# Patient Record
Sex: FEMALE | Race: WHITE | Employment: UNEMPLOYED | URBAN - METROPOLITAN AREA
[De-identification: names, ages, dates, MRNs, and addresses within clinical notes are randomized per-mention and may not be internally consistent; named-entity substitution may affect disease eponyms.]

---

## 2020-07-17 ENCOUNTER — HOSPITAL ENCOUNTER (INPATIENT)
Facility: CLINIC | Age: 60
LOS: 17 days | Discharge: HOME HEALTH | End: 2020-08-03
Attending: PHYSICAL MEDICINE & REHABILITATION | Admitting: PHYSICAL MEDICINE & REHABILITATION
Payer: MEDICAID

## 2020-07-17 DIAGNOSIS — I63.9 ACUTE ISCHEMIC STROKE (H): Primary | ICD-10-CM

## 2020-07-17 DIAGNOSIS — E11.9 TYPE 2 DIABETES MELLITUS WITHOUT COMPLICATION, WITHOUT LONG-TERM CURRENT USE OF INSULIN (H): ICD-10-CM

## 2020-07-17 LAB
GLUCOSE BLDC GLUCOMTR-MCNC: 102 MG/DL (ref 70–99)
GLUCOSE BLDC GLUCOMTR-MCNC: 138 MG/DL (ref 70–99)

## 2020-07-17 PROCEDURE — 12800006 ZZH R&B REHAB

## 2020-07-17 PROCEDURE — 25000132 ZZH RX MED GY IP 250 OP 250 PS 637: Performed by: PHYSICIAN ASSISTANT

## 2020-07-17 PROCEDURE — 00000146 ZZHCL STATISTIC GLUCOSE BY METER IP

## 2020-07-17 RX ORDER — BISACODYL 10 MG
10 SUPPOSITORY, RECTAL RECTAL DAILY PRN
Status: DISCONTINUED | OUTPATIENT
Start: 2020-07-17 | End: 2020-08-03 | Stop reason: HOSPADM

## 2020-07-17 RX ORDER — POLYETHYLENE GLYCOL 3350 17 G/17G
17 POWDER, FOR SOLUTION ORAL DAILY PRN
Status: DISCONTINUED | OUTPATIENT
Start: 2020-07-17 | End: 2020-07-25

## 2020-07-17 RX ORDER — ACETAMINOPHEN 325 MG/1
325-975 TABLET ORAL EVERY 6 HOURS PRN
Status: DISCONTINUED | OUTPATIENT
Start: 2020-07-17 | End: 2020-08-03 | Stop reason: HOSPADM

## 2020-07-17 RX ORDER — SERTRALINE HYDROCHLORIDE 25 MG/1
25 TABLET, FILM COATED ORAL DAILY
Status: DISCONTINUED | OUTPATIENT
Start: 2020-07-18 | End: 2020-07-23

## 2020-07-17 RX ORDER — NICOTINE POLACRILEX 4 MG
15-30 LOZENGE BUCCAL
Status: DISCONTINUED | OUTPATIENT
Start: 2020-07-17 | End: 2020-08-03 | Stop reason: HOSPADM

## 2020-07-17 RX ORDER — CLOPIDOGREL BISULFATE 75 MG/1
75 TABLET ORAL DAILY
Status: DISCONTINUED | OUTPATIENT
Start: 2020-07-18 | End: 2020-08-03 | Stop reason: HOSPADM

## 2020-07-17 RX ORDER — ASPIRIN 81 MG/1
81 TABLET ORAL DAILY
Status: DISCONTINUED | OUTPATIENT
Start: 2020-07-18 | End: 2020-08-03 | Stop reason: HOSPADM

## 2020-07-17 RX ORDER — ATORVASTATIN CALCIUM 40 MG/1
40 TABLET, FILM COATED ORAL EVERY EVENING
Status: DISCONTINUED | OUTPATIENT
Start: 2020-07-17 | End: 2020-08-03 | Stop reason: HOSPADM

## 2020-07-17 RX ORDER — LISINOPRIL 20 MG/1
20 TABLET ORAL DAILY
Status: DISCONTINUED | OUTPATIENT
Start: 2020-07-18 | End: 2020-07-20

## 2020-07-17 RX ORDER — DEXTROSE MONOHYDRATE 25 G/50ML
25-50 INJECTION, SOLUTION INTRAVENOUS
Status: DISCONTINUED | OUTPATIENT
Start: 2020-07-17 | End: 2020-08-03 | Stop reason: HOSPADM

## 2020-07-17 RX ORDER — HYDRALAZINE HYDROCHLORIDE 25 MG/1
25 TABLET, FILM COATED ORAL 3 TIMES DAILY PRN
Status: DISCONTINUED | OUTPATIENT
Start: 2020-07-17 | End: 2020-08-03 | Stop reason: HOSPADM

## 2020-07-17 RX ORDER — AMLODIPINE BESYLATE 10 MG/1
10 TABLET ORAL DAILY
Status: DISCONTINUED | OUTPATIENT
Start: 2020-07-18 | End: 2020-08-03 | Stop reason: HOSPADM

## 2020-07-17 RX ADMIN — METFORMIN HYDROCHLORIDE 500 MG: 500 TABLET ORAL at 18:29

## 2020-07-17 RX ADMIN — HYDRALAZINE HYDROCHLORIDE 25 MG: 25 TABLET ORAL at 19:16

## 2020-07-17 RX ADMIN — DICLOFENAC SODIUM 2 G: 10 GEL TOPICAL at 18:31

## 2020-07-17 RX ADMIN — ATORVASTATIN CALCIUM 40 MG: 40 TABLET, FILM COATED ORAL at 21:16

## 2020-07-17 RX ADMIN — DICLOFENAC SODIUM 2 G: 10 GEL TOPICAL at 21:16

## 2020-07-17 RX ADMIN — Medication 37.5 MG: at 21:16

## 2020-07-17 RX ADMIN — ACETAMINOPHEN 650 MG: 325 TABLET, FILM COATED ORAL at 19:16

## 2020-07-17 ASSESSMENT — MIFFLIN-ST. JEOR: SCORE: 1388.26

## 2020-07-17 NOTE — H&P
"    [facility]   Acute Rehabilitation Unit  Admission History and Physical    CHIEF COMPLAINT   \"I think I had a stroke\"    HISTORY OF PRESENT ILLNESS  Tara Odell is a 59-year-old female with no significant past medical history who presented with  tingling sensation on the right side, nausea and emesis, and memory impairments.  On arrival to the ER patient was noted to have right visual field cut.  Stroke code was called. She was not given TPAdue to hypertension.   CTA head showed left P2 occlusion which was not amenable for endovascular intervention.  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  Was seen and evaluated by neurology who recommend DAPT indefinitely given severe intracranial atherosclerotic disease, bp, HLD and dm control.      Status post cerebral angiogram which showed diffuse intracranial atherosclerotic disease in proximal basilar artery and left posterior cerebral artery distally. Mild fusiform enlargement of proximal cavernous left internal carotid artery as well as small left-sided aneurysm arising from control supraclinoid left internal carotid artery.  In addition to intracranial atherosclerosis workup revealed Hgb A1C 7.0% with new diabetes diagnosis, hyperlidemia with total cholesterol 226, . Echo with normal EF, moderate hypertrophy. Was started on medications for management of HTN, HLD, and diabetes during hospitalization will need ongoing monitoring and titration.    During her acute hospitalization, patient was seen and evaluated by PT, SLP and OT, who collectively " "recommended that patient would benefit from ongoing therapies in the acute inpatient rehabilitation setting.       In review of the therapy notes patient noted to have impaired balance, weakness, right field cut/neglect, impaired cognition and aphasia. She is currently min assist for seated grooming, transfers with mod-max assist walking up with 55 feet with mod assist and walker.      On arrival to rehab able to state her name and in response to why she is at rehab stated \"I think I had a stroke\". She was unable to name her deficits, she denied n/v/d, sob, headaches, dizziness, fever, and pain. She reported urge to void with suprapubic fullness reportedly doss catheter was removed several hours prior to admission, she has not yet voided.  Tara reports her appetite is poor and expresses frustration/sadness about her situation.  She denies vision/hearing changes, is motivated to get home to see grand babies. She reports right sided sensation changes.       PAST MEDICAL HISTORY   Reviewed and updated in Epic.  No past medical history on file.    SURGICAL HISTORY  Reviewed and updated in Epic.  No past surgical history on file.    SOCIAL HISTORY  Reviewed and updated in Epic.  Marital Status: single  Living situation: living with mother and brother in house, has 2 adult children   Family support: supportive  Vocational History: working at   Tobacco use: none  Alcohol use: none  Illicit drug use: none  Social History     Socioeconomic History     Marital status: Not on file     Spouse name: Not on file     Number of children: Not on file     Years of education: Not on file     Highest education level: Not on file   Occupational History     Not on file   Social Needs     Financial resource strain: Not on file     Food insecurity     Worry: Not on file     Inability: Not on file     Transportation needs     Medical: Not on file     Non-medical: Not on file   Tobacco Use     Smoking status: Not on file   Substance " "and Sexual Activity     Alcohol use: Not on file     Drug use: Not on file     Sexual activity: Not on file   Lifestyle     Physical activity     Days per week: Not on file     Minutes per session: Not on file     Stress: Not on file   Relationships     Social connections     Talks on phone: Not on file     Gets together: Not on file     Attends Zoroastrianism service: Not on file     Active member of club or organization: Not on file     Attends meetings of clubs or organizations: Not on file     Relationship status: Not on file     Intimate partner violence     Fear of current or ex partner: Not on file     Emotionally abused: Not on file     Physically abused: Not on file     Forced sexual activity: Not on file   Other Topics Concern     Not on file   Social History Narrative     Not on file       FAMILY HISTORY  Reviewed and updated in Epic.  Family History   Problem Relation Age of Onset     Heart Disease Mother      Heart Disease Father          PRIOR FUNCTIONAL HISTORY   Pt was independent with all ADLs/IADLs, transfers, mobility and gait.      MEDICATIONS  Scheduled meds  No medications prior to admission.       ALLERGIES   Allergies not on file      REVIEW OF SYSTEMS  A 10 point ROS was performed and negative unless otherwise noted in HPI.       PHYSICAL EXAM  VITAL SIGNS:  BP (!) 152/77 (BP Location: Right arm)   Pulse 65   Temp 98.1  F (36.7  C) (Oral)   Resp 20   Ht 1.626 m (5' 4\")   Wt 83.3 kg (183 lb 11.2 oz)   SpO2 96%   BMI 31.53 kg/m    BMI:  There is no height or weight on file to calculate BMI.     General: awake alert  HEENT: no scleral irritation/, mmm  Pulmonary: non labored clear  Cardiovascular: rrr  Abdominal: soft non tender, reported discomfort in suprapubic area.   Extremities: warm, well perfused, no edema in bilateral lower extremities, no tenderness in calves   MSK/neuro:   Mental Status:  alert and oriented to self and somewhat to situation.    Cranial Nerves:   1. 2nd CN: Pupils " equal, round, reactive  2. 3rd,4th,6th CN:  EOMI, appropriate pupillary responses.  Significant right field cut (hemianopsia)  3. 5th CN: facial sensation intact   4. 7th CN: face symmetrical   5. 8th CN: functional hearing bilaterally  6. 9th, 10th CN: palate elevates symmetrically   7. 11th CN: sternocleidomastoids and trapezii strong   8. 12th CN: tongue midline and without fasciculations     Sensory: reports impaired sensation on right.    Strength: 4/5 in all muscle groups of the left upper and lower   SF  EF  EE  WE  G  HF  KE  DF  EHL  PF   R  3           4-         3            4         4          4-         4-         4           4          4     Reflexes: Present and symmetrical    Navarro's test: negative bilaterally    Tone per modified Mustapha Scale: none   Abnormal movements: None    Coordination: appears slowed/ off on right ? Related to vision   Speech: memory impaired word finding problems.- anomia    Cognition: memory impaired follows simple commands   Gait: not tested.  Skin: no bruising or bleeding      LABS/imaging  Hgb A1C 7.0  Total Cholesterol 226     Echo   Left ventricle ejection fraction is normal. The calculated left   ventricular ejection fraction is 60%.    Normal left ventricular size. Moderate hypertrophy noted    Normal right ventricular size and systolic function.    No significant valvualr abnormalities noted    No previous study for comparison.    MRI   1.  Left posterior cerebral artery is supplied via the left posterior communicating artery. There is occlusion of the left posterior communicating artery at the expected P1/P2 junction without flow seen more distally.    2.  Severe multifocal areas of stenosis are seen within the proximal intradural vertebral arteries and basilar artery as seen on prior CTA.    3.  There is aneurysmal dilatation/fusiform ectasia of the cavernous segment of the left internal carotid artery measuring up to 9 mm in diameter.    4.  Large area  of restricted diffusion is seen involving the medial aspect of the left temporal and occipital lobes with a smaller area in the left putamen and compatible with areas of ischemic change.    CT BRAIN WITHOUT CONTRAST:   1. No finding for intracranial hemorrhage or mass or convincing finding for acute infarct. Small areas of patchy white matter low-attenuation change are noted, nonspecific and favored to reflect sequela of chronic microvascular ischemic change given the   patient's age. In the absence of priors for comparison, small areas of acute on chronic ischemic change cannot be excluded.  2. Small chronic infarcts are seen within both parietal lobes. Mild volume loss.    CTA [place] OF LUCAS:   1. There is abrupt occlusion of the left posterior cerebral artery at the level of the P2 junction. Left posterior cerebral artery is derived via the left posterior communicating artery. Left P1 segment is not identified.  2. There is smooth tapering of the proximal P1 segment of the right posterior cerebral artery. Although favored to reflect congenital variation, acquired occlusion cannot be excluded. P2 and more distal segments of the right posterior cerebral artery is   supplied via the right posterior communicating artery.  3. Extensive coarse atherosclerotic plaque is seen within both carotid siphons, intradural vertebral arteries and within the basilar artery. There is multifocal severe stenosis within the vertebral and basilar arteries. More mild narrowing is seen in   both carotid siphons without clear flow limiting stenosis. Areas of mild narrowing are seen within the distal EULALIA and MCA branches.    CTA OF THE NECK:  1. Mild atherosclerotic plaque left carotid bulb/bifurcation without significant stenosis either cervical carotid or vertebral system by modified NASCET criteria. No findings for dissection.    IMPRESSION/PLAN:  Tara BEAN Jose R is a 59 year old woman who presented with right sided  weakness/sensation changes, impaired cognition, nausea and vomiting noted to have visual field cut on admission, diagnosed with Left PCA stroke with severe multifocal intracranial atherosclerotic disease, HTN, HLD, and type 2 diabetes. Admitted to acute rehab 7/17 for ongoing rehabilitation and medical management.     Admission to acute inpatient rehab Left PCA stroke.    Impairment group code: 01.2      1. PT, OT and SLP 60 minutes of each on a daily basis, in addition to rehab nursing and close management of physiatrist.      2. Impairment of ADL's: Noted to have impaired strength, balance, vision, cognition, and activity tolerance. Currently max assist for clothing management with toileting, mod-max assist with transfers, mod assist for supine to sit, min assist for seated grooming/hygine. Goals for mod I with basic ADLs.     3. Impairment of mobility:  Noted to have strength, balance, vision, cognition, and activity tolerance, currently sit to stand with min assist, ambulating up to 55 feet with mod assist, goals for mod I with basic mobility and transfers, and cga for stairs.       4. Impairment of cognition/language/swallow:  Noted to have impaired cognition, and expressive aphasia. Goals to improve cognitive/linguistic skills.     5. Medical Conditions  Left PCA infarction   severe multifocal intracranial atherosclerotic disease    Patient admitted with right visual field cut, CT and CTA showed left PCA infarction along with extensive atherosclerotic disease involving the carotid and vertebrobasilar system. cerebral angiogram showed mild fusiform dilatation of the right and left ICA with diffuse intracranial atherosclerotic disease most notably 70% narrowing of the proximal basilar artery  -continue Dual antiplatelet therapy with aspirin and Plavix,  indefinitely   - continue  Lipitor 40 mg daily with goal of LDL less than 70.  - HTN management as below-  goal should be 120-130/80  -continue Physical,  occupational and speech therapy   -DM management as below Hemoglobin A1c goal <7.  - Follow-up with neurology (Dr. Duarte) in 6 to 8 weeks     Essential hypertension.  goal <130/80. Hypertensive on admission with initiation of BP meds starting 7/14, with ongoing titration.   -continue Amlodipine 10 mg daily, lisinopril 20 mg daily and metoprolol 37.5 daily. If bp remains elevated >130 would increase lisinopril   -hydralazine 25 mg tid prn.     Hypercholesteremia    . Goal <70. Started on statin during acute hospitalization.     -Continue atorvastatin 40 mg daily.    New diagnosis of DM 2.   HbA1C 7%  -Start  Metformin 500 mg daily  - monitor blood glucose levels- anticipate transition to bid 7/20  -ssi     Left hip pain: no fractures on xray no effusion, ? Trochanteric bursitis  - apply diclofenac ointment scheduled/ prn tylenol  -monitor.      6. Adjustment to disability:  Clinical psychology to eval and treat  7. FEN: reg  8. Bowel: continent  9. Bladder: doss reportedly removed 7/17- tov/ - monitor pvrs st cath as indicated  10. DVT Prophylaxis: ambulation  11. GI Prophylaxis: reg  12. Code: full confirmed on admission.   13. Disposition: goal for home  14. ELOS:  ~2 weeks.  15. Rehab prognosis:  Fair.   16. Follow up Appointments on Discharge: neurology, pcp       discussed with Dr. Holland , PM&R staff physician     Elisabet Chavez PA-C  Rehab Service  Pager [phone]

## 2020-07-17 NOTE — PLAN OF CARE
PT: Attempted to see pt for same day eval. Brief conversation with pt in which pt expressing she is tired and becoming tearful over hospital stay, writer providing therapeutic listening. Receiving a phone call, declined further. Will see tomorrow at scheduled appointment time.

## 2020-07-17 NOTE — PROGRESS NOTES
07/17/20 2013   Visit Information   Visit Made By Staff    Type of Visit Initial;Staff consultation/triage   Visited Patient  (Televisit)   Interventions   Plan of Care Review   With patient/family/proxy   Basic Spiritual Interventions    introduction/orientation to Spiritual Health Services;Assessment of spiritual needs/resources;Prayer;Reflective conversation   Advanced Assessments/Interventions   Presenting Concerns/Issues Spiritual/Sikh/emotional support;Grief and adjustment issues;Self-concept disturbance;Challenged coping;Stress/self-care   SPIRITUAL HEALTH SERVICES: Tele-Encounter  Patient Location (Hospital, [facility], Unit): [facility], [facility], 5R  Spoke with (patient, family relationship): Pt.      Referral Source: Epic consult.    DATA:  Spoke with Tara who had just arrived on unit. She said that she wasn't sure what happened or why it happened. 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  Tara lives with her mom, Gbariela and her brother, Graham.  She has a dtr, Anabel, and a son, Tong.  She said that her , Paula Nguyen, had spoken with her by phone.  Tara first said that she was Mormonism, then she said that she is Pentecostal. We shared a prayer for her healing and protection and that of her family.       PLAN:  Will speak with Tara next week on ARU.    Chaplain LARS MENCHACA    ______________________________    Type of service:  Telephone Visit     has received verbal consent for a TelephoneVisit from the patient? Yes    Distance Provider Location: designated [place] office or home office (secure setting)    Mode of Communication: telephone (via [employer] phone or [employer] website tele-call-number ([phone]))

## 2020-07-17 NOTE — PLAN OF CARE
Arrived at 1430 to unit. Patient is Alert to self, disoriented to time, situation. A1 with walker and gait belt. Per report from [facility]'s doss was pulled at 1200. Patient attempted to void but able, random scan was 57. Continue with POC.

## 2020-07-18 ENCOUNTER — APPOINTMENT (OUTPATIENT)
Dept: OCCUPATIONAL THERAPY | Facility: CLINIC | Age: 60
End: 2020-07-18
Payer: MEDICAID

## 2020-07-18 ENCOUNTER — APPOINTMENT (OUTPATIENT)
Dept: PHYSICAL THERAPY | Facility: CLINIC | Age: 60
End: 2020-07-18
Payer: MEDICAID

## 2020-07-18 LAB
GLUCOSE BLDC GLUCOMTR-MCNC: 107 MG/DL (ref 70–99)
GLUCOSE BLDC GLUCOMTR-MCNC: 130 MG/DL (ref 70–99)
GLUCOSE BLDC GLUCOMTR-MCNC: 140 MG/DL (ref 70–99)
GLUCOSE BLDC GLUCOMTR-MCNC: 143 MG/DL (ref 70–99)

## 2020-07-18 PROCEDURE — 25000131 ZZH RX MED GY IP 250 OP 636 PS 637: Performed by: PHYSICIAN ASSISTANT

## 2020-07-18 PROCEDURE — 97530 THERAPEUTIC ACTIVITIES: CPT | Mod: GP | Performed by: PHYSICAL THERAPIST

## 2020-07-18 PROCEDURE — 25000132 ZZH RX MED GY IP 250 OP 250 PS 637: Performed by: PHYSICAL MEDICINE & REHABILITATION

## 2020-07-18 PROCEDURE — 97166 OT EVAL MOD COMPLEX 45 MIN: CPT | Mod: GO

## 2020-07-18 PROCEDURE — 12800006 ZZH R&B REHAB

## 2020-07-18 PROCEDURE — 97535 SELF CARE MNGMENT TRAINING: CPT | Mod: GO

## 2020-07-18 PROCEDURE — 00000146 ZZHCL STATISTIC GLUCOSE BY METER IP

## 2020-07-18 PROCEDURE — 97162 PT EVAL MOD COMPLEX 30 MIN: CPT | Mod: GP | Performed by: PHYSICAL THERAPIST

## 2020-07-18 PROCEDURE — 97116 GAIT TRAINING THERAPY: CPT | Mod: GP | Performed by: PHYSICAL THERAPIST

## 2020-07-18 PROCEDURE — 25000132 ZZH RX MED GY IP 250 OP 250 PS 637: Performed by: PHYSICIAN ASSISTANT

## 2020-07-18 PROCEDURE — 97110 THERAPEUTIC EXERCISES: CPT | Mod: GO

## 2020-07-18 RX ORDER — LANOLIN ALCOHOL/MO/W.PET/CERES
3 CREAM (GRAM) TOPICAL
Status: DISCONTINUED | OUTPATIENT
Start: 2020-07-18 | End: 2020-08-03 | Stop reason: HOSPADM

## 2020-07-18 RX ADMIN — ASPIRIN 81 MG: 81 TABLET ORAL at 09:05

## 2020-07-18 RX ADMIN — MELATONIN TAB 3 MG 3 MG: 3 TAB at 22:58

## 2020-07-18 RX ADMIN — CLOPIDOGREL BISULFATE 75 MG: 75 TABLET, FILM COATED ORAL at 09:05

## 2020-07-18 RX ADMIN — DICLOFENAC SODIUM 2 G: 10 GEL TOPICAL at 09:12

## 2020-07-18 RX ADMIN — ACETAMINOPHEN 650 MG: 325 TABLET, FILM COATED ORAL at 20:12

## 2020-07-18 RX ADMIN — LISINOPRIL 20 MG: 20 TABLET ORAL at 09:05

## 2020-07-18 RX ADMIN — DICLOFENAC SODIUM 2 G: 10 GEL TOPICAL at 13:52

## 2020-07-18 RX ADMIN — HYDRALAZINE HYDROCHLORIDE 25 MG: 25 TABLET ORAL at 23:30

## 2020-07-18 RX ADMIN — DICLOFENAC SODIUM 2 G: 10 GEL TOPICAL at 20:11

## 2020-07-18 RX ADMIN — Medication 37.5 MG: at 09:05

## 2020-07-18 RX ADMIN — SERTRALINE HYDROCHLORIDE 25 MG: 25 TABLET ORAL at 09:05

## 2020-07-18 RX ADMIN — Medication 37.5 MG: at 20:11

## 2020-07-18 RX ADMIN — HYDRALAZINE HYDROCHLORIDE 25 MG: 25 TABLET ORAL at 16:18

## 2020-07-18 RX ADMIN — INSULIN ASPART 1 UNITS: 100 INJECTION, SOLUTION INTRAVENOUS; SUBCUTANEOUS at 17:50

## 2020-07-18 RX ADMIN — ATORVASTATIN CALCIUM 40 MG: 40 TABLET, FILM COATED ORAL at 20:10

## 2020-07-18 RX ADMIN — AMLODIPINE BESYLATE 10 MG: 10 TABLET ORAL at 09:05

## 2020-07-18 RX ADMIN — METFORMIN HYDROCHLORIDE 500 MG: 500 TABLET ORAL at 17:50

## 2020-07-18 RX ADMIN — DICLOFENAC SODIUM 2 G: 10 GEL TOPICAL at 16:19

## 2020-07-18 ASSESSMENT — ACTIVITIES OF DAILY LIVING (ADL)
PREVIOUS_RESPONSIBILITIES: MEAL PREP;HOUSEKEEPING;LAUNDRY;SHOPPING;YARDWORK;MEDICATION MANAGEMENT;FINANCES;DRIVING;WORK;CHILD CARE

## 2020-07-18 NOTE — PHARMACY-MEDICATION REGIMEN REVIEW
Pharmacy Medication Regimen Review  Tara Odell is a 59 year old female who is currently in the Acute Rehab Unit.    Assessment: All medications have an appropriate indications, durations and no unnecessary use was found    Plan:   Continue medications as ordered.  Attending provider will be sent this note for review.  If there are any emergent issues noted above, pharmacist will contact provider directly by phone.      Pharmacy will periodically review the resident's medication regimen for any PRN medications not administered in > 72 hours and discontinue them. The pharmacist will discuss gradual dose reductions of psychopharmacologic medications with interdisciplinary team on a regular basis.    Please contact pharmacy if the above does not answer specific medication questions/concerns.    Background:  A pharmacist has reviewed all medications and pertinent medical history today.  Medications were reviewed for appropriate use and any irregularities found are listed with recommendations.      Current Facility-Administered Medications:      acetaminophen (TYLENOL) tablet 325-975 mg, 325-975 mg, Oral, Q6H PRN, Elisabet Chavez PA, 650 mg at 07/17/20 1916     amLODIPine (NORVASC) tablet 10 mg, 10 mg, Oral, Daily, Elisabet Chavez PA, 10 mg at 07/18/20 0905     aspirin EC tablet 81 mg, 81 mg, Oral, Daily, Elisabet Chavez PA, 81 mg at 07/18/20 0905     atorvastatin (LIPITOR) tablet 40 mg, 40 mg, Oral, QPM, Elisabet Chavez PA, 40 mg at 07/17/20 2116     bisacodyl (DULCOLAX) Suppository 10 mg, 10 mg, Rectal, Daily PRN, Elisabet Chavez PA     clopidogrel (PLAVIX) tablet 75 mg, 75 mg, Oral, Daily, Elisabet Chavez PA, 75 mg at 07/18/20 0905     glucose gel 15-30 g, 15-30 g, Oral, Q15 Min PRN **OR** dextrose 50 % injection 25-50 mL, 25-50 mL, Intravenous, Q15 Min PRN **OR** glucagon injection 1 mg, 1 mg, Subcutaneous, Q15 Min PRN, Elisabet Chavez PA     diclofenac (VOLTAREN) 1 % topical  gel 2 g, 2 g, Transdermal, 4x Daily, Elisabet Chavez PA, 2 g at 07/18/20 0912     hydrALAZINE (APRESOLINE) tablet 25 mg, 25 mg, Oral, TID PRN, Elisabet Chavez PA, 25 mg at 07/17/20 1916     insulin aspart (NovoLOG) injection (RAPID ACTING), 1-3 Units, Subcutaneous, TID AC, Elisabet Chavez PA     insulin aspart (NovoLOG) injection (RAPID ACTING), 1-3 Units, Subcutaneous, At Bedtime, Elisabet Chavez PA     lisinopril (ZESTRIL) tablet 20 mg, 20 mg, Oral, Daily, Elisabet Chavez PA, 20 mg at 07/18/20 0905     metFORMIN (GLUCOPHAGE) tablet 500 mg, 500 mg, Oral, Daily with supper, Elisabet Chavez PA, 500 mg at 07/17/20 1829     metoprolol tartrate (LOPRESSOR) half-tab 37.5 mg, 37.5 mg, Oral, BID, Elisabet Chavez PA, 37.5 mg at 07/18/20 0905     polyethylene glycol (MIRALAX) Packet 17 g, 17 g, Oral, Daily PRN, Elisabet Chavez PA     sertraline (ZOLOFT) tablet 25 mg, 25 mg, Oral, Daily, Elisabet Chavez PA, 25 mg at 07/18/20 0905  No current outpatient prescriptions on file.

## 2020-07-18 NOTE — PROGRESS NOTES
07/18/20 0945   Quick Adds   Type of Visit Initial PT Evaluation       Present no   Living Environment   Lives With parent(s);sibling(s)  (Mother who she cares for, and brother)   Living Arrangements house   Home Accessibility stairs within home   Number of Stairs, Within Home, Primary other (see comments)  (flight of stairs to basement)   Transportation Anticipated family or friend will provide   Living Environment Comment Pt unsure number of LATRICE or with handrails inside or outside   Self-Care   Usual Activity Tolerance good   Current Activity Tolerance fair   Equipment Currently Used at Home none   Functional Level Prior   Ambulation 0-->independent   Transferring 0-->independent   Toileting 0-->independent   Bathing 0-->independent   Communication 0-->understands/communicates without difficulty   Swallowing 0-->swallows foods/liquids without difficulty   Cognition 0 - no cognition issues reported   Fall history within last six months no   Which of the above functional risks had a recent onset or change? 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   Pertinent History of Current Problem (include personal factors and/or comorbidities that impact the POC) Moderate   Precautions/Limitations fall precautions   Weight-Bearing Status - LUE full weight-bearing   Weight-Bearing Status - RUE full weight-bearing   Weight-Bearing Status - LLE full weight-bearing   Weight-Bearing Status - RLE full weight-bearing   Heart Disease Risk Factors Diabetes;High blood pressure;Dislipidemia;Overweight   General Observations quite forgetful and fatigued   Cognitive Status Examination   Orientation person;other (see comments)  (but not place, time, or situation)   Level of  Consciousness alert   Follows Commands and Answers Questions 75% of the time   Personal Safety and Judgment other (Must comment)  (Appears intact)   Memory impaired   Cognitive Comment quite forgetful   Pain Assessment   Patient Currently in Pain No   Integumentary/Edema   Integumentary/Edema no deficits were identifed   Posture    Posture Forward head position;Protracted shoulders;Kyphosis   Range of Motion (ROM)   ROM Comment WFL bilat LE   Strength   Manual Muscle Testing Quick Adds MMT: Hip;MMT: Knee;MMT: Ankle   MMT: Hip, Rehab Eval   Hip Flexion - Left Side (3+/5) fair plus, left   Hip Extension - Left Side (3+/5) fair plus, left   Hip ABduction - Left Side (3+/5) fair plus, left   Hip Flexion - Right Side (3/5) fair, right   Hip Extension - Right Side (3/5) fair, right   Hip ABduction - Right Side (3/5) fair, right   MMT: Knee, Rehab Eval   Knee Flexion - Left Side (4-/5) good minus, left   Knee Extension - Left Side (3+/5) fair plus, left   Knee Flexion - Right Side (4-/5) good minus, right   Knee Extension - Right Side (3/5) fair, right   MMT: Ankle, Rehab Eval   Ankle Dorsiflexion - Left Side   (Attmpted ankle MMT but pt w/ difficulty following directions)   ARC Assessment Only   Acute Rehab Functional Assessment See IP Rehab Daily Documentation Flowsheet for Functional Mobility/ADL Assessment   Balance   Balance Comments Fair seated balance. Able to maintain EOB with SBA. Poor standing balance without UE A through FWW.   Sensory Examination   Sensory Perception other (describe)   Sensory Perception Comments Difficult to assess, but appears intact to light touch. Proprioception 2/5 at bilat great toe, 4/5 L ankle, 1/5 R ankle- Pt appears to be primarily guessing.   Coordination   Coordination other (see comments)   Coordination Comments Moderate impairment noted R UE, although this may be due to visual field cut. Unable to assess LE coordination due to difficulty with following commands.   Muscle Tone    Muscle Tone Comments N/A   Modality Interventions   Planned Modality Interventions Thermotherapy: Hydrocollator Packs;Cryotherapy   General Therapy Interventions   Planned Therapy Interventions balance training;bed mobility training;gait training;groups;manual therapy;motor coordination training;neuromuscular re-education;ROM;strengthening;stretching;transfer training;home program guidelines;progressive activity/exercise;risk factor education   Clinical Impression   Criteria for Skilled Therapeutic Intervention yes, treatment indicated   PT Diagnosis Decreased strength, decreased balance   Influenced by the following impairments decreased strength, decreased balance, impaired vision/likely R neglect, impaired cognition   Functional limitations due to impairments Increased difficulty with bed mobility, transfers, ambulation, stairs   Clinical Presentation Evolving/Changing   Clinical Presentation Rationale Impaired cognition may be a limiting factor for this patient.   Clinical Decision Making (Complexity) Moderate complexity   Therapy Frequency Daily   Predicted Duration of Therapy Intervention (days/wks) 14   Anticipated Equipment Needs at Discharge gait belt;front wheeled walker   Anticipated Discharge Disposition Home with Assist;Home with Home Therapy;Home with Outpatient Therapy  (with supervision)   Risk & Benefits of therapy have been explained Yes   Patient, Family & other staff in agreement with plan of care Yes   Total Evaluation Time   Total Evaluation Time (Minutes) 30

## 2020-07-18 NOTE — H&P
Post Admission Physician Evaluation:    I have compared Tara Odell's condition on admission to acute rehabilitation to that outlined in the preadmission screen. History and physical exam performed by SALINAS Abad, and myself.  No significant differences are identified and the patient remains appropriate for an inpatient rehabilitation facility level of care to manage medical issues and address functional impairments due to L PCA CVA.    Comorbid medical conditions being managed: Multivessel intracranial stenosis, HTN, DM II, HLD    Prior functional level:   Independent with ambulation, mobility, and ADLs    Present function:   impaired balance, weakness, right field cut/neglect, impaired cognition and aphasia. She is currently min assist for seated grooming, transfers with mod-max assist walking up with 55 feet with mod assist and walker.      Anticipated rehabilitation course:   Anticipate discharge home at a supervision level for ambulation, mobility, and ADLs    Will benefit from intensive rehabilitation includin minutes each of PT, OT and SLP  Rehabilitation nursing  Close management by physiatry    Prognosis: Good    Estimated length of stay: 14 days    Alfred Holland MD  Department of Rehabilitation Medicine  Pager: [phone]

## 2020-07-18 NOTE — PLAN OF CARE
Discharge Planner OT   Patient plan for discharge: home with family support  Current status: min a transfers cga sit to stand, l/b dressing don socks sitting in low chair sba  Barriers to return to prior living situation: decrease strength r u/e decreae adls and Iadls  Recommendations for discharge: home with op ot         Entered by: Sheila Ospina 07/18/2020 4:13 PM

## 2020-07-18 NOTE — PLAN OF CARE
FOCUS/GOAL  Bowel management, Bladder management, and Pain management    ASSESSMENT, INTERVENTIONS AND CONTINUING PLAN FOR GOAL:  Pt woke up,  c/o discomfort but could not describe in detail, confused, and emotional around 0400.   After patient voided and had A sponge mat, patient fell asleep without complains.  PVR 30 ml.  Incontinent of bladder before went to the toilet.   Ax 1 with walker and GB to transfer.   LBM on 7/17.   BP elevated this shift, almost match the PRN Hydralazine perimeter,  Rechecked BP in minutes and dropped a little to 155/80. Pt denied pain, SOB, chest pain.   Called for assistance. Bed alarm on.

## 2020-07-18 NOTE — PLAN OF CARE
Orders received for SLP evaluation and chart reviewed. Approached patient but patient too somnolent to participate. Pt responds to verbal cues but then quickly falls back asleep. Will re-approach later in day as schedule allows. RN notified. -60 minutes SLP.

## 2020-07-18 NOTE — PROGRESS NOTES
"CLINICAL NUTRITION SERVICES - ASSESSMENT NOTE     Nutrition Prescription    RECOMMENDATIONS FOR MDs/PROVIDERS TO ORDER:  Assistance with meals as needed     Malnutrition Status:    Unable to determine due to no NFPA completed     Recommendations already ordered by Registered Dietitian (RD):  Diet education  Boost GC BID    Future/Additional Recommendations:  Additional DM diet eduction as pt becomes more appropriate  Adjust supplements per pt preference   Consider need for CHO restriction once intakes improve    Unable to obtain in-person nutrition history or nutrition focused physical assessment (NFPA) from patient to limit exposure and to minimize use of PPE during COVID-19 pandemic. Spoke to pt over the phone.     REASON FOR ASSESSMENT  Tara Odell is a 59 year old female assessed by the dietitian for Admission Nutrition Risk Screen for new/uncontrolled diabetes  No significant PMH admitted for CVA workup revealed Hgb A1C 7.0% with new diabetes diagnosis, hyperlidemia with total cholesterol 226, .  NUTRITION HISTORY  Pt unsure of UBW but believes wt is stable. Noted 5# wt loss in 1 week per documented wt history. Pt was unsure of what she normally eats at home but stated she will usually eat a breakfast, lunch and dinner. Pt was diagnosed with DM during admit and started on metformin. Stated she was not informed of this diagnosis but seemed confused during assessment. Pt stated her mom has DM (lives with pt) but is unsure if she follows a DM diet.     CURRENT NUTRITION ORDERS  Diet: Regular  Intake/Tolerance: Pt confused during assessment. When asked about appetite stated \"I dont know, mostly tired\".  Was too somnolent to participate in speech therapy this morning. Did not eat any breakfast this morning per flowsheet. Lunch tray in room during assessment, pt asked what she ordered, RD told her but pt asked again a few minutes later. Briefly discussed what foods contain carbohydrate and provided " "pt with a general DM diet handout. Was not appropriate for further education at this time.     LABS  Labs reviewed    MEDICATIONS  Medications reviewed;  novolog  Metformin  PRN: dulcolax, miralax    ANTHROPOMETRICS  Height: 162.6 cm (5' 4\")  Most Recent Weight: 83.3 kg (183 lb 11.2 oz)    IBW: 54.5 kg  BMI: 31.53 kg/m^2, Obesity Grade I BMI 30-34.9  Weight History: 5# (2.6%) wt loss in 1 week  Wt Readings from Last 10 Encounters:   07/17/20 83.3 kg (183 lb 11.2 oz)   07/10/20 85.3 kg (188 lb)     Dosing Weight: 61.7 kg (adjsuted using current wt of 83.3 kg and ideal wt of 54.5 kg)    ASSESSED NUTRITION NEEDS  Estimated Energy Needs: 6595-1485 kcals/day (25 - 30 kcals/kg)  Justification: Maintenance  Estimated Protein Needs: 62-74 grams protein/day (1 - 1.2 grams of pro/kg)  Justification: Obesity guidelines  Estimated Fluid Needs: 2632-4490 mL/day (1 mL/kcal)   Justification: Maintenance or Per provider pending fluid status    PHYSICAL FINDINGS  See malnutrition section below.    MALNUTRITION  % Intake: Decreased intake does not meet criteria  % Weight Loss: > 2% in 1 week (severe)  Subcutaneous Fat Loss: Unable to assess  Muscle Loss: Unable to assess  Fluid Accumulation/Edema: None noted  Malnutrition Diagnosis: Unable to determine due to no NFPA completed     NUTRITION DIAGNOSIS  Predicted inadequate nutrient intake related to confusion as evidenced by documented itnakes      INTERVENTIONS  Implementation  Nutrition Education: Discussed CHO containing foods and provided DM diet handout. Will require additional education d/t pts current condition.    Boost GC BID     Goals  Patient to consume % of nutritionally adequate meal trays TID, or the equivalent with supplements/snacks.     Monitoring/Evaluation  Progress toward goals will be monitored and evaluated per protocol.    Cathleen Mendoza MS, RD, LDN  Unit Pager [phone]    "

## 2020-07-18 NOTE — PROGRESS NOTES
"PM&R PROGRESS NOTE     Patient Active Problem List   Diagnosis     Acute ischemic stroke (H)       HPI   Tara Odell is a 59 year old female admitted to the ARU on 7/17/2020    She has a history of  Left PCA stroke with severe multifocal intracranial atherosclerotic disease, HTN, HLD, and type 2 diabetes. Admitted to acute rehab 7/17 for ongoing rehabilitation and medical management.     Deficits are right sided weakness/sensation changes, impaired cognition, nausea and vomiting noted to have visual field cut on admission, diagnosed with       Impairment group code: 01.2    From the functional perspective, Tara will be undergoing formal evaluations by PT/OT/SLP       Medically,   Hypertension: continues to have higher blood pressures, goal is to keep below 130/80. On increased dose of lisinopril started today at 20 mg daily. Continue Norcvasc at 10 mg daily     DM: Continue insulin ISS, and metformin. Reviewed blood sugars 100-140. Continue unchanged for now. Continue QID BS checks     Secondary prevention of stroke with dual antiplatelet therapy.     Orders Placed This Encounter      Combination Diet Regular Diet Adult      Review Of Systems  Total of ten systems reviewed, pertinent positives and negatives as follows  Denies chest pain fever chills rigors  Denies any shortness of breath   Denies any nausea or vomiting   Appetite good  Denies any lightheadedness or dizziness   Reports right sided weakness   Orders Placed This Encounter      Combination Diet Regular Diet Adult    Denies any pain    Denies any sob or cough   Denies any new rashes   LBM incontinent per nursing   Slept poorly   Fatigued   Remainder of the review of the systems was negative        /69 (BP Location: Right arm)   Pulse 66   Temp 96.8  F (36  C) (Oral)   Resp 16   Ht 1.626 m (5' 4\")   Wt 83.3 kg (183 lb 11.2 oz)   SpO2 96%   BMI 31.53 kg/m    Physical exam    Patient is lying in bed comfortable in no acute distress "   Fatigued   Slept poorly last night   Falls asleep easily during the conversation   HEENT NC AT PRTL EOM good   Neck supple  Heart S1S2  Lungs CTA  Abdomen  benign BS positive NT NR   LE no edema         Current Facility-Administered Medications   Medication     acetaminophen (TYLENOL) tablet 325-975 mg     amLODIPine (NORVASC) tablet 10 mg     aspirin EC tablet 81 mg     atorvastatin (LIPITOR) tablet 40 mg     bisacodyl (DULCOLAX) Suppository 10 mg     clopidogrel (PLAVIX) tablet 75 mg     glucose gel 15-30 g    Or     dextrose 50 % injection 25-50 mL    Or     glucagon injection 1 mg     diclofenac (VOLTAREN) 1 % topical gel 2 g     hydrALAZINE (APRESOLINE) tablet 25 mg     insulin aspart (NovoLOG) injection (RAPID ACTING)     insulin aspart (NovoLOG) injection (RAPID ACTING)     lisinopril (ZESTRIL) tablet 20 mg     metFORMIN (GLUCOPHAGE) tablet 500 mg     metoprolol tartrate (LOPRESSOR) half-tab 37.5 mg     polyethylene glycol (MIRALAX) Packet 17 g     sertraline (ZOLOFT) tablet 25 mg        Labs:  No results found for: WBC, HGB, HCT, PLT, NA, POTASSIUM, CHLORIDE, CO2, BUN, CR, GLC, SED, DD, DIMER, NTBNPI, NTBNP, TROPONIN, TROPI, TROPR, TROPN, AST, ALT, GGT, ALKPHOS, BILITOTAL, BILIDIRECT, PROSPER, INR    Attestation:  This patient has been seen and evaluated by me, Siomara Gordillo MD.    Total time: 25 Minutes more than 50% in Care coordination on the floor/ counseling the patient face to face   Siomara Gordillo MD, [facility]   Department of Rehabilitation

## 2020-07-18 NOTE — PROGRESS NOTES
20 1432   Living Arrangements   Lives With parent(s);sibling(s)   Final Resources   Equipment Currently Used at Home none     Social Work: Initial Assessment with Discharge Plan    Patient Name: Tara Odell  : 1960  Age: 59 year old  MRN: [id number]  Completed assessment with: patient, chart review, daughter  Admitted to ARU: 20    Presenting Information   Date of SW assessment: 2020  Health Care Directive: none  Primary Health Care Agent: next-of-kin (adult children) would be surrogate decision-maker if necessary  Secondary Health Care Agent: n/a  Living Situation: lives with her mother and brother in house in [place]  Previous Functional Status: independent, 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, was working  DME available: none  Patient and family understanding of hospitalization: stroke  Cultural/Language/Spiritual Considerations: pastor [place] involved (ok with  visits)    Physical Health  Reason for admission: acute left PCA territory CVA secondary to vascular occlusion    Provider Information   Primary Care Physician: [facility]eds to [facility] [place]     Mental Health/Chemical Dependency:   Diagnosis: zoloft started for mood concerns  Alcohol/Tobacco/Narcotics: no problems  Support/Services in Place: none  Services Needed/Recommended: health psychologist available if interested during stay  Sexuality/Intimacy: not discussed    Support System  Marital Status:   Family support: daughter-Anabel  Brother-Graham (unemployed, there , lives w/her and her mother)  Son-Guy (lives in [place])  Other support available: none mentioned    Community Resources  Current in home services: none  Previous services: none    Financial/Employment/Education  Employment Status: working  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  Pt/dtr explained that she was on MA yrs ago when she lived in [place]-unsure if [facility] is planning to transfer case to [facility] or what next steps are so sw can involved the financial worker to sort this out.    Discharge Plan   Patient and family discharge goal: home with family and recommended home care/therapy services.  Provided education on discharge plan: home care/therapy services  Patient agreeable to discharge plan: to be determined  Provided education and attained signature for Medicare IM and IRF Patient Rights and Privacy Information provided to patient : n/a  Provided patient with [facility] Resources: declined  Barriers to discharge: potential need for 24/7 oversight/assistance -unsure if available    Discharge Recommendations   Disposition: home with family and recommended home care/therapy services, potential need for 24/7 oversight/assistance  Transportation Needs: family can provide  Name of Transportation Company and Phone: n/a, but may have benefit through MA plan if eligible    Additional comments   D:  See H&P for full medical background.  I:  Met with patient to complete assessment and social work consult.  A:  Patient is doing okay, has expressive aphasia and memory limits.  Supportive family who is involved.    P:  SW will follow and assist as needed.    Please invite to Care Conference:  Anabel (dtr)       TYESHA Hernandez  Weekend   Phone: [phone]  Pager: [phone]      "

## 2020-07-18 NOTE — PROGRESS NOTES
07/18/20 1300   Living Environment   Lives With   (mom and brother)   Living Arrangements house   Home Accessibility stairs to enter home;stairs within home  (13 steps to downstairs to her bedroom)   Living Environment Comment rambler 1 step to enter, pt and brother  caregivers for mom, main level standard toilet moms bathroom walkin shower with rails and rails around toilet, Pts bathroom down stairs standard toilet and tub shower comb   Functional Level   Ambulation 0-->independent   Transferring 0-->independent   Toileting 0-->independent   Bathing 0-->independent   Dressing 0-->independent   Eating 0-->independent   Communication 0-->understands/communicates without difficulty   Swallowing 0-->swallows foods/liquids without difficulty   Cognition 0 - no cognition issues reported   Fall history within last six months no   Which of the above functional risks had a recent onset or change? ambulation;transferring;toileting;bathing;dressing;eating;cognition;communication/speech   Prior Functional Level Comment Prior independent without AD   General Information   Additional Occupational Profile Info/Pertinent History of Current Problem per md tilley Pt is a 59-year-old female with no significant past medical history who presented with  tingling sensation on the right side, nausea and emesis, and memory impairments.  On arrival to the ER patient was noted to have right visual field cut.  Stroke code was called. She was not given TPAdue to hypertension.   CTA head showed left P2 occlusion which was not amenable for endovascular intervention.  MRI brain shows large area of acute -subacute ischemia in the left temporal and occipital lobes; as well as small areas within the dorsolateral left thalamus, occlusion of the left posterior communicating artery at the P1/P2 junction, severe multifocal areas of stenosis within the proximal intradural vertebral arteries and basilar artery, aneurysmal dilatation of the cavernous  segment of the left ICA 9 mm.  Was seen and evaluated by neurology who recommend DAPT indefinitely given severe intracranial atherosclerotic disease, bp, HLD and dm control.     Weight-Bearing Status - LUE full weight-bearing   Weight-Bearing Status - RUE full weight-bearing   Weight-Bearing Status - LLE full weight-bearing   Weight-Bearing Status - RLE full weight-bearing   Cognitive Status Examination   Orientation person;place   Level of Consciousness alert   Follows Commands (Cognition) follows two step commands   Visual Perception   Visual Perception Comments pt needs further visual assessment difficult locating numbers and overshooting with pushing numbers on tv control, and closing r eye with tasks   Sensory Examination   Sensory Comments mild decrease sensation r u/e   Pain Assessment   Patient Currently in Pain No   Integumentary/Edema   Integumentary/Edema no deficits were identifed   Posture   Posture forward head position   Range of Motion (ROM)   ROM Comment prom arom wfl b u/e   Strength   Strength Comments l 4+ /5 r 3/5   Hand Strength   Hand Strength Comments functional for adls needs formal testing   Coordination   Coordination Comments decrease coordnationof r hand with adls but able to use with l to doff and don sock s to be formally tested   ARC Assessment Only   Acute Rehab Functional Assessment See IP Rehab Daily Documentation Flowsheet for Functional Mobility/ADL Assessment   Instrumental Activities of Daily Living (IADL)   Previous Responsibilities meal prep;housekeeping;laundry;shopping;yardwork;medication management;finances;driving;work;   Activities of Daily Living Analysis   Impairments Contributing to Impaired Activities of Daily Living balance impaired;cognition impaired;coordination impaired;motor control impaired;muscle tone abnormal;sensation decreased;strength decreased   General Therapy Interventions   Planned Therapy Interventions ADL retraining;IADL retraining;balance  training;bed mobility training;cognition;fine motor coordination training;motor coordination training;neuromuscular re-education   Clinical Impression   Criteria for Skilled Therapeutic Interventions Met yes, treatment indicated   OT Diagnosis decrease adls and Iadls   Influenced by the following impairments decrease strength r U/E and R L/E   Assessment of Occupational Performance 3-5 Performance Deficits   Identified Performance Deficits bathing dressing, toileting bating homemanagement   Clinical Decision Making (Complexity) Moderate complexity   Therapy Frequency Daily   Predicted Duration of Therapy Intervention (days/wks) 14 days   Anticipated Equipment Needs at Discharge bathing equipment;dressing equipment   Anticipated Discharge Disposition Home with Outpatient Therapy   Risks and Benefits of Treatment have been explained. Yes   Patient, Family & other staff in agreement with plan of care Yes   Clinical Impression Comments pt will benifit from ot per ot goals   Total Evaluation Time   Total Evaluation Time (Minutes) 15        07/18/20 1300   Living Environment   Lives With   (mom and brother)   Living Arrangements house   Home Accessibility stairs to enter home;stairs within home  (13 steps to downstairs to her bedroom)   Living Environment Comment rambler 1 step to enter, pt and brother  caregivers for mom, main level standard toilet moms bathroom walkin shower with rails and rails around toilet, Pts bathroom down stairs standard toilet and tub shower comb   Functional Level   Ambulation 0-->independent   Transferring 0-->independent   Toileting 0-->independent   Bathing 0-->independent   Dressing 0-->independent   Eating 0-->independent   Communication 0-->understands/communicates without difficulty   Swallowing 0-->swallows foods/liquids without difficulty   Cognition 0 - no cognition issues reported   Fall history within last six months no   Which of the above functional risks had a recent onset or  change? ambulation;transferring;toileting;bathing;dressing;eating;cognition;communication/speech   Prior Functional Level Comment Prior independent without AD   General Information   Additional Occupational Profile Info/Pertinent History of Current Problem per md reposrt Pt is a 59-year-old female with no significant past medical history who presented with  tingling sensation on the right side, nausea and emesis, and memory impairments.  On arrival to the ER patient was noted to have right visual field cut.  Stroke code was called. She was not given TPAdue to hypertension.   CTA head showed left P2 occlusion which was not amenable for endovascular intervention.  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  Was seen and evaluated by neurology who recommend DAPT indefinitely given severe intracranial atherosclerotic disease, bp, HLD and dm control.     Weight-Bearing Status - LUE full weight-bearing   Weight-Bearing Status - RUE full weight-bearing   Weight-Bearing Status - LLE full weight-bearing   Weight-Bearing Status - RLE full weight-bearing   Cognitive Status Examination   Orientation person;place   Level of Consciousness alert   Follows Commands (Cognition) follows two step commands   Visual Perception   Visual Perception Comments pt needs further visual assessment difficult locating numbers and overshooting with pushing numbers on tv control, and closing r eye with tasks   Sensory Examination   Sensory Comments mild decrease sensation r u/e   Pain Assessment   Patient Currently in Pain No   Integumentary/Edema   Integumentary/Edema no deficits were  identifed   Posture   Posture forward head position   Range of Motion (ROM)   ROM Comment prom arom wfl b u/e   Strength   Strength Comments l 4+ /5 r 3/5   Hand Strength   Hand Strength Comments functional for adls needs formal testing   Coordination   Coordination Comments decrease coordnationof r hand with adls but able to use with l to doff and don sock s to be formally tested   ARC Assessment Only   Acute Rehab Functional Assessment See IP Rehab Daily Documentation Flowsheet for Functional Mobility/ADL Assessment   Instrumental Activities of Daily Living (IADL)   Previous Responsibilities meal prep;housekeeping;laundry;shopping;yardwork;medication management;finances;driving;work;   Activities of Daily Living Analysis   Impairments Contributing to Impaired Activities of Daily Living balance impaired;cognition impaired;coordination impaired;motor control impaired;muscle tone abnormal;sensation decreased;strength decreased   General Therapy Interventions   Planned Therapy Interventions ADL retraining;IADL retraining;balance training;bed mobility training;cognition;fine motor coordination training;motor coordination training;neuromuscular re-education   Clinical Impression   Criteria for Skilled Therapeutic Interventions Met yes, treatment indicated   OT Diagnosis decrease adls and Iadls   Influenced by the following impairments decrease strength r U/E and R L/E   Assessment of Occupational Performance 3-5 Performance Deficits   Identified Performance Deficits bathing dressing, toileting bating homemanagement   Clinical Decision Making (Complexity) Moderate complexity   Therapy Frequency Daily   Predicted Duration of Therapy Intervention (days/wks) 14 days   Anticipated Equipment Needs at Discharge bathing equipment;dressing equipment   Anticipated Discharge Disposition Home with Outpatient Therapy   Risks and Benefits of Treatment have been explained. Yes   Patient, Family & other staff in agreement  with plan of care Yes   Clinical Impression Comments pt will benifit from ot per ot goals   Total Evaluation Time   Total Evaluation Time (Minutes) 15

## 2020-07-18 NOTE — PROGRESS NOTES
"SPIRITUAL HEALTH SERVICES  [facility] ([facility]) Rehab  ON-CALL VISIT     REFERRAL SOURCE: I did visit this morning patient Tara per [facility] hospital  referral. I introduced myself as the on-call  and shared all the info about the [facility].     Pt was looking so tired and sleepy and she said \"Thank you for coming to visit me but for today if I get a prayer is more than enough.\" As pt wanted after a brief words of comfort, I offered her a prayer based on her prayer request.     PLAN: The unit  will follow up the pt to provide spiritual care as needed.     Manny Lange M.Div. ([place]), M.Th., D.Min., [facility]  Staff   Pager [phone]    "

## 2020-07-18 NOTE — PLAN OF CARE
"Stroke with R sided weakness and vision field cut. VSS. T disoriented to time, and place and situation at times, better during day. Pt was very drowsy today after not getting much sleep last night. Tearful about being confused and feeling \"stupid\". Pt also needed to be educated and encouraged about how to use her call light if she needed something. Pt seamed to wake up more later in the shift and is currently visiting with daughter moses. Assist x1 with gait belt and walker. Pt incontinent of B&B at times, but also using toilet this shift. LBM 7/17. Orders for PVR after voids. DM II, BG's 130 and 140, no insulin given. Pt had poor appetite, only ate a muffin this shift. Encouraged importance of nutrition. Regular diet, think liquids. Therapy evals all done today. Continue with POC.   "

## 2020-07-19 ENCOUNTER — APPOINTMENT (OUTPATIENT)
Dept: PHYSICAL THERAPY | Facility: CLINIC | Age: 60
End: 2020-07-19
Payer: MEDICAID

## 2020-07-19 ENCOUNTER — APPOINTMENT (OUTPATIENT)
Dept: SPEECH THERAPY | Facility: CLINIC | Age: 60
End: 2020-07-19
Payer: MEDICAID

## 2020-07-19 ENCOUNTER — APPOINTMENT (OUTPATIENT)
Dept: OCCUPATIONAL THERAPY | Facility: CLINIC | Age: 60
End: 2020-07-19
Payer: MEDICAID

## 2020-07-19 LAB
GLUCOSE BLDC GLUCOMTR-MCNC: 116 MG/DL (ref 70–99)
GLUCOSE BLDC GLUCOMTR-MCNC: 122 MG/DL (ref 70–99)
GLUCOSE BLDC GLUCOMTR-MCNC: 130 MG/DL (ref 70–99)
GLUCOSE BLDC GLUCOMTR-MCNC: 204 MG/DL (ref 70–99)

## 2020-07-19 PROCEDURE — 97535 SELF CARE MNGMENT TRAINING: CPT | Mod: GO

## 2020-07-19 PROCEDURE — 25000132 ZZH RX MED GY IP 250 OP 250 PS 637: Performed by: PHYSICAL MEDICINE & REHABILITATION

## 2020-07-19 PROCEDURE — 92523 SPEECH SOUND LANG COMPREHEN: CPT | Mod: GN | Performed by: REHABILITATION PRACTITIONER

## 2020-07-19 PROCEDURE — 12800006 ZZH R&B REHAB

## 2020-07-19 PROCEDURE — 00000146 ZZHCL STATISTIC GLUCOSE BY METER IP

## 2020-07-19 PROCEDURE — 25000132 ZZH RX MED GY IP 250 OP 250 PS 637: Performed by: PHYSICIAN ASSISTANT

## 2020-07-19 PROCEDURE — 97530 THERAPEUTIC ACTIVITIES: CPT | Mod: GP

## 2020-07-19 RX ADMIN — Medication 37.5 MG: at 08:50

## 2020-07-19 RX ADMIN — SERTRALINE HYDROCHLORIDE 25 MG: 25 TABLET ORAL at 08:58

## 2020-07-19 RX ADMIN — DICLOFENAC SODIUM 2 G: 10 GEL TOPICAL at 20:30

## 2020-07-19 RX ADMIN — ACETAMINOPHEN 650 MG: 325 TABLET, FILM COATED ORAL at 02:10

## 2020-07-19 RX ADMIN — DICLOFENAC SODIUM 2 G: 10 GEL TOPICAL at 08:55

## 2020-07-19 RX ADMIN — LISINOPRIL 20 MG: 20 TABLET ORAL at 08:50

## 2020-07-19 RX ADMIN — Medication 37.5 MG: at 20:30

## 2020-07-19 RX ADMIN — CLOPIDOGREL BISULFATE 75 MG: 75 TABLET, FILM COATED ORAL at 08:50

## 2020-07-19 RX ADMIN — DICLOFENAC SODIUM 2 G: 10 GEL TOPICAL at 16:19

## 2020-07-19 RX ADMIN — ASPIRIN 81 MG: 81 TABLET ORAL at 08:50

## 2020-07-19 RX ADMIN — AMLODIPINE BESYLATE 10 MG: 10 TABLET ORAL at 08:50

## 2020-07-19 RX ADMIN — METFORMIN HYDROCHLORIDE 500 MG: 500 TABLET ORAL at 17:27

## 2020-07-19 RX ADMIN — DICLOFENAC SODIUM 2 G: 10 GEL TOPICAL at 12:31

## 2020-07-19 RX ADMIN — ATORVASTATIN CALCIUM 40 MG: 40 TABLET, FILM COATED ORAL at 20:30

## 2020-07-19 RX ADMIN — MELATONIN TAB 3 MG 3 MG: 3 TAB at 22:31

## 2020-07-19 NOTE — PLAN OF CARE
"PT: Pt engaged in functional mobility with FWW, required consistent cuing for hand placement, min A when turning with FWW for transfers; noted pulse to be low during session (51-58 bpm), pt reportedly feeling \"cold\" and \"tired\". -20 minutes due to bladder scan and low HR, RN aware.   "

## 2020-07-19 NOTE — PROGRESS NOTES
Called at 22:25. Patient requesting sleep aid.   -Melatonin 3mg prn  at bedtime ordered    Lory Stark MD

## 2020-07-19 NOTE — PROGRESS NOTES
Called at 5:02 regarding elevated SBP > 160 overnight, PRN hydralazine given without effective result. BP recheck 155/65. Plan to give scheduled PO lisinopril 20mg and PO amlodipine 10mg at 8:00. PO Hydralazine 25mg TID PRN for SBP > 160. Continue to monitor.     Lory Stark MD

## 2020-07-19 NOTE — PROGRESS NOTES
"   07/19/20 0900   General Information, SLP   Type of Evaluation Speech and Language   Type of Visit Initial   Start of Care Date 07/19/20   Onset of Illness/Injury or Date of Surgery - Date 07/10/20   Referring Physician Elisabet Chavez PA   Patient/Family Goals Statement \"I used to know how to read and think\"   Pertinent History of Current Problem Per chart review: \"Tara Odell is a 59-year-old female with no significant past medical history who presented with  tingling sensation on the right side, nausea and emesis, and memory impairments.  On arrival to the ER patient was noted to have right visual field cut.  Stroke code was called. She was not given TPAdue to hypertension.   CTA head showed left P2 occlusion which was not amenable for endovascular intervention.  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  Was seen and evaluated by neurology who recommend DAPT indefinitely given severe intracranial atherosclerotic disease, bp, HLD and dm control. \"   Language: Auditory Comprehension (understanding of spoken language)   Yes/No Sentence and Simple Paragraph; [place] Diagnostic Aphasia Exam 3 short (out of 6 total) 4   Functional Assessment Scale (Auditory Comprehension) Mild to Moderate Impairment   Language: Verbal Expression (use of spoken language to express information)   Generative Naming Score; Cognitive Linguistic Quick Test 1   Generative Naming; Cognitive Linguistic Quick Test Result Below mean   Conversation; [place] Diagnostic Aphasia Exam rating (out of 5 total) 3   Functional Assessment Scale (Verbal Expression) Moderate " Impairment   Reading Comprehension (understanding of written language)   Match Letters/Match Words (out of 8 total) 6   Functional Assessment Scale (Reading Comprehension) Moderate to Severe Impairment   Comments (Reading Comprehension) Likely impacted by R field cut/neglect.   Cognitive Status Examination   Attention impaired   Behavioral Observations confused;lethargic   Visual Impairments Include visual tracking;visual scanning   Reasoning impaired   Clinical Impression, SLP Eval   Criteria for Skilled Therapeutic Interventions Met Yes;Treatment indicated   Rehab Potential Good, to achieve stated therapy goals   Therapy Frequency Daily   Predicted Duration of Therapy Intervention (days/wks) 2 weeks   Anticipated Discharge Disposition Home with Assist   Risks and Benefits of Treatment have been explained. Yes   Patient, Family & other staff in agreement with plan of care Yes   Clinical Impression Comments Patient seen for speech/language evaluation. Patient presents with mild-moderate to moderate receptive/expressive aphasia. Reading comprehension is moderate-severely impaired and likely impacted by vision impairments. Recommend further evaluation of cognitive function as language improves. Communication function is significantly below baseline and pt will benefit from skilled SLP tx to maximize safety/independence for return to home.   Total Evaluation Time      Total Evaluation Time (Minutes) 40

## 2020-07-19 NOTE — PLAN OF CARE
Approached patient x2 in afternoon but patient sleeping soundly. Pt does not wake despite verbal and tactile cues. -30 SLP.

## 2020-07-19 NOTE — PROGRESS NOTES
"   07/19/20 0900   General Information, SLP   Type of Evaluation Speech and Language   Type of Visit Initial   Start of Care Date 07/19/20   Onset of Illness/Injury or Date of Surgery - Date 07/10/20   Referring Physician Elisabet Chavez PA   Patient/Family Goals Statement \"I used to know how to read and think\"   Pertinent History of Current Problem Per chart review: \"Tara Odell is a 59-year-old female with no significant past medical history who presented with  tingling sensation on the right side, nausea and emesis, and memory impairments.  On arrival to the ER patient was noted to have right visual field cut.  Stroke code was called. She was not given TPAdue to hypertension.   CTA head showed left P2 occlusion which was not amenable for endovascular intervention.  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  Was seen and evaluated by neurology who recommend DAPT indefinitely given severe intracranial atherosclerotic disease, bp, HLD and dm control. \"   Language: Auditory Comprehension (understanding of spoken language)   Yes/No Sentence and Simple Paragraph; [place] Diagnostic Aphasia Exam 3 short (out of 6 total) 4   Functional Assessment Scale (Auditory Comprehension) Mild to Moderate Impairment   Language: Verbal Expression (use of spoken language to express information)   Generative Naming Score; Cognitive Linguistic Quick Test 1   Generative Naming; Cognitive Linguistic Quick Test Result Below mean   Conversation; [place] Diagnostic Aphasia Exam rating (out of 5 total) 3   Functional Assessment Scale (Verbal Expression) Moderate " Impairment   Reading Comprehension (understanding of written language)   Match Letters/Match Words (out of 8 total) 6   Functional Assessment Scale (Reading Comprehension) Moderate to Severe Impairment   Comments (Reading Comprehension) Likely impacted by R field cut/neglect.   Cognitive Status Examination   Attention impaired   Behavioral Observations confused;lethargic   Visual Impairments Include visual tracking;visual scanning   Reasoning impaired   Clinical Impression, SLP Eval   Criteria for Skilled Therapeutic Interventions Met Yes;Treatment indicated   Rehab Potential Good, to achieve stated therapy goals   Therapy Frequency Daily   Predicted Duration of Therapy Intervention (days/wks) 2 weeks   Anticipated Discharge Disposition Home with Assist   Risks and Benefits of Treatment have been explained. Yes   Patient, Family & other staff in agreement with plan of care Yes   Clinical Impression Comments Patient seen for speech/language evaluation. Patient presents with mild-moderate to moderate receptive/expressive aphasia. Reading comprehension is moderate-severely impaired and likely impacted by vision impairments. Recommend further evaluation of cognitive function as language improves. Communication function is significantly below baseline and pt will benefit from skilled SLP tx to maximize safety/independence for return to home.   Total Evaluation Time      Total Evaluation Time (Minutes) 30

## 2020-07-19 NOTE — PLAN OF CARE
FOCUS/GOAL  Bladder management, Medical management, and Skin integrity    ASSESSMENT, INTERVENTIONS AND CONTINUING PLAN FOR GOAL:  Pt is alert to self and oriented to situation only, intermittently tearful and c/o not being able to get comfortable and sleep at HS. MD on call paged and melatonin PRN ordered and given. VSS ex for elevated BP hydralazine PRN and scheduled metoprolol given this shift recheck 144/70. Denies dizziness, SOB, chest pain.  and 107. Appetite fair, she ate 50 %. Had a shower this evening. Incontinent of bladder and continent of bowel x1. Assist of 1/walker for transfers. Staff anticipates needs, call light with reach. Alarms on for safety.

## 2020-07-19 NOTE — PLAN OF CARE
"FOCUS/GOAL  Bowel management, Bladder management, and Pain management    ASSESSMENT, INTERVENTIONS AND CONTINUING PLAN FOR GOAL:  Pt c/o \"tired, hot, could not fell asleep\" at the beginning of the shift, a fan offered and patient calm down.  Elevated BP this shift, PRN Hydralazine offered without effective result. On call provider was paged at 0500, who told writer to continuously monitor BP and hydralazine is not needed now. /65 at 0500.   Ax 1, GB and walker to transfer to toilet. Incontinent of bladder this shift. PVR 15 ml. No BM this shift, LBM on 7/17.  PCD offered, but patient took them off around 0200.  Pt was emotional, alert, disoriented to herself, time, and place.   Pt c/o LLE pain at 0230, tylenol offered with relief.   Pt slept well after 0230.   Call light in reach, but patient did not call for assistance this shift. Writer did frequently check on her overnight for the safety. Bed alarm on.         "

## 2020-07-19 NOTE — PLAN OF CARE
FOCUS/GOAL  Bladder management, Pain management, Medical management, and Safety management    ASSESSMENT, INTERVENTIONS AND CONTINUING PLAN FOR GOAL:  Pt complained of feeling tired and cold while in therapy, vitals signs stable ex for slightly elevated /73 pulse 57. Pt assisted to bed to rest. Denies dizziness, difficulty breathing, pain, N/V. Continent of bladder this morning, PVR 55. LBM 7/17 continent.  this morning after pt had breakfast and 130 before lunch. Requires 1 assist/walker for transfers and cares. Used call light appropriately to seek assistance. Alarms on for safety.    Jennifer shift: Pt  and 122. No sliding scale given. Denies pain. VSS ex elevated BP. Pt C/o not being comfortable and unable to sleep at bedtime. Pt repositioned and melatonin given. Incontinent of bladder. Used call light appropriately for assistance.

## 2020-07-20 ENCOUNTER — APPOINTMENT (OUTPATIENT)
Dept: OCCUPATIONAL THERAPY | Facility: CLINIC | Age: 60
End: 2020-07-20
Payer: MEDICAID

## 2020-07-20 ENCOUNTER — APPOINTMENT (OUTPATIENT)
Dept: PHYSICAL THERAPY | Facility: CLINIC | Age: 60
End: 2020-07-20
Payer: MEDICAID

## 2020-07-20 ENCOUNTER — APPOINTMENT (OUTPATIENT)
Dept: SPEECH THERAPY | Facility: CLINIC | Age: 60
End: 2020-07-20
Payer: MEDICAID

## 2020-07-20 LAB
ANION GAP SERPL CALCULATED.3IONS-SCNC: 5 MMOL/L (ref 3–14)
BASOPHILS # BLD AUTO: 0 10E9/L (ref 0–0.2)
BASOPHILS NFR BLD AUTO: 0.3 %
BUN SERPL-MCNC: 16 MG/DL (ref 7–30)
CALCIUM SERPL-MCNC: 8.9 MG/DL (ref 8.5–10.1)
CHLORIDE SERPL-SCNC: 110 MMOL/L (ref 94–109)
CO2 SERPL-SCNC: 26 MMOL/L (ref 20–32)
CREAT SERPL-MCNC: 0.63 MG/DL (ref 0.52–1.04)
DIFFERENTIAL METHOD BLD: NORMAL
EOSINOPHIL # BLD AUTO: 0.3 10E9/L (ref 0–0.7)
EOSINOPHIL NFR BLD AUTO: 4 %
ERYTHROCYTE [DISTWIDTH] IN BLOOD BY AUTOMATED COUNT: 13.4 % (ref 10–15)
GFR SERPL CREATININE-BSD FRML MDRD: >90 ML/MIN/{1.73_M2}
GLUCOSE BLDC GLUCOMTR-MCNC: 109 MG/DL (ref 70–99)
GLUCOSE BLDC GLUCOMTR-MCNC: 116 MG/DL (ref 70–99)
GLUCOSE BLDC GLUCOMTR-MCNC: 129 MG/DL (ref 70–99)
GLUCOSE SERPL-MCNC: 112 MG/DL (ref 70–99)
HCT VFR BLD AUTO: 38.2 % (ref 35–47)
HGB BLD-MCNC: 12.8 G/DL (ref 11.7–15.7)
IMM GRANULOCYTES # BLD: 0 10E9/L (ref 0–0.4)
IMM GRANULOCYTES NFR BLD: 0.1 %
LYMPHOCYTES # BLD AUTO: 1.6 10E9/L (ref 0.8–5.3)
LYMPHOCYTES NFR BLD AUTO: 21.8 %
MCH RBC QN AUTO: 29.6 PG (ref 26.5–33)
MCHC RBC AUTO-ENTMCNC: 33.5 G/DL (ref 31.5–36.5)
MCV RBC AUTO: 88 FL (ref 78–100)
MONOCYTES # BLD AUTO: 0.8 10E9/L (ref 0–1.3)
MONOCYTES NFR BLD AUTO: 11.7 %
NEUTROPHILS # BLD AUTO: 4.5 10E9/L (ref 1.6–8.3)
NEUTROPHILS NFR BLD AUTO: 62.1 %
NRBC # BLD AUTO: 0 10*3/UL
NRBC BLD AUTO-RTO: 0 /100
PLATELET # BLD AUTO: 318 10E9/L (ref 150–450)
POTASSIUM SERPL-SCNC: 3.6 MMOL/L (ref 3.4–5.3)
RBC # BLD AUTO: 4.33 10E12/L (ref 3.8–5.2)
SODIUM SERPL-SCNC: 141 MMOL/L (ref 133–144)
WBC # BLD AUTO: 7.2 10E9/L (ref 4–11)

## 2020-07-20 PROCEDURE — 00000146 ZZHCL STATISTIC GLUCOSE BY METER IP

## 2020-07-20 PROCEDURE — 97110 THERAPEUTIC EXERCISES: CPT | Mod: GP

## 2020-07-20 PROCEDURE — 80048 BASIC METABOLIC PNL TOTAL CA: CPT | Performed by: PHYSICIAN ASSISTANT

## 2020-07-20 PROCEDURE — 36415 COLL VENOUS BLD VENIPUNCTURE: CPT | Performed by: PHYSICIAN ASSISTANT

## 2020-07-20 PROCEDURE — 97116 GAIT TRAINING THERAPY: CPT | Mod: GP

## 2020-07-20 PROCEDURE — 92507 TX SP LANG VOICE COMM INDIV: CPT | Mod: GN

## 2020-07-20 PROCEDURE — 25000132 ZZH RX MED GY IP 250 OP 250 PS 637: Performed by: PHYSICIAN ASSISTANT

## 2020-07-20 PROCEDURE — 85025 COMPLETE CBC W/AUTO DIFF WBC: CPT | Performed by: PHYSICIAN ASSISTANT

## 2020-07-20 PROCEDURE — 97535 SELF CARE MNGMENT TRAINING: CPT | Mod: GO | Performed by: OCCUPATIONAL THERAPIST

## 2020-07-20 PROCEDURE — 97530 THERAPEUTIC ACTIVITIES: CPT | Mod: GP

## 2020-07-20 PROCEDURE — 12800006 ZZH R&B REHAB

## 2020-07-20 PROCEDURE — 97112 NEUROMUSCULAR REEDUCATION: CPT | Mod: GP

## 2020-07-20 RX ORDER — LISINOPRIL 40 MG/1
40 TABLET ORAL DAILY
Status: DISCONTINUED | OUTPATIENT
Start: 2020-07-21 | End: 2020-08-03 | Stop reason: HOSPADM

## 2020-07-20 RX ADMIN — ASPIRIN 81 MG: 81 TABLET ORAL at 08:56

## 2020-07-20 RX ADMIN — Medication 37.5 MG: at 08:56

## 2020-07-20 RX ADMIN — LISINOPRIL 20 MG: 20 TABLET ORAL at 08:56

## 2020-07-20 RX ADMIN — METFORMIN HYDROCHLORIDE 500 MG: 500 TABLET ORAL at 17:36

## 2020-07-20 RX ADMIN — DICLOFENAC SODIUM 2 G: 10 GEL TOPICAL at 17:36

## 2020-07-20 RX ADMIN — SERTRALINE HYDROCHLORIDE 25 MG: 25 TABLET ORAL at 08:56

## 2020-07-20 RX ADMIN — ACETAMINOPHEN 975 MG: 325 TABLET, FILM COATED ORAL at 01:42

## 2020-07-20 RX ADMIN — AMLODIPINE BESYLATE 10 MG: 10 TABLET ORAL at 08:56

## 2020-07-20 RX ADMIN — DICLOFENAC SODIUM 2 G: 10 GEL TOPICAL at 09:01

## 2020-07-20 RX ADMIN — HYDRALAZINE HYDROCHLORIDE 25 MG: 25 TABLET ORAL at 01:42

## 2020-07-20 RX ADMIN — ATORVASTATIN CALCIUM 40 MG: 40 TABLET, FILM COATED ORAL at 20:51

## 2020-07-20 RX ADMIN — DICLOFENAC SODIUM 2 G: 10 GEL TOPICAL at 12:36

## 2020-07-20 RX ADMIN — CLOPIDOGREL BISULFATE 75 MG: 75 TABLET, FILM COATED ORAL at 08:56

## 2020-07-20 RX ADMIN — DICLOFENAC SODIUM 2 G: 10 GEL TOPICAL at 20:53

## 2020-07-20 RX ADMIN — Medication 37.5 MG: at 20:52

## 2020-07-20 NOTE — PLAN OF CARE
"OT- Patient completed health and safety questions with 1/6 correct. Answered \"call police\" for all other questions asked. Apepars with decreased visual acuity and possible R side neglect with start of visual screening - will further assess.  Did ask \"which is my L hand?\" when asked to trial writing with L UE. Able to state month by counting on her fingers and singing a song for the months of the year.  "

## 2020-07-20 NOTE — PROGRESS NOTES
PM&R PROGRESS NOTE     Patient Active Problem List   Diagnosis     Acute ischemic stroke (H)       HPI   Tara Odell is a 59 year old female admitted to the ARU on 7/17/2020    She has a history of  Left PCA stroke with severe multifocal intracranial atherosclerotic disease, HTN, HLD, and type 2 diabetes. Admitted to acute rehab 7/17 for ongoing rehabilitation and medical management.     Deficits are right sided weakness/sensation changes, impaired cognition, nausea and vomiting noted to have visual field cut on admission, diagnosed with       Impairment group code: 01.2    From the functional perspective, Lindsay was seen by PT/OT/SLP. Discussed with JOSE Bender today, patient quite somnolent during her therapy session.   Per my discussion with the patient, she did not sleep well last night. And fatigued during the day.   In PT, she required Min assist for transfers and mobility with walker. Continue rehab.   While it is too early in the rehab course, recommend the primary evaluate her support system, and the patient's trajectory of recovery to make decisions if discharge home is a reasonable option       Medically,   Hypertension: continues to have higher blood pressures, goal is to keep below 130/80. On increased dose of lisinopril started today at 20 mg daily. Continue Norcvasc at 10 mg daily. Reviewed the call note and discussed with the resident. PO Hydralazine added as prn.      DM: Continue insulin ISS, and metformin. Reviewed blood sugars 100-140. Continue unchanged for now. Continue QID BS checks     Secondary prevention of stroke with dual antiplatelet therapy.     Orders Placed This Encounter      Combination Diet Regular Diet Adult      Review Of Systems  Total of ten systems reviewed, pertinent positives and negatives as follows  Fatigue   Slept poorly last night   Denies chest pain fever chills rigors  Denies any shortness of breath   Denies any nausea or vomiting   Appetite poor  Denies any  "lightheadedness or dizziness   Reports right sided weakness   Orders Placed This Encounter      Combination Diet Regular Diet Adult    Remainder of the review of the systems was negative        BP (!) 157/74 (BP Location: Right arm)   Pulse 62   Temp 98.4  F (36.9  C) (Oral)   Resp 16   Ht 1.626 m (5' 4\")   Wt 83.3 kg (183 lb 11.2 oz)   SpO2 96%   BMI 31.53 kg/m    Physical exam    Patient is lying in bed comfortable in no acute distress   Fatigued   Aphasia and paucity of speech   HEENT NC AT PRTL EOM good   Neck supple  Heart S1S2  Lungs CTA  Abdomen  benign BS positive NT NR   LE no edema    Right sided wealness       Current Facility-Administered Medications   Medication     acetaminophen (TYLENOL) tablet 325-975 mg     amLODIPine (NORVASC) tablet 10 mg     aspirin EC tablet 81 mg     atorvastatin (LIPITOR) tablet 40 mg     bisacodyl (DULCOLAX) Suppository 10 mg     clopidogrel (PLAVIX) tablet 75 mg     glucose gel 15-30 g    Or     dextrose 50 % injection 25-50 mL    Or     glucagon injection 1 mg     diclofenac (VOLTAREN) 1 % topical gel 2 g     hydrALAZINE (APRESOLINE) tablet 25 mg     insulin aspart (NovoLOG) injection (RAPID ACTING)     insulin aspart (NovoLOG) injection (RAPID ACTING)     lisinopril (ZESTRIL) tablet 20 mg     melatonin tablet 3 mg     metFORMIN (GLUCOPHAGE) tablet 500 mg     metoprolol tartrate (LOPRESSOR) half-tab 37.5 mg     polyethylene glycol (MIRALAX) Packet 17 g     sertraline (ZOLOFT) tablet 25 mg        Labs:  No results found for: WBC, HGB, HCT, PLT, NA, POTASSIUM, CHLORIDE, CO2, BUN, CR, GLC, SED, DD, DIMER, NTBNPI, NTBNP, TROPONIN, TROPI, TROPR, TROPN, AST, ALT, GGT, ALKPHOS, BILITOTAL, BILIDIRECT, PROSPER, INR    Attestation:  This patient has been seen and evaluated by me, Siomara Gordillo MD.    Total time: 25 Minutes more than 50% in Care coordination on the floor/ counseling the patient face to face   Siomara Gordillo MD, [facility]   Department of Rehabilitation    "

## 2020-07-20 NOTE — PLAN OF CARE
PT: Good participation in therapies this date. Pt should continue to be challenged with R visual field challenges and attending to R side, ashish when ambulating.

## 2020-07-20 NOTE — PLAN OF CARE
"SLP: Speech/lang tx session completed. Picture identification in field of 3 words with 100% accuracy. Significant word finding and pt reporting \"things don't make sense.\" Pt able to complete a phrase in 17/20 opportunities independently, moderate cues to increase to 20/20     "

## 2020-07-20 NOTE — PROGRESS NOTES
"  [facility]   Acute Rehabilitation Unit  Daily progress note    INTERVAL HISTORY  Tara Odell was seen and examined at bedside. She was doing ok; reported fatigue and lack of energy. Denied any pain or discomfort, fever, chills, SOB, CP, dysuria or hematuria. She also reported difficulty seeing the board and knowing time/day.     Early in her rehab course. Will discuss at team rounds. Visual deficits, fatigue and cognitive deficits seem to be her main barriers at this point.       MEDICATIONS  Scheduled meds    amLODIPine  10 mg Oral Daily     aspirin  81 mg Oral Daily     atorvastatin  40 mg Oral QPM     clopidogrel  75 mg Oral Daily     diclofenac  2 g Transdermal 4x Daily     insulin aspart  1-3 Units Subcutaneous BID w/meals     [START ON 7/21/2020] lisinopril  40 mg Oral Daily     metFORMIN  500 mg Oral Daily with supper     metoprolol tartrate  37.5 mg Oral BID     sertraline  25 mg Oral Daily       PRN meds:  acetaminophen, bisacodyl, glucose **OR** dextrose **OR** glucagon, hydrALAZINE, melatonin, polyethylene glycol      PHYSICAL EXAM  /76 (BP Location: Right arm)   Pulse 61   Temp 98.2  F (36.8  C) (Oral)   Resp 16   Ht 1.626 m (5' 4\")   Wt 83.3 kg (183 lb 11.2 oz)   SpO2 93%   BMI 31.53 kg/m    Gen: NAD, resting in bed  Cardio: RRR  Pulm: clear breath sounds b/l   Abd: soft and non-tender   Ext: no edema in bilateral lower extremities, no tenderness at calves   Neuro/MSK: alert, partially oriented, didn't know the date and couldn't read the board. RUE with 4/5 strength but mostly apraxic and incoordinated. RLE the same; she had 4/5 strength at DF/PF but was not consistently using the strength     LABS  BMP wnl  CBC wnl  BGs in good range     ASSESSMENT AND PLAN  Tara Odell is a 59 year old woman who presented 7/10 with right sided weakness/sensation changes, impaired cognition, nausea and vomiting noted to have visual field cut on " admission, diagnosed with Left PCA stroke with severe multifocal intracranial atherosclerotic disease, HTN, HLD, and type 2 diabetes. Admitted to acute rehab 7/17 for ongoing rehabilitation and medical management.       Left PCA infarction  Severe multifocal intracranial atherosclerotic disease    Patient admitted with right visual field cut, CT and CTA showed left PCA infarction along with extensive atherosclerotic disease involving the carotid and vertebrobasilar system. cerebral angiogram showed mild fusiform dilatation of the right and left ICA with diffuse intracranial atherosclerotic disease most notably 70% narrowing of the proximal basilar artery  -continue Dual antiplatelet therapy with aspirin and Plavix,  indefinitely   - continue  Lipitor 40 mg daily with goal of LDL less than 70.  - HTN management as below-  goal should be 120-130/80  -continue Physical, occupational and speech therapy   -DM management as below Hemoglobin A1c goal <7.  - Follow-up with neurology (Dr. Duarte) in 6 to 8 weeks     Essential hypertension.  goal <130/80. Hypertensive on admission with initiation of BP meds starting 7/14, with ongoing titration.   -continue Amlodipine 10 mg daily, lisinopril 20 mg daily and metoprolol 37.5 daily. If bp remains elevated >130 would increase lisinopril. 07/20/20 increased to 40 daily   -hydralazine 25 mg tid prn.     Hypercholesteremia    . Goal <70. Started on statin during acute hospitalization.     -Continue atorvastatin 40 mg daily.    New diagnosis of DM 2.   HbA1C 7%  -Start  Metformin 500 mg daily  - monitor blood glucose levels- anticipate transition to bid 7/20. 07/20/20 changed to bid checking   -ssi; 07/20/20 not requiring; will discontinue in 2 days.      Left hip pain: no fractures on xray no effusion, ? Trochanteric bursitis  - apply diclofenac ointment scheduled/ prn tylenol  -monitor.        Adjustment to disability:  Clinical psychology to eval and treat as needed   FEN:  reg  Bowel: continent  Bladder: doss reportedly removed 7/17- tov - monitor pvrs st cath as indicated. PVRs were checked and were acceptable.   DVT Prophylaxis: ambulation  GI Prophylaxis: reg  Code: full confirmed on admission.   Disposition: goal for home  ELOS:  ~2 weeks.  Rehab prognosis:  Fair.   Follow up Appointments on Discharge: neurology, pcp          Kelsi Mcneill MD  Physical Medicine & Rehabilitation    I spent a total of 35 minutes face to face and coordinating care of Tara Odell.  Over 50% of my time on the unit was spent counseling the patient and /or coordinating care.

## 2020-07-20 NOTE — PLAN OF CARE
FOCUS/GOAL  Caregiver training, Psychosocial needs and Medical management    ASSESSMENT, INTERVENTIONS AND CONTINUING PLAN FOR GOAL:  Patient denied pain. Scheduled voltaren gel given to L hip to manage pain. VSS ex /72. MD increased lisinopril to start tomorrow 7/21/20.  and 123 prior to meals. MD modified BG orders to BID. Patient denied SOB, denied difficulty breathing. Denied numbness or tingling. No neuro changes. Patient disoriented to time and place. Patient forgetful and tearful regarding hospital stay. Staff provided support and listening and educated on recovery. Psychology also involved. PLC stroke class to be given when family present. Alarms on for safety purposes. Call light within reach. Continue with POC.

## 2020-07-20 NOTE — PLAN OF CARE
FOCUS/GOAL  Bowel management, Bladder management, and Pain management    ASSESSMENT, INTERVENTIONS AND CONTINUING PLAN FOR GOAL:    Pt had elevated /75 prn Hydralazine 25 mg , recheck /74. C/o left thigh pain prn Tylenol was given with good effect.  Slept well. Incontinent of bladder, no BM. Continue with plan care.

## 2020-07-21 ENCOUNTER — APPOINTMENT (OUTPATIENT)
Dept: OCCUPATIONAL THERAPY | Facility: CLINIC | Age: 60
End: 2020-07-21
Payer: MEDICAID

## 2020-07-21 ENCOUNTER — APPOINTMENT (OUTPATIENT)
Dept: SPEECH THERAPY | Facility: CLINIC | Age: 60
End: 2020-07-21
Payer: MEDICAID

## 2020-07-21 ENCOUNTER — APPOINTMENT (OUTPATIENT)
Dept: PHYSICAL THERAPY | Facility: CLINIC | Age: 60
End: 2020-07-21
Payer: MEDICAID

## 2020-07-21 LAB
GLUCOSE BLDC GLUCOMTR-MCNC: 105 MG/DL (ref 70–99)
GLUCOSE BLDC GLUCOMTR-MCNC: 124 MG/DL (ref 70–99)
GLUCOSE BLDC GLUCOMTR-MCNC: 131 MG/DL (ref 70–99)
GLUCOSE BLDC GLUCOMTR-MCNC: 133 MG/DL (ref 70–99)

## 2020-07-21 PROCEDURE — 25000132 ZZH RX MED GY IP 250 OP 250 PS 637: Performed by: PHYSICAL MEDICINE & REHABILITATION

## 2020-07-21 PROCEDURE — 97533 SENSORY INTEGRATION: CPT | Mod: GO

## 2020-07-21 PROCEDURE — 97535 SELF CARE MNGMENT TRAINING: CPT | Mod: GO

## 2020-07-21 PROCEDURE — 25000132 ZZH RX MED GY IP 250 OP 250 PS 637: Performed by: PHYSICIAN ASSISTANT

## 2020-07-21 PROCEDURE — 92507 TX SP LANG VOICE COMM INDIV: CPT | Mod: GN

## 2020-07-21 PROCEDURE — 12800006 ZZH R&B REHAB

## 2020-07-21 PROCEDURE — 00000146 ZZHCL STATISTIC GLUCOSE BY METER IP

## 2020-07-21 PROCEDURE — 97530 THERAPEUTIC ACTIVITIES: CPT | Mod: GP | Performed by: REHABILITATION PRACTITIONER

## 2020-07-21 PROCEDURE — 97116 GAIT TRAINING THERAPY: CPT | Mod: GP | Performed by: REHABILITATION PRACTITIONER

## 2020-07-21 PROCEDURE — 97530 THERAPEUTIC ACTIVITIES: CPT | Mod: GO

## 2020-07-21 RX ADMIN — DICLOFENAC SODIUM 2 G: 10 GEL TOPICAL at 20:52

## 2020-07-21 RX ADMIN — MELATONIN TAB 3 MG 3 MG: 3 TAB at 22:38

## 2020-07-21 RX ADMIN — ATORVASTATIN CALCIUM 40 MG: 40 TABLET, FILM COATED ORAL at 20:52

## 2020-07-21 RX ADMIN — Medication 37.5 MG: at 21:53

## 2020-07-21 RX ADMIN — DICLOFENAC SODIUM 2 G: 10 GEL TOPICAL at 09:49

## 2020-07-21 RX ADMIN — DICLOFENAC SODIUM 2 G: 10 GEL TOPICAL at 12:32

## 2020-07-21 RX ADMIN — ASPIRIN 81 MG: 81 TABLET ORAL at 09:40

## 2020-07-21 RX ADMIN — AMLODIPINE BESYLATE 10 MG: 10 TABLET ORAL at 09:40

## 2020-07-21 RX ADMIN — LISINOPRIL 40 MG: 40 TABLET ORAL at 09:40

## 2020-07-21 RX ADMIN — Medication 37.5 MG: at 09:41

## 2020-07-21 RX ADMIN — SERTRALINE HYDROCHLORIDE 25 MG: 25 TABLET ORAL at 09:41

## 2020-07-21 RX ADMIN — METFORMIN HYDROCHLORIDE 500 MG: 500 TABLET ORAL at 17:01

## 2020-07-21 RX ADMIN — POLYETHYLENE GLYCOL 3350 17 G: 17 POWDER, FOR SOLUTION ORAL at 09:57

## 2020-07-21 RX ADMIN — CLOPIDOGREL BISULFATE 75 MG: 75 TABLET, FILM COATED ORAL at 09:40

## 2020-07-21 NOTE — PLAN OF CARE
Patient was observed casually during her breakfast meal and just noting lack of initiation with her meals.  No dysphagia or swallowing concerns.  Patient in conversation is able to generate general information but lacking a lot of specific details and requiring verbal cues and assistance in order to generate appropriate information.  Information lacking is typically rote information such as where she lives, ages of her children and where she works.

## 2020-07-21 NOTE — PLAN OF CARE
FOCUS/GOAL  Bowel management, Bladder management and Pain management    ASSESSMENT, INTERVENTIONS AND CONTINUING PLAN FOR GOAL:    Pt slept well during the overnight, denies pain. Pt Is alert and oriented x4, assist of one with walker. Incontinent of bladder, no BM. Continue with plan care.

## 2020-07-21 NOTE — PLAN OF CARE
OT: treatment focused on full body adls and sit to stand transfers, visual tasks. pt motivated and engaged in entire session    Sheron Burgess OTR/L, [facility]IN

## 2020-07-21 NOTE — PROGRESS NOTES
"  [facility]   Acute Rehabilitation Unit  Daily progress note    INTERVAL HISTORY  Tara Odell was seen and examined at bedside. She was asleep sitting in the chair. Woke up and answered questions. Reported ongoing fatigue and drowsiness; no other associated symptoms. She was not able to provide good history regarding her bowel or bladder function.  Reply to most questions with I do not know.    Blood pressure is still elevated but today was the first dose of 40 mg lisinopril.  Will monitor and adjust accordingly  Continues to be incontinent with and bladder, unclear history.  Will start timed toileting  Her fatigue is most likely due to stroke, no clinical sign or symptom of infection.  We will check TFT on Monday with other labs      She is mostly standby with bed mobility, and requires contact-guard assist for transfers.  She has been able to ambulate short distances, fatigue, incoordination apraxia and right hemiparesis contributing.  Will discuss at team rounds.      MEDICATIONS  Scheduled meds    amLODIPine  10 mg Oral Daily     aspirin  81 mg Oral Daily     atorvastatin  40 mg Oral QPM     clopidogrel  75 mg Oral Daily     diclofenac  2 g Transdermal 4x Daily     insulin aspart  1-3 Units Subcutaneous BID w/meals     lisinopril  40 mg Oral Daily     metFORMIN  500 mg Oral Daily with supper     metoprolol tartrate  37.5 mg Oral BID     sertraline  25 mg Oral Daily       PRN meds:  acetaminophen, bisacodyl, glucose **OR** dextrose **OR** glucagon, hydrALAZINE, melatonin, polyethylene glycol      PHYSICAL EXAM  BP (!) 140/65 (BP Location: Right arm)   Pulse 55   Temp 97.9  F (36.6  C) (Oral)   Resp 20   Ht 1.626 m (5' 4\")   Wt 83.3 kg (183 lb 11.2 oz)   SpO2 93%   BMI 31.53 kg/m      Gen: NAD, sitting in chair   Cardio: RRR  Pulm: clear breath sounds b/l   Abd: soft and non-tender   Ext: no edema in bilateral lower extremities, no tenderness at calves "   Neuro/MSK: Unchanged - alert, partially oriented, didn't know the date and couldn't read the board. RUE with 4/5 strength but mostly apraxic and incoordinated. RLE the same; she had 4/5 strength at DF/PF but was not consistently using the strength     LABS  No new today    ASSESSMENT AND PLAN  Tara Odell is a 59 year old woman who presented 7/10 with right sided weakness/sensation changes, impaired cognition, nausea and vomiting noted to have visual field cut on admission, diagnosed with Left PCA stroke with severe multifocal intracranial atherosclerotic disease, HTN, HLD, and type 2 diabetes. Admitted to acute rehab 7/17 for ongoing rehabilitation and medical management.       Left PCA infarction  Severe multifocal intracranial atherosclerotic disease    Patient admitted with right visual field cut, CT and CTA showed left PCA infarction along with extensive atherosclerotic disease involving the carotid and vertebrobasilar system. cerebral angiogram showed mild fusiform dilatation of the right and left ICA with diffuse intracranial atherosclerotic disease most notably 70% narrowing of the proximal basilar artery  -continue Dual antiplatelet therapy with aspirin and Plavix,  indefinitely   - continue  Lipitor 40 mg daily with goal of LDL less than 70.  - HTN management as below-  goal should be 120-130/80  -continue Physical, occupational and speech therapy   -DM management as below Hemoglobin A1c goal <7.  - Follow-up with neurology (Dr. Duarte) in 6 to 8 weeks     Essential hypertension.  goal <130/80. Hypertensive on admission with initiation of BP meds starting 7/14, with ongoing titration.   -continue Amlodipine 10 mg daily, lisinopril 20 mg daily and metoprolol 37.5 daily. If bp remains elevated >130 would increase lisinopril. 07/20/20 increased to 40 daily   -hydralazine 25 mg tid prn.     Hypercholesteremia    . Goal <70. Started on statin during acute hospitalization.     -Continue  atorvastatin 40 mg daily.    New diagnosis of DM 2.   HbA1C 7%  -Start  Metformin 500 mg daily  - monitor blood glucose levels- anticipate transition to bid 7/20. 07/20/20 changed to bid checking   -ssi; 07/20/20 not requiring; will discontinue in 2 days.      Left hip pain: no fractures on xray no effusion, ? Trochanteric bursitis  - apply diclofenac ointment scheduled/ prn tylenol  -monitor.        Adjustment to disability:  Clinical psychology to eval and treat as needed   FEN: reg  Bowel: continent  Bladder: doss reportedly removed 7/17- tov - monitor pvrs st cath as indicated. PVRs were checked and were acceptable. 07/21/20 she continues to be intermittently incontinent, most likely neurogenic.  We will start timed toileting and monitor  DVT Prophylaxis: ambulation  GI Prophylaxis: reg  Code: full confirmed on admission.   Disposition: goal for home  ELOS:  ~2 weeks.  Rehab prognosis:  Fair.   Follow up Appointments on Discharge: neurology, pcp          Kelsi Mcneill MD  Physical Medicine & Rehabilitation    I spent a total of 25 minutes face to face and coordinating care of Tara Odell.  Over 50% of my time on the unit was spent counseling the patient and /or coordinating care.

## 2020-07-21 NOTE — PLAN OF CARE
FOCUS/GOAL  Bladder management, Mobility, Cognition/Memory/Judgment/Problem solving, Psychosocial needs, and Safety management    ASSESSMENT, INTERVENTIONS AND CONTINUING PLAN FOR GOAL:  Alert, oriented x 2, needed prompting on place, date, and time. Assist of 1 w/ walker. Only ate a few bites of dinner after encouragement. Verbalized sadness, reassurance provided by staff.  at 1800. LBM 7/18 per chart; pt denies abdominal discomfort, bowel sounds active. Both continent and incontinent of urine during shift. Pt verbalized discomfort on right hip/leg/flank area; Voltaren cream applied x2 as scheduled. Used call light appropriately.

## 2020-07-21 NOTE — PLAN OF CARE
Discharge Planner PT   Patient plan for discharge: home.   Current status: pt willing to work with PT today, pt stating concern that will never be able to get home to see family. Pt reassured pt that with cont hard work good things will cont to happen and pt will cont to progress with all functional mobility. Pt SBA to all bed mob, CGA for sit to stand with WW. Pt amb up to 70'x 1 and other short amb drill in small spaces all with WW. Pt needing CGA and V.c for tech for all mobility for safety. Pt on bed and chair alarms   Barriers to return to prior living situation: weakness, fatigue, vision, cognition  Recommendations for discharge: PT - per plan established by the Physical Therapist, according to functional mobility the  discharge recommendation date 8/1   Rationale for recommendations: pt cont to work well with PT to progress functional mobility.        Entered by: Pito Lima 07/21/2020 10:18 AM

## 2020-07-21 NOTE — PLAN OF CARE
C/o right wrist pain, no inflammation or redness noted,voltren gel applied with good effect. No BM per epic for 3 days, prn miralax administered. Patient appear very sad and will lament about missing her grand kids and her cat, TLC provided with good effect,however was happy that she had  a good conversation with her mom over the phone, will continue POC

## 2020-07-21 NOTE — PLAN OF CARE
"  VS: BP (!) 140/65 (BP Location: Right arm)   Pulse 55   Temp 97.9  F (36.6  C) (Oral)   Resp 20   Ht 1.626 m (5' 4\")   Wt 83.3 kg (183 lb 11.2 oz)   SpO2 93%   BMI 31.53 kg/m     O2: Room air, no complaints of SOB   Output: Voids spontaneously in the toilet without difficulty   Last BM: 7/18/2020 per chart   Activity: Up w/ x1 assist using walker and gait belt to wheelchair    Skin: Generalized ecchymotic area   Pain: Denies   CMS: Right side weakness. Disoriented to time and place.   Dressing: N/a   Diet: Regular, thin liquids, pills whole   LDA: Na   Equipment: Bed/chair alarm, walker, personal belongings, call light within reach   Plan: Discharge set for 8/1/2020   Additional Info:        "

## 2020-07-21 NOTE — PROGRESS NOTES
"CLINICAL NUTRITION SERVICES - BRIEF NOTE     Nutrition Prescription    RECOMMENDATIONS FOR MDs/PROVIDERS TO ORDER:  None today    Malnutrition Status:    Unable to determine d/t no NFPA completed    Recommendations already ordered by Registered Dietitian (RD):  Chocolate boost GC BID    Future/Additional Recommendations:  Additional DM diet eduction as pt becomes more appropriate  Adjust supplements per pt preference   Consider need for CHO restriction once intakes improve   Unable to obtain in-person nutrition history or nutrition focused physical assessment (NFPA) from patient to limit exposure and to minimize use of PPE during COVID-19 pandemic. Spoke to pt over the phone.     Findings  Pt stated appetite is \"Okay I guess\". Continues to have fair intakes, eating ~50% of meals. Stated she is usually drinking boost, would prefer chocolate. Unable to follow up with previously provided DM diet education as pt was going to therapy.     INTERVENTIONS  Implementation  Chocolate boost GC with breakfast and dinner      Follow up/Monitoring  Progress toward goals will be monitored and evaluated per protocol.    Cathleen Mendoza MS, RD, LDN  Unit Pager [phone]    "

## 2020-07-22 ENCOUNTER — APPOINTMENT (OUTPATIENT)
Dept: OCCUPATIONAL THERAPY | Facility: CLINIC | Age: 60
End: 2020-07-22
Payer: MEDICAID

## 2020-07-22 ENCOUNTER — APPOINTMENT (OUTPATIENT)
Dept: PHYSICAL THERAPY | Facility: CLINIC | Age: 60
End: 2020-07-22
Payer: MEDICAID

## 2020-07-22 ENCOUNTER — APPOINTMENT (OUTPATIENT)
Dept: SPEECH THERAPY | Facility: CLINIC | Age: 60
End: 2020-07-22
Payer: MEDICAID

## 2020-07-22 LAB
GLUCOSE BLDC GLUCOMTR-MCNC: 125 MG/DL (ref 70–99)
GLUCOSE BLDC GLUCOMTR-MCNC: 156 MG/DL (ref 70–99)
GLUCOSE BLDC GLUCOMTR-MCNC: 159 MG/DL (ref 70–99)

## 2020-07-22 PROCEDURE — 40000187 ZZH STATISTIC PATIENT MED CONFERENCE < 30 MIN

## 2020-07-22 PROCEDURE — 97530 THERAPEUTIC ACTIVITIES: CPT | Mod: GO

## 2020-07-22 PROCEDURE — 97116 GAIT TRAINING THERAPY: CPT | Mod: GP

## 2020-07-22 PROCEDURE — 92507 TX SP LANG VOICE COMM INDIV: CPT | Mod: GN

## 2020-07-22 PROCEDURE — 97535 SELF CARE MNGMENT TRAINING: CPT | Mod: GO

## 2020-07-22 PROCEDURE — 97110 THERAPEUTIC EXERCISES: CPT | Mod: GP

## 2020-07-22 PROCEDURE — 40000183 ZZH STATISTIC PT MED CONFERENCE < 30 MIN: Performed by: PHYSICAL THERAPIST

## 2020-07-22 PROCEDURE — 12800006 ZZH R&B REHAB

## 2020-07-22 PROCEDURE — 00000146 ZZHCL STATISTIC GLUCOSE BY METER IP

## 2020-07-22 PROCEDURE — 25000132 ZZH RX MED GY IP 250 OP 250 PS 637: Performed by: PHYSICAL MEDICINE & REHABILITATION

## 2020-07-22 PROCEDURE — 97530 THERAPEUTIC ACTIVITIES: CPT | Mod: GP

## 2020-07-22 PROCEDURE — 25000132 ZZH RX MED GY IP 250 OP 250 PS 637: Performed by: PHYSICIAN ASSISTANT

## 2020-07-22 RX ORDER — CARVEDILOL 25 MG/1
25 TABLET ORAL 2 TIMES DAILY WITH MEALS
Status: DISCONTINUED | OUTPATIENT
Start: 2020-07-22 | End: 2020-08-01

## 2020-07-22 RX ADMIN — ATORVASTATIN CALCIUM 40 MG: 40 TABLET, FILM COATED ORAL at 20:06

## 2020-07-22 RX ADMIN — CLOPIDOGREL BISULFATE 75 MG: 75 TABLET, FILM COATED ORAL at 09:24

## 2020-07-22 RX ADMIN — HYDRALAZINE HYDROCHLORIDE 25 MG: 25 TABLET ORAL at 06:22

## 2020-07-22 RX ADMIN — SERTRALINE HYDROCHLORIDE 25 MG: 25 TABLET ORAL at 09:23

## 2020-07-22 RX ADMIN — BISACODYL 10 MG: 10 SUPPOSITORY RECTAL at 20:06

## 2020-07-22 RX ADMIN — ASPIRIN 81 MG: 81 TABLET ORAL at 09:24

## 2020-07-22 RX ADMIN — CARVEDILOL 25 MG: 25 TABLET, FILM COATED ORAL at 17:23

## 2020-07-22 RX ADMIN — AMLODIPINE BESYLATE 10 MG: 10 TABLET ORAL at 09:23

## 2020-07-22 RX ADMIN — POLYETHYLENE GLYCOL 3350 17 G: 17 POWDER, FOR SOLUTION ORAL at 12:59

## 2020-07-22 RX ADMIN — DICLOFENAC SODIUM 2 G: 10 GEL TOPICAL at 13:00

## 2020-07-22 RX ADMIN — CARVEDILOL 25 MG: 25 TABLET, FILM COATED ORAL at 09:51

## 2020-07-22 RX ADMIN — METFORMIN HYDROCHLORIDE 500 MG: 500 TABLET ORAL at 17:23

## 2020-07-22 RX ADMIN — LISINOPRIL 40 MG: 40 TABLET ORAL at 09:23

## 2020-07-22 RX ADMIN — DICLOFENAC SODIUM 2 G: 10 GEL TOPICAL at 17:23

## 2020-07-22 RX ADMIN — DICLOFENAC SODIUM 2 G: 10 GEL TOPICAL at 20:06

## 2020-07-22 NOTE — PLAN OF CARE
Acute Rehab Care Conference/Team Rounds    Type: Team Rounds    Present:  Kelsi Mcneill MD, Elisabet Chavez PA-C, Carrie Segura PT, Sheila Ospina OTR-L, Anil Ball SLP, Mone SANCHEZ, Rox Ruiz RD, GucciO Eduarda Larkin, Katharine Arroyo RN     Discharge Barriers/Treatment/Education    Rehab Diagnosis:  L PCA CVA    Active Medical Co-morbidities/Prognosis:  Multivessel intracranial stenosis, HTN, DM II, HLD       Safety: Pt alert and oriented x2, disoriented to place and time. Alarms on for safety.     Pain:Generalized pain that's managed with prn Tylenol.      Medications, Skin, Tubes/Lines: Skin intact, no tubes or lines.     Swallowing/Nutrition: No dysphagia related goals. At times limited initiation with her meals exacerbated by right side neglect. May benefit from additional nursing assistance/cues to eat adequate portions.     Bowel/Bladder:Incont/cont of bladder, continent of BM.     Psychosocial: Lives w/elderly mother (adm her meds) & brother who doesn't work/taking care of their mother. Dtr local & son in [place].  Started new job at [employer].  NO insurance, had MA in the past. MA application was sent 7/16 to [facility]. Family can assist at discharge. Independent PTA.     ADLs/IADLs:OT pt sba g/h, sba U/B dressing, min a pants and sba socks cont ot per IP poc    Mobility: Up in room w/ walker and assist, anticipate ind in ho    Cognition/Language: Able to express her general thoughts and answer questions broadly but when requiring more definitive responses, has significant difficulties being able to complete. Difficulties with memory and cognition as well. Fatigue and distractibility limiting at times. Would recommend additional oversight with all IADL tasks at discharge. Ongoing therapy at discharge.     Community Re-Entry: Ambulatory w/ supervision    Transportation: Not a , car transfer not a barrier    Decision maker: self    Plan of Care and goals reviewed and  updated.    Discharge Plan/Recommendations    Fall Precautions: continue    Overall plan for the patient: she has made some progress since last week but limited by fatigue, cognitive deficits, aphasia, right sided visual deficits and inattention. BP not well controlled yet; will continue to adjust her meds as needed. Anticipate 10 more days at ARU and then discharge to home with likely home care. Consider neuropsych testing next week and also consider antidepressant medication. Psych consult is pending.       Utilization Review and Continued Stay Justification    Medical Necessity Criteria:    For any criteria that is not met, please document reason and plan for discharge, transfer, or modification of plan of care to address.    Requires intensive rehabilitation program to treat functional deficits?: Yes    Requires 3x per week or greater involvement of rehabilitation physician to oversee rehabilitation program?: Yes    Requires rehabilitation nursing interventions?: Yes    Patient is making functional progress?: Yes    There is a potential for additional functional progress? Yes    Patient is participating in therapy 3 hours per day a minimum of 5 days per week or 15 hours per week in 7 day period?:Yes    Has discharge needs that require coordinated discharge planning approach?:Yes      Final Physician Sign off    Statement of Approval: I agree with all the recommendations detailed in this document.      Patient Goals  Social Work Goals:Confirm discharge recommendations with therapy, coordinate safe discharge plan and remain available to support and assist as needed.         OT Frequency: daily 60-90 min for 14 days  OT goal: hygiene/grooming: modified independent  OT goal: upper body dressing: Modified independent  OT goal: lower body dressing: Modified independent  OT goal: upper body bathing: Supervision/stand-by assist  OT goal: lower body bathing: Supervision/stand-by assist  OT goal: bed mobility: Modified  independent  OT Goal: transfer: Modified independent  OT goal: toilet transfer/toileting: Modified independent  OT goal: meal preparation: Supervision/stand-by assist  OT goal: home management: Supervision/stand-by assist   OT goal 1: pt will be sba with tub shower kamran transfer  OT goal 2: OT pt will be mod I hep      PT Frequency: daily x 60 minutes  PT goal: bed mobility: Independent, Supine to/from sit, Rolling  PT goal: transfers: Modified independent, Sit to/from stand, Bed to/from chair(with FWW)  PT goal: gait: Modified independent, 100 feet, Assistive device  PT goal: stairs: Supervision/stand-by assist, Greater than 10 stairs(with single rail)  PT goal: perform aerobic activity with stable cardiovascular response: NuStep, 10 minutes   PT goal 1: Pt will be able to perform car transfer with SBA/supervision with LRAD.  PT Goal 2: Pt will be independent with HEP for LE strengthening and balance prior to discharge.  PT Goal 3: floor transfer w/ furniture assist      SLP Frequency: daily x 2 weeks  SLP Swallow Goal:  Safely tolerate diet without signs/symptoms of aspiration: regular diet, thin liquids  SLP goal 1: Pt to complete mod-complex level auditory comprehension and reading comprehension tasks with 90% acc given min A to maximize ability to understand spoken and written information from medical team and family.  SLP goal 2: Pt to complete mod-complex verbal expression tasks with 90% acc given min A to maximize functional communication with medial team and family.      RN goal:     Patient and family will be able to verbalize 3 risk factors of stroke, and 3 signs/symptoms of stroke by participating in PLC stroke teaching.   RN goal:

## 2020-07-22 NOTE — PLAN OF CARE
"Patient plan for discharge: home.   Current status:  Pt SBA for all bed mob, CGA for sit to stand with WW and cues for hand placement. Pt amb up to 155 'x 2 with WW. Pt needing CGA and moderate vc's for safety and directional cues due to poor attention to right side.  She frequently reported fatigue, memory issues, and overall cognitive deficits.  She asked if she would \"always be stupid\".     Barriers to return to prior living situation: weakness, fatigue, vision, cognition  Recommendations for discharge: PT - per plan established by the Physical Therapist, according to functional mobility the discharge recommendation date 8/1   Rationale for recommendations: pt cont to work well with PT to progress functional mobility.   "

## 2020-07-22 NOTE — PLAN OF CARE
Post Rounds Family Phone Call     Length of call: 6 min  Family involved: Anabel Daughter  Projected discharge date: 8-1  Projected discharge location: home with family assisit  Equipment needs:  Bathroom equipment  Other questions and misc: daughter updated on pt progress following rounds, daughter had no questions at this time

## 2020-07-22 NOTE — PROGRESS NOTES
"Per [employer] Counselor- 'MA cleared on 7-, MA active 5-1-2020 for [facility]. Address in [employer] system. Pt will likely need to call [facility] and report her change of address for Case number [id number]. Address for pt per [facility] and [employer] is:  [address] [id number].'    Printed off this information and met with pt in the room during team rounds. Pt appeared sleepy and unclear if she understood the information provided. Suggested that pt call [facility] and update her home address. Pt stated \"I don't know what idiot did that, I have lived at the same address for a while\". SWer offered and plans to come back to assist pt with making that call.     Followed up with pt later this afternoon. Call to [facility] not completed as pt was lethargic. Asked pt if she had a HCD or POA. Pt stated that she has never completed that information. Asked pt who she would want to be contacted in case of an emergency, pt stated her son and dtr. Asked SLP and team during huddle to assess further pt's ability to complete a POA/HCD. Would be beneficial to complete if able. Will plan to f/u with pt to discuss further.     Plan to also discuss SLL referral, stroke alliance referral, SMRT assessment for possible CADI waiver, metro mobility/transportation resources and etc. Will follow when pt is more alert and able to engage.     Mone Hewitt, JOSELO, CAD-[facility] Acute Rehab Unit   Phone: [phone]  I   Pager: [phone]          "

## 2020-07-22 NOTE — PLAN OF CARE
DESTINEY: Pt seated in w/c upon arrival just having finished rounds meeting. Pt reports being very tired but willing to participate. Pt donned socks with setup and cues, no physical assist. Pt ambulated to/from therapy gym with FWW with focus on navigating around obstacles and attending to R side/scanning with Min A. Faciliated pt in [place] puzz with focus on R side attention and visual scanning. Pt required assist with manipulation of pieces, initiation, and cues to use R hand and stabilizing board with L hand.    -15 d/t 2/2 fatigue. Pt requested to return to bed; unable to stay aroused.

## 2020-07-22 NOTE — PROGRESS NOTES
"  [facility]   Acute Rehabilitation Unit  Daily progress note    INTERVAL HISTORY  Tara Odell was seen during rounds today. She has made some progress since last week but limited by fatigue, cognitive deficits, aphasia, right sided visual deficits and inattention. BP not well controlled yet; will continue to adjust her meds as needed. Anticipate 10 more days at ARU and then discharge to home with likely home care. Consider neuropsych testing next week and also consider antidepressant medication. Psych consult is pending.       MEDICATIONS  Scheduled meds    - Medication Assessment Program - Rehab Services   Does not apply See Admin Instructions     amLODIPine  10 mg Oral Daily     aspirin  81 mg Oral Daily     atorvastatin  40 mg Oral QPM     carvedilol  25 mg Oral BID w/meals     clopidogrel  75 mg Oral Daily     diclofenac  2 g Transdermal 4x Daily     insulin aspart  1-3 Units Subcutaneous BID w/meals     lisinopril  40 mg Oral Daily     metFORMIN  500 mg Oral Daily with supper     sertraline  25 mg Oral Daily       PRN meds:  acetaminophen, bisacodyl, glucose **OR** dextrose **OR** glucagon, hydrALAZINE, melatonin, polyethylene glycol      PHYSICAL EXAM  /64 (BP Location: Right arm)   Pulse 64   Temp 98.5  F (36.9  C) (Oral)   Resp 17   Ht 1.626 m (5' 4\")   Wt 83.3 kg (183 lb 11.2 oz)   SpO2 96%   BMI 31.53 kg/m      Gen: NAD, sitting in chair   Pulm: non-labored in room air   Abd: soft and non-tender   Ext: no edema in bilateral lower extremities  Neuro/MSK: was deferred for conversation today       LABS  No new today    ASSESSMENT AND PLAN  Tara Odell is a 59 year old woman who presented 7/10 with right sided weakness/sensation changes, impaired cognition, nausea and vomiting noted to have visual field cut on admission, diagnosed with Left PCA stroke with severe multifocal intracranial atherosclerotic disease, HTN, HLD, and type 2 " diabetes. Admitted to acute rehab 7/17 for ongoing rehabilitation and medical management.       Left PCA infarction  Severe multifocal intracranial atherosclerotic disease    Patient admitted with right visual field cut, CT and CTA showed left PCA infarction along with extensive atherosclerotic disease involving the carotid and vertebrobasilar system. cerebral angiogram showed mild fusiform dilatation of the right and left ICA with diffuse intracranial atherosclerotic disease most notably 70% narrowing of the proximal basilar artery  -continue Dual antiplatelet therapy with aspirin and Plavix,  indefinitely   - continue  Lipitor 40 mg daily with goal of LDL less than 70.  - HTN management as below-  goal should be 120-130/80  -continue Physical, occupational and speech therapy   -DM management as below Hemoglobin A1c goal <7.  - Follow-up with neurology (Dr. Duarte) in 6 to 8 weeks     Essential hypertension.  goal <130/80. Hypertensive on admission with initiation of BP meds starting 7/14, with ongoing titration.   -continue Amlodipine 10 mg daily, lisinopril 20 mg daily and metoprolol 37.5 daily. If bp remains elevated >130 would increase lisinopril. 07/20/20 increased to 40 daily . 07/22/20 BP continues to be elevated; change metoprolol to coreg with slightly higher dose.  -hydralazine 25 mg tid prn.     Hypercholesteremia    . Goal <70. Started on statin during acute hospitalization.     -Continue atorvastatin 40 mg daily.    New diagnosis of DM 2.   HbA1C 7%  -Start  Metformin 500 mg daily  - monitor blood glucose levels- anticipate transition to bid 7/20. 07/20/20 changed to bid checking   -ssi; 07/20/20 not requiring; will discontinue in 2 days.      Left hip pain: no fractures on xray no effusion, ? Trochanteric bursitis  - apply diclofenac ointment scheduled/ prn tylenol  -monitor.        Adjustment to disability:  Clinical psychology to eval and treat as needed   FEN: reg  Bowel: continent  Bladder:  doss reportedly removed 7/17- tov - monitor pvrs st cath as indicated. PVRs were checked and were acceptable. 07/21/20 she continues to be intermittently incontinent, most likely neurogenic.  We will start timed toileting and monitor  DVT Prophylaxis: ambulation  GI Prophylaxis: reg  Code: full confirmed on admission.   Disposition: goal for home  ELOS:  ~2 weeks.  Rehab prognosis:  Fair.   Follow up Appointments on Discharge: neurology, pcp          Kelsi Mcneill MD  Physical Medicine & Rehabilitation    I spent a total of 25 minutes face to face and coordinating care of Tara Odell.  Over 50% of my time on the unit was spent counseling the patient and /or coordinating care.

## 2020-07-22 NOTE — PLAN OF CARE
Answering yes/no questions at moderate level of difficulty with 90% accuracy.  Ongoing difficulties noted with verbal expression/word finding in casual conversation though able to express her general thoughts.

## 2020-07-22 NOTE — PLAN OF CARE
Patient continues to complain of left hip pain, no inflammation or redness noted in the area, voltren gel applied which patient states that it is not effective, the pain continues.  Patient is unable to describe or rate the pain level.  Patient was refusing to eat lunch stating that she was really tired and want to sleep.  Toileting schedule got her up and then began to eat lunch.  Distraction with the T.V. was effective to get her focused on eatting.  Meal trays are to be set up and ordered through the room service by the staff on the unit also auto sent by  if the meal is skipped.  Tray to be set up at the nurses station and the utensils placed to the left of the patient. Last BM 7/18/20 per chart. Prn miralax given today. Toileting schedule started with good results.  Patient remained continent for the day, but continued to need encouragement.  Up with assist of 1, using walker to ambulate to the restroom. Patient continues to show saddness and get tearful at times reagarding her current state. Redirection  and distraction provided with good effect, laughter can be achieved if talking about memories and family. However the happiness is short in duration. States that she wishes to see her daughter soon.  MAP to be started in preparation for discharge.  Discharge set for 8/1/20.

## 2020-07-23 ENCOUNTER — APPOINTMENT (OUTPATIENT)
Dept: PHYSICAL THERAPY | Facility: CLINIC | Age: 60
End: 2020-07-23
Payer: MEDICAID

## 2020-07-23 ENCOUNTER — APPOINTMENT (OUTPATIENT)
Dept: SPEECH THERAPY | Facility: CLINIC | Age: 60
End: 2020-07-23
Payer: MEDICAID

## 2020-07-23 ENCOUNTER — APPOINTMENT (OUTPATIENT)
Dept: OCCUPATIONAL THERAPY | Facility: CLINIC | Age: 60
End: 2020-07-23
Payer: MEDICAID

## 2020-07-23 LAB
GLUCOSE BLDC GLUCOMTR-MCNC: 115 MG/DL (ref 70–99)
GLUCOSE BLDC GLUCOMTR-MCNC: 127 MG/DL (ref 70–99)
GLUCOSE BLDC GLUCOMTR-MCNC: 143 MG/DL (ref 70–99)

## 2020-07-23 PROCEDURE — 25000132 ZZH RX MED GY IP 250 OP 250 PS 637: Performed by: PHYSICAL MEDICINE & REHABILITATION

## 2020-07-23 PROCEDURE — 97116 GAIT TRAINING THERAPY: CPT | Mod: GP

## 2020-07-23 PROCEDURE — 00000146 ZZHCL STATISTIC GLUCOSE BY METER IP

## 2020-07-23 PROCEDURE — 97112 NEUROMUSCULAR REEDUCATION: CPT | Mod: GP

## 2020-07-23 PROCEDURE — 12800006 ZZH R&B REHAB

## 2020-07-23 PROCEDURE — 25000132 ZZH RX MED GY IP 250 OP 250 PS 637: Performed by: PHYSICIAN ASSISTANT

## 2020-07-23 PROCEDURE — 97129 THER IVNTJ 1ST 15 MIN: CPT | Mod: GO

## 2020-07-23 PROCEDURE — 92507 TX SP LANG VOICE COMM INDIV: CPT | Mod: GN | Performed by: REHABILITATION PRACTITIONER

## 2020-07-23 PROCEDURE — 97535 SELF CARE MNGMENT TRAINING: CPT | Mod: GO

## 2020-07-23 PROCEDURE — 97750 PHYSICAL PERFORMANCE TEST: CPT | Mod: GP

## 2020-07-23 RX ORDER — HYDRALAZINE HYDROCHLORIDE 10 MG/1
10 TABLET, FILM COATED ORAL 3 TIMES DAILY
Status: DISCONTINUED | OUTPATIENT
Start: 2020-07-23 | End: 2020-08-03 | Stop reason: HOSPADM

## 2020-07-23 RX ADMIN — HYDRALAZINE HYDROCHLORIDE 10 MG: 10 TABLET ORAL at 09:28

## 2020-07-23 RX ADMIN — HYDRALAZINE HYDROCHLORIDE 10 MG: 10 TABLET ORAL at 14:04

## 2020-07-23 RX ADMIN — HYDRALAZINE HYDROCHLORIDE 10 MG: 10 TABLET ORAL at 20:10

## 2020-07-23 RX ADMIN — ATORVASTATIN CALCIUM 40 MG: 40 TABLET, FILM COATED ORAL at 20:11

## 2020-07-23 RX ADMIN — MELATONIN TAB 3 MG 3 MG: 3 TAB at 20:55

## 2020-07-23 RX ADMIN — ASPIRIN 81 MG: 81 TABLET ORAL at 09:34

## 2020-07-23 RX ADMIN — LISINOPRIL 40 MG: 40 TABLET ORAL at 09:33

## 2020-07-23 RX ADMIN — CARVEDILOL 25 MG: 25 TABLET, FILM COATED ORAL at 09:33

## 2020-07-23 RX ADMIN — SERTRALINE HYDROCHLORIDE 50 MG: 50 TABLET, FILM COATED ORAL at 09:28

## 2020-07-23 RX ADMIN — CLOPIDOGREL BISULFATE 75 MG: 75 TABLET, FILM COATED ORAL at 09:33

## 2020-07-23 RX ADMIN — AMLODIPINE BESYLATE 10 MG: 10 TABLET ORAL at 09:33

## 2020-07-23 RX ADMIN — METFORMIN HYDROCHLORIDE 500 MG: 500 TABLET ORAL at 18:45

## 2020-07-23 NOTE — PLAN OF CARE
FOCUS/GOAL  Medical management and Cognition/Memory/Judgment/Problem solving    ASSESSMENT, INTERVENTIONS AND CONTINUING PLAN FOR GOAL:  Patient denied pain during shift. Denied SOB, denied difficulty breathing. No neuro changes. VSS ex /77, recheck /77. Scheduled hydralazine ordered per medical team.  during shift - scheduled novolog insulin discontinued per medical team. Alert and oriented to self and situation. Zoloft increased. Patient did not call for medications via MAP. With repeated education on MAP, patient unable to understand MAP and unable to perform. Check-ins provided. Patient not initiating needs via call light. Call light within reach. Alarms on for safety purposes. Continue with POC.

## 2020-07-23 NOTE — PLAN OF CARE
Patient is Alert to self, and situation. CGA with walker and gait belt, needs cueing to attend to R. Side. LBM today after suppository. Voiding spontaneously in toliet by participating in timed toileting. Did not eat dinner tonight. Stated she has no appetite and is too sad to do anything. Very tearful throughout the entire shift. Left MD note about depression/ sadness patient is experiencing. MAP is set up to start tomorrow. Continue with POC.

## 2020-07-23 NOTE — PROGRESS NOTES
"   07/23/20 0820   Signing Clinician's Name / Credentials   Signing clinician's name / credentials Roxi Licona, PT/DPT   Garcia Balance Scale (SCOTT PULLIAM, LANE PLUMMER, CHONG CHURCH, TIGRE EARLY: MEASURING BALANCE IN THE ELDERLY: VALIDATION OF AN INSTRUMENT. CAN. J. PUB. HEALTH, JULY/AUGUST SUPPLEMENT 2:S7-11, 1992.)   Sit To Stand 3   Standing Unsupported 1   Sitting Unsupported 4   Stand to Sit 3   Transfers 2   Standing with Eyes Closed 3   Standing Unsupported, Feet Together 2   Reach Forward With Outstretched Arm 2   Retrieve Object From Floor 3   Turning to Look Behind 3   Turn 360 Degrees 1   Placing Alternate Foot on Stool (4-6 inches) 1   Unsupported Tandem Stand (Demonstrate to Subject) 0   One Leg Stand 1   Total Score (A score of 45 or less has been correlated with an increased risk of falls)   Total Score (out of 56) 29     Garcia Balance Scale (BBS) Cutoff Scores: A score of < 45/56 indicates an increased risk for falls.   Patient Score: 29/56    The BBS is a measure of static and dynamic standing balance that has been validated in community dwelling elderly individuals and individuals who have Parkinson's Disease, MS, and those who are s/p CVA and TBI. The test is administered without an assistive device. Scores from the Garcia are used to determine the probability of falling based on the patient's previous history of falls and their test performance.     Minimal Detectable Change = 6.5   & Minimal Detectable Change (Parkinson's Disease) = 5 according to Miguelangel & Keviney 2008    Assessment (rationale for performing, application to patient s function & care plan): Pt required assistance/supervision with most tasks. Pt had difficulty with turning 360', tandem stance, SLS, alternating toe taps, and standing unsupported. Pt verbalized that she felt \"scared\" during most tasks but did well with reassurance. Pt also c/o pain in R LE.   Minutes billed as physical performance test: 40        "

## 2020-07-23 NOTE — PLAN OF CARE
PT: Pt continues to c/o fatigue throughout session. Pt limited by R sided weakness and decrease attention to R side. Pt able to ambulate 200' x 2 with CGA and min A for turns.

## 2020-07-23 NOTE — PROGRESS NOTES
"  [facility]   Acute Rehabilitation Unit  Daily progress note    INTERVAL HISTORY  Tara Odell was seen sitting up in chair reports she is tired, when asked if she is sleeping at night responds \"I think so\" which she responds to several questions. She denies n/v/d, sob, headaches, and dizziness.  Also feels she does not need diclofenac on hip scheduled asks for as needed.  We discussed changes in medications, specifically increase in zoloft which was started on rehab admission, ongoing blood pressure medication titration, increase in metformin and decrease in glucose monitoring frequency.  She notes ongoing right hemiparesis uses term \"it hurts\" then when more specifically asks denies pain but says doesn't work.      Garcia Balance Scale (BBS) Cutoff Scores: A score of < 45/56 indicates an increased risk for falls.   Patient Score: 29/56    Pt demonstrates ongoing difficulty with expressive language. Teary at times.    MEDICATIONS  Scheduled meds    - Medication Assessment Program - Rehab Services   Does not apply See Admin Instructions     amLODIPine  10 mg Oral Daily     aspirin  81 mg Oral Daily     atorvastatin  40 mg Oral QPM     carvedilol  25 mg Oral BID w/meals     clopidogrel  75 mg Oral Daily     hydrALAZINE  10 mg Oral TID     lisinopril  40 mg Oral Daily     metFORMIN  500 mg Oral BID w/meals     sertraline  50 mg Oral Daily       PRN meds:  acetaminophen, bisacodyl, glucose **OR** dextrose **OR** glucagon, diclofenac, hydrALAZINE, melatonin, polyethylene glycol      PHYSICAL EXAM  /71 (BP Location: Right arm)   Pulse 55   Temp 98.8  F (37.1  C) (Oral)   Resp 18   Ht 1.626 m (5' 4\")   Wt 83.3 kg (183 lb 11.2 oz)   SpO2 94%   BMI 31.53 kg/m      Gen: NAD, sitting in chair   Pulm: non-labored clear in room air   CV: rrr  Abd: soft and non-tender   Ext: no edema in bilateral lower extremities  Neuro/MSK: alert to self, vision impaired, right side " impaired coordination/4/5 at ue, hf 4-, ke 4-, df/pf 4/5.       LABS  No new today    ASSESSMENT AND PLAN  Tara Odell is a 59 year old woman who presented 7/10 with right sided weakness/sensation changes, impaired cognition, nausea and vomiting noted to have visual field cut on admission, diagnosed with Left PCA stroke with severe multifocal intracranial atherosclerotic disease, HTN, HLD, and type 2 diabetes. Admitted to acute rehab 7/17 for ongoing rehabilitation and medical management.       Left PCA infarction  Severe multifocal intracranial atherosclerotic disease    Patient admitted with right visual field cut, CT and CTA showed left PCA infarction along with extensive atherosclerotic disease involving the carotid and vertebrobasilar system. cerebral angiogram showed mild fusiform dilatation of the right and left ICA with diffuse intracranial atherosclerotic disease most notably 70% narrowing of the proximal basilar artery  -continue Dual antiplatelet therapy with aspirin and Plavix,  indefinitely   - continue  Lipitor 40 mg daily with goal of LDL less than 70.  - HTN management as below-  goal should be 120-130/80  -continue Physical, occupational and speech therapy   -DM management as below Hemoglobin A1c goal <7.  - Follow-up with neurology (Dr. Duarte) in 6 to 8 weeks       Essential hypertension.  goal <130/80. Hypertensive on admission with initiation of BP meds starting 7/14, with ongoing titration.   -continue Amlodipine 10 mg daily, lisinopril 40mg daily  Coreg 25 mg bid and -hydralazine 10 mg tid started 7/23.   -continue to monitor and titrate as indicated.   -continue prn hydralazine mg tid prn.     Hypercholesteremia    . Goal <70. Started on statin during acute hospitalization.     -Continue atorvastatin 40 mg daily.    New diagnosis of DM 2.   HbA1C 7%  -increase metformin 500 mg bid 7/23-   -  bid glucose checks   -discontinue ssi    Suspected neurogenic bladder- doss removed  "7/21 tov PVRs wnl, intermittent incontinence,  Has difficulty explaining incontinence, when asked if she feel urge to void states \"sometimes\", denies ab pain, nausea, dysuria. Does not believe she is drinking well, this was encouraged    Situational Depression  Poor appetite, tearful, fatigue, suspect situational depression, started on sertaline 25 mg daily on admission will increase to 50 mg daily 7/23.  -monitor       Left hip pain: denies left hip pain 7/23 no fractures on xray no effusion, ? Trochanteric bursitis  - apply diclofenac ointment prn/ prn tylenol  -monitor.        Adjustment to disability:  Clinical psychology to eval and treat as needed   FEN: reg  Bowel: continent  Bladder: doss reportedly removed 7/17- tov - monitor pvrs st cath as indicated. PVRs were checked and were acceptable. 07/21/20 she continues to be intermittently incontinent, most likely neurogenic.   timed toileting and monitor  DVT Prophylaxis: ambulation  GI Prophylaxis: reg  Code: full confirmed on admission.   Disposition: goal for home  ELOS:  ~2 weeks.  Rehab prognosis:  Fair.   Follow up Appointments on Discharge: neurology, pcp          Elisabet Chavez PA-C  PM&R    Case discussed with Dr. Mcneill.             "

## 2020-07-23 NOTE — PROGRESS NOTES
07/23/20 1100   General Information   Patient Profile Review See Profile for full history and prior level of function   Daily Contact with Relatives or Friends Phone call   Music   Music Preferences Country;Spiritual   Listens to Recorded Music Yes   Brought Own Equipment No   Media   TV / Movies TV   Writing Writing   Reading Books   Sports / Physical Activities   Sports/Exercise Walks   Impression   Open to Socializing with Others Initiates with cues   Barriers to Leisure Endurance;Cognition   Treatment Plan   Type of Intervention 1:1 intervention   Equipment and Supplies While on Unit Radio   Therapeutic Recreation: Brief assessment completed with pt, pt had mild expressive aphasia, able to identify leisure interests.  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 Flat affect, needed assist with her phone due to vision (stated blurry) and unable to comprehend words in messages.  Will need assist with phone use.  Appropriate for 1:1 TR tx and will be scheduled.  Juliane Mahoney, CTRS

## 2020-07-23 NOTE — PLAN OF CARE
FOCUS/GOAL  Bowel management, Bladder management, and Pain management    ASSESSMENT, INTERVENTIONS AND CONTINUING PLAN FOR GOAL:  Pt appears to be sleeping, no sign of respiratory distress noted. Pt was continent of bladder, no BM. Alert and oriented x2, denies pain. MAP is setup. Continue with plan of care.

## 2020-07-24 ENCOUNTER — APPOINTMENT (OUTPATIENT)
Dept: OCCUPATIONAL THERAPY | Facility: CLINIC | Age: 60
End: 2020-07-24
Payer: MEDICAID

## 2020-07-24 ENCOUNTER — APPOINTMENT (OUTPATIENT)
Dept: PHYSICAL THERAPY | Facility: CLINIC | Age: 60
End: 2020-07-24
Payer: MEDICAID

## 2020-07-24 LAB
GLUCOSE BLDC GLUCOMTR-MCNC: 127 MG/DL (ref 70–99)
GLUCOSE BLDC GLUCOMTR-MCNC: 135 MG/DL (ref 70–99)

## 2020-07-24 PROCEDURE — 97530 THERAPEUTIC ACTIVITIES: CPT | Mod: GP

## 2020-07-24 PROCEDURE — 97112 NEUROMUSCULAR REEDUCATION: CPT | Mod: GO

## 2020-07-24 PROCEDURE — 25000132 ZZH RX MED GY IP 250 OP 250 PS 637: Performed by: PHYSICIAN ASSISTANT

## 2020-07-24 PROCEDURE — 97110 THERAPEUTIC EXERCISES: CPT | Mod: GO

## 2020-07-24 PROCEDURE — 97530 THERAPEUTIC ACTIVITIES: CPT | Mod: GO | Performed by: OCCUPATIONAL THERAPIST

## 2020-07-24 PROCEDURE — 12800006 ZZH R&B REHAB

## 2020-07-24 PROCEDURE — 97110 THERAPEUTIC EXERCISES: CPT | Mod: GP

## 2020-07-24 PROCEDURE — 97116 GAIT TRAINING THERAPY: CPT | Mod: GP

## 2020-07-24 PROCEDURE — 25000132 ZZH RX MED GY IP 250 OP 250 PS 637: Performed by: PHYSICAL MEDICINE & REHABILITATION

## 2020-07-24 PROCEDURE — 97112 NEUROMUSCULAR REEDUCATION: CPT | Mod: GP

## 2020-07-24 PROCEDURE — 00000146 ZZHCL STATISTIC GLUCOSE BY METER IP

## 2020-07-24 PROCEDURE — 97535 SELF CARE MNGMENT TRAINING: CPT | Mod: GO | Performed by: OCCUPATIONAL THERAPIST

## 2020-07-24 PROCEDURE — 97535 SELF CARE MNGMENT TRAINING: CPT | Mod: GO

## 2020-07-24 RX ADMIN — AMLODIPINE BESYLATE 10 MG: 10 TABLET ORAL at 08:38

## 2020-07-24 RX ADMIN — METFORMIN HYDROCHLORIDE 500 MG: 500 TABLET ORAL at 18:33

## 2020-07-24 RX ADMIN — CLOPIDOGREL BISULFATE 75 MG: 75 TABLET, FILM COATED ORAL at 08:38

## 2020-07-24 RX ADMIN — SERTRALINE HYDROCHLORIDE 50 MG: 50 TABLET, FILM COATED ORAL at 08:37

## 2020-07-24 RX ADMIN — CARVEDILOL 25 MG: 25 TABLET, FILM COATED ORAL at 08:38

## 2020-07-24 RX ADMIN — ATORVASTATIN CALCIUM 40 MG: 40 TABLET, FILM COATED ORAL at 20:27

## 2020-07-24 RX ADMIN — HYDRALAZINE HYDROCHLORIDE 10 MG: 10 TABLET ORAL at 20:27

## 2020-07-24 RX ADMIN — ACETAMINOPHEN 975 MG: 325 TABLET, FILM COATED ORAL at 14:47

## 2020-07-24 RX ADMIN — HYDRALAZINE HYDROCHLORIDE 10 MG: 10 TABLET ORAL at 08:38

## 2020-07-24 RX ADMIN — HYDRALAZINE HYDROCHLORIDE 10 MG: 10 TABLET ORAL at 13:35

## 2020-07-24 RX ADMIN — ASPIRIN 81 MG: 81 TABLET ORAL at 08:38

## 2020-07-24 RX ADMIN — LISINOPRIL 40 MG: 40 TABLET ORAL at 08:38

## 2020-07-24 RX ADMIN — METFORMIN HYDROCHLORIDE 500 MG: 500 TABLET ORAL at 08:38

## 2020-07-24 NOTE — PROGRESS NOTES
"  [facility]   Acute Rehabilitation Unit  Daily progress note    INTERVAL HISTORY  Tara Odell was seen during her PT. Doing better today and less fatigued. Still answered most questions with \"I don't know\". Denied any pain or discomfort.    BP and BG in better range.     Fatigue, aphasia and visual deficits are main barriers for her independence. Able to ambulate now with FWW and min A.       MEDICATIONS  Scheduled meds    amLODIPine  10 mg Oral Daily     aspirin  81 mg Oral Daily     atorvastatin  40 mg Oral QPM     carvedilol  25 mg Oral BID w/meals     clopidogrel  75 mg Oral Daily     hydrALAZINE  10 mg Oral TID     lisinopril  40 mg Oral Daily     metFORMIN  500 mg Oral BID w/meals     sertraline  50 mg Oral Daily       PRN meds:  acetaminophen, bisacodyl, glucose **OR** dextrose **OR** glucagon, diclofenac, hydrALAZINE, melatonin, polyethylene glycol      PHYSICAL EXAM  /69 (BP Location: Right arm)   Pulse 60   Temp 98.1  F (36.7  C) (Oral)   Resp 16   Ht 1.626 m (5' 4\")   Wt 83.3 kg (183 lb 11.2 oz)   SpO2 92%   BMI 31.53 kg/m      Gen: NAD, sitting in chair   Pulm: non-labored clear in room air   Abd: soft and non-tender   Ext: no edema in bilateral lower extremities  Neuro/MSK: unchanged - alert to self, vision impaired, right side impaired coordination/4/5 at ue, hf 4-, ke 4-, df/pf 4/5.       LABS  No new today  Reviewed BGs      ASSESSMENT AND PLAN  Tara Odell is a 59 year old woman who presented 7/10 with right sided weakness/sensation changes, impaired cognition, nausea and vomiting noted to have visual field cut on admission, diagnosed with Left PCA stroke with severe multifocal intracranial atherosclerotic disease, HTN, HLD, and type 2 diabetes. Admitted to acute rehab 7/17 for ongoing rehabilitation and medical management.       Left PCA infarction  Severe multifocal intracranial atherosclerotic disease    Patient admitted " "with right visual field cut, CT and CTA showed left PCA infarction along with extensive atherosclerotic disease involving the carotid and vertebrobasilar system. cerebral angiogram showed mild fusiform dilatation of the right and left ICA with diffuse intracranial atherosclerotic disease most notably 70% narrowing of the proximal basilar artery  -continue Dual antiplatelet therapy with aspirin and Plavix,  indefinitely   - continue  Lipitor 40 mg daily with goal of LDL less than 70.  - HTN management as below-  goal should be 120-130/80  -continue Physical, occupational and speech therapy   -DM management as below Hemoglobin A1c goal <7.  - Follow-up with neurology (Dr. Duarte) in 6 to 8 weeks       Essential hypertension.  goal <130/80. Hypertensive on admission with initiation of BP meds starting 7/14, with ongoing titration.   -continue Amlodipine 10 mg daily, lisinopril 40mg daily  Coreg 25 mg bid and -hydralazine 10 mg tid started 7/23.   -continue to monitor and titrate as indicated.   -continue prn hydralazine mg tid prn.     Hypercholesteremia    . Goal <70. Started on statin during acute hospitalization.     -Continue atorvastatin 40 mg daily.    New diagnosis of DM 2.   HbA1C 7%  -increase metformin 500 mg bid 7/23-   -  bid glucose checks   -discontinue ssi    Suspected neurogenic bladder- doss removed 7/21 tov PVRs wnl, intermittent incontinence,  Has difficulty explaining incontinence, when asked if she feel urge to void states \"sometimes\", denies ab pain, nausea, dysuria. Does not believe she is drinking well, this was encouraged    Situational Depression  Poor appetite, tearful, fatigue, suspect situational depression, started on sertaline 25 mg daily on admission will increase to 50 mg daily 7/23.  -monitor       Left hip pain: denies left hip pain 7/23 no fractures on xray no effusion, ? Trochanteric bursitis  - apply diclofenac ointment prn/ prn tylenol  -monitor.        Adjustment to " disability:  Clinical psychology to eval and treat as needed   FEN: reg  Bowel: continent  Bladder: doss reportedly removed 7/17- tov - monitor pvrs st cath as indicated. PVRs were checked and were acceptable. 07/21/20 she continues to be intermittently incontinent, most likely neurogenic.   timed toileting and monitor  DVT Prophylaxis: ambulation  GI Prophylaxis: reg  Code: full confirmed on admission.   Disposition: goal for home  ELOS:  ~2 weeks.  Rehab prognosis:  Fair.   Follow up Appointments on Discharge: neurology, pcp      Kelsi Mcneill MD  Physical Medicine & Rehabilitation    Time Spent on this Encounter   I spent a total of 25 minutes face to face and coordinating care of Tara Odell.  Over 50% of my time on the unit was spent counseling the patient and /or coordinating care; see note for details.

## 2020-07-24 NOTE — PLAN OF CARE
RN: TALITA. Pt alert and oriented to self  and situation. Appetite fair. Pt rested in bed for most of the shift, turned/repositioned. Incontinent of urine, perineal skin care per routine. Pt denies pain or SOB and has no complaints. Bed alarm applied. Pt refused PCD. Continue with POC.

## 2020-07-24 NOTE — PLAN OF CARE
OT pt seen for viusal tasks with patch on r eye pt able to dial numer written and given verbal 100% accuracy, pt able to sequence and problem solve card a-k with leaving needed spaces betwwen cards pt says wrong number but sees correct number and able to push on phone and pick correct card

## 2020-07-24 NOTE — PLAN OF CARE
PT: Pt continues to c/o fatigue throughout session. Pt completed session with R eye patch but continued to have difficulty with scanning environment and would turn L with visual and verbal cues to turn R. Pt able to ambulate up to 150' using FWW with min A for turns and safety. Appropriate to continue with current POC.

## 2020-07-24 NOTE — PLAN OF CARE
FOCUS/GOAL  Bowel management, Bladder management, and Pain management    ASSESSMENT, INTERVENTIONS AND CONTINUING PLAN FOR GOAL:  Pt slept well.   No c/o pain.  Call light in reach, bed alarm on.   No BM this shift.   Incont and cont of bladder, malodorous.   Disoriented to time ans place, alert.   Ax 1 with [id number] to transfer.

## 2020-07-24 NOTE — PLAN OF CARE
"FOCUS/GOAL  Medical management    ASSESSMENT, INTERVENTIONS AND CONTINUING PLAN FOR GOAL:  Patient denied pain during shift. Denied SOB, denied difficulty breathing. No neuro changes.  and 135 during shift. VSS ex /76, HR 57. Afternoon /68. Check-ins provided. Toileting offered and up with therapy. Patient not using call light to initiate needs. Alarms on for safety purposes. Patient did not eat breakfast d/t patient not liking it. Staff assisted with ordering lunch. Patient stated likes lunch but \"too tired\". Patient reported sleeping well last night. Set-up of tray provided. Call light within reach. Continue with POC.Patient reported headache toward end of shift, PRN tylenol given x1 to assist with pain. /69, HR 60. See OT note for later afternoon session update.  "

## 2020-07-25 ENCOUNTER — APPOINTMENT (OUTPATIENT)
Dept: EDUCATION SERVICES | Facility: CLINIC | Age: 60
End: 2020-07-25
Attending: PHYSICAL MEDICINE & REHABILITATION
Payer: MEDICAID

## 2020-07-25 ENCOUNTER — APPOINTMENT (OUTPATIENT)
Dept: OCCUPATIONAL THERAPY | Facility: CLINIC | Age: 60
End: 2020-07-25
Payer: MEDICAID

## 2020-07-25 ENCOUNTER — APPOINTMENT (OUTPATIENT)
Dept: PHYSICAL THERAPY | Facility: CLINIC | Age: 60
End: 2020-07-25
Payer: MEDICAID

## 2020-07-25 ENCOUNTER — APPOINTMENT (OUTPATIENT)
Dept: SPEECH THERAPY | Facility: CLINIC | Age: 60
End: 2020-07-25
Payer: MEDICAID

## 2020-07-25 LAB
GLUCOSE BLDC GLUCOMTR-MCNC: 125 MG/DL (ref 70–99)
GLUCOSE BLDC GLUCOMTR-MCNC: 129 MG/DL (ref 70–99)
GLUCOSE BLDC GLUCOMTR-MCNC: 139 MG/DL (ref 70–99)
GLUCOSE BLDC GLUCOMTR-MCNC: 97 MG/DL (ref 70–99)

## 2020-07-25 PROCEDURE — 97112 NEUROMUSCULAR REEDUCATION: CPT | Mod: GP | Performed by: PHYSICAL THERAPIST

## 2020-07-25 PROCEDURE — 97535 SELF CARE MNGMENT TRAINING: CPT | Mod: GO | Performed by: OCCUPATIONAL THERAPIST

## 2020-07-25 PROCEDURE — 25000132 ZZH RX MED GY IP 250 OP 250 PS 637: Performed by: PHYSICAL MEDICINE & REHABILITATION

## 2020-07-25 PROCEDURE — 00000146 ZZHCL STATISTIC GLUCOSE BY METER IP

## 2020-07-25 PROCEDURE — 97530 THERAPEUTIC ACTIVITIES: CPT | Mod: GP | Performed by: PHYSICAL THERAPIST

## 2020-07-25 PROCEDURE — 25000132 ZZH RX MED GY IP 250 OP 250 PS 637: Performed by: PHYSICIAN ASSISTANT

## 2020-07-25 PROCEDURE — 92507 TX SP LANG VOICE COMM INDIV: CPT | Mod: GN | Performed by: REHABILITATION PRACTITIONER

## 2020-07-25 PROCEDURE — 97530 THERAPEUTIC ACTIVITIES: CPT | Mod: GO | Performed by: OCCUPATIONAL THERAPIST

## 2020-07-25 PROCEDURE — 97110 THERAPEUTIC EXERCISES: CPT | Mod: GP | Performed by: PHYSICAL THERAPIST

## 2020-07-25 PROCEDURE — 25000132 ZZH RX MED GY IP 250 OP 250 PS 637: Performed by: STUDENT IN AN ORGANIZED HEALTH CARE EDUCATION/TRAINING PROGRAM

## 2020-07-25 PROCEDURE — 12800006 ZZH R&B REHAB

## 2020-07-25 RX ORDER — POLYETHYLENE GLYCOL 3350 17 G/17G
17 POWDER, FOR SOLUTION ORAL DAILY
Status: DISCONTINUED | OUTPATIENT
Start: 2020-07-25 | End: 2020-08-03 | Stop reason: HOSPADM

## 2020-07-25 RX ADMIN — POLYETHYLENE GLYCOL 3350 17 G: 17 POWDER, FOR SOLUTION ORAL at 13:24

## 2020-07-25 RX ADMIN — ATORVASTATIN CALCIUM 40 MG: 40 TABLET, FILM COATED ORAL at 19:40

## 2020-07-25 RX ADMIN — HYDRALAZINE HYDROCHLORIDE 10 MG: 10 TABLET ORAL at 13:25

## 2020-07-25 RX ADMIN — CARVEDILOL 25 MG: 25 TABLET, FILM COATED ORAL at 17:05

## 2020-07-25 RX ADMIN — HYDRALAZINE HYDROCHLORIDE 10 MG: 10 TABLET ORAL at 19:40

## 2020-07-25 RX ADMIN — SERTRALINE HYDROCHLORIDE 50 MG: 50 TABLET, FILM COATED ORAL at 08:47

## 2020-07-25 RX ADMIN — LISINOPRIL 40 MG: 40 TABLET ORAL at 08:47

## 2020-07-25 RX ADMIN — HYDRALAZINE HYDROCHLORIDE 10 MG: 10 TABLET ORAL at 08:46

## 2020-07-25 RX ADMIN — ASPIRIN 81 MG: 81 TABLET ORAL at 08:47

## 2020-07-25 RX ADMIN — METFORMIN HYDROCHLORIDE 500 MG: 500 TABLET ORAL at 17:05

## 2020-07-25 RX ADMIN — CLOPIDOGREL BISULFATE 75 MG: 75 TABLET, FILM COATED ORAL at 08:46

## 2020-07-25 RX ADMIN — CARVEDILOL 25 MG: 25 TABLET, FILM COATED ORAL at 08:47

## 2020-07-25 RX ADMIN — AMLODIPINE BESYLATE 10 MG: 10 TABLET ORAL at 08:47

## 2020-07-25 RX ADMIN — METFORMIN HYDROCHLORIDE 500 MG: 500 TABLET ORAL at 08:47

## 2020-07-25 ASSESSMENT — MIFFLIN-ST. JEOR: SCORE: 1356.96

## 2020-07-25 NOTE — PLAN OF CARE
Pt is alert, disoriented to time. Pt very soft spoken, and states she is just so tired and wants to sleep. Is easy to arouse on encounters. Denies shortness of breath/chest pain. Has been continent and incontinent of bladder. Have been offering bathroom on encounters. Pt has poor appetite, pt only ate angelfood cake and orange fruit ice for dinner. LBM 7/23. Denies pain on encounters. Took pills well with applesauce. Continue POC.

## 2020-07-25 NOTE — CONSULTS
Stroke Education Note    The following information has been reviewed with the patient and family:    1. Warning signs of stroke    2. Calling 911 if having warning signs of stroke    3. All modifiable risk factors: hypertension, CAD, atrial fib, diabetes, hypercholesterolemia, smoking, substance abuse, diet, physical inactivity, obesity, sleep apnea.    4. Patient's risk factors for stroke which include: HTN, HLD, obesity, poor diet, physical inactivity, DM2    5. Follow-up plan for after discharge    6. Discharge medications which include: Lisinopril, Hydralazine, Metformin, Coreg, Lipitor, ASA,Plavix, Norvasc    In addition, the [facility] Stroke Class Handout has been given to the patient and family.    Learner's response to risk factors / lifestyle modification education: NA - Precontemplative. Tara had very flat affect throughout education and gave minimal response when asked questions about lifestyle choices and health care. Talked about needing some diabetic education for her new diagnosis and the importance of follow up care.     Anne Pal, RN

## 2020-07-25 NOTE — PLAN OF CARE
FOCUS/GOAL  Bowel management, Bladder management, Pain management, Discharge planning, Mobility, Skin integrity, and Safety management    ASSESSMENT, INTERVENTIONS AND CONTINUING PLAN FOR GOAL:  Disoriented to place and time.  Right side weakness continues.  Up w/ assist one and walker to the bathroom.  Cont of bowel and bladder overnight. Last BM 7/23.  No c/o pain.  Regular diet, thin liquids. Takes pills well whole w/ applesauce.  Needs assistance w/ ordering meals.  Continue w/ POC.

## 2020-07-25 NOTE — PLAN OF CARE
Denies headache or dizziness, amb to the BR and continent bladder and medium BM,ind with pericare. Need assist with clothing management, also inc x 1, need assist with changing her brief. . Had some episode of weepiness and statements of feeling sad,patients daughter and patients mom visited today, which was helpful patients mood, also provided TLC, Patient ate good at breakfast and poor at lunch, , will continue POC

## 2020-07-25 NOTE — PLAN OF CARE
Patient demonstrated difficulty attending to targets in R visual field during paper/pencil cancellation task.

## 2020-07-25 NOTE — PROGRESS NOTES
[facility], [place]   Physical Medicine and Rehabilitation Daily Note           Assessment and Plan of Care:   Tara Odell is a 59 year old woman who presented 7/10 with right sided weakness/sensation changes, impaired cognition, nausea and vomiting noted to have visual field cut on admission, diagnosed with Left PCA stroke with severe multifocal intracranial atherosclerotic disease, HTN, HLD, and type 2 diabetes. Admitted to acute rehab 7/17 for ongoing rehabilitation and medical management.     --Vitals stable. No lab today.  -- Added scheduled Miralax for constipation  --Continue ongoing medical management.  --Continue therapies and plan of care.             Interval history:   Nursing notes reviewed, no acute overnight events. LBM 7/23. Denies abdominal pain, nausea. Discussed scheduling Miralax; pt amenable. Denies any other new symptoms or concerns.             Physical Exam:     Vitals:    07/24/20 1828 07/24/20 2024 07/25/20 0640 07/25/20 0742   BP: 135/57 133/65  (!) 148/66   BP Location: Left arm   Right arm   Pulse: 55   68   Resp: 16   20   Temp: 97  F (36.1  C)   98.3  F (36.8  C)   TempSrc: Oral   Oral   SpO2: 95%   94%   Weight:   80.2 kg (176 lb 12.8 oz)    Height:         Gen: NAD, resting in bed  Resp: Non-labored breathing on RA.   Ext: wwp, no edema in BLE  MSK/neuro: Alert. Seems to answers questions appropriately but with delayed processing. R hemiparesis.          Data:   Scheduled meds    amLODIPine  10 mg Oral Daily     aspirin  81 mg Oral Daily     atorvastatin  40 mg Oral QPM     carvedilol  25 mg Oral BID w/meals     clopidogrel  75 mg Oral Daily     hydrALAZINE  10 mg Oral TID     lisinopril  40 mg Oral Daily     menthol   Transdermal Q8H     metFORMIN  500 mg Oral BID w/meals     polyethylene glycol  17 g Oral Daily     sertraline  50 mg Oral Daily       PRN meds:  acetaminophen, bisacodyl, glucose **OR** dextrose **OR** glucagon, diclofenac,  hydrALAZINE, melatonin, menthol **AND** menthol    Staff is Dr. Angelika Kelly MD  Physical Medicine and Rehabilitation Resident

## 2020-07-26 ENCOUNTER — APPOINTMENT (OUTPATIENT)
Dept: PHYSICAL THERAPY | Facility: CLINIC | Age: 60
End: 2020-07-26
Payer: MEDICAID

## 2020-07-26 ENCOUNTER — APPOINTMENT (OUTPATIENT)
Dept: SPEECH THERAPY | Facility: CLINIC | Age: 60
End: 2020-07-26
Payer: MEDICAID

## 2020-07-26 ENCOUNTER — APPOINTMENT (OUTPATIENT)
Dept: OCCUPATIONAL THERAPY | Facility: CLINIC | Age: 60
End: 2020-07-26
Payer: MEDICAID

## 2020-07-26 LAB
GLUCOSE BLDC GLUCOMTR-MCNC: 121 MG/DL (ref 70–99)
GLUCOSE BLDC GLUCOMTR-MCNC: 129 MG/DL (ref 70–99)
GLUCOSE BLDC GLUCOMTR-MCNC: 98 MG/DL (ref 70–99)

## 2020-07-26 PROCEDURE — 00000146 ZZHCL STATISTIC GLUCOSE BY METER IP

## 2020-07-26 PROCEDURE — 25000132 ZZH RX MED GY IP 250 OP 250 PS 637: Performed by: STUDENT IN AN ORGANIZED HEALTH CARE EDUCATION/TRAINING PROGRAM

## 2020-07-26 PROCEDURE — 25000132 ZZH RX MED GY IP 250 OP 250 PS 637: Performed by: PHYSICIAN ASSISTANT

## 2020-07-26 PROCEDURE — 12800006 ZZH R&B REHAB

## 2020-07-26 PROCEDURE — 97112 NEUROMUSCULAR REEDUCATION: CPT | Mod: GP | Performed by: PHYSICAL THERAPIST

## 2020-07-26 PROCEDURE — 97530 THERAPEUTIC ACTIVITIES: CPT | Mod: GO | Performed by: OCCUPATIONAL THERAPIST

## 2020-07-26 PROCEDURE — 97530 THERAPEUTIC ACTIVITIES: CPT | Mod: GP | Performed by: PHYSICAL THERAPIST

## 2020-07-26 PROCEDURE — 92507 TX SP LANG VOICE COMM INDIV: CPT | Mod: GN | Performed by: SPEECH-LANGUAGE PATHOLOGIST

## 2020-07-26 PROCEDURE — 25000132 ZZH RX MED GY IP 250 OP 250 PS 637: Performed by: PHYSICAL MEDICINE & REHABILITATION

## 2020-07-26 PROCEDURE — 97535 SELF CARE MNGMENT TRAINING: CPT | Mod: GO | Performed by: OCCUPATIONAL THERAPIST

## 2020-07-26 RX ADMIN — ATORVASTATIN CALCIUM 40 MG: 40 TABLET, FILM COATED ORAL at 20:04

## 2020-07-26 RX ADMIN — AMLODIPINE BESYLATE 10 MG: 10 TABLET ORAL at 08:25

## 2020-07-26 RX ADMIN — CLOPIDOGREL BISULFATE 75 MG: 75 TABLET, FILM COATED ORAL at 08:25

## 2020-07-26 RX ADMIN — SERTRALINE HYDROCHLORIDE 50 MG: 50 TABLET, FILM COATED ORAL at 08:24

## 2020-07-26 RX ADMIN — CARVEDILOL 25 MG: 25 TABLET, FILM COATED ORAL at 17:52

## 2020-07-26 RX ADMIN — LISINOPRIL 40 MG: 40 TABLET ORAL at 08:24

## 2020-07-26 RX ADMIN — HYDRALAZINE HYDROCHLORIDE 10 MG: 10 TABLET ORAL at 08:24

## 2020-07-26 RX ADMIN — METFORMIN HYDROCHLORIDE 500 MG: 500 TABLET ORAL at 08:25

## 2020-07-26 RX ADMIN — HYDRALAZINE HYDROCHLORIDE 10 MG: 10 TABLET ORAL at 13:52

## 2020-07-26 RX ADMIN — ASPIRIN 81 MG: 81 TABLET ORAL at 08:30

## 2020-07-26 RX ADMIN — POLYETHYLENE GLYCOL 3350 17 G: 17 POWDER, FOR SOLUTION ORAL at 08:24

## 2020-07-26 RX ADMIN — HYDRALAZINE HYDROCHLORIDE 10 MG: 10 TABLET ORAL at 20:04

## 2020-07-26 RX ADMIN — METFORMIN HYDROCHLORIDE 500 MG: 500 TABLET ORAL at 17:52

## 2020-07-26 NOTE — PLAN OF CARE
Denies headache or dizziness, appear to be sleepy most of the time if not participating in therapy, Also poor appetite need significant encouragement to eat, will rather sleep than eat. Make statements than she does not like the food, offered other food choices but she declined and just want to sleep. Admission weight compared to yesterday revealed about 8 pounds weight loss,currently on zoloft., nutrition consult is in place will continue POC

## 2020-07-26 NOTE — PLAN OF CARE
Completed auditory comprehension task- listening to yes/no questions- comparative and then answering yes/no- pt sow to process and respond- needing repetition - at 12/15 accuracy. Pt also very sleepy. Completed verbal expression task- divergent naming- target is to name 5 category members- pt is averaging 3/5 on 5 different trials- benefits from phonemic cueing.

## 2020-07-26 NOTE — PLAN OF CARE
Patient disoriented to situation with occasional forgetfulness. Uses call light to make needs known. VSS and lung sounds clear and equal bilaterally. Bowel sounds active and passing gas; LBM yesterday. Denies chest pain, lightheadedness, dizziness, and SOB. Drinking well and tolerating regular diet. Needs encouragement to eat and drink. Voiding spontaneously with occasional incontinence. Able to wiggle toes and CMS intact; denies numbness or tingling. Denies pain and discomfort. Turned and repositioned with heels floated off of bed. Able to transfer with assist of 1 and walker. Will continue to follow plan of care and provide interventions as needed.

## 2020-07-26 NOTE — PLAN OF CARE
Participated well today.  Was upset about her lunch tray but unable to articulate why.  Called her dtr who indicated that she does not like pieces of meat, only smaller meat that is in things (like chicken salad).  Made a short preference list and passed it on to the charge nurse.

## 2020-07-26 NOTE — PLAN OF CARE
FOCUS/GOAL  Bowel management, Bladder management, and Medical management    ASSESSMENT, INTERVENTIONS AND CONTINUING PLAN FOR GOAL:  Alert, uses call light to make needs known. Assist of one with walker. Continent/incontinent of bladder, had BM this shift. Denies pain or discomfort. Fall precautions in place, uses call light appropriately to make needs known.

## 2020-07-27 ENCOUNTER — APPOINTMENT (OUTPATIENT)
Dept: OCCUPATIONAL THERAPY | Facility: CLINIC | Age: 60
End: 2020-07-27
Payer: MEDICAID

## 2020-07-27 ENCOUNTER — APPOINTMENT (OUTPATIENT)
Dept: SPEECH THERAPY | Facility: CLINIC | Age: 60
End: 2020-07-27
Payer: MEDICAID

## 2020-07-27 ENCOUNTER — APPOINTMENT (OUTPATIENT)
Dept: PHYSICAL THERAPY | Facility: CLINIC | Age: 60
End: 2020-07-27
Payer: MEDICAID

## 2020-07-27 LAB
ANION GAP SERPL CALCULATED.3IONS-SCNC: 7 MMOL/L (ref 3–14)
BASOPHILS # BLD AUTO: 0 10E9/L (ref 0–0.2)
BASOPHILS NFR BLD AUTO: 0.4 %
BUN SERPL-MCNC: 21 MG/DL (ref 7–30)
CALCIUM SERPL-MCNC: 9.2 MG/DL (ref 8.5–10.1)
CHLORIDE SERPL-SCNC: 108 MMOL/L (ref 94–109)
CO2 SERPL-SCNC: 25 MMOL/L (ref 20–32)
CREAT SERPL-MCNC: 0.87 MG/DL (ref 0.52–1.04)
DIFFERENTIAL METHOD BLD: NORMAL
EOSINOPHIL # BLD AUTO: 0.3 10E9/L (ref 0–0.7)
EOSINOPHIL NFR BLD AUTO: 3.4 %
ERYTHROCYTE [DISTWIDTH] IN BLOOD BY AUTOMATED COUNT: 13.3 % (ref 10–15)
GFR SERPL CREATININE-BSD FRML MDRD: 72 ML/MIN/{1.73_M2}
GLUCOSE BLDC GLUCOMTR-MCNC: 107 MG/DL (ref 70–99)
GLUCOSE SERPL-MCNC: 109 MG/DL (ref 70–99)
HCT VFR BLD AUTO: 42.4 % (ref 35–47)
HGB BLD-MCNC: 14.4 G/DL (ref 11.7–15.7)
IMM GRANULOCYTES # BLD: 0 10E9/L (ref 0–0.4)
IMM GRANULOCYTES NFR BLD: 0.1 %
LYMPHOCYTES # BLD AUTO: 2 10E9/L (ref 0.8–5.3)
LYMPHOCYTES NFR BLD AUTO: 27.5 %
MCH RBC QN AUTO: 30.1 PG (ref 26.5–33)
MCHC RBC AUTO-ENTMCNC: 34 G/DL (ref 31.5–36.5)
MCV RBC AUTO: 89 FL (ref 78–100)
MONOCYTES # BLD AUTO: 0.7 10E9/L (ref 0–1.3)
MONOCYTES NFR BLD AUTO: 8.8 %
NEUTROPHILS # BLD AUTO: 4.4 10E9/L (ref 1.6–8.3)
NEUTROPHILS NFR BLD AUTO: 59.8 %
NRBC # BLD AUTO: 0 10*3/UL
NRBC BLD AUTO-RTO: 0 /100
PLATELET # BLD AUTO: 319 10E9/L (ref 150–450)
POTASSIUM SERPL-SCNC: 4.2 MMOL/L (ref 3.4–5.3)
RBC # BLD AUTO: 4.78 10E12/L (ref 3.8–5.2)
SODIUM SERPL-SCNC: 140 MMOL/L (ref 133–144)
TSH SERPL DL<=0.005 MIU/L-ACNC: 0.84 MU/L (ref 0.4–4)
WBC # BLD AUTO: 7.4 10E9/L (ref 4–11)

## 2020-07-27 PROCEDURE — 25000132 ZZH RX MED GY IP 250 OP 250 PS 637: Performed by: PHYSICIAN ASSISTANT

## 2020-07-27 PROCEDURE — 97535 SELF CARE MNGMENT TRAINING: CPT | Mod: GO

## 2020-07-27 PROCEDURE — 97110 THERAPEUTIC EXERCISES: CPT | Mod: GO

## 2020-07-27 PROCEDURE — 00000146 ZZHCL STATISTIC GLUCOSE BY METER IP

## 2020-07-27 PROCEDURE — 84443 ASSAY THYROID STIM HORMONE: CPT | Performed by: PHYSICIAN ASSISTANT

## 2020-07-27 PROCEDURE — 25000132 ZZH RX MED GY IP 250 OP 250 PS 637: Performed by: STUDENT IN AN ORGANIZED HEALTH CARE EDUCATION/TRAINING PROGRAM

## 2020-07-27 PROCEDURE — 97116 GAIT TRAINING THERAPY: CPT | Mod: GP

## 2020-07-27 PROCEDURE — 92507 TX SP LANG VOICE COMM INDIV: CPT | Mod: GN

## 2020-07-27 PROCEDURE — 36415 COLL VENOUS BLD VENIPUNCTURE: CPT | Performed by: PHYSICIAN ASSISTANT

## 2020-07-27 PROCEDURE — 85025 COMPLETE CBC W/AUTO DIFF WBC: CPT | Performed by: PHYSICIAN ASSISTANT

## 2020-07-27 PROCEDURE — 25000132 ZZH RX MED GY IP 250 OP 250 PS 637: Performed by: PHYSICAL MEDICINE & REHABILITATION

## 2020-07-27 PROCEDURE — 80048 BASIC METABOLIC PNL TOTAL CA: CPT | Performed by: PHYSICIAN ASSISTANT

## 2020-07-27 PROCEDURE — 25000128 H RX IP 250 OP 636: Performed by: PHYSICAL MEDICINE & REHABILITATION

## 2020-07-27 PROCEDURE — 97112 NEUROMUSCULAR REEDUCATION: CPT | Mod: GP

## 2020-07-27 PROCEDURE — 12800006 ZZH R&B REHAB

## 2020-07-27 RX ORDER — ONDANSETRON 4 MG/1
4 TABLET, FILM COATED ORAL EVERY 6 HOURS PRN
Status: DISCONTINUED | OUTPATIENT
Start: 2020-07-27 | End: 2020-08-03 | Stop reason: HOSPADM

## 2020-07-27 RX ADMIN — SERTRALINE HYDROCHLORIDE 50 MG: 50 TABLET, FILM COATED ORAL at 09:45

## 2020-07-27 RX ADMIN — CARVEDILOL 25 MG: 25 TABLET, FILM COATED ORAL at 21:19

## 2020-07-27 RX ADMIN — LISINOPRIL 40 MG: 40 TABLET ORAL at 09:45

## 2020-07-27 RX ADMIN — HYDRALAZINE HYDROCHLORIDE 10 MG: 10 TABLET ORAL at 09:45

## 2020-07-27 RX ADMIN — POLYETHYLENE GLYCOL 3350 17 G: 17 POWDER, FOR SOLUTION ORAL at 09:46

## 2020-07-27 RX ADMIN — METFORMIN HYDROCHLORIDE 500 MG: 500 TABLET ORAL at 09:46

## 2020-07-27 RX ADMIN — ONDANSETRON HYDROCHLORIDE 4 MG: 4 TABLET, FILM COATED ORAL at 11:23

## 2020-07-27 RX ADMIN — HYDRALAZINE HYDROCHLORIDE 10 MG: 10 TABLET ORAL at 21:19

## 2020-07-27 RX ADMIN — AMLODIPINE BESYLATE 10 MG: 10 TABLET ORAL at 09:45

## 2020-07-27 RX ADMIN — ATORVASTATIN CALCIUM 40 MG: 40 TABLET, FILM COATED ORAL at 21:19

## 2020-07-27 RX ADMIN — ASPIRIN 81 MG: 81 TABLET ORAL at 09:45

## 2020-07-27 RX ADMIN — METFORMIN HYDROCHLORIDE 500 MG: 500 TABLET ORAL at 18:17

## 2020-07-27 RX ADMIN — HYDRALAZINE HYDROCHLORIDE 10 MG: 10 TABLET ORAL at 13:50

## 2020-07-27 RX ADMIN — CLOPIDOGREL BISULFATE 75 MG: 75 TABLET, FILM COATED ORAL at 09:45

## 2020-07-27 NOTE — PLAN OF CARE
OT pt able to don shirt with good clothes orientation, min a for clothes orientation and fastening bra in front pt able to turn it around and get arms in , button shirt min a pt needed assist with l arm second arm into sleeve. Pt seen for vision with eye patch on l and then on r eye increase vision from last week with r eye pt able to see 1/2 letter tiles and place small beads on pickup stick, pt c/o eyes not working together when no patch was on eye and stated her eyes got tired with taks

## 2020-07-27 NOTE — PLAN OF CARE
Patient scheduled for TR.  Patient sitting up in bed eating and watching tv.  Patient talked about her book and how she would like to work on it while here in the hospital.  TR  Provided her with a tablet and pen.  Will continue to schedule 1:1 with TR and bring outside as well.  No other leisure needs.  Helped with warm blankets and repositioning.

## 2020-07-27 NOTE — PLAN OF CARE
PT:  Pt was very fatigued today and need encouragement to continue to participate.  She had multiple complaints of pain in her R UE and R LE.  -30 minutes for later AM session due to complaints of stomach pain and nausea.  RN was aware.  Continue to focus on activity tolerance and dynamic standing balance for improved independence with all functional mobility.

## 2020-07-27 NOTE — PROGRESS NOTES
Clinical Nutrition Services Brief Note      Tara Odell is a/an 59 year old female assessed by the dietitian for modified RN Consult - She doesn't like: Boost, carrots, cantaloupe, tomato soup, meat in big pieces. Here are the food choices that she would like: Rice, potatoes, peaches, chicken salad, chicken noodle soup, ham and cheese sandwich, blue berry muffin, mac & cheese, pizza. Pt had been getting default trays and pt had been in tears several times because it is food she doesn't want to eat. Nursing cannot always call and order food for her.     Contacted nutrition associate who assists pt in ordering and updated on preferences.    Will continue to monitor per nutrition protocol.      Kayla Mcfarland RD, LD  Unit Pager: [phone]

## 2020-07-27 NOTE — PLAN OF CARE
FOCUS/GOAL  Medical management    ASSESSMENT, INTERVENTIONS AND CONTINUING PLAN FOR GOAL:  Patient was seen at 2310, she had no complaints, she asked for assistance with blankets. At 2355, she was heard crying, she said she called for assistance and no one came, her call light was not on. She requested to remove her PCD sleeves, explained to her the reason she needs them on, she still declined, they were removed. She has been sleeping well, independent with bed mobility, turning herself from side to side. At 0100, offered her assistance to the bathroom, she declined, no incontinence.   At 0500, she was incontinent of a small BM and urine. She did not call for assistance all night.  Continue to anticipate her needs.

## 2020-07-27 NOTE — PLAN OF CARE
FOCUS/GOAL  Bowel management, Bladder management, and Nutrition/Feeding/Swallowing precautions    ASSESSMENT, INTERVENTIONS AND CONTINUING PLAN FOR GOAL:  Alert and disoriented to place and situation, cont of B/B this shift. Pt had hard time swallowing pills this AM.  Pt took pills 1 at a time with sips of water, but was gagging while taking pills. Pt continued to burp and gag for approximately 3 hours after AM pills. Pt was given Zofran at 1130. Pt was taken to the bathroom as well to attempt tp have a BM, pt had small loose BM that did not relieve symptoms right away. Upon revisiting pt, she states she does not feel as nauseous as before. Pt ate 50% of breakfast and late lunch was ordered. VSS ex BP, see flowsheet. BP medications given per order. Pt denies pain,  SOB, and N/T. Continue POC

## 2020-07-27 NOTE — PLAN OF CARE
SLP: Speech/lang session completed. Yes/no questions of moderate complexity competed with 80% accuracy independently, 90% with min cues. Object naming when provided a description with 40% accuracy independently and moderate cues needed to increase accuracy to 80%.

## 2020-07-28 ENCOUNTER — APPOINTMENT (OUTPATIENT)
Dept: PHYSICAL THERAPY | Facility: CLINIC | Age: 60
End: 2020-07-28
Payer: MEDICAID

## 2020-07-28 ENCOUNTER — APPOINTMENT (OUTPATIENT)
Dept: OCCUPATIONAL THERAPY | Facility: CLINIC | Age: 60
End: 2020-07-28
Payer: MEDICAID

## 2020-07-28 ENCOUNTER — APPOINTMENT (OUTPATIENT)
Dept: SPEECH THERAPY | Facility: CLINIC | Age: 60
End: 2020-07-28
Payer: MEDICAID

## 2020-07-28 LAB
GLUCOSE BLDC GLUCOMTR-MCNC: 104 MG/DL (ref 70–99)
GLUCOSE BLDC GLUCOMTR-MCNC: 116 MG/DL (ref 70–99)
GLUCOSE BLDC GLUCOMTR-MCNC: 132 MG/DL (ref 70–99)

## 2020-07-28 PROCEDURE — 97530 THERAPEUTIC ACTIVITIES: CPT | Mod: GP

## 2020-07-28 PROCEDURE — 25000132 ZZH RX MED GY IP 250 OP 250 PS 637: Performed by: STUDENT IN AN ORGANIZED HEALTH CARE EDUCATION/TRAINING PROGRAM

## 2020-07-28 PROCEDURE — 25000132 ZZH RX MED GY IP 250 OP 250 PS 637: Performed by: PHYSICAL MEDICINE & REHABILITATION

## 2020-07-28 PROCEDURE — 97110 THERAPEUTIC EXERCISES: CPT | Mod: GO | Performed by: OCCUPATIONAL THERAPIST

## 2020-07-28 PROCEDURE — 00000146 ZZHCL STATISTIC GLUCOSE BY METER IP

## 2020-07-28 PROCEDURE — 12800006 ZZH R&B REHAB

## 2020-07-28 PROCEDURE — 97110 THERAPEUTIC EXERCISES: CPT | Mod: GP

## 2020-07-28 PROCEDURE — 25000132 ZZH RX MED GY IP 250 OP 250 PS 637: Performed by: PHYSICIAN ASSISTANT

## 2020-07-28 PROCEDURE — 97535 SELF CARE MNGMENT TRAINING: CPT | Mod: GO | Performed by: OCCUPATIONAL THERAPIST

## 2020-07-28 PROCEDURE — 92507 TX SP LANG VOICE COMM INDIV: CPT | Mod: GN | Performed by: SPEECH-LANGUAGE PATHOLOGIST

## 2020-07-28 PROCEDURE — 97116 GAIT TRAINING THERAPY: CPT | Mod: GP

## 2020-07-28 PROCEDURE — 97530 THERAPEUTIC ACTIVITIES: CPT | Mod: GO | Performed by: OCCUPATIONAL THERAPIST

## 2020-07-28 RX ORDER — SERTRALINE HYDROCHLORIDE 100 MG/1
100 TABLET, FILM COATED ORAL DAILY
Status: DISCONTINUED | OUTPATIENT
Start: 2020-07-29 | End: 2020-08-03 | Stop reason: HOSPADM

## 2020-07-28 RX ADMIN — LISINOPRIL 40 MG: 40 TABLET ORAL at 07:59

## 2020-07-28 RX ADMIN — HYDRALAZINE HYDROCHLORIDE 10 MG: 10 TABLET ORAL at 08:02

## 2020-07-28 RX ADMIN — SERTRALINE HYDROCHLORIDE 50 MG: 50 TABLET, FILM COATED ORAL at 07:58

## 2020-07-28 RX ADMIN — METFORMIN HYDROCHLORIDE 500 MG: 500 TABLET ORAL at 18:31

## 2020-07-28 RX ADMIN — METFORMIN HYDROCHLORIDE 500 MG: 500 TABLET ORAL at 07:59

## 2020-07-28 RX ADMIN — MELATONIN TAB 3 MG 3 MG: 3 TAB at 19:29

## 2020-07-28 RX ADMIN — HYDRALAZINE HYDROCHLORIDE 10 MG: 10 TABLET ORAL at 14:40

## 2020-07-28 RX ADMIN — CLOPIDOGREL BISULFATE 75 MG: 75 TABLET, FILM COATED ORAL at 07:59

## 2020-07-28 RX ADMIN — AMLODIPINE BESYLATE 10 MG: 10 TABLET ORAL at 07:59

## 2020-07-28 RX ADMIN — ASPIRIN 81 MG: 81 TABLET ORAL at 07:59

## 2020-07-28 RX ADMIN — ATORVASTATIN CALCIUM 40 MG: 40 TABLET, FILM COATED ORAL at 19:29

## 2020-07-28 RX ADMIN — HYDRALAZINE HYDROCHLORIDE 10 MG: 10 TABLET ORAL at 19:29

## 2020-07-28 NOTE — PLAN OF CARE
Pt alert and oriented x2. VSS. Disoriented to situation and time. Up with 1 assist, gait belt and walker. Regular diet, thin liquids. Takes pills whole. Was incontinent of bladder this shift. Up to the bathroom. LBM: 7/27. Pt continues to dry heaven, denies nausea. Was very tired this evening. Able to make needs known. Will continue with plan of care.

## 2020-07-28 NOTE — PLAN OF CARE
FOCUS/GOAL  Bowel management, Bladder management, and Pain management    ASSESSMENT, INTERVENTIONS AND CONTINUING PLAN FOR GOAL:    Pt slept well during the overnight , denies pain or SOB. CGA with FWW for transfers. Incontinent of urine x1, no BM on this shift. Continue with plan of care.

## 2020-07-28 NOTE — PLAN OF CARE
Patient has walked to the bathroom several times so far this shift, with gait belt and walker, assist of 1.She needs cues, and assist with slacks and pull-ups.  Small BM earlier.  Declined to take Miralax today.       When lunch tray arrived, ignored it and turned self to side.  After a walk to the bathroom, she was at least receptive to looking at it, and considering a few bites.

## 2020-07-28 NOTE — PLAN OF CARE
SLP: Patient seen for speech/language treatment. Patient named grandchildren in book in room (mixed up names on occasion but mostly accurate). Patient required cues to expand descriptions of pictures. Patient named described objects with 60% accuracy independently and required min cues to improve accuracy to 100%. Patient responded to short abstract yes/no questions with 100% accuracy. Note significant difficulty with simple reading tasks at sentence level.

## 2020-07-28 NOTE — PROGRESS NOTES
Diagnosis: Acute Ischemic Stroke  OT: Pt very melancholy this day. Pt completed ambulating with 2 ww with CGA. Right attention decreased  Precautions: Requires alarms due to some impulsiveness.

## 2020-07-28 NOTE — PROGRESS NOTES
"  [facility]   Acute Rehabilitation Unit  Daily progress note    INTERVAL HISTORY  Tara Odell was seen and examined in her room. She was very sad and tearful; said \"I just want my life back\". Discussed post-stroke depression, its frequency, and treatment options.     She is working with our RT team.   Psych consult is pending; placed another one today.  Zoloft dose was increased to the therapeutic dose 7/23 and increased to 100 starting tomorrow. Consider psychiatry consult if needed. Also consider stimulant to help with fatigue.    BP and BGs in good range.    Misses therapy minutes due to various symptoms, fatigue and lack of motivation. Visual and cognitive deficits are interfering as well. Currently requires min A for ADLs and mobility. Will discuss at team rounds.       MEDICATIONS  Scheduled meds    amLODIPine  10 mg Oral Daily     aspirin  81 mg Oral Daily     atorvastatin  40 mg Oral QPM     carvedilol  25 mg Oral BID w/meals     clopidogrel  75 mg Oral Daily     hydrALAZINE  10 mg Oral TID     lisinopril  40 mg Oral Daily     menthol   Transdermal Q8H     metFORMIN  500 mg Oral BID w/meals     polyethylene glycol  17 g Oral Daily     sertraline  50 mg Oral Daily       PRN meds:  acetaminophen, bisacodyl, glucose **OR** dextrose **OR** glucagon, hydrALAZINE, melatonin, menthol **AND** menthol, ondansetron      PHYSICAL EXAM  /67   Pulse 50   Temp 98.3  F (36.8  C) (Oral)   Resp 20   Ht 1.626 m (5' 4\")   Wt 80.2 kg (176 lb 12.8 oz)   SpO2 93%   BMI 30.35 kg/m      Gen: NAD, sitting in chair   Heart: RRR  Pulm: clear breath sounds b/l   Abd: soft and non-tender   Ext: no edema in bilateral lower extremities  Neuro/MSK: alert and partially oriented. R homonymous hemianopsia and neglect.    LABS  No new today   BGs in good range      ASSESSMENT AND PLAN  Tara Odell is a 59 year old woman who presented 7/10 with right sided " weakness/sensation changes, impaired cognition, nausea and vomiting noted to have visual field cut on admission, diagnosed with Left PCA stroke with severe multifocal intracranial atherosclerotic disease, HTN, HLD, and type 2 diabetes. Admitted to acute rehab 7/17 for ongoing rehabilitation and medical management.       Left PCA infarction  Severe multifocal intracranial atherosclerotic disease    Patient admitted with right visual field cut, CT and CTA showed left PCA infarction along with extensive atherosclerotic disease involving the carotid and vertebrobasilar system. cerebral angiogram showed mild fusiform dilatation of the right and left ICA with diffuse intracranial atherosclerotic disease most notably 70% narrowing of the proximal basilar artery  -continue Dual antiplatelet therapy with aspirin and Plavix,  indefinitely   - continue  Lipitor 40 mg daily with goal of LDL less than 70.  - HTN management as below-  goal should be 120-130/80  -continue Physical, occupational and speech therapy   -DM management as below Hemoglobin A1c goal <7.  - Follow-up with neurology (Dr. Duarte) in 6 to 8 weeks       Essential hypertension.  goal <130/80. Hypertensive on admission with initiation of BP meds starting 7/14, with ongoing titration.   -continue Amlodipine 10 mg daily, lisinopril 40mg daily  Coreg 25 mg bid and -hydralazine 10 mg tid started 7/23.   -continue to monitor and titrate as indicated.   -continue prn hydralazine mg tid prn.     Hypercholesteremia    . Goal <70. Started on statin during acute hospitalization.     -Continue atorvastatin 40 mg daily.    New diagnosis of DM 2.   HbA1C 7%  -increase metformin 500 mg bid 7/23-   -  bid glucose checks   -discontinue ssi  Diabetic educator consult completed; will order supply for home    Suspected neurogenic bladder- doss removed 7/21 tov PVRs wnl, intermittent incontinence,  Has difficulty explaining incontinence, when asked if she feel urge to void  "states \"sometimes\", denies ab pain, nausea, dysuria. Does not believe she is drinking well, this was encouraged    Depression  Poor appetite, tearful, fatigue, suspect situational depression, started on sertaline 25 mg daily on admission.  07/28/20   She is working with our RT team.   Psych consult is pending; placed another one today.  Zoloft dose was increased to the therapeutic dose 7/23 and increased to 100 starting tomorrow. Consider psychiatry consult if needed. Also consider stimulant to help with fatigue.     Left hip pain: denies left hip pain 7/23 no fractures on xray no effusion, ? Trochanteric bursitis  - apply diclofenac ointment prn/ prn tylenol  -monitor.        Adjustment to disability:  Clinical psychology to eval and treat as needed   FEN: reg  Bowel: continent  Bladder: doss reportedly removed 7/17- tov - monitor pvrs st cath as indicated. PVRs were checked and were acceptable. 07/21/20 she continues to be intermittently incontinent, most likely neurogenic.   timed toileting and monitor  DVT Prophylaxis: ambulation  GI Prophylaxis: reg  Code: full confirmed on admission.   Disposition: goal for home  ELOS:  ~2 weeks.  Rehab prognosis:  Fair.   Follow up Appointments on Discharge: neurology, pcp      Kelsi Mcneill MD  Physical Medicine & Rehabilitation    Time Spent on this Encounter   I spent a total of 30 minutes face to face and coordinating care of Tara Odell.  Over 50% of my time on the unit was spent counseling the patient and /or coordinating care; see note for details.                "

## 2020-07-28 NOTE — PROGRESS NOTES
"  [facility]   Acute Rehabilitation Unit  Daily progress note    INTERVAL HISTORY  Tara Odell was seen and examined in her room. She was doing \"better\". Still fatigued with low mood. Denied any pain or discomfort. Appetite is fair.     Was nauseated earlier today which resolved with zofran. Talked to nursing about questionable constipation; will monitor.     BP and BGs in better range.     She is participating  But fatigue and lack of motivation continue to be limiting. Aphasia and cognitive deficits contributing as well. Will discuss at team rounds; may need longer stay before being mod I and ready for discharge.       MEDICATIONS  Scheduled meds    amLODIPine  10 mg Oral Daily     aspirin  81 mg Oral Daily     atorvastatin  40 mg Oral QPM     carvedilol  25 mg Oral BID w/meals     clopidogrel  75 mg Oral Daily     hydrALAZINE  10 mg Oral TID     lisinopril  40 mg Oral Daily     menthol   Transdermal Q8H     metFORMIN  500 mg Oral BID w/meals     polyethylene glycol  17 g Oral Daily     sertraline  50 mg Oral Daily       PRN meds:  acetaminophen, bisacodyl, glucose **OR** dextrose **OR** glucagon, hydrALAZINE, melatonin, menthol **AND** menthol, ondansetron      PHYSICAL EXAM  BP (!) 146/79   Pulse 56   Temp 98.8  F (37.1  C) (Oral)   Resp 20   Ht 1.626 m (5' 4\")   Wt 80.2 kg (176 lb 12.8 oz)   SpO2 93%   BMI 30.35 kg/m      Gen: NAD, resting in bed  Pulm: non-labored clear in room air   Abd: soft and non-tender   Ext: no edema in bilateral lower extremities  Neuro/MSK: alert and partially oriented. R homonymous hemianopsia and neglect.    LABS  CBC and BMP wnl  BGs       ASSESSMENT AND PLAN  Tara Odell is a 59 year old woman who presented 7/10 with right sided weakness/sensation changes, impaired cognition, nausea and vomiting noted to have visual field cut on admission, diagnosed with Left PCA stroke with severe multifocal intracranial " "atherosclerotic disease, HTN, HLD, and type 2 diabetes. Admitted to acute rehab 7/17 for ongoing rehabilitation and medical management.       Left PCA infarction  Severe multifocal intracranial atherosclerotic disease    Patient admitted with right visual field cut, CT and CTA showed left PCA infarction along with extensive atherosclerotic disease involving the carotid and vertebrobasilar system. cerebral angiogram showed mild fusiform dilatation of the right and left ICA with diffuse intracranial atherosclerotic disease most notably 70% narrowing of the proximal basilar artery  -continue Dual antiplatelet therapy with aspirin and Plavix,  indefinitely   - continue  Lipitor 40 mg daily with goal of LDL less than 70.  - HTN management as below-  goal should be 120-130/80  -continue Physical, occupational and speech therapy   -DM management as below Hemoglobin A1c goal <7.  - Follow-up with neurology (Dr. Duarte) in 6 to 8 weeks       Essential hypertension.  goal <130/80. Hypertensive on admission with initiation of BP meds starting 7/14, with ongoing titration.   -continue Amlodipine 10 mg daily, lisinopril 40mg daily  Coreg 25 mg bid and -hydralazine 10 mg tid started 7/23.   -continue to monitor and titrate as indicated.   -continue prn hydralazine mg tid prn.     Hypercholesteremia    . Goal <70. Started on statin during acute hospitalization.     -Continue atorvastatin 40 mg daily.    New diagnosis of DM 2.   HbA1C 7%  -increase metformin 500 mg bid 7/23-   -  bid glucose checks   -discontinue ssi  Diabetic educator consult completed; will order supply for home    Suspected neurogenic bladder- doss removed 7/21 tov PVRs wnl, intermittent incontinence,  Has difficulty explaining incontinence, when asked if she feel urge to void states \"sometimes\", denies ab pain, nausea, dysuria. Does not believe she is drinking well, this was encouraged    Situational Depression  Poor appetite, tearful, fatigue, suspect " situational depression, started on sertaline 25 mg daily on admission will increase to 50 mg daily 7/23. May consider stimulant.   -monitor       Left hip pain: denies left hip pain 7/23 no fractures on xray no effusion, ? Trochanteric bursitis  - apply diclofenac ointment prn/ prn tylenol  -monitor.        Adjustment to disability:  Clinical psychology to eval and treat as needed   FEN: reg  Bowel: continent  Bladder: doss reportedly removed 7/17- tov - monitor pvrs st cath as indicated. PVRs were checked and were acceptable. 07/21/20 she continues to be intermittently incontinent, most likely neurogenic.   timed toileting and monitor  DVT Prophylaxis: ambulation  GI Prophylaxis: reg  Code: full confirmed on admission.   Disposition: goal for home  ELOS:  ~2 weeks.  Rehab prognosis:  Fair.   Follow up Appointments on Discharge: neurology, pcp      Kelsi Mcneill MD  Physical Medicine & Rehabilitation    Time Spent on this Encounter   I spent a total of 20 minutes face to face and coordinating care of Tara Odell.  Over 50% of my time on the unit was spent counseling the patient and /or coordinating care; see note for details.

## 2020-07-29 ENCOUNTER — APPOINTMENT (OUTPATIENT)
Dept: SPEECH THERAPY | Facility: CLINIC | Age: 60
End: 2020-07-29
Payer: MEDICAID

## 2020-07-29 ENCOUNTER — APPOINTMENT (OUTPATIENT)
Dept: PHYSICAL THERAPY | Facility: CLINIC | Age: 60
End: 2020-07-29
Payer: MEDICAID

## 2020-07-29 ENCOUNTER — APPOINTMENT (OUTPATIENT)
Dept: OCCUPATIONAL THERAPY | Facility: CLINIC | Age: 60
End: 2020-07-29
Payer: MEDICAID

## 2020-07-29 LAB
GLUCOSE BLDC GLUCOMTR-MCNC: 101 MG/DL (ref 70–99)
GLUCOSE BLDC GLUCOMTR-MCNC: 114 MG/DL (ref 70–99)

## 2020-07-29 PROCEDURE — 25000128 H RX IP 250 OP 636: Performed by: PHYSICAL MEDICINE & REHABILITATION

## 2020-07-29 PROCEDURE — 12800006 ZZH R&B REHAB

## 2020-07-29 PROCEDURE — 97535 SELF CARE MNGMENT TRAINING: CPT | Mod: GO

## 2020-07-29 PROCEDURE — 25000132 ZZH RX MED GY IP 250 OP 250 PS 637: Performed by: PHYSICIAN ASSISTANT

## 2020-07-29 PROCEDURE — 40000187 ZZH STATISTIC PATIENT MED CONFERENCE < 30 MIN

## 2020-07-29 PROCEDURE — 25000132 ZZH RX MED GY IP 250 OP 250 PS 637: Performed by: PHYSICAL MEDICINE & REHABILITATION

## 2020-07-29 PROCEDURE — 00000146 ZZHCL STATISTIC GLUCOSE BY METER IP

## 2020-07-29 PROCEDURE — 97530 THERAPEUTIC ACTIVITIES: CPT | Mod: GP | Performed by: PHYSICAL THERAPIST

## 2020-07-29 PROCEDURE — 97112 NEUROMUSCULAR REEDUCATION: CPT | Mod: GP | Performed by: PHYSICAL THERAPIST

## 2020-07-29 PROCEDURE — 92507 TX SP LANG VOICE COMM INDIV: CPT | Mod: GN

## 2020-07-29 PROCEDURE — 97530 THERAPEUTIC ACTIVITIES: CPT | Mod: GO

## 2020-07-29 RX ORDER — BACLOFEN 10 MG/1
5 TABLET ORAL 2 TIMES DAILY PRN
Status: DISCONTINUED | OUTPATIENT
Start: 2020-07-29 | End: 2020-08-03 | Stop reason: HOSPADM

## 2020-07-29 RX ADMIN — HYDRALAZINE HYDROCHLORIDE 10 MG: 10 TABLET ORAL at 13:58

## 2020-07-29 RX ADMIN — ASPIRIN 81 MG: 81 TABLET ORAL at 07:52

## 2020-07-29 RX ADMIN — HYDRALAZINE HYDROCHLORIDE 10 MG: 10 TABLET ORAL at 19:41

## 2020-07-29 RX ADMIN — METFORMIN HYDROCHLORIDE 500 MG: 500 TABLET ORAL at 17:41

## 2020-07-29 RX ADMIN — METFORMIN HYDROCHLORIDE 500 MG: 500 TABLET ORAL at 07:52

## 2020-07-29 RX ADMIN — HYDRALAZINE HYDROCHLORIDE 10 MG: 10 TABLET ORAL at 07:53

## 2020-07-29 RX ADMIN — ATORVASTATIN CALCIUM 40 MG: 40 TABLET, FILM COATED ORAL at 19:41

## 2020-07-29 RX ADMIN — CLOPIDOGREL BISULFATE 75 MG: 75 TABLET, FILM COATED ORAL at 07:52

## 2020-07-29 RX ADMIN — ONDANSETRON HYDROCHLORIDE 4 MG: 4 TABLET, FILM COATED ORAL at 13:58

## 2020-07-29 RX ADMIN — AMLODIPINE BESYLATE 10 MG: 10 TABLET ORAL at 07:53

## 2020-07-29 RX ADMIN — LISINOPRIL 40 MG: 40 TABLET ORAL at 07:53

## 2020-07-29 RX ADMIN — SERTRALINE HYDROCHLORIDE 100 MG: 100 TABLET ORAL at 07:54

## 2020-07-29 RX ADMIN — Medication 5 MG: at 18:08

## 2020-07-29 NOTE — CONSULTS
Health Psychology                  Clinic    Department of Medicine  Tara Lomas, Ph.D., L.P. [phone]                          Clinics and Surgery Center  [facility] Yoly Trujillo, Ph.D.,  L.P. [phone]                 3rd Floor  [place] [address]   Deanna Otero, Ph.D., L.P. [phone]    [address] Cathleen Hernandez, Ph.D., L.P. [phone]            [id number]  [place] [address]          Jordon Lombardo, Ph.D., A.B.P.P., L.P. [phone]      Rossi Boone, Ph.D., L.P. [phone]      This telehealth service is appropriate and effective for delivering services in light of the necessity for social distancing to mitigate the COVID-19 epidemic and for conservation of PPE.     Patient has agreed to receiving telehealth services after being informed about it: Yes    Time service started:1112  Time service ended:  1204    Mode of transmission: telephone    Location of originating:  Hospital room of the patient    Distance site:  Home office of provider for [facility]    The patient has been notified that:  Video/telephone visits will be conducted via a call with their psychologist to provide the care they need with a video/telephone conversation. Video/telephone visits may be billed at different rates depending on insurance coverage.  Patients are advised to please contact their insurance provider with any questions about their health insurance coverage. If during the course of a call the psychologist feels a video/telephone visit is not appropriate, patients will not be charged for this service.      Inpatient Health Psychology Consultation*    DOS:  07/29/2020      REFERRAL SOURCE:  Kelsi Mcneill MD, Acute Rehabilitation Center, [facility].      REASON FOR REFERRAL:  Tara Odell is a 59-year-old woman currently on the Acute [facility] Center following a stroke which has left her with significant levels  "of physical and cognitive impairment.  Ms. Odell has been exhibiting a high level of signs of grief and sadness during this ARC admission.  She has made multiple comments that seem very negative toward herself, focused on the changes in her physical and cognitive abilities.  This evaluation was requested to assess her current emotional status and to make treatment recommendations.      HISTORY OF PRESENT ILLNESS:  Ms. Odell was contacted by telephone for this evaluation, and needed assistance to answer the phone and facilitate this contact.  When she did answer the phone, she indicated that she was open to conversation and appeared to be fully engaged.  She reported that her baseline mood premorbidly was good, and that she does not recall any need for past contact with a mental health professional.  She describes her current mood as very down and sad.  She notes that she is not typically an anxious person, but that she has been feeling very worried about her mother, whom she describes as having multiple medical conditions, being in poor health, and notes that she and her brother have been primary caregivers for her mother; all three share a home. She is unable to describe a sense of making progress in her rehabilitation therapies.  She notes that her rehab therapists tell her that she is doing well, but that she has no insight into the concrete steps of progress that she may be taking.  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  However, she was unable to describe to me anything about the books, even whether she writes fiction or nonfiction.  She agrees that it might be helpful to get very concrete feedback from her rehab therapists about the specific small steps of progress that they see her making.      Ms. Odell acknowledges a very " "depressed mood, and exhibits a profoundly depressed affect, even over the telephone.  She struggles to remain hopeful.  She denies suicidal ideation.  Her appetite is significantly down, and she is unable to speculate on whether it is hospital food or simply lack of appetite.  She notes that sleep is not an issue. We did speak about the frequency of post-stroke depression and how the medication that she is can potentially help with both mood as well as with post-stroke motor recovery.      Ms. Odell states that her primary sources of support are her daughter and her larry.  She does not have close friends, telling me that she is in touch with friends primarily on Facebook.  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  She tells me that she does gain comfort from her larry and her personal prayer, but does not have a regular personal prayer practice.  She has appreciated contact from [facility].  I taught her a compassion prayer to use for herself and all people that she cares about; she stated she was open to practicing with this, and agreed that I might ask staff to write it down for her and bring it into her room.      She is open to additional contact with Health Psychology prior to discharge and I will plan to contact her later this week.       SOCIAL HISTORY:  Ms. Odell grew up in [place] with 1 brother and both parents for a time, then just her mom after her parents .  She attended technical school, but did not recall what she studied, and then moved to [place] because that was where her future  lived.  They were  for a few years and had 2 children; she was unable to tell me how long the marriage lasted, stating only that it was \"too long.\"  She recalls that her oldest child, Guy, was in  when they .  She recalls that the " move to the [place] area was motivated by her daughter's desire to study in the [employer] following high school.  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  As noted above, she has a strong larry.  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  She was confused about whether she would continue teaching, given the current situation of the pandemic.  As noted above, she does not have close friends.      MEDICAL HISTORY:  Please see Ms. Odell's [facility] electronic medical record for more complete information on her medical history, current admission and status, and all medications.      PSYCHIATRIC HISTORY:  As above in HPI.  Ms. Odell denied any significant history of mental health concerns that have affected her functioning negatively.  No other psychiatric history was available at the time of this evaluation.      BEHAVIORAL OBSERVATIONS:  Ms. Odell presented for this evaluation resting in her room on the ARC between scheduled rehabilitation therapies.  She needed assistance to access the hospital telephone and engage in this conversation.  She reported her mood as sad and exhibited a primarily dysphoric affect, crying multiple times during our conversation.  Speech was very soft, and reflected a significant level of cognitive impairment.  She often answered questions that were similar to what I had asked, but not precisely as asked.  At other times, she used more complex sentences and answered more fully.  There were many facts about her personal history for which she had no ability to identify chronologically, often forgot names of peop.e and places.  Most of the speech content was qualified with many responses indicating lack of memory or confusion about the specific question asked.  Insight and judgment  "appeared to be grossly intact with some impairment.  She exhibited no evidence of distortions of thought or perception.      IMPRESSIONS AND PLAN:  Neisha Odell is a 59-year-old woman currently on the [facility] following a severe left posterior cerebral artery stroke that has left her with significant physical and cognitive impairment.  She appears to be experiencing a significant level of grief related to her former abilities that she feels are permanently lost.  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      Ms. Odell has a strong larry, and if able to engage with it, states that it does provide her with a sense of comfort.  She would appreciate additional contact with [facility], and I will communicate that to our unit .  Ms. Odell was open to a compassion prayer that I taught her and encouraged her to experiment with.  A staff member agreed to write this down for her, to assist her in having an ability to remember and practice with it.  It would be helpful if nursing and therapy staff reminded her of this.  She is also open to me contacting her later this week to check in and review that practice, and I will plan to speak with her on Friday.      DIAGNOSES:     1.  Adjustment disorder with mixed depression and anxiety (F43.23).     2.  Rule out single episode of major depressive disorder versus mood disorder secondary to stroke.         NEISHA BURRIS, PHD, LP        *In accordance with the Rules of the [place] Board of [employer], it is noted that psychological descriptions and scientific procedures underlying psychological evaluations have limitations.  Absolute predictions cannot be made based on information in this report.     D: 07/29/2020   T: 07/29/2020   MT: ROXANN "      Name:     NEISHA BARKELY   MRN:      [id number]        Account:       [id number]   :      1960           Consult Date:  2020      Document: [id number]       cc: ARIS LEHMAN MD

## 2020-07-29 NOTE — PLAN OF CARE
Patient having significant difficulties being able to read any words including in large print and sentence as well as at the single word level.  Patient however was able to match written words to the items without difficulties.  When attempting to write the names of items, patient with significant spelling errors without recognition.  Patient having some obvious language component to her impairments related to vision.

## 2020-07-29 NOTE — PROGRESS NOTES
Team rounds this morning. Plan/goal is to discharge home on Monday 08/03 with family supervision (24/7) and assistance with IADLs. Plan for family training and RN education for pt medications.     Pt Carrie and SWer called pt dtr Anabel Ph: [phone]. Discussed progress, discharge recommendations and preference to have family come in for FT over the weekend. Pt dtr appeared overwhelmed and stated that she will need to discuss further with her uncle (pt brother who lives with the pt). Provided pt dtr with Sw direct contact and requested that pt dtr f/u. SWer discussed HHC recommendations as well, did not discuss agencies or send referral at this time, will follow up. Also discussed and recommending a SMRT assessment with the [facility] to become certified disabled and to then have a PCA assessment for additional help in the home. Pt dtr stated that she works on Monday and can't take time off so she will also need to verify that her uncle can provide transport home.     Will plan to contact [facility] to request SMRT assessment, f/u with dtr on scheduling FT and discharge plans, arrange HHC services and offered to complete a stroke alliance referral and senior linkage line referral for additional support. SW will continue to follow and remain available as needs arise.     ADDENDUM: Called [facility]. Was told that pt is eligible for PCA services and that [facility] [place] [facility] would have to call [facility] to coordinate due to pt planned discharge early next week. Pt would be eligible for a SMRT assessment and CADI waiver but that would take weeks-months to complete per the representative. Confirmed that both can be requested at the same time. Called [facility]  PH: [phone] and left a vm on the social service line. Will wait for call back and continue to follow.     Mone Hewitt, LICSW, CADC-IL  [facility]   Phone: [phone]  I   Pager: [phone]

## 2020-07-29 NOTE — PLAN OF CARE
"  VS: /69 (BP Location: Right arm)   Pulse 52   Temp 97.3  F (36.3  C) (Oral)   Resp 16   Ht 1.626 m (5' 4\")   Wt 80.2 kg (176 lb 12.8 oz)   SpO2 94%   BMI 30.35 kg/m     O2: Room air, no complaints of SOB   Output: Voids spontaneously w/o difficultyin toilet   Last BM: 7/28/2020 continent   Activity: A1 w/ walker and gait belt   Skin: Intact ex generalized bruising   Pain: Right leg pain, denies medication and cold packs.Offered emotional support which relaxed the patient to where she appeared comfortable.   CMS: Disoriented to time, place, and situation.Slow speech. Able to make needs known   Dressing: N/a   Diet: Regular diet, thin liquids, pills whole   LDA: N/a   Equipment: Wallker, chair alarm, personal belongings, call light within reach   Plan: Discharge set for 8/3/2020. Family training on 8/1/2020.   Additional Info: Patient complaining of nausea, zofran given with relief       "

## 2020-07-29 NOTE — PLAN OF CARE
OT: Th provided set-up and cues for shower transfer with bar and bench simulating set-up from mom's bathroom which she reports she will use at discharge.  Th provided hand placement cues and CGA for safety.  Pt also led through room activity to put dirty clothing away, straighten her counter top and navigate between chair, bathroom, window ledge and closet.  Pt consistently running into objects on R side with and without R eye patched.  She self corrected after the fact and therapist provided CGA for safety as pt ambulated and transferred with 2WW.  Cue x4 to push up from sitting surface rather than pull up on walker and to find R hand rest on walker before ambulating.

## 2020-07-29 NOTE — PROGRESS NOTES
"  [facility]   Acute Rehabilitation Unit  Daily progress note    INTERVAL HISTORY  Tara Odell was seen during rounds today. She is making slow and steady progress; she is SBA to CGA for ADLs and mobility but will need supervision initially after discharge. Depression and fatigue are two main contributing factors to her function, as well as he visual and cognitive / linguistic deficits. Will schedule family training this weekend and plan for discharge to home on Monday 8/3 if possible. She might need longer stay pending family availability / support and her progress by then.       MEDICATIONS  Scheduled meds    amLODIPine  10 mg Oral Daily     aspirin  81 mg Oral Daily     atorvastatin  40 mg Oral QPM     carvedilol  25 mg Oral BID w/meals     clopidogrel  75 mg Oral Daily     hydrALAZINE  10 mg Oral TID     lisinopril  40 mg Oral Daily     menthol   Transdermal Q8H     metFORMIN  500 mg Oral BID w/meals     polyethylene glycol  17 g Oral Daily     sertraline  100 mg Oral Daily       PRN meds:  acetaminophen, Baclofen, bisacodyl, glucose **OR** dextrose **OR** glucagon, hydrALAZINE, melatonin, menthol **AND** menthol, ondansetron      PHYSICAL EXAM  /62 (BP Location: Right arm)   Pulse 54   Temp 98.9  F (37.2  C) (Oral)   Resp 18   Ht 1.626 m (5' 4\")   Wt 80.2 kg (176 lb 12.8 oz)   SpO2 98%   BMI 30.35 kg/m      Gen: NAD, sitting in chair   Pulm: non-labored in room air   Abd: soft and non-tender   Ext: no edema in bilateral lower extremities  Neuro/MSK: was deferred for conversation     LABS  No new today   BGs in good range      ASSESSMENT AND PLAN  Tara Odell is a 59 year old woman who presented 7/10 with right sided weakness/sensation changes, impaired cognition, nausea and vomiting noted to have visual field cut on admission, diagnosed with Left PCA stroke with severe multifocal intracranial atherosclerotic disease, HTN, HLD, and type 2 " "diabetes. Admitted to acute rehab 7/17 for ongoing rehabilitation and medical management.       Left PCA infarction  Severe multifocal intracranial atherosclerotic disease    Patient admitted with right visual field cut, CT and CTA showed left PCA infarction along with extensive atherosclerotic disease involving the carotid and vertebrobasilar system. cerebral angiogram showed mild fusiform dilatation of the right and left ICA with diffuse intracranial atherosclerotic disease most notably 70% narrowing of the proximal basilar artery  -continue Dual antiplatelet therapy with aspirin and Plavix,  indefinitely   - continue  Lipitor 40 mg daily with goal of LDL less than 70.  - HTN management as below-  goal should be 120-130/80  -continue Physical, occupational and speech therapy   -DM management as below Hemoglobin A1c goal <7.  - Follow-up with neurology (Dr. Duarte) in 6 to 8 weeks       Essential hypertension.  goal <130/80. Hypertensive on admission with initiation of BP meds starting 7/14, with ongoing titration.   -continue Amlodipine 10 mg daily, lisinopril 40mg daily  Coreg 25 mg bid and -hydralazine 10 mg tid started 7/23.   -continue to monitor and titrate as indicated.   -continue prn hydralazine mg tid prn.     Hypercholesteremia    . Goal <70. Started on statin during acute hospitalization.     -Continue atorvastatin 40 mg daily.    New diagnosis of DM 2.   HbA1C 7%  -increase metformin 500 mg bid 7/23-   -  bid glucose checks   -discontinue ssi  Diabetic educator consult completed; will order supply for home    Suspected neurogenic bladder- doss removed 7/21 tov PVRs wnl, intermittent incontinence,  Has difficulty explaining incontinence, when asked if she feel urge to void states \"sometimes\", denies ab pain, nausea, dysuria. Does not believe she is drinking well, this was encouraged    Depression  Poor appetite, tearful, fatigue, suspect situational depression, started on sertaline 25 mg daily on " admission.  07/28/20   She is working with our RT team.   Psych consult is pending; placed another one today.  Zoloft dose was increased to the therapeutic dose 7/23 and increased to 100 starting tomorrow. Consider psychiatry consult if needed. Also consider stimulant to help with fatigue.     Left hip pain: denies left hip pain 7/23 no fractures on xray no effusion, ? Trochanteric bursitis  - apply diclofenac ointment prn/ prn tylenol  -monitor.    Hiccups: persistent and uncomfortable per her report. 07/29/20 started prn baclofen.         Adjustment to disability:  Clinical psychology to eval and treat as needed   FEN: reg  Bowel: continent  Bladder: doss reportedly removed 7/17- tov - monitor pvrs st cath as indicated. PVRs were checked and were acceptable. 07/21/20 she continues to be intermittently incontinent, most likely neurogenic.   timed toileting and monitor  DVT Prophylaxis: ambulation  GI Prophylaxis: reg  Code: full confirmed on admission.   Disposition: goal for home  ELOS:  ~2 weeks.  Rehab prognosis:  Fair.   Follow up Appointments on Discharge: neurology, pcp      Kelsi Mcneill MD  Physical Medicine & Rehabilitation    Time Spent on this Encounter   I spent a total of 25 minutes face to face and coordinating care of Tara Odell.  Over 50% of my time on the unit was spent counseling the patient and /or coordinating care; see note for details.

## 2020-07-29 NOTE — PROGRESS NOTES
CLINICAL NUTRITION SERVICES - REASSESSMENT NOTE     Nutrition Prescription    RECOMMENDATIONS FOR MDs/PROVIDERS TO ORDER:  Consider appetite stimulant if deemed appropriate      Malnutrition Status:    Unable to fully determine due to no NFPA, pt at least likely meets criteria of non-severe malnutrition     Recommendations already ordered by Registered Dietitian (RD):  None today     Future/Additional Recommendations:  Continue to monitor intakes  Continue to monitor weight trends      EVALUATION OF THE PROGRESS TOWARD GOALS   Diet: Regular with list of food preferences provided in room service order, pt was on Boost but nursing staff reported pt disliked this so was discontinued   Intake: 0-75% per flow sheets        NEW FINDINGS   Per care team rounds: Continues to have visual and verbal deficits, MD aware of poor appetite/intakes, likely multifactorial in the setting of depression/fatigue/continued deficits, MD adjusting medications, psychology consulted.     No RD face to face visit today due to reduced in-person exposure during COVID outbreak, no phone call due to continued deficits.    MAR reviewed. Labs reviewed.   07/25/20 0640  80.2 kg (176 lb 12.8 oz)    07/17/20 1424  83.3 kg (183 lb 11.2 oz)      Weight assessment: Significant 3.8% weight loss in 1 week, current BMI still 30.35    MALNUTRITION  % Intake: < 75% for > 7 days (non-severe)  % Weight Loss: > 2% in 1 week (severe)  Subcutaneous Fat Loss: Unable to assess  Muscle Loss: Unable to assess  Fluid Accumulation/Edema: None noted  Malnutrition Diagnosis: Unable to fully determine due to no NFPA, pt at least likely meets criteria of non-severe malnutrition     Previous Goals   Patient to consume % of nutritionally adequate meal trays TID, or the equivalent with supplements/snacks  Evaluation: Not met    Previous Nutrition Diagnosis  Predicted inadequate nutrient intake related to confusion as evidenced by documented intakes  Evaluation:  Declining    CURRENT NUTRITION DIAGNOSIS  Inadequate oral intake related to poor appetite, preferences, mood as evidenced by meal intakes of 0-50% with pt having significant weight loss in 1 week       INTERVENTIONS  Implementation  Modify composition of meals/snacks    Goals  Patient to consume % of nutritionally adequate meal trays TID, or the equivalent with supplements/snacks.    Monitoring/Evaluation  Progress toward goals will be monitored and evaluated per protocol.    Rox Ruiz RD, ANGELIC LEROY RD Pager: [phone]

## 2020-07-29 NOTE — PROGRESS NOTES
"VS: /61   Pulse 52   Temp 97.6  F (36.4  C) (Oral)   Resp 16   Ht 1.626 m (5' 4\")   Wt 80.2 kg (176 lb 12.8 oz)   SpO2 94%   BMI 30.35 kg/m  pt denies chest pain, Pain,n/v and sob    O2: Room air sat. > 90%.    Output: Voiding adequate amount without difficulty in the bathroom.   Last BM: 07/28/20. Continent of bowel and bladder.   Activity: AX 1 to the bathroom with walker and gait belt.   Skin: Intact.   Pain: Denies pain this shift. Takes pills whole   CMS:    Dressing: None   Diet: Regular, thin diet.   LDA: None in place   Equipment:  Personal belongings. Call light in place.   Plan: TBD. Continue with the plan of care   Additional Info:       Alert to self, disoriented to time and situation. Can be forgetful and flat effect.  Able to make needs known.   "

## 2020-07-29 NOTE — PLAN OF CARE
FOCUS/GOAL  Bowel management, Bladder management, Pain management, Mobility, Skin integrity, and Safety management    ASSESSMENT, INTERVENTIONS AND CONTINUING PLAN FOR GOAL:   Oriented to self and place but forgetful. HR 52 so held evening coreg.  Up with CG assist and walker.  Continent of bowel and bladder this shift.  Last BM this shift.  Regular diet, thin liquids. Take pills well whole.  Poor appetite for dinner.  No c/o pain or nausea.  Skin intact.  PRN melatonin given at HS.  Planning on discharge 8/1.  Cont w/ POC.

## 2020-07-29 NOTE — PLAN OF CARE
Acute Rehab Care Conference/Team Rounds    Type: Team Rounds    Present: Kelsi Mcneill MD, Carrie Segura PT, Sheila Ospina OTR-L, Anil aBll SLP, Mone SANCHEZ, Rox Ruiz RD, Elizabeth Larkin, Osmani Collazo RN     Discharge Barriers/Treatment/Education    Rehab Diagnosis: L PCA CVA     Active Medical Co-morbidities/Prognosis:  Multivessel intracranial stenosis, HTN, DM II, HLD    Safety:Alert, disoriented to time and situation. Bed alarm on for safety.    Pain: Denies pain.    Medications, Skin, Tubes/Lines: No tubes or lines present, skin intact.    Swallowing/Nutrition: Dysphagia goal met with patient on regular texture diet and thin liquids.     Bowel/Bladder: Continent of bowel and bladder, last BM 07/28/2020.    Psychosocial: Lives w/elderly mother (adm her meds) & brother who doesn't work/taking care of their mother. Dtr local & son in [place].  Started new job at [employer].  NO insurance, had MA in the past. MA application was sent 7/16 to [facility]. Family can assist at discharge. Independent PTA.     ADLs/IADLs:OT sba standing at sink sba pull over shirt min a with bra, sba l/b dressing and sba with socks and shoes cont ot per poc. Pt would benift from using bathroom upstairs set up for mom    Mobility: Up in room w/ walker and supervision, Garcia score 29/56 but clinically improving. Bedroom initially reported as down 13 steps but she says she may move to another bedroom on the main floor.  Need to confirm w/ family their ability to assist for safe home discharge considering need for IADL support and managing her daily activities in setting of memory loss.     Cognition/Language: Word finding difficulties and deficits in verbal expression having significant difficulties being able to express his thoughts. Patient has improved in auditory comprehension with 80-90% accuracy with moderate level Y/N questions. Ability to read is a strong area of interest for patient but has had  significant difficulties being able to complete. Able to match objects to words but unable to read words. Ongoing therapy upon facility discharge.     Community Re-Entry: limited distance ambulation w/ supervision    Transportation: not a  due to cognitive and vision changes, car transfer not a barrier    Decision maker: self    Plan of Care and goals reviewed and updated.    Discharge Plan/Recommendations    Fall Precautions: continue    Overall plan for the patient: She is making slow and steady progress; she is SBA to CGA for ADLs and mobility but will need supervision initially after discharge. Depression and fatigue are two main contributing factors to her function, as well as he visual and cognitive / linguistic deficits. Will schedule family training this weekend and plan for discharge to home on Monday 8/3 if possible. She might need longer stay pending family availability / support and her progress by then.       Utilization Review and Continued Stay Justification    Medical Necessity Criteria:    For any criteria that is not met, please document reason and plan for discharge, transfer, or modification of plan of care to address.    Requires intensive rehabilitation program to treat functional deficits?: Yes    Requires 3x per week or greater involvement of rehabilitation physician to oversee rehabilitation program?: Yes    Requires rehabilitation nursing interventions?: Yes    Patient is making functional progress?: Yes    There is a potential for additional functional progress? Yes    Patient is participating in therapy 3 hours per day a minimum of 5 days per week or 15 hours per week in 7 day period?:Yes    Has discharge needs that require coordinated discharge planning approach?:Yes        Final Physician Sign off    Statement of Approval: I agree with all the recommendations detailed in this document.      Patient Goals  Social Work Goals:Confirm discharge recommendations with therapy, coordinate  safe discharge plan and remain available to support and assist as needed.       OT Frequency: daily 60-90 min for 14 days  OT goal: hygiene/grooming: modified independent  OT goal: upper body dressing: Modified independent  OT goal: lower body dressing: Modified independent  OT goal: upper body bathing: Supervision/stand-by assist  OT goal: lower body bathing: Supervision/stand-by assist  OT goal: bed mobility: Modified independent  OT Goal: transfer: Modified independent  OT goal: toilet transfer/toileting: Modified independent  OT goal: meal preparation: Supervision/stand-by assist  OT goal: home management: Supervision/stand-by assist           OT goal 1: pt will be sba with tub shower kamran transfer  OT goal 2: OT pt will be mod I hep              PT Frequency: daily x 60 minutes  PT goal: bed mobility: Independent, Supine to/from sit, Rolling  PT goal: transfers: Modified independent, Sit to/from stand, Bed to/from chair(with FWW)  PT goal: gait: 50 feet, Rolling walker  PT goal: stairs: Supervision/stand-by assist, Greater than 10 stairs(with single rail)  PT goal: perform aerobic activity with stable cardiovascular response: NuStep, 10 minutes   PT goal 1: Pt will be able to perform car transfer with SBA/supervision with LRAD.  PT Goal 2: Pt will be independent with HEP for LE strengthening and balance prior to discharge.  PT Goal 3: floor transfer w/ furniture and assist of one      SLP Frequency: daily x 2 weeks  SLP Swallow Goal:  Safely tolerate diet without signs/symptoms of aspiration: regular diet, thin liquids GOAL MET  SLP goal 1: Pt to complete mod-complex level auditory comprehension and reading comprehension tasks with 90% acc given min A to maximize ability to understand spoken and written information from medical team and family.  SLP goal 2: Pt to complete mod-complex verbal expression tasks with 90% acc given min A to maximize functional communication with medial team and family.       Nursing  goals    Goal: Medical Management: Patient and family will be able to verbalize 3 risk factors of stroke, and 3 signs/symptoms of stroke by participating in [facility] stroke teaching.     Patient/Family Goal: Bladder: Patient will be continent of bladder, and empty completely by timed toileting and double voids by 7/31/20.     Goal: Skin Integrity: Patient will remain free from skin breakdown by demonstrating 3 interventions to promote healthy intact skin.

## 2020-07-30 ENCOUNTER — APPOINTMENT (OUTPATIENT)
Dept: SPEECH THERAPY | Facility: CLINIC | Age: 60
End: 2020-07-30
Payer: MEDICAID

## 2020-07-30 ENCOUNTER — APPOINTMENT (OUTPATIENT)
Dept: OCCUPATIONAL THERAPY | Facility: CLINIC | Age: 60
End: 2020-07-30
Payer: MEDICAID

## 2020-07-30 ENCOUNTER — APPOINTMENT (OUTPATIENT)
Dept: PHYSICAL THERAPY | Facility: CLINIC | Age: 60
End: 2020-07-30
Payer: MEDICAID

## 2020-07-30 LAB
GLUCOSE BLDC GLUCOMTR-MCNC: 109 MG/DL (ref 70–99)
GLUCOSE BLDC GLUCOMTR-MCNC: 124 MG/DL (ref 70–99)

## 2020-07-30 PROCEDURE — 12800006 ZZH R&B REHAB

## 2020-07-30 PROCEDURE — 97110 THERAPEUTIC EXERCISES: CPT | Mod: GP | Performed by: STUDENT IN AN ORGANIZED HEALTH CARE EDUCATION/TRAINING PROGRAM

## 2020-07-30 PROCEDURE — 97530 THERAPEUTIC ACTIVITIES: CPT | Mod: GP | Performed by: STUDENT IN AN ORGANIZED HEALTH CARE EDUCATION/TRAINING PROGRAM

## 2020-07-30 PROCEDURE — 25000132 ZZH RX MED GY IP 250 OP 250 PS 637: Performed by: PHYSICAL MEDICINE & REHABILITATION

## 2020-07-30 PROCEDURE — 97112 NEUROMUSCULAR REEDUCATION: CPT | Mod: GO

## 2020-07-30 PROCEDURE — 92507 TX SP LANG VOICE COMM INDIV: CPT | Mod: GN

## 2020-07-30 PROCEDURE — 00000146 ZZHCL STATISTIC GLUCOSE BY METER IP

## 2020-07-30 PROCEDURE — 25000132 ZZH RX MED GY IP 250 OP 250 PS 637: Performed by: PHYSICIAN ASSISTANT

## 2020-07-30 PROCEDURE — 97535 SELF CARE MNGMENT TRAINING: CPT | Mod: GO

## 2020-07-30 PROCEDURE — 97116 GAIT TRAINING THERAPY: CPT | Mod: GP | Performed by: STUDENT IN AN ORGANIZED HEALTH CARE EDUCATION/TRAINING PROGRAM

## 2020-07-30 PROCEDURE — 97112 NEUROMUSCULAR REEDUCATION: CPT | Mod: GP | Performed by: STUDENT IN AN ORGANIZED HEALTH CARE EDUCATION/TRAINING PROGRAM

## 2020-07-30 PROCEDURE — 97530 THERAPEUTIC ACTIVITIES: CPT | Mod: GO

## 2020-07-30 PROCEDURE — 25000128 H RX IP 250 OP 636: Performed by: PHYSICAL MEDICINE & REHABILITATION

## 2020-07-30 PROCEDURE — 97110 THERAPEUTIC EXERCISES: CPT | Mod: GO

## 2020-07-30 RX ADMIN — ASPIRIN 81 MG: 81 TABLET ORAL at 08:15

## 2020-07-30 RX ADMIN — CLOPIDOGREL BISULFATE 75 MG: 75 TABLET, FILM COATED ORAL at 08:17

## 2020-07-30 RX ADMIN — METFORMIN HYDROCHLORIDE 500 MG: 500 TABLET ORAL at 08:15

## 2020-07-30 RX ADMIN — HYDRALAZINE HYDROCHLORIDE 10 MG: 10 TABLET ORAL at 08:16

## 2020-07-30 RX ADMIN — CARVEDILOL 25 MG: 25 TABLET, FILM COATED ORAL at 08:16

## 2020-07-30 RX ADMIN — SERTRALINE HYDROCHLORIDE 100 MG: 100 TABLET ORAL at 08:16

## 2020-07-30 RX ADMIN — HYDRALAZINE HYDROCHLORIDE 10 MG: 10 TABLET ORAL at 19:33

## 2020-07-30 RX ADMIN — LISINOPRIL 40 MG: 40 TABLET ORAL at 08:17

## 2020-07-30 RX ADMIN — ATORVASTATIN CALCIUM 40 MG: 40 TABLET, FILM COATED ORAL at 19:32

## 2020-07-30 RX ADMIN — AMLODIPINE BESYLATE 10 MG: 10 TABLET ORAL at 08:15

## 2020-07-30 RX ADMIN — METFORMIN HYDROCHLORIDE 500 MG: 500 TABLET ORAL at 18:30

## 2020-07-30 NOTE — PROGRESS NOTES
Received a vm from pt dtr this afternoon stating that she and her uncle (pt brother) can come in on Saturday for family training. Called pt dtr Anabel. Anabel was at work and didn't have a lot time to talk. Discussed 9:00am family training on Saturday. Quickly discussed HHC services, with permission referral sent to [facility]. Discussed SLL and how the SLL can support pt/pt family at discharge and also follow up with PCA assessment once pt is home. Anabel agreeable and referral submitted. Anabel stated that once the pt is home, things are out of her control and she is relying on her uncle. Uncle's number not in the chart. Asked Anabel for the number, Anabel stated that it is in her phone and will have to follow up at a later time. Please get pt's brother phone number as he may be the main contact to coordinate support and HHC services.     Discharge scheduled for Monday 08/03. Will plan to f/u upon return Monday.     [facility]-RN, PT, OT, SLP, HHA and SW   Phone: [phone]  Fax: [phone]      Mone Hewitt, JOSELO, CADC-[place]  [place]   Phone: [phone]  I   Pager: [phone]

## 2020-07-30 NOTE — PLAN OF CARE
Patient is alert but oriented to self only and is very forgetful. Complains of R ankle pain, declines PRN medication when offered but did use ice pack to manage pain which was effective per patient report. Is CGA with walker for transfers and ambulation. Has been continent of bladder this shift, no BM. Tolerating diet but has poor appetite. Patient in chair at end of shift. Chair alarm on for safety, call light within reach. Ok to continue with care plan.

## 2020-07-30 NOTE — PLAN OF CARE
OT adls: Pt continues to need vebal cues for sequencing g/h l/b dressing and bra , ffw adapted with foam on r handle to increse proprecption of hand on walker and increase hand placement.  Pt continues to increase vision anle to attend all visual field with 9 hole peg test fine motor  On 9 hole peg R 53.87 L 27.44

## 2020-07-30 NOTE — PLAN OF CARE
FOCUS/GOAL  Bowel management, Bladder management, Pain management, Discharge planning, Mobility, Skin integrity, Cognition/Memory/Judgment/Problem solving, and Safety management    ASSESSMENT, INTERVENTIONS AND CONTINUING PLAN FOR GOAL:  Oriented to self.  HR 54 so held evening coreg per parameters.  No c/o pain.  Pt had hiccups, gave prn baclofen x1 w/ relief.  No c/o nausea this shift.  Up w/ CG and walker.  Continent of bowel and bladder this shift.  Last BM this shift.  Poor appetite still, only had bits of dinner.  Regular diet, thin liquids.  Takes pills well whole.  Planning to discharge home w/ family Monday 8/3.  Will have family training Saturday.  Continue w/ POC.

## 2020-07-30 NOTE — PLAN OF CARE
Patient presented with a variety of minimally different pairs (one letter different between two words). Patient able to recognize the correct word on 75% of opportunities. Improved when reviewing the words and discussing how the two words were different. At end of session, unable to spontaneously read them but able to ID the difference and if given one word, was able to ID the second on 3/5 opportunities.

## 2020-07-30 NOTE — PLAN OF CARE
FOCUS/GOAL  Medical management    ASSESSMENT, INTERVENTIONS AND CONTINUING PLAN FOR GOAL:  Pt is alert, disoriented to time, situation, and place. No complaints of pain. Assist of 1 with walker. Incontinent of bladder in brief, then continent in toilet. Staff offering toileting. Declined toileting x1. Appeared to sleep well overnight between cares.

## 2020-07-30 NOTE — PROGRESS NOTES
"   07/30/20 2626   Visit Information   Visit Made By Staff    Type of Visit Follow-up;Staff consultation/triage   Visited Patient  (Televisit)   Interventions   Plan of Care Review   With patient/family/proxy   Basic Spiritual Interventions   Assessment of spiritual needs/resources;Prayer;Reflective conversation   Advanced Assessments/Interventions   Presenting Concerns/Issues Spiritual/Restorationism/emotional support;Self-concept disturbance;Challenged coping;Stress/self-care   SPIRITUAL HEALTH SERVICES: Tele-Encounter  Patient Location (Hospital, [facility], Unit): [facility],,5R  Spoke with (patient, family relationship): Pt      Referral Source: SW suggested a f/unit(s) visit.      DATA:  Tara shared the story of her stroke at the day [facility] where she worked (shortly). She said that her therapists are telling her that she is getting better each day. Tara said that \"maybe she is getting better.\" Her dtr, Carli, was with her at the time of the call. Tara attends a [employer] whose  is Paula Nguyen.    We shared a prayer together for her healing.       PLAN:  If Tara is still on unit after Aug. 9th, will speak with her then.    Chaplain LARS MENCHACA    ______________________________    Type of service:  Telephone Visit     has received verbal consent for a TelephoneVisit from the patient? Yes    Distance Provider Location: designated [place] office or home office (secure setting)    Mode of Communication: telephone (via [employer] phone or [employer] tele-call-number ([phone]))      "

## 2020-07-31 ENCOUNTER — APPOINTMENT (OUTPATIENT)
Dept: SPEECH THERAPY | Facility: CLINIC | Age: 60
End: 2020-07-31
Payer: MEDICAID

## 2020-07-31 ENCOUNTER — APPOINTMENT (OUTPATIENT)
Dept: PHYSICAL THERAPY | Facility: CLINIC | Age: 60
End: 2020-07-31
Payer: MEDICAID

## 2020-07-31 ENCOUNTER — HOME CARE/HOSPICE - HEALTHEAST (OUTPATIENT)
Dept: HOME HEALTH SERVICES | Facility: HOME HEALTH | Age: 60
End: 2020-07-31

## 2020-07-31 ENCOUNTER — APPOINTMENT (OUTPATIENT)
Dept: OCCUPATIONAL THERAPY | Facility: CLINIC | Age: 60
End: 2020-07-31
Payer: MEDICAID

## 2020-07-31 LAB
GLUCOSE BLDC GLUCOMTR-MCNC: 108 MG/DL (ref 70–99)
GLUCOSE BLDC GLUCOMTR-MCNC: 144 MG/DL (ref 70–99)

## 2020-07-31 PROCEDURE — 97116 GAIT TRAINING THERAPY: CPT | Mod: GP

## 2020-07-31 PROCEDURE — 12800006 ZZH R&B REHAB

## 2020-07-31 PROCEDURE — 00000146 ZZHCL STATISTIC GLUCOSE BY METER IP

## 2020-07-31 PROCEDURE — 97530 THERAPEUTIC ACTIVITIES: CPT | Mod: GP | Performed by: PHYSICAL THERAPIST

## 2020-07-31 PROCEDURE — 97535 SELF CARE MNGMENT TRAINING: CPT | Mod: GO

## 2020-07-31 PROCEDURE — 97112 NEUROMUSCULAR REEDUCATION: CPT | Mod: GP | Performed by: PHYSICAL THERAPIST

## 2020-07-31 PROCEDURE — 97110 THERAPEUTIC EXERCISES: CPT | Mod: GP

## 2020-07-31 PROCEDURE — 92507 TX SP LANG VOICE COMM INDIV: CPT | Mod: GN | Performed by: SPEECH-LANGUAGE PATHOLOGIST

## 2020-07-31 PROCEDURE — 25000132 ZZH RX MED GY IP 250 OP 250 PS 637: Performed by: PHYSICAL MEDICINE & REHABILITATION

## 2020-07-31 PROCEDURE — 25000132 ZZH RX MED GY IP 250 OP 250 PS 637: Performed by: PHYSICIAN ASSISTANT

## 2020-07-31 RX ADMIN — ATORVASTATIN CALCIUM 40 MG: 40 TABLET, FILM COATED ORAL at 20:52

## 2020-07-31 RX ADMIN — METFORMIN HYDROCHLORIDE 500 MG: 500 TABLET ORAL at 08:28

## 2020-07-31 RX ADMIN — METFORMIN HYDROCHLORIDE 500 MG: 500 TABLET ORAL at 17:18

## 2020-07-31 RX ADMIN — HYDRALAZINE HYDROCHLORIDE 10 MG: 10 TABLET ORAL at 08:33

## 2020-07-31 RX ADMIN — LISINOPRIL 40 MG: 40 TABLET ORAL at 08:32

## 2020-07-31 RX ADMIN — SERTRALINE HYDROCHLORIDE 100 MG: 100 TABLET ORAL at 08:28

## 2020-07-31 RX ADMIN — ASPIRIN 81 MG: 81 TABLET ORAL at 08:28

## 2020-07-31 RX ADMIN — HYDRALAZINE HYDROCHLORIDE 10 MG: 10 TABLET ORAL at 13:28

## 2020-07-31 RX ADMIN — HYDRALAZINE HYDROCHLORIDE 10 MG: 10 TABLET ORAL at 20:52

## 2020-07-31 RX ADMIN — CLOPIDOGREL BISULFATE 75 MG: 75 TABLET, FILM COATED ORAL at 08:27

## 2020-07-31 RX ADMIN — AMLODIPINE BESYLATE 10 MG: 10 TABLET ORAL at 08:31

## 2020-07-31 NOTE — PLAN OF CARE
Patient is alert but oriented to self only and is very forgetful. Denies pain this shift. Was upgraded to Mod I with walker during day this shift. Has been continent of bowel and bladder this shift using toilet in bathroom. Tolerating diet but has poor appetite. Writer went through entire menu and compiled a list of food preferences. Writer left this in the room so staff can continue offering meal choices from this list to hopefully encourage intake. Patient in chair at end of shift. Call light within reach. Ok to continue with care plan.

## 2020-07-31 NOTE — PROGRESS NOTES
Spoke with patient and daughter, Anabel, this afternoon. Patient planning to discharge home Monday 8/3. Ride provided by brother at 12:30 PM (cannot  at 11 AM d/t morning conflict). Family training Saturday morning at 9 AM with daughter and brother. [facility] SN/PT/OT/SW/HHA set up. [facility] to contact brother for scheduling visits ([place]; Ph: [phone]). Floor SW will do senior linkage line referral for PCA assessment. Per floor SW handoff, patient will need PCP established prior to discharge for [facility] services to begin. HUC aware. Patient and family in agreement with plan. SW will continue to follow and assist as needed.    SAVANNA Soler,[facility]    Phone: [phone]  Pager: [phone]

## 2020-07-31 NOTE — PLAN OF CARE
Completed mod level auditory comprehension task- hearing comparative questions read out loud and answering yes/no- pt at 1/415 accuracy with this task- improved from 5 days ago. Completed a naming to description task- pt at 11/20- benefits from phonemic cueing when she is not spontaneously able to name.

## 2020-07-31 NOTE — PLAN OF CARE
PT: Orders requested from primary PT/OT to transition to mod I in the room during the day/evening, supervision at night. Still reports to be limited by fatigue, but able to tolerate 150+ ft ambulation with FWW and SBA/supervision. Family training to be completed tomorrow: plan to cover ambulation with FWW, stairs, car transfer, and demonstrate floor recovery.

## 2020-07-31 NOTE — PLAN OF CARE
FOCUS/GOAL  Medical management    ASSESSMENT, INTERVENTIONS AND CONTINUING PLAN FOR GOAL:  Pt is alert, oriented to self only. No complaints of pain.CGA assist with walker. Continent and incontinent of bladder using toilet in the bathroom. Staff offering toileting to avoid incontinence. Appeared to sleep well overnight.

## 2020-07-31 NOTE — CONSULTS
"Diabetes Education  Received consult request to see this 59 year old female for diabetes education.  Patient on ARU following CVA.  Patient diagnosed with diabetes during hospital stay, started on metformin 500 mg bid.    Had called daughter earlier this week regarding diabetes education; daughter reports that patient does not live with her, but lives with mother and brother.  Daughter reports that both the patient's brother and mother have diabetes and check blood glucoses.    Met with patient today; patient reports she was not aware she has diabetes.  Discussed that she is receiving metformin and that her glucoses are well controlled.    Demonstrated use of an Accu-chek Guide meter kit; patient reports it looks similar to what her mother uses.  Discussed that she can check her glucoses before meals twice daily.    Patient states she has not seen a PCP in \" few years\" and will not to get established with a provider.    Debra Luu MS, APRN, CNS, CDE, CDTC  [phone]      A  "

## 2020-07-31 NOTE — PROGRESS NOTES
"   07/31/20 1400   Treatment Plan   Type of Intervention 1:1 intervention   Assessment Therapeutic Recreation: 30 minute tx session, worked on letter and single word comprehension.  Pt unable to verbally identify single letter, even when given choice of two.  Pt would say, \"it doesn't look like either\".  Able to identify names/words that start with a given letter with 75% accuracy and extra time to process.       "

## 2020-07-31 NOTE — PLAN OF CARE
FOCUS/GOAL  Bowel management, Bladder management, Pain management, Discharge planning, Mobility, Skin integrity, Cognition/Memory/Judgment/Problem solving, and Safety management    ASSESSMENT, INTERVENTIONS AND CONTINUING PLAN FOR GOAL:  Oriented to self only, forgetful.  Up w/ CG gait belt and walker to toilet.  Incont of urine x1 this shift contained in brief.  Continent of bowel, last BM this shift.  Regular diet, poor appetite continues.  Takes pills well whole.HS coreg held due to HR 53. No c/o pain. No prn meds given this shift.1700 bg was 109. Planning to discharge home Monday 8/3.  Cont w/ POC.

## 2020-07-31 NOTE — CONSULTS
"  Health Psychology                  Clinic    Department of Medicine  Tara Lomas, Ph.D., L.P. [phone]                          Clinics and Surgery Center  [facility] Yoly Trujillo, Ph.D.,  L.P. [phone]                 3rd Floor  [place] Mail [address]   Deanna Otero, Ph.D., L.P. [phone]    [address] Cathleen Hernandez, Ph.D., L.P. [phone]            [id number]  [place] [address]          Jordon Lombardo, Ph.D., A.B.P.P., L.P. [phone]      Rossi Boone, Ph.D., L.P. [phone]      This telehealth service is appropriate and effective for delivering services in light of the necessity for social distancing to mitigate the COVID-19 epidemic and for conservation of PPE.     Patient has agreed to receiving telehealth services after being informed about it: Yes    Time service started:1000  Time service ended:  101    Mode of transmission: telephone    Location of originating:  Hospital room of the patient    Distance site:  Home office of provider for [facility]    The patient has been notified that:  Video/telephone visits will be conducted via a call with their psychologist to provide the care they need with a video/telephone conversation. Video/telephone visits may be billed at different rates depending on insurance coverage.  Patients are advised to please contact their insurance provider with any questions about their health insurance coverage. If during the course of a call the psychologist feels a video/telephone visit is not appropriate, patients will not be charged for this service.      Inpatient Health Psychology Consultation*    DOS:  07/31/2020    Clinical Information:  Follow up with Ms Jean as planned earlier this week. She reports feeling a bit more confident that she is making progress. However, continues to answer most questions about her emotional status with \"i'm not sure\" or I don't " "know.\" speech tone and affective quality less distressed, less teary than earlier in week, but continues have aspects of sadness. When asked if she has any concerns about upcoming discharge home she referenced her mother immediatly, stating that she \"feels so bad\" for her mother. Unable to provide any specific information about mother's condition aside from stating that she has had \"lots of illness\" over the past few years. Sounded reassured when I reminded her that she will have home care services to provide assistance for herself; stated that the information about that sounded familiar.  She recalled the compassion prayer that I taught her earlier, recalled that she had been given a written copy but could not recall where it is. We practiced together, with focus first only on her mother and then on both herself and her mother. She initially was reluctant to send the prayer out for herself, stating \"Oh, I'll be ok\" she agreed to use it for both of them together but not for herself alone.  Assessment: Emotional status remains dysphoric and with anxious thinking. Cognitive impairment also observable and will be added to diagnostic picture.  Diagnosis:    1.  Adjustment disorder with mixed depression and anxiety (F43.23).     2. 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  Rule out single episode of major depressive disorder versus mood disorder secondary to stroke.   Recommendations/Plan: As Ms Odell will have home care, I would recommend addition of social work on a periodic to the home care requests to allow for ongoing assessment of emotional status. Current presentation does not appear to make establishing care with a mental health provider to be a priority currently.  Time Spent with Patient: 18 minutes of telephone contact  Service Provided: Individual psychotherapy   Provider: Tara Lomas, Ph.D, LP   Provider Pager: [phone]   Provider Phone: [phone]              "

## 2020-08-01 ENCOUNTER — APPOINTMENT (OUTPATIENT)
Dept: PHYSICAL THERAPY | Facility: CLINIC | Age: 60
End: 2020-08-01
Payer: MEDICAID

## 2020-08-01 ENCOUNTER — APPOINTMENT (OUTPATIENT)
Dept: OCCUPATIONAL THERAPY | Facility: CLINIC | Age: 60
End: 2020-08-01
Payer: MEDICAID

## 2020-08-01 ENCOUNTER — APPOINTMENT (OUTPATIENT)
Dept: SPEECH THERAPY | Facility: CLINIC | Age: 60
End: 2020-08-01
Payer: MEDICAID

## 2020-08-01 LAB
GLUCOSE BLDC GLUCOMTR-MCNC: 107 MG/DL (ref 70–99)
GLUCOSE BLDC GLUCOMTR-MCNC: 122 MG/DL (ref 70–99)
GLUCOSE BLDC GLUCOMTR-MCNC: 90 MG/DL (ref 70–99)

## 2020-08-01 PROCEDURE — 97535 SELF CARE MNGMENT TRAINING: CPT | Mod: GO | Performed by: OCCUPATIONAL THERAPIST

## 2020-08-01 PROCEDURE — 97110 THERAPEUTIC EXERCISES: CPT | Mod: GO

## 2020-08-01 PROCEDURE — 97110 THERAPEUTIC EXERCISES: CPT | Mod: GP | Performed by: PHYSICAL THERAPIST

## 2020-08-01 PROCEDURE — 12800006 ZZH R&B REHAB

## 2020-08-01 PROCEDURE — 25000132 ZZH RX MED GY IP 250 OP 250 PS 637: Performed by: STUDENT IN AN ORGANIZED HEALTH CARE EDUCATION/TRAINING PROGRAM

## 2020-08-01 PROCEDURE — 25000128 H RX IP 250 OP 636: Performed by: PHYSICAL MEDICINE & REHABILITATION

## 2020-08-01 PROCEDURE — 25000132 ZZH RX MED GY IP 250 OP 250 PS 637: Performed by: PHYSICAL MEDICINE & REHABILITATION

## 2020-08-01 PROCEDURE — 00000146 ZZHCL STATISTIC GLUCOSE BY METER IP

## 2020-08-01 PROCEDURE — 97530 THERAPEUTIC ACTIVITIES: CPT | Mod: GP | Performed by: PHYSICAL THERAPIST

## 2020-08-01 PROCEDURE — 25000132 ZZH RX MED GY IP 250 OP 250 PS 637: Performed by: PHYSICIAN ASSISTANT

## 2020-08-01 PROCEDURE — 92507 TX SP LANG VOICE COMM INDIV: CPT | Mod: GN

## 2020-08-01 RX ORDER — ATORVASTATIN CALCIUM 40 MG/1
40 TABLET, FILM COATED ORAL EVERY EVENING
Qty: 30 TABLET | Refills: 0 | Status: SHIPPED | OUTPATIENT
Start: 2020-08-01 | End: 2020-08-31

## 2020-08-01 RX ORDER — BLOOD PRESSURE TEST KIT
KIT MISCELLANEOUS
Qty: 100 EACH | Refills: 0 | Status: SHIPPED | OUTPATIENT
Start: 2020-08-01 | End: 2021-08-18

## 2020-08-01 RX ORDER — AMLODIPINE BESYLATE 10 MG/1
10 TABLET ORAL DAILY
Qty: 30 TABLET | Refills: 0 | Status: SHIPPED | OUTPATIENT
Start: 2020-08-02 | End: 2021-08-18

## 2020-08-01 RX ORDER — HYDRALAZINE HYDROCHLORIDE 10 MG/1
10 TABLET, FILM COATED ORAL 3 TIMES DAILY
Qty: 90 TABLET | Refills: 0 | Status: SHIPPED | OUTPATIENT
Start: 2020-08-01 | End: 2021-08-18

## 2020-08-01 RX ORDER — POLYETHYLENE GLYCOL 3350 17 G/17G
17 POWDER, FOR SOLUTION ORAL DAILY PRN
Qty: 10 PACKET | Refills: 0 | Status: SHIPPED | OUTPATIENT
Start: 2020-08-01 | End: 2021-08-18

## 2020-08-01 RX ORDER — SERTRALINE HYDROCHLORIDE 100 MG/1
100 TABLET, FILM COATED ORAL DAILY
Qty: 30 TABLET | Refills: 0 | Status: SHIPPED | OUTPATIENT
Start: 2020-08-02 | End: 2021-08-18

## 2020-08-01 RX ORDER — CONTAINER,EMPTY
EACH MISCELLANEOUS
Qty: 1 EACH | Refills: 0 | Status: SHIPPED | OUTPATIENT
Start: 2020-08-01 | End: 2024-02-14

## 2020-08-01 RX ORDER — CLOPIDOGREL BISULFATE 75 MG/1
75 TABLET ORAL DAILY
Qty: 30 TABLET | Refills: 0 | Status: SHIPPED | OUTPATIENT
Start: 2020-08-02 | End: 2020-08-31

## 2020-08-01 RX ORDER — ACETAMINOPHEN 325 MG/1
650 TABLET ORAL EVERY 4 HOURS PRN
COMMUNITY
Start: 2020-08-01 | End: 2021-08-18

## 2020-08-01 RX ORDER — LISINOPRIL 40 MG/1
40 TABLET ORAL DAILY
Qty: 30 TABLET | Refills: 0 | Status: SHIPPED | OUTPATIENT
Start: 2020-08-02 | End: 2022-04-28

## 2020-08-01 RX ADMIN — HYDRALAZINE HYDROCHLORIDE 10 MG: 10 TABLET ORAL at 13:11

## 2020-08-01 RX ADMIN — LISINOPRIL 40 MG: 40 TABLET ORAL at 08:15

## 2020-08-01 RX ADMIN — METFORMIN HYDROCHLORIDE 500 MG: 500 TABLET ORAL at 08:15

## 2020-08-01 RX ADMIN — ATORVASTATIN CALCIUM 40 MG: 40 TABLET, FILM COATED ORAL at 19:35

## 2020-08-01 RX ADMIN — METFORMIN HYDROCHLORIDE 500 MG: 500 TABLET ORAL at 18:44

## 2020-08-01 RX ADMIN — ASPIRIN 81 MG: 81 TABLET ORAL at 08:16

## 2020-08-01 RX ADMIN — HYDRALAZINE HYDROCHLORIDE 10 MG: 10 TABLET ORAL at 08:15

## 2020-08-01 RX ADMIN — HYDRALAZINE HYDROCHLORIDE 10 MG: 10 TABLET ORAL at 19:35

## 2020-08-01 RX ADMIN — AMLODIPINE BESYLATE 10 MG: 10 TABLET ORAL at 08:16

## 2020-08-01 RX ADMIN — SERTRALINE HYDROCHLORIDE 100 MG: 100 TABLET ORAL at 08:14

## 2020-08-01 RX ADMIN — POLYETHYLENE GLYCOL 3350 17 G: 17 POWDER, FOR SOLUTION ORAL at 08:14

## 2020-08-01 RX ADMIN — ONDANSETRON HYDROCHLORIDE 4 MG: 4 TABLET, FILM COATED ORAL at 20:42

## 2020-08-01 RX ADMIN — CLOPIDOGREL BISULFATE 75 MG: 75 TABLET, FILM COATED ORAL at 08:15

## 2020-08-01 NOTE — PLAN OF CARE
PT: Family training completed today, including demonstration of patient performance with ambulation with FWW, ascending/descending 12 steps, car transfer, and EOM<>floor transfer. Standing LE strength/balance HEP initiated today, with handout provided for home. Plan for discharge home with family A on 8/3/20.

## 2020-08-01 NOTE — PLAN OF CARE
FOCUS/GOAL  Bowel management, Bladder management and Safety management    ASSESSMENT, INTERVENTIONS AND CONTINUING PLAN FOR GOAL:   Patient is alert to self only. Pt is disorientated to time, place and situation. Pt is also forgetful. Pt does call appropriately for needs. VSS except HR. bradycaia at 53 prior to 1800 Coreg. Coreg was held per parameter order. Pt was upgraded to Mod I in room with walker AM shift but  Continue to call even to go to bathroom. Pt was noted to be standing by bedside barefoot x1. Educated pt on the importance of wearing shoes or gripper socks. Pt verbalized understanding.  Poor appetite at dinner even with lots of encouragement and meal choices. Pt required cueing to perform toileting hygiene after toilet use. continent of bowel and bladder on toilet this shift. Pt was also continent of bladder x1 this shift. Safety rounds provided. Alarm on at bedtime for safety. Call light with in reach. Continue with monitoring and POC.

## 2020-08-01 NOTE — PROGRESS NOTES
"  [facility]   Acute Rehabilitation Unit  Daily progress note    INTERVAL HISTORY  Tara Odell was seen and examined in her room. She was doing well and resting in bed. Denied any pain. Mood has improved over the past 2-3 days; \"less tearful and more optimistic\". Discussed the plan for family training tomorrow and possible discharge to home on Saturday.     PT: Orders requested from primary PT/OT to transition to mod I in the room during the day/evening, supervision at night. Still reports to be limited by fatigue, but able to tolerate 150+ ft ambulation with FWW and SBA/supervision. Family training to be completed tomorrow: plan to cover ambulation with FWW, stairs, car transfer, and demonstrate floor recovery.    No medical issues as barrier for discharge.     MEDICATIONS  Scheduled meds    amLODIPine  10 mg Oral Daily     aspirin  81 mg Oral Daily     atorvastatin  40 mg Oral QPM     carvedilol  25 mg Oral BID w/meals     clopidogrel  75 mg Oral Daily     hydrALAZINE  10 mg Oral TID     lisinopril  40 mg Oral Daily     menthol   Transdermal Q8H     metFORMIN  500 mg Oral BID w/meals     polyethylene glycol  17 g Oral Daily     sertraline  100 mg Oral Daily       PRN meds:  acetaminophen, Baclofen, bisacodyl, glucose **OR** dextrose **OR** glucagon, hydrALAZINE, melatonin, menthol **AND** menthol, ondansetron      PHYSICAL EXAM  /69   Pulse 62   Temp 98.4  F (36.9  C) (Oral)   Resp 17   Ht 1.626 m (5' 4\")   Wt 80.2 kg (176 lb 12.8 oz)   SpO2 97%   BMI 30.35 kg/m      Gen: NAD, resting in bed   Pulm: non-labored in room air   Abd: soft and non-tender   Ext: no edema in bilateral lower extremities  Neuro/MSK: mild right hemiparesis, right homonymous hemianopsia and also visual neglect, aphasic        LABS  No new today   BGs in good range      ASSESSMENT AND PLAN  Tara Odell is a 59 year old woman who presented 7/10 with right sided " weakness/sensation changes, impaired cognition, nausea and vomiting noted to have visual field cut on admission, diagnosed with Left PCA stroke with severe multifocal intracranial atherosclerotic disease, HTN, HLD, and type 2 diabetes. Admitted to acute rehab 7/17 for ongoing rehabilitation and medical management.       Left PCA infarction  Severe multifocal intracranial atherosclerotic disease    Patient admitted with right visual field cut, CT and CTA showed left PCA infarction along with extensive atherosclerotic disease involving the carotid and vertebrobasilar system. cerebral angiogram showed mild fusiform dilatation of the right and left ICA with diffuse intracranial atherosclerotic disease most notably 70% narrowing of the proximal basilar artery  -continue Dual antiplatelet therapy with aspirin and Plavix,  indefinitely   - continue  Lipitor 40 mg daily with goal of LDL less than 70.  - HTN management as below-  goal should be 120-130/80  -continue Physical, occupational and speech therapy   -DM management as below Hemoglobin A1c goal <7.  - Follow-up with neurology (Dr. Duarte) in 6 to 8 weeks       Essential hypertension.  goal <130/80. Hypertensive on admission with initiation of BP meds starting 7/14, with ongoing titration.   -continue Amlodipine 10 mg daily, lisinopril 40mg daily  Coreg 25 mg bid and -hydralazine 10 mg tid started 7/23.   -continue to monitor and titrate as indicated.   -continue prn hydralazine mg tid prn.     Hypercholesteremia    . Goal <70. Started on statin during acute hospitalization.     -Continue atorvastatin 40 mg daily.    New diagnosis of DM 2.   HbA1C 7%  -increase metformin 500 mg bid 7/23-   -  bid glucose checks   -discontinue ssi  Diabetic educator consult completed; will order supply for home    Suspected neurogenic bladder- doss removed 7/21 tov PVRs wnl, intermittent incontinence,  Has difficulty explaining incontinence, when asked if she feel urge to void  "states \"sometimes\", denies ab pain, nausea, dysuria. Does not believe she is drinking well, this was encouraged    Depression  Poor appetite, tearful, fatigue, suspect situational depression, started on sertaline 25 mg daily on admission.  07/28/20   She is working with our RT team.   Psych consult is pending; placed another one today.  Zoloft dose was increased to the therapeutic dose 7/23 and increased to 100 starting tomorrow. Consider psychiatry consult if needed. Also consider stimulant to help with fatigue.     Left hip pain: denies left hip pain 7/23 no fractures on xray no effusion, ? Trochanteric bursitis  - apply diclofenac ointment prn/ prn tylenol  -monitor.    Hiccups: persistent and uncomfortable per her report. 07/29/20 started prn baclofen.         Adjustment to disability:  Clinical psychology to eval and treat as needed   FEN: reg  Bowel: continent  Bladder: doss reportedly removed 7/17- tov - monitor pvrs st cath as indicated. PVRs were checked and were acceptable. 07/21/20 she continues to be intermittently incontinent, most likely neurogenic.   timed toileting and monitor  DVT Prophylaxis: ambulation  GI Prophylaxis: reg  Code: full confirmed on admission.   Disposition: goal for home  ELOS:  ~2 weeks.  Rehab prognosis:  Fair.   Follow up Appointments on Discharge: neurology, pcp      Kelsi Mcneill MD  Physical Medicine & Rehabilitation    Time Spent on this Encounter   I spent a total of 25 minutes face to face and coordinating care of Tara Odell.  Over 50% of my time on the unit was spent counseling the patient and /or coordinating care; see note for details.                "

## 2020-08-01 NOTE — PLAN OF CARE
FOCUS/GOAL  Bowel management, Bladder management, and Pain management    ASSESSMENT, INTERVENTIONS AND CONTINUING PLAN FOR GOAL:  Pt slept well overnight,  Denied pain and SOB.  SBA/supervision with FWW to transfer.  Cont of bladder, uses toilet, LBM on 7/31,  Call light in reach and bed alarm on at night for the safety.

## 2020-08-01 NOTE — PLAN OF CARE
Reviewed with patient and family level of supervision and she will benefit from.  Patient will be fine to be left alone for short periods of time but generally having someone around at most times.  Examples given of some of the improvements being noted and highlighting some of the ongoing difficulties she is having with language tasks such as reading/writing.

## 2020-08-01 NOTE — PLAN OF CARE
FOCUS/GOAL  Bowel management, Bladder management, Medication management, Pain management and Mobility    ASSESSMENT, INTERVENTIONS AND CONTINUING PLAN FOR GOAL:   patient is alert and oriented to self only. Pt called for needs. frequent safety checks provided. Mod I in room with walker. Pt required lots of cueing for cares and ADLs.  VSS except HR was 53. Held scheduled coreg at 0800. Pt asymptomatic. HR has been below order parameter and looks like MD discontinued coreg and pt to be monitored.  Adequate appetite and oral hydration with staff encouraging and frequent checks. Skin is intact. continent of bowel and bladder on toilet in the bathroom. BG was 122. Plan to discharge home on Monday. Will practice how to check BG here until discharge. Call light within reach. Will continue to monitor.

## 2020-08-01 NOTE — PLAN OF CARE
-25 min, lethargy. pt sleeping soundly upon approach. pt appears very lethargic. pt reports she feels more tired than usual right now however pt agreeable to exercises at bedside. Instructed patient in UB HEP using yellow tband. Needed [place] A due to R incoordination. Needed max vc for alertness. pt tolerates fair this session.       Calos Deluca, OTR

## 2020-08-01 NOTE — PROGRESS NOTES
"  [facility]   Acute Rehabilitation Unit  Daily progress note    INTERVAL HISTORY  Tara Odell was seen and examined in her room. Her brother and daughter were present as well; they are here for family training. Discussed the plan for discharge to home on Monday and they were agreeable. Graham will pick her up in the afternoon because their mother has an appointment in the morning.     Reviewed her meds and ordered for discharge. She has not been getting coreg over the past few days due to low HR; discontinued and will monitor before discharge.      MEDICATIONS  Scheduled meds    amLODIPine  10 mg Oral Daily     aspirin  81 mg Oral Daily     atorvastatin  40 mg Oral QPM     clopidogrel  75 mg Oral Daily     hydrALAZINE  10 mg Oral TID     lisinopril  40 mg Oral Daily     menthol   Transdermal Q8H     metFORMIN  500 mg Oral BID w/meals     polyethylene glycol  17 g Oral Daily     sertraline  100 mg Oral Daily       PRN meds:  acetaminophen, Baclofen, bisacodyl, glucose **OR** dextrose **OR** glucagon, hydrALAZINE, melatonin, menthol **AND** menthol, ondansetron      PHYSICAL EXAM  /65 (BP Location: Left arm)   Pulse 53   Temp 97.5  F (36.4  C) (Oral)   Resp 18   Ht 1.626 m (5' 4\")   Wt 80.2 kg (176 lb 12.8 oz)   SpO2 98%   BMI 30.35 kg/m      Gen: NAD, sitting in chair   Pulm: non-labored in room air   Abd: soft and non-tender   Ext: no edema in bilateral lower extremities  Neuro/MSK: was deferred for conversation       LABS  No new today   BGs in good range      ASSESSMENT AND PLAN  Tara Odell is a 59 year old woman who presented 7/10 with right sided weakness/sensation changes, impaired cognition, nausea and vomiting noted to have visual field cut on admission, diagnosed with Left PCA stroke with severe multifocal intracranial atherosclerotic disease, HTN, HLD, and type 2 diabetes. Admitted to acute rehab 7/17 for ongoing rehabilitation and " "medical management.       Left PCA infarction  Severe multifocal intracranial atherosclerotic disease    Patient admitted with right visual field cut, CT and CTA showed left PCA infarction along with extensive atherosclerotic disease involving the carotid and vertebrobasilar system. cerebral angiogram showed mild fusiform dilatation of the right and left ICA with diffuse intracranial atherosclerotic disease most notably 70% narrowing of the proximal basilar artery  -continue Dual antiplatelet therapy with aspirin and Plavix,  indefinitely   - continue  Lipitor 40 mg daily with goal of LDL less than 70.  - HTN management as below-  goal should be 120-130/80  -continue Physical, occupational and speech therapy   -DM management as below Hemoglobin A1c goal <7.  - Follow-up with neurology (Dr. Duarte) in 6 to 8 weeks       Essential hypertension.  goal <130/80. Hypertensive on admission with initiation of BP meds starting 7/14, with ongoing titration.   -continue Amlodipine 10 mg daily, lisinopril 40mg daily  Coreg 25 mg bid and -hydralazine 10 mg tid started 7/23.   -continue to monitor and titrate as indicated. 08/01/20 discontinued coreg as she has been taking it over the past few months.   -continue prn hydralazine mg tid prn.     Hypercholesteremia    . Goal <70. Started on statin during acute hospitalization.     -Continue atorvastatin 40 mg daily.    New diagnosis of DM 2.   HbA1C 7%  -increase metformin 500 mg bid 7/23-   -  bid glucose checks   -discontinue ssi  Diabetic educator consult completed; will order supply for home    Suspected neurogenic bladder- dsos removed 7/21 tov PVRs wnl, intermittent incontinence,  Has difficulty explaining incontinence, when asked if she feel urge to void states \"sometimes\", denies ab pain, nausea, dysuria. Does not believe she is drinking well, this was encouraged    Depression  Poor appetite, tearful, fatigue, suspect situational depression, started on sertaline 25 " mg daily on admission.  07/28/20   She is working with our RT team.   Psych consult is pending; placed another one today.  Zoloft dose was increased to the therapeutic dose 7/23 and increased to 100 starting tomorrow. Consider psychiatry consult if needed. Also consider stimulant to help with fatigue.     Left hip pain: denies left hip pain 7/23 no fractures on xray no effusion, ? Trochanteric bursitis  - apply diclofenac ointment prn/ prn tylenol  -monitor.    Hiccups: persistent and uncomfortable per her report. 07/29/20 started prn baclofen.         Adjustment to disability:  Clinical psychology to eval and treat as needed   FEN: reg  Bowel: continent  Bladder: doss reportedly removed 7/17- tov - monitor pvrs st cath as indicated. PVRs were checked and were acceptable. 07/21/20 she continues to be intermittently incontinent, most likely neurogenic.   timed toileting and monitor  DVT Prophylaxis: ambulation  GI Prophylaxis: reg  Code: full confirmed on admission.   Disposition: goal for home  ELOS:  ~2 weeks.  Rehab prognosis:  Fair.   Follow up Appointments on Discharge: neurology, pcp      Kelsi Mcneill MD  Physical Medicine & Rehabilitation    Time Spent on this Encounter   I spent a total of 30 minutes face to face and coordinating care of Tara Odell.  Over 50% of my time on the unit was spent counseling the patient and /or coordinating care; see note for details.

## 2020-08-02 ENCOUNTER — APPOINTMENT (OUTPATIENT)
Dept: PHYSICAL THERAPY | Facility: CLINIC | Age: 60
End: 2020-08-02
Payer: MEDICAID

## 2020-08-02 ENCOUNTER — APPOINTMENT (OUTPATIENT)
Dept: SPEECH THERAPY | Facility: CLINIC | Age: 60
End: 2020-08-02
Payer: MEDICAID

## 2020-08-02 ENCOUNTER — APPOINTMENT (OUTPATIENT)
Dept: OCCUPATIONAL THERAPY | Facility: CLINIC | Age: 60
End: 2020-08-02
Payer: MEDICAID

## 2020-08-02 LAB
GLUCOSE BLDC GLUCOMTR-MCNC: 108 MG/DL (ref 70–99)
GLUCOSE BLDC GLUCOMTR-MCNC: 86 MG/DL (ref 70–99)

## 2020-08-02 PROCEDURE — 97530 THERAPEUTIC ACTIVITIES: CPT | Mod: GP | Performed by: PHYSICAL THERAPIST

## 2020-08-02 PROCEDURE — 97110 THERAPEUTIC EXERCISES: CPT | Mod: GP

## 2020-08-02 PROCEDURE — 25000132 ZZH RX MED GY IP 250 OP 250 PS 637: Performed by: PHYSICIAN ASSISTANT

## 2020-08-02 PROCEDURE — 25000132 ZZH RX MED GY IP 250 OP 250 PS 637: Performed by: PHYSICAL MEDICINE & REHABILITATION

## 2020-08-02 PROCEDURE — 12800006 ZZH R&B REHAB

## 2020-08-02 PROCEDURE — 25000132 ZZH RX MED GY IP 250 OP 250 PS 637: Performed by: STUDENT IN AN ORGANIZED HEALTH CARE EDUCATION/TRAINING PROGRAM

## 2020-08-02 PROCEDURE — 00000146 ZZHCL STATISTIC GLUCOSE BY METER IP

## 2020-08-02 PROCEDURE — 97116 GAIT TRAINING THERAPY: CPT | Mod: GP

## 2020-08-02 PROCEDURE — 97535 SELF CARE MNGMENT TRAINING: CPT | Mod: GO | Performed by: OCCUPATIONAL THERAPIST

## 2020-08-02 PROCEDURE — 92507 TX SP LANG VOICE COMM INDIV: CPT | Mod: GN

## 2020-08-02 RX ADMIN — HYDRALAZINE HYDROCHLORIDE 10 MG: 10 TABLET ORAL at 19:09

## 2020-08-02 RX ADMIN — POLYETHYLENE GLYCOL 3350 17 G: 17 POWDER, FOR SOLUTION ORAL at 09:35

## 2020-08-02 RX ADMIN — ATORVASTATIN CALCIUM 40 MG: 40 TABLET, FILM COATED ORAL at 19:08

## 2020-08-02 RX ADMIN — AMLODIPINE BESYLATE 10 MG: 10 TABLET ORAL at 09:34

## 2020-08-02 RX ADMIN — METFORMIN HYDROCHLORIDE 500 MG: 500 TABLET ORAL at 18:05

## 2020-08-02 RX ADMIN — HYDRALAZINE HYDROCHLORIDE 10 MG: 10 TABLET ORAL at 09:34

## 2020-08-02 RX ADMIN — LISINOPRIL 40 MG: 40 TABLET ORAL at 09:37

## 2020-08-02 RX ADMIN — METFORMIN HYDROCHLORIDE 500 MG: 500 TABLET ORAL at 09:34

## 2020-08-02 RX ADMIN — CLOPIDOGREL BISULFATE 75 MG: 75 TABLET, FILM COATED ORAL at 09:34

## 2020-08-02 RX ADMIN — ASPIRIN 81 MG: 81 TABLET ORAL at 09:34

## 2020-08-02 RX ADMIN — SERTRALINE HYDROCHLORIDE 100 MG: 100 TABLET ORAL at 09:34

## 2020-08-02 NOTE — PLAN OF CARE
Physical Therapy Discharge Summary    Reason for therapy discharge:    Discharged to home with home therapy.    Progress towards therapy goal(s). See goals on Care Plan in [facility] electronic health record for goal details.  Goals met or otherwise adequate for discharge. See care plan.    Therapy recommendation(s):    Continued therapy is recommended.  Rationale/Recommendations:  to address continued LE strength, endurance, and balance deficits to further improve independence and decrease risk of future falls.  Continue home exercise program.

## 2020-08-02 NOTE — PLAN OF CARE
Speech Language Therapy Discharge Summary    Reason for therapy discharge:    Discharged to home with outpatient therapy.    Progress towards therapy goal(s). See goals on Care Plan in Epic electronic health record for goal details.  Goals partially met.  Barriers to achieving goals:   discharge from facility.    Therapy recommendation(s):    Continued therapy is recommended.  Rationale/Recommendations:  Continue addressing language-based goals. Patient showing consistent improvement in verbal expression abilities.  Patient continues with significant difficulties with reading and writing.  Patient at baseline does enjoy reading and is even attempting to publish a book and is very bothered by her increased difficulties with reading and writing.  Patient unable to consistently read at the word level but is able to differentiate between different words as well as identify differences in minimally different paired words.  Would recommend general oversight upon initial discharge to home and supervision with all higher level IADL tasks.

## 2020-08-02 NOTE — PLAN OF CARE
Occupational Therapy Discharge Summary    Reason for therapy discharge:    Discharged to home with home therapy.    Progress towards therapy goal(s). See goals on Care Plan in [facility] electronic health record for goal details.  Goals met    Therapy recommendation(s):    Continued therapy is recommended.  Rationale/Recommendations:  Patient will benefit from assist from family mainly for verbal cues and encouragement, as she is physically able to complete ADLs and light IADLs.  Family training was completed with brother and daughter and they will be able to provide the assist as well as helping with IADLs.  She will benefit from continued OT to address mild cognitive deficits including vision of her right eye as well as the coordination and strength of her RUE to increase IND.

## 2020-08-02 NOTE — PLAN OF CARE
FOCUS/GOAL  Bowel management, Bladder management, Nutrition/Feeding/Swallowing precautions, and Mobility    ASSESSMENT, INTERVENTIONS AND CONTINUING PLAN FOR GOAL:  Alert and oriented x4, cont of B/B this shift, LBM 8/1. Mod I with walker for transfers. No complaints of pain, SOB, or N/T. VSS ex bp. Last /47, 1400 hydralazine held. Fluids encouraged, will continue to monitor. Call light within reach, pt using appropriately. Pt to discharge home tomorrow with home care.

## 2020-08-02 NOTE — PLAN OF CARE
FOCUS/GOAL  Bowel management, Bladder management, and Pain management    ASSESSMENT, INTERVENTIONS AND CONTINUING PLAN FOR GOAL:  Pt slept well, independent repositioning in the bed,  No c/o pain,   Ax 1 with walker to transfer.  Cont of bladder, no BM this shift,  LBM on 8/1,  Call light within reach, bed alarm on.

## 2020-08-02 NOTE — PLAN OF CARE
FOCUS/GOAL  Discharge planning, Mobility, Cognition/Memory/Judgment/Problem solving and Safety management    ASSESSMENT, INTERVENTIONS AND CONTINUING PLAN FOR GOAL:  A/O to self and place only. VSS. Denies pain, SOB, chest pain. Continent of B&B on toilet in bathroom. Assist x1 with GB and walker. Alarms on for safety. Pt educated on BG checks, continue teach back practice with BG checks. Needs several cues. BG 90. Ate 75% of dinner. Pt c/o nausea at 2030, PRN zofran given. Pt reported feeling better after antiemetic. Skin is intact. Pt able to make needs known and call light is within reach. Continue with plan of care.

## 2020-08-03 ENCOUNTER — RECORDS - HEALTHEAST (OUTPATIENT)
Dept: ADMINISTRATIVE | Facility: OTHER | Age: 60
End: 2020-08-03

## 2020-08-03 VITALS
TEMPERATURE: 97.2 F | WEIGHT: 176.8 LBS | HEIGHT: 64 IN | RESPIRATION RATE: 18 BRPM | OXYGEN SATURATION: 96 % | SYSTOLIC BLOOD PRESSURE: 138 MMHG | HEART RATE: 54 BPM | DIASTOLIC BLOOD PRESSURE: 84 MMHG | BODY MASS INDEX: 30.19 KG/M2

## 2020-08-03 LAB
ANION GAP SERPL CALCULATED.3IONS-SCNC: 7 MMOL/L (ref 3–14)
BUN SERPL-MCNC: 23 MG/DL (ref 7–30)
CALCIUM SERPL-MCNC: 9.2 MG/DL (ref 8.5–10.1)
CHLORIDE SERPL-SCNC: 106 MMOL/L (ref 94–109)
CO2 SERPL-SCNC: 28 MMOL/L (ref 20–32)
CREAT SERPL-MCNC: 1.01 MG/DL (ref 0.52–1.04)
GFR SERPL CREATININE-BSD FRML MDRD: 60 ML/MIN/{1.73_M2}
GLUCOSE BLDC GLUCOMTR-MCNC: 101 MG/DL (ref 70–99)
GLUCOSE SERPL-MCNC: 103 MG/DL (ref 70–99)
POTASSIUM SERPL-SCNC: 4 MMOL/L (ref 3.4–5.3)
SODIUM SERPL-SCNC: 141 MMOL/L (ref 133–144)

## 2020-08-03 PROCEDURE — 00000146 ZZHCL STATISTIC GLUCOSE BY METER IP

## 2020-08-03 PROCEDURE — 25000132 ZZH RX MED GY IP 250 OP 250 PS 637: Performed by: PHYSICIAN ASSISTANT

## 2020-08-03 PROCEDURE — 25000132 ZZH RX MED GY IP 250 OP 250 PS 637: Performed by: PHYSICAL MEDICINE & REHABILITATION

## 2020-08-03 PROCEDURE — 36415 COLL VENOUS BLD VENIPUNCTURE: CPT | Performed by: PHYSICIAN ASSISTANT

## 2020-08-03 PROCEDURE — 80048 BASIC METABOLIC PNL TOTAL CA: CPT | Performed by: PHYSICIAN ASSISTANT

## 2020-08-03 PROCEDURE — 25000132 ZZH RX MED GY IP 250 OP 250 PS 637: Performed by: STUDENT IN AN ORGANIZED HEALTH CARE EDUCATION/TRAINING PROGRAM

## 2020-08-03 RX ADMIN — POLYETHYLENE GLYCOL 3350 17 G: 17 POWDER, FOR SOLUTION ORAL at 08:30

## 2020-08-03 RX ADMIN — ASPIRIN 81 MG: 81 TABLET ORAL at 08:31

## 2020-08-03 RX ADMIN — SERTRALINE HYDROCHLORIDE 100 MG: 100 TABLET ORAL at 08:31

## 2020-08-03 RX ADMIN — HYDRALAZINE HYDROCHLORIDE 10 MG: 10 TABLET ORAL at 08:31

## 2020-08-03 RX ADMIN — METFORMIN HYDROCHLORIDE 500 MG: 500 TABLET ORAL at 08:31

## 2020-08-03 RX ADMIN — AMLODIPINE BESYLATE 10 MG: 10 TABLET ORAL at 08:31

## 2020-08-03 RX ADMIN — LISINOPRIL 40 MG: 40 TABLET ORAL at 08:30

## 2020-08-03 RX ADMIN — CLOPIDOGREL BISULFATE 75 MG: 75 TABLET, FILM COATED ORAL at 08:31

## 2020-08-03 NOTE — PLAN OF CARE
Patient is discharged, medications and instructions read and explained,advised to on follow-up appointments to patient and brother, they both verbalized understanding  and signed AVS.  Brother demonstrated on how to check sister's blood sugars using the new machine did good. BG was 147 after meals. Left at 1230

## 2020-08-03 NOTE — PLAN OF CARE
FOCUS/GOAL  Nutrition/Feeding/Swallowing precautions, Discharge planning, and Mobility    ASSESSMENT, INTERVENTIONS AND CONTINUING PLAN FOR GOAL:  A/Ox3, disoriented to situation occasionally. VSS. No neuro changes. Denies pain, SOB, and chest pain. Continent of B&B on toilet in bathroom. SBAx1 with GB and walker. BG was 89 at suppertime. Fair appetite. Continue BG education and teach back. Plan for discharge home tomorrow with brother and homecare. Brother needs diabetic teaching before discharge. Bed alarm on for safety. Pt able to make needs known but needs cuing. Continue frequent check-ins and plan of care.

## 2020-08-03 NOTE — DISCHARGE SUMMARY
[facility]   Acute Rehabilitation Unit  Discharge summary     Date of Admission: 7/17/2020  Date of Discharge: 08/03/20   Disposition: home  Primary Care Physician: No Ref-Primary, Physician  Attending physician: Kelsi Mcneill MD       discharge diagnosis  Left PCA infarction  Severe multifocal intracranial atherosclerotic disease  Essential hypertension  Hypercholesteremia    New diagnosis of DM 2  Suspected neurogenic bladder  Depression  Left hip pain  Hiccups        brief summary  Tara Odell is a 59 year old woman who presented 7/10 with right sided weakness/sensation changes, impaired cognition, nausea and vomiting noted to have visual field cut on admission, diagnosed with Left PCA stroke with severe multifocal intracranial atherosclerotic disease, HTN, HLD, and type 2 diabetes. Admitted to acute rehab 7/17 for ongoing rehabilitation and medical management.         rehabilitaiton course  PT: Continued therapy is recommended.  Rationale/Recommendations:  to address continued LE strength, endurance, and balance deficits to further improve independence and decrease risk of future falls.  Continue home exercise program.      SLP: Continued therapy is recommended.  Rationale/Recommendations:  Continue addressing language-based goals. Patient showing consistent improvement in verbal expression abilities.  Patient continues with significant difficulties with reading and writing.  Patient at baseline does enjoy reading and is even attempting to publish a book and is very bothered by her increased difficulties with reading and writing.  Patient unable to consistently read at the word level but is able to differentiate between different words as well as identify differences in minimally different paired words.  Would recommend general oversight upon initial discharge to home and supervision with all higher level IADL tasks.      OT: Continued therapy is recommended.   Rationale/Recommendations:  Patient will benefit from assist from family mainly for verbal cues and encouragement, as she is physically able to complete ADLs and light IADLs.  Family training was completed with brother and daughter and they will be able to provide the assist as well as helping with IADLs.  She will benefit from continued OT to address mild cognitive deficits including vision of her right eye as well as the coordination and strength of her RUE to increase IND.           mEDICAL COURSE  Left PCA infarction  Severe multifocal intracranial atherosclerotic disease    Patient admitted with right visual field cut, CT and CTA showed left PCA infarction along with extensive atherosclerotic disease involving the carotid and vertebrobasilar system. cerebral angiogram showed mild fusiform dilatation of the right and left ICA with diffuse intracranial atherosclerotic disease most notably 70% narrowing of the proximal basilar artery  -continue Dual antiplatelet therapy with aspirin and Plavix,  indefinitely   - continue  Lipitor 40 mg daily with goal of LDL less than 70.  - HTN management as below-  goal should be 120-130/80  - continue Physical, occupational and speech therapy   - DM management as below Hemoglobin A1c goal <7.  - Follow-up with neurology (Dr. Duarte) in 6 to 8 weeks       Essential hypertension.  goal <130/80. Hypertensive on admission with initiation of BP meds starting 7/14, with ongoing titration.   -continue Amlodipine 10 mg daily, lisinopril 40mg daily  Coreg 25 mg bid and -hydralazine 10 mg tid started 7/23.   -continue to monitor and titrate as indicated. 08/01/20 discontinued coreg as she has been taking it over the past few days.   BP as well controlled on current regimen. Recommended to monitor BP at home and follow up with PCP for any adjustment.     Hypercholesteremia    . Goal <70. Started on statin during acute hospitalization.     -Continue atorvastatin 40 mg  "daily.      New diagnosis of DM 2.   HbA1C 7%  -increase metformin 500 mg bid 7/23-   -  bid glucose checks   -discontinue ssi  Diabetic educator consult completed; ordered supply for home. Tara and her brother received education on BG check and monitoring.        Suspected neurogenic bladder- doss removed 7/21 tov PVRs wnl, intermittent incontinence,  Has difficulty explaining incontinence, when asked if she feel urge to void states \"sometimes\", denies ab pain, nausea, dysuria. Does not believe she is drinking well, this was encouraged     Depression  Poor appetite, tearful, fatigue, suspect situational depression, started on sertaline 25 mg daily on admission.  07/28/20   She is working with our RT team.   Psych consult was placed; appreciate Dr. Abebe maurice  Zoloft dose was increased to the therapeutic dose 7/23 and increased to 100 starting tomorrow. Consider psychiatry consult if needed. Also consider stimulant to help with fatigue.     Left hip pain: denies left hip pain 7/23 no fractures on xray no effusion, ? Trochanteric bursitis  - apply diclofenac ointment prn/ prn tylenol  -monitor.     Hiccups: persistent and uncomfortable per her report. 07/29/20 started prn baclofen.     FEN: reg  Bowel: continent  Bladder: doss reportedly removed 7/17- tov - monitor pvrs st cath as indicated. PVRs were checked and were acceptable. 07/21/20 she continues to be intermittently incontinent, most likely neurogenic.   timed toileting and monitor  DVT Prophylaxis: ambulation  GI Prophylaxis: reg  Code: full confirmed on admission.       dISCHARGE MEDICATIONS  Discharge Medication List as of 8/3/2020 12:26 PM      START taking these medications    Details   acetaminophen (TYLENOL) 325 MG tablet Take 2 tablets (650 mg) by mouth every 4 hours as needed for mild pain or fever (> 101 F), OTC      Alcohol Swabs PADS Use to swab the area of the injection or pam as directed Per insurance coverage, Disp-100 each,R-0, " E-Prescribe      amLODIPine (NORVASC) 10 MG tablet Take 1 tablet (10 mg) by mouth daily, Disp-30 tablet,R-0, E-Prescribe      aspirin (ASA) 81 MG EC tablet Take 1 tablet (81 mg) by mouth daily, Disp-30 tablet,R-0, E-Prescribe      atorvastatin (LIPITOR) 40 MG tablet Take 1 tablet (40 mg) by mouth every evening, Disp-30 tablet,R-0, E-Prescribe      blood glucose (NO BRAND SPECIFIED) lancets standard Accu-chek FastClix lancet drums Use to test blood sugar  2  times daily as directed.Disp-100 each,[id number]      blood glucose (NO BRAND SPECIFIED) test strip Accu-chek Guide Use to test blood sugar  2 times daily as directed. To accompany glucose monitor brands per insurance coverage., Disp-100 each,R-0, E-Prescribe      blood glucose monitoring (NO BRAND SPECIFIED) meter device kit Accu-chek Guide Per insurance coverageDisp-1 kit,[id number]      clopidogrel (PLAVIX) 75 MG tablet Take 1 tablet (75 mg) by mouth daily, Disp-30 tablet,R-0, E-Prescribe      hydrALAZINE (APRESOLINE) 10 MG tablet Take 1 tablet (10 mg) by mouth 3 times daily, Disp-90 tablet,R-0, E-Prescribe      lisinopril (ZESTRIL) 40 MG tablet Take 1 tablet (40 mg) by mouth daily, Disp-30 tablet,R-0, E-Prescribe      metFORMIN (GLUCOPHAGE) 500 MG tablet Take 1 tablet (500 mg) by mouth 2 times daily (with meals), Disp-60 tablet,R-0, E-Prescribe      polyethylene glycol (MIRALAX) 17 g packet Take 17 g by mouth daily as needed for constipation, Disp-10 packet,R-0, E-Prescribe      sertraline (ZOLOFT) 100 MG tablet Take 1 tablet (100 mg) by mouth daily, Disp-30 tablet,R-0, E-Prescribe      Sharps Container MISC Use as directed to dispose of needles, lancets and other sharps Per Insurance coverage, Disp-1 each,R-0, E-Prescribe               DISCHARGE INSTRUCTIONS AND FOLLOW UP  Discharge Procedure Orders   Home care nursing referral   Referral Priority: Routine Referral Type: Home Health Therapies & Aides   Number of Visits Requested: 1     Home Care PT  Referral for Hospital Discharge   Referral Priority: Routine Referral Type: Home Health Therapies & Aides   Number of Visits Requested: 1     Home Care OT Referral for Hospital Discharge   Referral Priority: Routine   Number of Visits Requested: 1     Home Care SLP Referral for Hospital Discharge   Referral Priority: Routine   Number of Visits Requested: 1     Home Care Social Service Referral for Hospital Discharge   Referral Priority: Routine   Number of Visits Requested: 1     Reason for your hospital stay   Order Comments: Stroke     Adult [facility] Follow-up and recommended labs and tests   Order Comments: - Follow up with primary care provider to establish care  You are scheduled to see Dr. Edwina Randle on Wednesday, August 5th at 9:40 AM.    Address  [facility]                          [address], [place]  Phone   [phone]    - Follow up with neurology, Dr. Solomon Duarte, in 3-5 weeks.  You are scheduled with Dr. Duarte on Monday, August 31st at 10:30 AM. This is scheduled as a telephone visit and they will call you at [phone] 10-15 minutes prior to the appointment for check in.    Address  [facility]                          (Formerly [facility]                          [address]                          [address]                          [place] [address]  Phone   [phone]    Appointments on [place] and/or [facility] (with [facility] or [facility] provider or service). Call [phone] if you haven't heard regarding these appointments within 7 days of discharge.     Activity   Order Comments: Your activity upon discharge:   Use your walker at all times   No driving until clear by OT and a physician     Order Specific Question Answer Comments   Is discharge order? Yes      MD face to face encounter   Order Comments: Documentation of Face to Face and Certification for Home  Health Services    I certify that patient: Tara Odell is under my care and that I, or a nurse practitioner or physician's assistant working with me, had a face-to-face encounter that meets the physician face-to-face encounter requirements with this patient on: 8/1/2020.    This encounter with the patient was in whole, or in part, for the following medical condition, which is the primary reason for home health care: stoke .    I certify that, based on my findings, the following services are medically necessary home health services: Nursing, Occupational Therapy, Physical Therapy, Speech Language Therapy and Social Work.    My clinical findings support the need for the above services because:     Nurse is needed: To assess BP and BG after changes in medications or other medical regimen., To provide assessment and oversight required in the home to assure adherence to the medical plan due to: recent stroke and risk of complications. and To teach and train about the disease and treatments for DM, HTN and HLD,     Occupational Therapy Services are needed to assess and treat cognitive ability and address ADL safety due to impairment in safety, ADLs and endurance.,     Physical Therapy Services are needed to assess and treat the following functional impairments: imbalance, visual deficits, neglect and apraxia.     Speech Therapy Services are needed to assess and treat impairments in language and/or swallow functions due to cognitive and linguistic deficits.    Further, I certify that my clinical findings support that this patient is homebound (i.e. absences from home require considerable and taxing effort and are for medical reasons or [facility] services or infrequently or of short duration when for other reasons) because: Requires assistance of another person or specialized equipment to access medical services because patient: Is prone to wander/get lost without assistance., Is unable to exit home safely on  own due to: cognitive and linguistic deficits., Is unable to operate assistive equipment on their own., Is unable to walk greater than 100 feet without rest. and Requires supervision of another for safe transfer...    Based on the above findings. I certify that this patient is confined to the home and needs intermittent skilled nursing care, physical therapy and/or speech therapy.  The patient is under my care, and I have initiated the establishment of the plan of care.  This patient will be followed by a physician who will periodically review the plan of care.  Physician/Provider to provide follow up care: No Ref-Primary, Physician    Attending hospital physician (the Medicare certified [facility] provider): Kelsi Mcneill MD  Physician Signature: See electronic signature associated with these discharge orders.  Date: 8/1/2020     Monitor and record   Order Comments: blood glucose once a day  blood pressure daily  make diary of readings to bring to the appointment with your PCP     Full Code     Order Specific Question Answer Comments   Code status determined by: Discussion with patient/ legal decision maker      Diet   Order Comments: Follow this diet upon discharge: Regular Diet Adult     Order Specific Question Answer Comments   Is discharge order? Yes           physical examination    Most recent Vital Signs:   Vitals:    08/02/20 1521 08/02/20 1909 08/03/20 0825 08/03/20 0831   BP: 109/45 137/71 138/84 138/84   BP Location: Left arm Right arm Right arm Right arm   Pulse: 58 64 55 54   Resp: 16  18 18   Temp: 97.8  F (36.6  C)  97.2  F (36.2  C) 97.2  F (36.2  C)   TempSrc: Oral  Oral Oral   SpO2: 97%   96%   Weight:       Height:           Gen: NAD, sitting in chair   Heart: RRR  Pulm: clear breath sounds b/l   Abd: soft and non-tender   Ext: no edema in bilateral lower extremities, no tenderness at calves   Neuro/MSK: mild right hemiparesis but mostly apraxic and incoordinated, right homonymous hemianopsia and  also visual neglect, aphasic        Discharge summary was forwarded to No Ref-Primary, Physician (PCP) at the time of discharge, so as to bridge from hospital to outpatient care.     It was our pleasure to care for Tara RgBillBola during this hospitalization. Please do not hesitate to contact me should there be questions regarding the hospital course or discharge plan.        Kelsi Mcneill MD  Physical Medicine & Rehabilitation      I, Kelsi Mcneill MD, saw and evaluated this patient prior to discharge. 35 minutes spent in discharge, including >50% in counseling and coordination of care, medication review and plan of care recommended on follow up.

## 2020-08-03 NOTE — PLAN OF CARE
FOCUS/GOAL  Mobility and Safety management    ASSESSMENT, INTERVENTIONS AND CONTINUING PLAN FOR GOAL:   Patient is alert. disoriented to place and time. Called for needs using call light. Denied pain, denied SOB, denied difficulty breathing, denied chest pain.  Pt Mod I in room with walker but note to forget to put gripper socks and shoes on prior to getting up and start walking. Pt A of 1 with walker for transfers and mobility. Slept well. Frequent safety round provided. Alarm on for safety. Call light with in reach. Plan to discharge in Am.

## 2020-08-03 NOTE — DISCHARGE INSTRUCTIONS
Follow up Appointments    - Follow up with primary care provider to establish care  You are scheduled to see Dr. Edwina Randle on Wednesday, August 5th at 9:40 AM.    Address  [facility]                          [address], [place]  Phone   [phone]    - Follow up with neurology, Dr. Solomon Duarte, in 3-5 weeks.  You are scheduled for a telephone visit with Dr. Duarte on Monday, August 31st at 10:30 AM. They will call you at [phone] 10-15 minutes prior to the appointment for check in.    Address  [facility]                          (Former[facility]  Phone   [phone]    ------------------------------------------    Home Health Care:   [facility]   Phone: [phone]  Fax :[phone]    [place] [facility] [place] [facility]  Additional resources and contact information online   [email or web address]  PH: [phone]    [facility] Linkage Line   Ph: [phone]    --------------------------------------------    To reduce the risk of subsequent stroke there are several important factors including optimal management of anticoagulants, blood pressure, cholesterol, diabetes and smoking abstinence.    Anticoagulation:  You are on Aspirin and Clopidogrel    Blood Pressure:  Keeping your blood pressures less than 130/80 has been shown to reduce risk of recurrent stroke. Recording your blood pressure and heart rate once daily in a log book can help you and your providers make decisions on optimal management. You are encouraged to bring your log book with you to your primary physician and/or cardiology doctor visits.    You are currently on multiple medications to help control your blood pressure. Several lifestyle modifications have been associated with blood pressure reduction and are an important part of a comprehensive plan. These include: weight loss (if over-weight); a diet  "low in salt and cholesterol and rich in fruits and vegetables; regular aerobic physical activity and limited alcohol consumption.    Diabetes:  Optimal management of diabetes not only reduces risk of another stroke, it can help with healing process from your recent stroke. Blood glucose levels measure how well you're controlling your diabetes. An additional test \"hemoglobin A1C\" reflects how you have been overall with glucose control in the prior few months. This should be less than 7 though even lower below 6 is considered normal. Your recent Hgb A1C was 7%. This should be followed up with your primary provider and/or endocrinologist.    Your present plan is to check blood glucose consistently Twice Daily. These should be recorded along with date/time and any relevant notes such as food consumed, insulin/medication taken etc. This helps you and your providers make decisions on optimal management. You're encouraged to bring you log book with to your primary and/or endocrinology doctor visits.  Your current medications include Metformin (Glucophage). Specific doses and frequencies listed elsewhere.     Diet:  Diet and exercise are very important for diabetes regardless of being on medication or not. Be aware of and moderate your carbohydrate intake as instructed by doctor, dietitian or nurse.  Regular physical activity may be more difficult since your stroke though doing what you can on a daily basis is helpful.     Cholesterol:  Traditional target levels for LDL cholesterol or \"bad cholesterol\" is less than 130 however once you have had a stroke, your target LDL level is now less than 70. Additional recommendations such as increasing your HDL or \"good\" cholesterol and lowering your triglyceride level can also be important.    This should be followed up in 2-3 months with your primary provider.    Smoking:  Finally one of the most important modifiable risk factors is to not smoke. This includes cigarettes, pipes, " cigars, chewing tobacco and second hand smoke. Support through counseling, nicotine replacement, and oral smoking-cessation medications may all be helpful. Often people have been able to quit during their hospitalization but once returning to their familiar environment, the urges can be stronger. If this is the case, we encourage you to get support. There are numerous options, start by talking with your doctor.

## 2020-08-03 NOTE — PROGRESS NOTES
Home today. HE C set up. Brother Graham will  at 12:30pm (Ph: [phone]).     [facility] referral completed ([id number]). [place] [facility] [place] referral completed as well. Information added to AVS and Graham's number added to pt face sheet as well. No further SW needs at this time.     JOSELO Wan, [facility] Acute Rehab Unit   Phone: [phone]  I   Pager: [phone]

## 2020-08-05 ENCOUNTER — OFFICE VISIT - HEALTHEAST (OUTPATIENT)
Dept: INTERNAL MEDICINE | Facility: CLINIC | Age: 60
End: 2020-08-05

## 2020-08-05 DIAGNOSIS — Z12.31 VISIT FOR SCREENING MAMMOGRAM: ICD-10-CM

## 2020-08-05 DIAGNOSIS — Z12.11 SCREEN FOR COLON CANCER: ICD-10-CM

## 2020-08-05 DIAGNOSIS — I15.9 SECONDARY HYPERTENSION: ICD-10-CM

## 2020-08-05 DIAGNOSIS — E11.9 TYPE 2 DIABETES MELLITUS WITHOUT COMPLICATION, WITHOUT LONG-TERM CURRENT USE OF INSULIN (H): ICD-10-CM

## 2020-08-05 DIAGNOSIS — I63.532 ACUTE LEFT PCA STROKE (H): ICD-10-CM

## 2020-08-05 ASSESSMENT — PATIENT HEALTH QUESTIONNAIRE - PHQ9: SUM OF ALL RESPONSES TO PHQ QUESTIONS 1-9: 6

## 2020-08-05 ASSESSMENT — MIFFLIN-ST. JEOR: SCORE: 1335.17

## 2020-08-06 ENCOUNTER — COMMUNICATION - HEALTHEAST (OUTPATIENT)
Dept: HOME HEALTH SERVICES | Facility: HOME HEALTH | Age: 60
End: 2020-08-06

## 2020-08-08 ENCOUNTER — COMMUNICATION - HEALTHEAST (OUTPATIENT)
Dept: HOME HEALTH SERVICES | Facility: HOME HEALTH | Age: 60
End: 2020-08-08

## 2020-08-08 ENCOUNTER — HOME CARE/HOSPICE - HEALTHEAST (OUTPATIENT)
Dept: HOME HEALTH SERVICES | Facility: HOME HEALTH | Age: 60
End: 2020-08-08

## 2020-08-08 DIAGNOSIS — E11.9 TYPE 2 DIABETES MELLITUS WITHOUT COMPLICATION, WITHOUT LONG-TERM CURRENT USE OF INSULIN (H): ICD-10-CM

## 2020-08-12 ENCOUNTER — HOME CARE/HOSPICE - HEALTHEAST (OUTPATIENT)
Dept: HOME HEALTH SERVICES | Facility: HOME HEALTH | Age: 60
End: 2020-08-12

## 2020-08-12 ENCOUNTER — COMMUNICATION - HEALTHEAST (OUTPATIENT)
Dept: HOME HEALTH SERVICES | Facility: HOME HEALTH | Age: 60
End: 2020-08-12

## 2020-08-13 ENCOUNTER — HOME CARE/HOSPICE - HEALTHEAST (OUTPATIENT)
Dept: HOME HEALTH SERVICES | Facility: HOME HEALTH | Age: 60
End: 2020-08-13

## 2020-08-14 ENCOUNTER — HOME CARE/HOSPICE - HEALTHEAST (OUTPATIENT)
Dept: HOME HEALTH SERVICES | Facility: HOME HEALTH | Age: 60
End: 2020-08-14

## 2020-08-16 ENCOUNTER — HOME CARE/HOSPICE - HEALTHEAST (OUTPATIENT)
Dept: HOME HEALTH SERVICES | Facility: HOME HEALTH | Age: 60
End: 2020-08-16

## 2020-08-18 ENCOUNTER — HOME CARE/HOSPICE - HEALTHEAST (OUTPATIENT)
Dept: HOME HEALTH SERVICES | Facility: HOME HEALTH | Age: 60
End: 2020-08-18

## 2020-08-19 ENCOUNTER — HOME CARE/HOSPICE - HEALTHEAST (OUTPATIENT)
Dept: HOME HEALTH SERVICES | Facility: HOME HEALTH | Age: 60
End: 2020-08-19

## 2020-08-21 ENCOUNTER — HOME CARE/HOSPICE - HEALTHEAST (OUTPATIENT)
Dept: HOME HEALTH SERVICES | Facility: HOME HEALTH | Age: 60
End: 2020-08-21

## 2020-08-24 ENCOUNTER — HOME CARE/HOSPICE - HEALTHEAST (OUTPATIENT)
Dept: HOME HEALTH SERVICES | Facility: HOME HEALTH | Age: 60
End: 2020-08-24

## 2020-08-25 ENCOUNTER — HOME CARE/HOSPICE - HEALTHEAST (OUTPATIENT)
Dept: HOME HEALTH SERVICES | Facility: HOME HEALTH | Age: 60
End: 2020-08-25

## 2020-08-26 ENCOUNTER — HOME CARE/HOSPICE - HEALTHEAST (OUTPATIENT)
Dept: HOME HEALTH SERVICES | Facility: HOME HEALTH | Age: 60
End: 2020-08-26

## 2020-08-27 ENCOUNTER — HOME CARE/HOSPICE - HEALTHEAST (OUTPATIENT)
Dept: HOME HEALTH SERVICES | Facility: HOME HEALTH | Age: 60
End: 2020-08-27

## 2020-08-28 ENCOUNTER — HOME CARE/HOSPICE - HEALTHEAST (OUTPATIENT)
Dept: HOME HEALTH SERVICES | Facility: HOME HEALTH | Age: 60
End: 2020-08-28

## 2020-08-28 SDOH — HEALTH STABILITY: MENTAL HEALTH: HOW OFTEN DO YOU HAVE A DRINK CONTAINING ALCOHOL?: NEVER

## 2020-08-31 ENCOUNTER — OFFICE VISIT (OUTPATIENT)
Dept: NEUROLOGY | Facility: CLINIC | Age: 60
End: 2020-08-31
Payer: MEDICAID

## 2020-08-31 ENCOUNTER — HOME CARE/HOSPICE - HEALTHEAST (OUTPATIENT)
Dept: HOME HEALTH SERVICES | Facility: HOME HEALTH | Age: 60
End: 2020-08-31

## 2020-08-31 VITALS
WEIGHT: 170 LBS | HEIGHT: 64 IN | BODY MASS INDEX: 29.02 KG/M2 | DIASTOLIC BLOOD PRESSURE: 64 MMHG | HEART RATE: 60 BPM | SYSTOLIC BLOOD PRESSURE: 117 MMHG

## 2020-08-31 DIAGNOSIS — I63.532 ACUTE LEFT PCA STROKE (H): Primary | ICD-10-CM

## 2020-08-31 DIAGNOSIS — E11.9 TYPE 2 DIABETES MELLITUS WITHOUT COMPLICATION, WITHOUT LONG-TERM CURRENT USE OF INSULIN (H): ICD-10-CM

## 2020-08-31 DIAGNOSIS — I63.9 ACUTE ISCHEMIC STROKE (H): ICD-10-CM

## 2020-08-31 DIAGNOSIS — I67.2 INTRACRANIAL ATHEROSCLEROSIS: ICD-10-CM

## 2020-08-31 PROBLEM — I10 BENIGN ESSENTIAL HYPERTENSION: Status: ACTIVE | Noted: 2020-08-31

## 2020-08-31 PROBLEM — G45.0 VBI (VERTEBROBASILAR INSUFFICIENCY): Status: ACTIVE | Noted: 2020-07-12

## 2020-08-31 PROCEDURE — 99214 OFFICE O/P EST MOD 30 MIN: CPT | Performed by: PSYCHIATRY & NEUROLOGY

## 2020-08-31 RX ORDER — CLOPIDOGREL BISULFATE 75 MG/1
75 TABLET ORAL DAILY
Qty: 30 TABLET | Refills: 11 | Status: SHIPPED | OUTPATIENT
Start: 2020-08-31 | End: 2021-08-24

## 2020-08-31 RX ORDER — ATORVASTATIN CALCIUM 40 MG/1
40 TABLET, FILM COATED ORAL EVERY EVENING
Qty: 30 TABLET | Refills: 11 | Status: SHIPPED | OUTPATIENT
Start: 2020-08-31 | End: 2021-08-18

## 2020-08-31 ASSESSMENT — MIFFLIN-ST. JEOR: SCORE: 1326.11

## 2020-08-31 NOTE — PROGRESS NOTES
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  She was noted to have fairly elevated blood pressure and did not received TPA.  During hospitalization she was also diagnosed with type 2 diabetes.  She has not noticed any significant improvement in her right visual field cut and is quite distraught as she will not be able to read.  She is wondering if she can see an ophthalmologist and if that will help.  I have discussed in detail the management of risk factors and I have told her that she will be left with visual field cut.  Due to severe intracranial atherosclerotic disease, I have recommended dual antiplatelet therapy and high intensity statin with goal of LDL less than 70.  She should also have blood pressure 120-130/80.  She will continue with outpatient therapy.  I have refilled her prescription for Plavix and statin.  Follow-up will be on PRN basis  Thank you again for this referral, please feel free to contact me if you have any questions.    Solomon Duarte MD  [facility]  (Former[facility], [facility]     HISTORY OF PRESENT ILLNESS     Patient is a 59-year-old female with history of recent admission for left PCA CVA, vertebrobasilar insufficiency who returns for follow-up.  She was admitted to [facility] on 2020 when patient reported that she developed tingling on the right  side of the body as well as difficulty walking.  She came to the emergency room and was noted to have fairly elevated blood pressure.  She had a CT of the head and CTA that showed a old left parietal infarct but there was cut off of the left posterior cerebral artery at the P2 segment.  There was tapering of the left P1 artery as well.  Although she was a candidate for TPA her blood pressure was elevated and she received some labetalol but unfortunately did not control her pressure and she did not receive TPA.  She had echocardiogram that showed a normal ejection fraction with no cardiac source of emboli.  She was noted to have fairly advanced diffuse intracranial atherosclerotic disease and I recommended dual antiplatelet therapy.  Her LDL was 152 and was started on high intensity Lipitor.  CTA showed aneurysmal dilatation versus fusiform ectasia of the cavernous segment of the left internal carotid artery measuring 9 mm and she had a cerebral angiogram that showed mild fusiform dilatation of the right and left ICA with diffuse intracranial atherosclerotic disease most notably 70% narrowing of the proximal basilar artery.  She was told to have aggressive control of her risk factors and was discharged to TCU.  Since her discharge patient reports she was in TCU and has not noticed any improvement in her vision.  She is wondering if she will get any improvement in her vision.  She went through outpatient physical therapy and still is continuing with those sessions.  During hospitalization she was diagnosed with type 2 diabetes and hypertension and currently is on metformin also takes insulin and was started on blood pressure medication     PROBLEM LIST   Patient Active Problem List   Diagnosis Code     Acute left PCA stroke (H) I63.532     VBI (vertebrobasilar insufficiency) G45.0     Intracranial atherosclerosis I67.2     Benign essential hypertension I10     Type 2 diabetes mellitus (H) E11.9         PAST MEDICAL &  SURGICAL HISTORY     Past Medical History:   Patient  has no past medical history on file.    Surgical History:  She  has no past surgical history on file.     SOCIAL HISTORY     Reviewed, and she  reports that she has never smoked. She has never used smokeless tobacco. She reports that she does not drink alcohol.     FAMILY HISTORY     Reviewed, and family history includes Cerebrovascular Disease in her maternal grandmother; Heart Disease in her father and mother.     ALLERGIES     Allergies   Allergen Reactions     Seafood          REVIEW OF SYSTEMS     A 12 point review of system was performed and was negative except as outlined in the history of present illness.     HOME MEDICATIONS       Current Outpatient Medications:      amLODIPine (NORVASC) 10 MG tablet, Take 1 tablet (10 mg) by mouth daily, Disp: 30 tablet, Rfl: 0     atorvastatin (LIPITOR) 40 MG tablet, Take 1 tablet (40 mg) by mouth every evening, Disp: 30 tablet, Rfl: 0     clopidogrel (PLAVIX) 75 MG tablet, Take 1 tablet (75 mg) by mouth daily, Disp: 30 tablet, Rfl: 0     lisinopril (ZESTRIL) 40 MG tablet, Take 1 tablet (40 mg) by mouth daily, Disp: 30 tablet, Rfl: 0     metFORMIN (GLUCOPHAGE) 500 MG tablet, Take 1 tablet (500 mg) by mouth 2 times daily (with meals), Disp: 60 tablet, Rfl: 0     sertraline (ZOLOFT) 100 MG tablet, Take 1 tablet (100 mg) by mouth daily, Disp: 30 tablet, Rfl: 0     acetaminophen (TYLENOL) 325 MG tablet, Take 2 tablets (650 mg) by mouth every 4 hours as needed for mild pain or fever (> 101 F), Disp: , Rfl:      Alcohol Swabs PADS, Use to swab the area of the injection or pam as directed Per insurance coverage, Disp: 100 each, Rfl: 0     aspirin (ASA) 81 MG EC tablet, Take 1 tablet (81 mg) by mouth daily (Patient not taking: Reported on 8/31/2020), Disp: 30 tablet, Rfl: 0     blood glucose (NO BRAND SPECIFIED) lancets standard, Accu-chek FastClix lancet drums Use to test blood sugar  2  times daily as directed., Disp: 100  "each, Rfl: 0     blood glucose (NO BRAND SPECIFIED) test strip, Accu-chek Guide Use to test blood sugar  2 times daily as directed. To accompany glucose monitor brands per insurance coverage., Disp: 100 each, Rfl: 0     blood glucose monitoring (NO BRAND SPECIFIED) meter device kit, Accu-chek Guide Per insurance coverage, Disp: 1 kit, Rfl: 0     hydrALAZINE (APRESOLINE) 10 MG tablet, Take 1 tablet (10 mg) by mouth 3 times daily (Patient not taking: Reported on 8/31/2020), Disp: 90 tablet, Rfl: 0     polyethylene glycol (MIRALAX) 17 g packet, Take 17 g by mouth daily as needed for constipation (Patient not taking: Reported on 8/31/2020), Disp: 10 packet, Rfl: 0     Sharps Container MISC, Use as directed to dispose of needles, lancets and other sharps Per Insurance coverage, Disp: 1 each, Rfl: 0      PHYSICAL EXAM     Vital signs  /64 (BP Location: Right arm, Patient Position: Sitting)   Pulse 60   Ht 1.626 m (5' 4\")   Wt 77.1 kg (170 lb)   BMI 29.18 kg/m      Weight:   170 lbs 0 oz    GENERAL PHYSICAL EXAM: Patient is alert and oriented x 4 in no acute distress. Neck was supple, no carotid bruits, thyromegaly, lymphadenopathy or JVD noted.   NEUROLOGICAL EXAM:  Patient is alert and oriented x3 speech somewhat soft and monotone.  She has right visual field cut.  Extraocular movements are intact face symmetrical tongue midline.  Strength in extremities 5/5 reflexes 1+ toes equivocal.  She has dysmetria on finger-nose testing.  She walks with a walker     DIAGNOSTIC STUDIES     PERTINENT RADIOLOGY  Following imaging studies were reviewed:     CT  CT BRAIN WITHOUT CONTRAST:   1. No finding for intracranial hemorrhage or mass or convincing finding for acute infarct. Small areas of patchy white matter low-attenuation change are noted, nonspecific and favored to reflect sequela of chronic microvascular ischemic change given the   patient's age. In the absence of priors for comparison, small areas of acute on " chronic ischemic change cannot be excluded.  2. Small chronic infarcts are seen within both parietal lobes. Mild volume loss.     CTA [place]:   1. There is abrupt occlusion of the left posterior cerebral artery at the level of the P2 junction. Left posterior cerebral artery is derived via the left posterior communicating artery. Left P1 segment is not identified.  2. There is smooth tapering of the proximal P1 segment of the right posterior cerebral artery. Although favored to reflect congenital variation, acquired occlusion cannot be excluded. P2 and more distal segments of the right posterior cerebral artery is   supplied via the right posterior communicating artery.  3. Extensive coarse atherosclerotic plaque is seen within both carotid siphons, intradural vertebral arteries and within the basilar artery. There is multifocal severe stenosis within the vertebral and basilar arteries. More mild narrowing is seen in   both carotid siphons without clear flow limiting stenosis. Areas of mild narrowing are seen within the distal EULALIA and MCA branches.     CTA OF THE NECK:  1. Mild atherosclerotic plaque left carotid bulb/bifurcation without significant stenosis either cervical carotid or vertebral system by modified NASCET criteria. No findings for dissection.     MRI  1.  Left posterior cerebral artery is supplied via the left posterior communicating artery. There is occlusion of the left posterior communicating artery at the expected P1/P2 junction without flow seen more distally.     2.  Severe multifocal areas of stenosis are seen within the proximal intradural vertebral arteries and basilar artery as seen on prior CTA.     3.  There is aneurysmal dilatation/fusiform ectasia of the cavernous segment of the left internal carotid artery measuring up to 9 mm in diameter.     4.  Large area of restricted diffusion is seen involving the medial aspect of the left temporal and occipital lobes with a smaller area  in the left putamen and compatible with areas of ischemic change.     Echocardiogram    Left ventricle ejection fraction is normal. The calculated left ventricular ejection fraction is 60%.    Normal left ventricular size. Moderate hypertrophy noted    Normal right ventricular size and systolic function.    No significant valvualr abnormalities noted    No previous study for comparison.     Cerebral angiogram  1. Diffuse intracranial atherosclerotic disease most notably affecting the proximal basilar artery and the left posterior cerebral artery distally.     2. Mild fusiform enlargement of the proximal cavernous left internal carotid artery as well as small sessile aneurysms arising from the ventral supraclinoid left internal carotid artery of doubtful clinical consequence.     3. No other significant intracranial or extra cranial abnormalities are identified.       PERTINENT LABS  Following labs were reviewed:  Admission on 07/17/2020, Discharged on 08/03/2020   Component Date Value     Glucose 07/17/2020 102*     Glucose 07/17/2020 138*     Glucose 07/18/2020 130*     Glucose 07/18/2020 140*     Glucose 07/18/2020 143*     Glucose 07/18/2020 107*     Glucose 07/19/2020 204*     Glucose 07/19/2020 130*     Glucose 07/19/2020 116*     Glucose 07/19/2020 122*     Sodium 07/20/2020 141      Potassium 07/20/2020 3.6      Chloride 07/20/2020 110*     Carbon Dioxide 07/20/2020 26      Anion Gap 07/20/2020 5      Glucose 07/20/2020 112*     Urea Nitrogen 07/20/2020 16      Creatinine 07/20/2020 0.63      GFR Estimate 07/20/2020 >90      GFR Estimate If Black 07/20/2020 >90      Calcium 07/20/2020 8.9      WBC 07/20/2020 7.2      RBC Count 07/20/2020 4.33      Hemoglobin 07/20/2020 12.8      Hematocrit 07/20/2020 38.2      MCV 07/20/2020 88      MCH 07/20/2020 29.6      MCHC 07/20/2020 33.5      RDW 07/20/2020 13.4      Platelet Count 07/20/2020 318      Diff Method 07/20/2020 Automated Method      % Neutrophils  07/20/2020 62.1      % Lymphocytes 07/20/2020 21.8      % Monocytes 07/20/2020 11.7      % Eosinophils 07/20/2020 4.0      % Basophils 07/20/2020 0.3      % Immature Granulocytes 07/20/2020 0.1      Nucleated RBCs 07/20/2020 0      Absolute Neutrophil 07/20/2020 4.5      Absolute Lymphocytes 07/20/2020 1.6      Absolute Monocytes 07/20/2020 0.8      Absolute Eosinophils 07/20/2020 0.3      Absolute Basophils 07/20/2020 0.0      Abs Immature Granulocytes 07/20/2020 0.0      Absolute Nucleated RBC 07/20/2020 0.0      Glucose 07/20/2020 116*     Glucose 07/20/2020 129*     Glucose 07/20/2020 109*     Glucose 07/21/2020 133*     Glucose 07/21/2020 131*     Glucose 07/21/2020 124*     Glucose 07/21/2020 105*     Glucose 07/22/2020 125*     Glucose 07/22/2020 159*     Glucose 07/22/2020 156*     Glucose 07/23/2020 127*     Glucose 07/23/2020 143*     Glucose 07/23/2020 115*     Glucose 07/24/2020 127*     Glucose 07/24/2020 135*     Glucose 07/25/2020 129*     Glucose 07/25/2020 139*     Glucose 07/25/2020 125*     Glucose 07/25/2020 97      Glucose 07/26/2020 129*     Glucose 07/26/2020 121*     Glucose 07/26/2020 98      Sodium 07/27/2020 140      Potassium 07/27/2020 4.2      Chloride 07/27/2020 108      Carbon Dioxide 07/27/2020 25      Anion Gap 07/27/2020 7      Glucose 07/27/2020 109*     Urea Nitrogen 07/27/2020 21      Creatinine 07/27/2020 0.87      GFR Estimate 07/27/2020 72      GFR Estimate If Black 07/27/2020 84      Calcium 07/27/2020 9.2      WBC 07/27/2020 7.4      RBC Count 07/27/2020 4.78      Hemoglobin 07/27/2020 14.4      Hematocrit 07/27/2020 42.4      MCV 07/27/2020 89      MCH 07/27/2020 30.1      MCHC 07/27/2020 34.0      RDW 07/27/2020 13.3      Platelet Count 07/27/2020 319      Diff Method 07/27/2020 Automated Method      % Neutrophils 07/27/2020 59.8      % Lymphocytes 07/27/2020 27.5      % Monocytes 07/27/2020 8.8      % Eosinophils 07/27/2020 3.4      % Basophils 07/27/2020 0.4      %  Immature Granulocytes 07/27/2020 0.1      Nucleated RBCs 07/27/2020 0      Absolute Neutrophil 07/27/2020 4.4      Absolute Lymphocytes 07/27/2020 2.0      Absolute Monocytes 07/27/2020 0.7      Absolute Eosinophils 07/27/2020 0.3      Absolute Basophils 07/27/2020 0.0      Abs Immature Granulocytes 07/27/2020 0.0      Absolute Nucleated RBC 07/27/2020 0.0      TSH 07/27/2020 0.84      Glucose 07/27/2020 107*     Glucose 07/28/2020 132*     Glucose 07/28/2020 116*     Glucose 07/28/2020 104*     Glucose 07/29/2020 114*     Glucose 07/29/2020 101*     Glucose 07/30/2020 124*     Glucose 07/30/2020 109*     Glucose 07/31/2020 144*     Glucose 07/31/2020 108*     Glucose 08/01/2020 107*     Glucose 08/01/2020 122*     Glucose 08/01/2020 90      Glucose 08/02/2020 108*     Glucose 08/02/2020 86      Sodium 08/03/2020 141      Potassium 08/03/2020 4.0      Chloride 08/03/2020 106      Carbon Dioxide 08/03/2020 28      Anion Gap 08/03/2020 7      Glucose 08/03/2020 103*     Urea Nitrogen 08/03/2020 23      Creatinine 08/03/2020 1.01      GFR Estimate 08/03/2020 60*     GFR Estimate If Black 08/03/2020 70      Calcium 08/03/2020 9.2      Glucose 08/03/2020 101*         Total time spent for face to face visit, reviewing labs/imaging studies, counseling and coordination of care was: 30 Minutes More than 50% of this time was spent on counseling and coordination of care.      This note was dictated using voice recognition software.  Any grammatical or context distortions are unintentional and inherent to the software.

## 2020-08-31 NOTE — NURSING NOTE
Chief Complaint   Patient presents with     Stroke     Hospital follow up     Vidya Soliman CMA on 8/31/2020 at 10:37 AM

## 2020-08-31 NOTE — LETTER
2020         RE: Tara Odell  [address] [place] [address]        Dear Colleague,    Thank you for referring your patient, Tara Odell, to the [facility]. Please see a copy of my visit note below.    NEUROLOGY FOLLOW UP VISIT  NOTE       [facility] NEUROLOGY [place]  [address] [place] [address]  Tel: [phone]  Fax: [phone]  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  She was noted to have fairly elevated blood pressure and did not received TPA.  During hospitalization she was also diagnosed with type 2 diabetes.  She has not noticed any significant improvement in her right visual field cut and is quite distraught as she will not be able to read.  She is wondering if she can see an ophthalmologist and if that will help.  I have discussed in detail the management of risk factors and I have told her that she will be left with visual field cut.  Due to severe intracranial atherosclerotic disease, I have recommended dual antiplatelet therapy and high intensity statin with goal of LDL less than 70.  She should also have blood pressure 120-130/80.  She will continue with outpatient therapy.  I have refilled her prescription for Plavix and statin.  Follow-up will be on PRN basis  Thank you again for this referral, please feel free to contact me if you have any questions.    Solomon Duarte MD  [facility]  (Formerly, [facility]     HISTORY OF  PRESENT ILLNESS     Patient is a 59-year-old female with history of recent admission for left PCA CVA, vertebrobasilar insufficiency who returns for follow-up.  She was admitted to [facility] on 7/11/2020 when patient reported that she developed tingling on the right side of the body as well as difficulty walking.  She came to the emergency room and was noted to have fairly elevated blood pressure.  She had a CT of the head and CTA that showed a old left parietal infarct but there was cut off of the left posterior cerebral artery at the P2 segment.  There was tapering of the left P1 artery as well.  Although she was a candidate for TPA her blood pressure was elevated and she received some labetalol but unfortunately did not control her pressure and she did not receive TPA.  She had echocardiogram that showed a normal ejection fraction with no cardiac source of emboli.  She was noted to have fairly advanced diffuse intracranial atherosclerotic disease and I recommended dual antiplatelet therapy.  Her LDL was 152 and was started on high intensity Lipitor.  CTA showed aneurysmal dilatation versus fusiform ectasia of the cavernous segment of the left internal carotid artery measuring 9 mm and she had a cerebral angiogram that showed mild fusiform dilatation of the right and left ICA with diffuse intracranial atherosclerotic disease most notably 70% narrowing of the proximal basilar artery.  She was told to have aggressive control of her risk factors and was discharged to TCU.  Since her discharge patient reports she was in TCU and has not noticed any improvement in her vision.  She is wondering if she will get any improvement in her vision.  She went through outpatient physical therapy and still is continuing with those sessions.  During hospitalization she was diagnosed with type 2 diabetes and hypertension and currently is on metformin also takes insulin and was started on blood pressure medication      PROBLEM LIST   Patient Active Problem List   Diagnosis Code     Acute left PCA stroke (H) I63.532     VBI (vertebrobasilar insufficiency) G45.0     Intracranial atherosclerosis I67.2     Benign essential hypertension I10     Type 2 diabetes mellitus (H) E11.9         PAST MEDICAL & SURGICAL HISTORY     Past Medical History:   Patient  has no past medical history on file.    Surgical History:  She  has no past surgical history on file.     SOCIAL HISTORY     Reviewed, and she  reports that she has never smoked. She has never used smokeless tobacco. She reports that she does not drink alcohol.     FAMILY HISTORY     Reviewed, and family history includes Cerebrovascular Disease in her maternal grandmother; Heart Disease in her father and mother.     ALLERGIES     Allergies   Allergen Reactions     Seafood          REVIEW OF SYSTEMS     A 12 point review of system was performed and was negative except as outlined in the history of present illness.     HOME MEDICATIONS       Current Outpatient Medications:      amLODIPine (NORVASC) 10 MG tablet, Take 1 tablet (10 mg) by mouth daily, Disp: 30 tablet, Rfl: 0     atorvastatin (LIPITOR) 40 MG tablet, Take 1 tablet (40 mg) by mouth every evening, Disp: 30 tablet, Rfl: 0     clopidogrel (PLAVIX) 75 MG tablet, Take 1 tablet (75 mg) by mouth daily, Disp: 30 tablet, Rfl: 0     lisinopril (ZESTRIL) 40 MG tablet, Take 1 tablet (40 mg) by mouth daily, Disp: 30 tablet, Rfl: 0     metFORMIN (GLUCOPHAGE) 500 MG tablet, Take 1 tablet (500 mg) by mouth 2 times daily (with meals), Disp: 60 tablet, Rfl: 0     sertraline (ZOLOFT) 100 MG tablet, Take 1 tablet (100 mg) by mouth daily, Disp: 30 tablet, Rfl: 0     acetaminophen (TYLENOL) 325 MG tablet, Take 2 tablets (650 mg) by mouth every 4 hours as needed for mild pain or fever (> 101 F), Disp: , Rfl:      Alcohol Swabs PADS, Use to swab the area of the injection or pam as directed Per insurance coverage, Disp: 100 each, Rfl: 0      "aspirin (ASA) 81 MG EC tablet, Take 1 tablet (81 mg) by mouth daily (Patient not taking: Reported on 8/31/2020), Disp: 30 tablet, Rfl: 0     blood glucose (NO BRAND SPECIFIED) lancets standard, Accu-chek FastClix lancet drums Use to test blood sugar  2  times daily as directed., Disp: 100 each, Rfl: 0     blood glucose (NO BRAND SPECIFIED) test strip, Accu-chek Guide Use to test blood sugar  2 times daily as directed. To accompany glucose monitor brands per insurance coverage., Disp: 100 each, Rfl: 0     blood glucose monitoring (NO BRAND SPECIFIED) meter device kit, Accu-chek Guide Per insurance coverage, Disp: 1 kit, Rfl: 0     hydrALAZINE (APRESOLINE) 10 MG tablet, Take 1 tablet (10 mg) by mouth 3 times daily (Patient not taking: Reported on 8/31/2020), Disp: 90 tablet, Rfl: 0     polyethylene glycol (MIRALAX) 17 g packet, Take 17 g by mouth daily as needed for constipation (Patient not taking: Reported on 8/31/2020), Disp: 10 packet, Rfl: 0     Sharps Container MISC, Use as directed to dispose of needles, lancets and other sharps Per Insurance coverage, Disp: 1 each, Rfl: 0      PHYSICAL EXAM     Vital signs  /64 (BP Location: Right arm, Patient Position: Sitting)   Pulse 60   Ht 1.626 m (5' 4\")   Wt 77.1 kg (170 lb)   BMI 29.18 kg/m      Weight:   170 lbs 0 oz    GENERAL PHYSICAL EXAM: Patient is alert and oriented x 4 in no acute distress. Neck was supple, no carotid bruits, thyromegaly, lymphadenopathy or JVD noted.   NEUROLOGICAL EXAM:  Patient is alert and oriented x3 speech somewhat soft and monotone.  She has right visual field cut.  Extraocular movements are intact face symmetrical tongue midline.  Strength in extremities 5/5 reflexes 1+ toes equivocal.  She has dysmetria on finger-nose testing.  She walks with a walker     DIAGNOSTIC STUDIES     PERTINENT RADIOLOGY  Following imaging studies were reviewed:     CT  CT BRAIN WITHOUT CONTRAST:   1. No finding for intracranial hemorrhage or mass " or convincing finding for acute infarct. Small areas of patchy white matter low-attenuation change are noted, nonspecific and favored to reflect sequela of chronic microvascular ischemic change given the   patient's age. In the absence of priors for comparison, small areas of acute on chronic ischemic change cannot be excluded.  2. Small chronic infarcts are seen within both parietal lobes. Mild volume loss.     CTA [place] OF [place]:   1. There is abrupt occlusion of the left posterior cerebral artery at the level of the P2 junction. Left posterior cerebral artery is derived via the left posterior communicating artery. Left P1 segment is not identified.  2. There is smooth tapering of the proximal P1 segment of the right posterior cerebral artery. Although favored to reflect congenital variation, acquired occlusion cannot be excluded. P2 and more distal segments of the right posterior cerebral artery is   supplied via the right posterior communicating artery.  3. Extensive coarse atherosclerotic plaque is seen within both carotid siphons, intradural vertebral arteries and within the basilar artery. There is multifocal severe stenosis within the vertebral and basilar arteries. More mild narrowing is seen in   both carotid siphons without clear flow limiting stenosis. Areas of mild narrowing are seen within the distal EULALIA and MCA branches.     CTA OF THE NECK:  1. Mild atherosclerotic plaque left carotid bulb/bifurcation without significant stenosis either cervical carotid or vertebral system by modified NASCET criteria. No findings for dissection.     MRI  1.  Left posterior cerebral artery is supplied via the left posterior communicating artery. There is occlusion of the left posterior communicating artery at the expected P1/P2 junction without flow seen more distally.     2.  Severe multifocal areas of stenosis are seen within the proximal intradural vertebral arteries and basilar artery as seen on prior  CTA.     3.  There is aneurysmal dilatation/fusiform ectasia of the cavernous segment of the left internal carotid artery measuring up to 9 mm in diameter.     4.  Large area of restricted diffusion is seen involving the medial aspect of the left temporal and occipital lobes with a smaller area in the left putamen and compatible with areas of ischemic change.     Echocardiogram    Left ventricle ejection fraction is normal. The calculated left ventricular ejection fraction is 60%.    Normal left ventricular size. Moderate hypertrophy noted    Normal right ventricular size and systolic function.    No significant valvualr abnormalities noted    No previous study for comparison.     Cerebral angiogram  1. Diffuse intracranial atherosclerotic disease most notably affecting the proximal basilar artery and the left posterior cerebral artery distally.     2. Mild fusiform enlargement of the proximal cavernous left internal carotid artery as well as small sessile aneurysms arising from the ventral supraclinoid left internal carotid artery of doubtful clinical consequence.     3. No other significant intracranial or extra cranial abnormalities are identified.       PERTINENT LABS  Following labs were reviewed:  Admission on 07/17/2020, Discharged on 08/03/2020   Component Date Value     Glucose 07/17/2020 102*     Glucose 07/17/2020 138*     Glucose 07/18/2020 130*     Glucose 07/18/2020 140*     Glucose 07/18/2020 143*     Glucose 07/18/2020 107*     Glucose 07/19/2020 204*     Glucose 07/19/2020 130*     Glucose 07/19/2020 116*     Glucose 07/19/2020 122*     Sodium 07/20/2020 141      Potassium 07/20/2020 3.6      Chloride 07/20/2020 110*     Carbon Dioxide 07/20/2020 26      Anion Gap 07/20/2020 5      Glucose 07/20/2020 112*     Urea Nitrogen 07/20/2020 16      Creatinine 07/20/2020 0.63      GFR Estimate 07/20/2020 >90      GFR Estimate If Black 07/20/2020 >90      Calcium 07/20/2020 8.9      WBC 07/20/2020 7.2       RBC Count 07/20/2020 4.33      Hemoglobin 07/20/2020 12.8      Hematocrit 07/20/2020 38.2      MCV 07/20/2020 88      MCH 07/20/2020 29.6      MCHC 07/20/2020 33.5      RDW 07/20/2020 13.4      Platelet Count 07/20/2020 318      Diff Method 07/20/2020 Automated Method      % Neutrophils 07/20/2020 62.1      % Lymphocytes 07/20/2020 21.8      % Monocytes 07/20/2020 11.7      % Eosinophils 07/20/2020 4.0      % Basophils 07/20/2020 0.3      % Immature Granulocytes 07/20/2020 0.1      Nucleated RBCs 07/20/2020 0      Absolute Neutrophil 07/20/2020 4.5      Absolute Lymphocytes 07/20/2020 1.6      Absolute Monocytes 07/20/2020 0.8      Absolute Eosinophils 07/20/2020 0.3      Absolute Basophils 07/20/2020 0.0      Abs Immature Granulocytes 07/20/2020 0.0      Absolute Nucleated RBC 07/20/2020 0.0      Glucose 07/20/2020 116*     Glucose 07/20/2020 129*     Glucose 07/20/2020 109*     Glucose 07/21/2020 133*     Glucose 07/21/2020 131*     Glucose 07/21/2020 124*     Glucose 07/21/2020 105*     Glucose 07/22/2020 125*     Glucose 07/22/2020 159*     Glucose 07/22/2020 156*     Glucose 07/23/2020 127*     Glucose 07/23/2020 143*     Glucose 07/23/2020 115*     Glucose 07/24/2020 127*     Glucose 07/24/2020 135*     Glucose 07/25/2020 129*     Glucose 07/25/2020 139*     Glucose 07/25/2020 125*     Glucose 07/25/2020 97      Glucose 07/26/2020 129*     Glucose 07/26/2020 121*     Glucose 07/26/2020 98      Sodium 07/27/2020 140      Potassium 07/27/2020 4.2      Chloride 07/27/2020 108      Carbon Dioxide 07/27/2020 25      Anion Gap 07/27/2020 7      Glucose 07/27/2020 109*     Urea Nitrogen 07/27/2020 21      Creatinine 07/27/2020 0.87      GFR Estimate 07/27/2020 72      GFR Estimate If Black 07/27/2020 84      Calcium 07/27/2020 9.2      WBC 07/27/2020 7.4      RBC Count 07/27/2020 4.78      Hemoglobin 07/27/2020 14.4      Hematocrit 07/27/2020 42.4      MCV 07/27/2020 89      MCH 07/27/2020 30.1      MCHC 07/27/2020  34.0      RDW 07/27/2020 13.3      Platelet Count 07/27/2020 319      Diff Method 07/27/2020 Automated Method      % Neutrophils 07/27/2020 59.8      % Lymphocytes 07/27/2020 27.5      % Monocytes 07/27/2020 8.8      % Eosinophils 07/27/2020 3.4      % Basophils 07/27/2020 0.4      % Immature Granulocytes 07/27/2020 0.1      Nucleated RBCs 07/27/2020 0      Absolute Neutrophil 07/27/2020 4.4      Absolute Lymphocytes 07/27/2020 2.0      Absolute Monocytes 07/27/2020 0.7      Absolute Eosinophils 07/27/2020 0.3      Absolute Basophils 07/27/2020 0.0      Abs Immature Granulocytes 07/27/2020 0.0      Absolute Nucleated RBC 07/27/2020 0.0      TSH 07/27/2020 0.84      Glucose 07/27/2020 107*     Glucose 07/28/2020 132*     Glucose 07/28/2020 116*     Glucose 07/28/2020 104*     Glucose 07/29/2020 114*     Glucose 07/29/2020 101*     Glucose 07/30/2020 124*     Glucose 07/30/2020 109*     Glucose 07/31/2020 144*     Glucose 07/31/2020 108*     Glucose 08/01/2020 107*     Glucose 08/01/2020 122*     Glucose 08/01/2020 90      Glucose 08/02/2020 108*     Glucose 08/02/2020 86      Sodium 08/03/2020 141      Potassium 08/03/2020 4.0      Chloride 08/03/2020 106      Carbon Dioxide 08/03/2020 28      Anion Gap 08/03/2020 7      Glucose 08/03/2020 103*     Urea Nitrogen 08/03/2020 23      Creatinine 08/03/2020 1.01      GFR Estimate 08/03/2020 60*     GFR Estimate If Black 08/03/2020 70      Calcium 08/03/2020 9.2      Glucose 08/03/2020 101*         Total time spent for face to face visit, reviewing labs/imaging studies, counseling and coordination of care was: 30 Minutes More than 50% of this time was spent on counseling and coordination of care.      This note was dictated using voice recognition software.  Any grammatical or context distortions are unintentional and inherent to the software.           Again, thank you for allowing me to participate in the care of your patient.        Sincerely,        Solomon Duarte,  MD

## 2020-09-01 ENCOUNTER — HOME CARE/HOSPICE - HEALTHEAST (OUTPATIENT)
Dept: HOME HEALTH SERVICES | Facility: HOME HEALTH | Age: 60
End: 2020-09-01

## 2020-09-02 ENCOUNTER — HOME CARE/HOSPICE - HEALTHEAST (OUTPATIENT)
Dept: HOME HEALTH SERVICES | Facility: HOME HEALTH | Age: 60
End: 2020-09-02

## 2020-09-02 ENCOUNTER — COMMUNICATION - HEALTHEAST (OUTPATIENT)
Dept: HOME HEALTH SERVICES | Facility: HOME HEALTH | Age: 60
End: 2020-09-02

## 2020-09-02 ENCOUNTER — OFFICE VISIT - HEALTHEAST (OUTPATIENT)
Dept: INTERNAL MEDICINE | Facility: CLINIC | Age: 60
End: 2020-09-02

## 2020-09-02 DIAGNOSIS — I63.532 ACUTE LEFT PCA STROKE (H): ICD-10-CM

## 2020-09-02 DIAGNOSIS — I15.9 SECONDARY HYPERTENSION: ICD-10-CM

## 2020-09-02 DIAGNOSIS — E11.9 TYPE 2 DIABETES MELLITUS WITHOUT COMPLICATION, WITHOUT LONG-TERM CURRENT USE OF INSULIN (H): ICD-10-CM

## 2020-09-02 LAB — LDLC SERPL CALC-MCNC: 132 MG/DL

## 2020-09-03 ENCOUNTER — COMMUNICATION - HEALTHEAST (OUTPATIENT)
Dept: HOME HEALTH SERVICES | Facility: HOME HEALTH | Age: 60
End: 2020-09-03

## 2020-09-04 ENCOUNTER — COMMUNICATION - HEALTHEAST (OUTPATIENT)
Dept: INTERNAL MEDICINE | Facility: CLINIC | Age: 60
End: 2020-09-04

## 2020-09-04 ENCOUNTER — HOME CARE/HOSPICE - HEALTHEAST (OUTPATIENT)
Dept: HOME HEALTH SERVICES | Facility: HOME HEALTH | Age: 60
End: 2020-09-04

## 2020-09-04 ENCOUNTER — COMMUNICATION - HEALTHEAST (OUTPATIENT)
Dept: PHYSICAL THERAPY | Age: 60
End: 2020-09-04

## 2020-09-04 ENCOUNTER — COMMUNICATION - HEALTHEAST (OUTPATIENT)
Dept: SCHEDULING | Facility: CLINIC | Age: 60
End: 2020-09-04

## 2020-09-04 ENCOUNTER — COMMUNICATION - HEALTHEAST (OUTPATIENT)
Dept: HOME HEALTH SERVICES | Facility: HOME HEALTH | Age: 60
End: 2020-09-04

## 2020-09-04 DIAGNOSIS — I63.532 ACUTE LEFT PCA STROKE (H): ICD-10-CM

## 2020-09-04 DIAGNOSIS — E11.9 TYPE 2 DIABETES MELLITUS WITHOUT COMPLICATION, WITHOUT LONG-TERM CURRENT USE OF INSULIN (H): ICD-10-CM

## 2020-09-04 DIAGNOSIS — R41.841 COGNITIVE COMMUNICATION DISORDER: ICD-10-CM

## 2020-09-08 ENCOUNTER — COMMUNICATION - HEALTHEAST (OUTPATIENT)
Dept: INTERNAL MEDICINE | Facility: CLINIC | Age: 60
End: 2020-09-08

## 2020-09-09 ENCOUNTER — COMMUNICATION - HEALTHEAST (OUTPATIENT)
Dept: HOME HEALTH SERVICES | Facility: HOME HEALTH | Age: 60
End: 2020-09-09

## 2020-09-09 ENCOUNTER — HOME CARE/HOSPICE - HEALTHEAST (OUTPATIENT)
Dept: HOME HEALTH SERVICES | Facility: HOME HEALTH | Age: 60
End: 2020-09-09

## 2020-09-09 ENCOUNTER — COMMUNICATION - HEALTHEAST (OUTPATIENT)
Dept: INTERNAL MEDICINE | Facility: CLINIC | Age: 60
End: 2020-09-09

## 2020-09-10 ENCOUNTER — COMMUNICATION - HEALTHEAST (OUTPATIENT)
Dept: CARE COORDINATION | Facility: CLINIC | Age: 60
End: 2020-09-10

## 2020-09-10 ENCOUNTER — HOME CARE/HOSPICE - HEALTHEAST (OUTPATIENT)
Dept: HOME HEALTH SERVICES | Facility: HOME HEALTH | Age: 60
End: 2020-09-10

## 2020-09-10 ENCOUNTER — COMMUNICATION - HEALTHEAST (OUTPATIENT)
Dept: SCHEDULING | Facility: CLINIC | Age: 60
End: 2020-09-10

## 2020-09-11 ENCOUNTER — COMMUNICATION - HEALTHEAST (OUTPATIENT)
Dept: CARE COORDINATION | Facility: CLINIC | Age: 60
End: 2020-09-11

## 2020-09-11 ENCOUNTER — HOME CARE/HOSPICE - HEALTHEAST (OUTPATIENT)
Dept: HOME HEALTH SERVICES | Facility: HOME HEALTH | Age: 60
End: 2020-09-11

## 2020-09-12 ENCOUNTER — HOME CARE/HOSPICE - HEALTHEAST (OUTPATIENT)
Dept: HOME HEALTH SERVICES | Facility: HOME HEALTH | Age: 60
End: 2020-09-12

## 2020-09-14 ENCOUNTER — COMMUNICATION - HEALTHEAST (OUTPATIENT)
Dept: HOME HEALTH SERVICES | Facility: HOME HEALTH | Age: 60
End: 2020-09-14

## 2020-09-14 ENCOUNTER — HOME CARE/HOSPICE - HEALTHEAST (OUTPATIENT)
Dept: HOME HEALTH SERVICES | Facility: HOME HEALTH | Age: 60
End: 2020-09-14

## 2020-09-15 ENCOUNTER — HOME CARE/HOSPICE - HEALTHEAST (OUTPATIENT)
Dept: HOME HEALTH SERVICES | Facility: HOME HEALTH | Age: 60
End: 2020-09-15

## 2020-09-16 ENCOUNTER — HOME CARE/HOSPICE - HEALTHEAST (OUTPATIENT)
Dept: HOME HEALTH SERVICES | Facility: HOME HEALTH | Age: 60
End: 2020-09-16

## 2020-09-16 ENCOUNTER — COMMUNICATION - HEALTHEAST (OUTPATIENT)
Dept: HOME HEALTH SERVICES | Facility: HOME HEALTH | Age: 60
End: 2020-09-16

## 2020-09-16 DIAGNOSIS — R11.2 NAUSEA AND VOMITING, INTRACTABILITY OF VOMITING NOT SPECIFIED, UNSPECIFIED VOMITING TYPE: ICD-10-CM

## 2020-09-17 ENCOUNTER — HOME CARE/HOSPICE - HEALTHEAST (OUTPATIENT)
Dept: HOME HEALTH SERVICES | Facility: HOME HEALTH | Age: 60
End: 2020-09-17

## 2020-09-18 ENCOUNTER — HOME CARE/HOSPICE - HEALTHEAST (OUTPATIENT)
Dept: HOME HEALTH SERVICES | Facility: HOME HEALTH | Age: 60
End: 2020-09-18

## 2020-09-18 ENCOUNTER — COMMUNICATION - HEALTHEAST (OUTPATIENT)
Dept: NURSING | Facility: CLINIC | Age: 60
End: 2020-09-18

## 2020-09-18 ENCOUNTER — COMMUNICATION - HEALTHEAST (OUTPATIENT)
Dept: SCHEDULING | Facility: CLINIC | Age: 60
End: 2020-09-18

## 2020-09-19 ENCOUNTER — HOME CARE/HOSPICE - HEALTHEAST (OUTPATIENT)
Dept: HOME HEALTH SERVICES | Facility: HOME HEALTH | Age: 60
End: 2020-09-19

## 2020-09-20 ENCOUNTER — HOME CARE/HOSPICE - HEALTHEAST (OUTPATIENT)
Dept: HOME HEALTH SERVICES | Facility: HOME HEALTH | Age: 60
End: 2020-09-20

## 2020-09-21 ENCOUNTER — HOME CARE/HOSPICE - HEALTHEAST (OUTPATIENT)
Dept: HOME HEALTH SERVICES | Facility: HOME HEALTH | Age: 60
End: 2020-09-21

## 2020-09-21 ENCOUNTER — COMMUNICATION - HEALTHEAST (OUTPATIENT)
Dept: HOME HEALTH SERVICES | Facility: HOME HEALTH | Age: 60
End: 2020-09-21

## 2020-09-21 DIAGNOSIS — E11.9 TYPE 2 DIABETES MELLITUS WITHOUT COMPLICATION, WITHOUT LONG-TERM CURRENT USE OF INSULIN (H): ICD-10-CM

## 2020-09-22 ENCOUNTER — HOME CARE/HOSPICE - HEALTHEAST (OUTPATIENT)
Dept: HOME HEALTH SERVICES | Facility: HOME HEALTH | Age: 60
End: 2020-09-22

## 2020-09-22 ENCOUNTER — COMMUNICATION - HEALTHEAST (OUTPATIENT)
Dept: CARE COORDINATION | Facility: CLINIC | Age: 60
End: 2020-09-22

## 2020-09-23 ENCOUNTER — HOME CARE/HOSPICE - HEALTHEAST (OUTPATIENT)
Dept: HOME HEALTH SERVICES | Facility: HOME HEALTH | Age: 60
End: 2020-09-23

## 2020-09-23 ENCOUNTER — COMMUNICATION - HEALTHEAST (OUTPATIENT)
Dept: SCHEDULING | Facility: CLINIC | Age: 60
End: 2020-09-23

## 2020-09-23 ENCOUNTER — COMMUNICATION - HEALTHEAST (OUTPATIENT)
Dept: INTERNAL MEDICINE | Facility: CLINIC | Age: 60
End: 2020-09-23

## 2020-09-23 DIAGNOSIS — E11.9 TYPE 2 DIABETES MELLITUS WITHOUT COMPLICATION, WITHOUT LONG-TERM CURRENT USE OF INSULIN (H): ICD-10-CM

## 2020-09-24 ENCOUNTER — COMMUNICATION - HEALTHEAST (OUTPATIENT)
Dept: INTERNAL MEDICINE | Facility: CLINIC | Age: 60
End: 2020-09-24

## 2020-09-24 ENCOUNTER — HOME CARE/HOSPICE - HEALTHEAST (OUTPATIENT)
Dept: HOME HEALTH SERVICES | Facility: HOME HEALTH | Age: 60
End: 2020-09-24

## 2020-09-24 ENCOUNTER — COMMUNICATION - HEALTHEAST (OUTPATIENT)
Dept: HOME HEALTH SERVICES | Facility: HOME HEALTH | Age: 60
End: 2020-09-24

## 2020-09-24 DIAGNOSIS — E11.9 TYPE 2 DIABETES MELLITUS WITHOUT COMPLICATION, WITHOUT LONG-TERM CURRENT USE OF INSULIN (H): ICD-10-CM

## 2020-09-25 ENCOUNTER — HOME CARE/HOSPICE - HEALTHEAST (OUTPATIENT)
Dept: HOME HEALTH SERVICES | Facility: HOME HEALTH | Age: 60
End: 2020-09-25

## 2020-09-25 ENCOUNTER — COMMUNICATION - HEALTHEAST (OUTPATIENT)
Dept: INTERNAL MEDICINE | Facility: CLINIC | Age: 60
End: 2020-09-25

## 2020-09-25 ENCOUNTER — COMMUNICATION - HEALTHEAST (OUTPATIENT)
Dept: SCHEDULING | Facility: CLINIC | Age: 60
End: 2020-09-25

## 2020-09-29 ENCOUNTER — RECORDS - HEALTHEAST (OUTPATIENT)
Dept: ADMINISTRATIVE | Facility: OTHER | Age: 60
End: 2020-09-29

## 2020-09-29 ENCOUNTER — HOME CARE/HOSPICE - HEALTHEAST (OUTPATIENT)
Dept: HOME HEALTH SERVICES | Facility: HOME HEALTH | Age: 60
End: 2020-09-29

## 2020-09-29 ENCOUNTER — COMMUNICATION - HEALTHEAST (OUTPATIENT)
Dept: HOME HEALTH SERVICES | Facility: HOME HEALTH | Age: 60
End: 2020-09-29

## 2020-09-29 DIAGNOSIS — E11.9 TYPE 2 DIABETES MELLITUS WITHOUT COMPLICATION, WITHOUT LONG-TERM CURRENT USE OF INSULIN (H): ICD-10-CM

## 2020-09-30 ENCOUNTER — HOME CARE/HOSPICE - HEALTHEAST (OUTPATIENT)
Dept: HOME HEALTH SERVICES | Facility: HOME HEALTH | Age: 60
End: 2020-09-30

## 2020-10-01 ENCOUNTER — HOME CARE/HOSPICE - HEALTHEAST (OUTPATIENT)
Dept: HOME HEALTH SERVICES | Facility: HOME HEALTH | Age: 60
End: 2020-10-01

## 2020-10-05 ENCOUNTER — HOME CARE/HOSPICE - HEALTHEAST (OUTPATIENT)
Dept: HOME HEALTH SERVICES | Facility: HOME HEALTH | Age: 60
End: 2020-10-05

## 2020-10-05 ENCOUNTER — OFFICE VISIT - HEALTHEAST (OUTPATIENT)
Dept: INTERNAL MEDICINE | Facility: CLINIC | Age: 60
End: 2020-10-05

## 2020-10-05 DIAGNOSIS — Z23 NEED FOR IMMUNIZATION AGAINST INFLUENZA: ICD-10-CM

## 2020-10-05 DIAGNOSIS — E11.9 TYPE 2 DIABETES MELLITUS WITHOUT COMPLICATION, WITHOUT LONG-TERM CURRENT USE OF INSULIN (H): ICD-10-CM

## 2020-10-05 DIAGNOSIS — I63.532 ACUTE LEFT PCA STROKE (H): ICD-10-CM

## 2020-10-05 DIAGNOSIS — M79.10 PAIN IN THE MUSCLES: ICD-10-CM

## 2020-10-05 DIAGNOSIS — R11.2 NON-INTRACTABLE VOMITING WITH NAUSEA, UNSPECIFIED VOMITING TYPE: ICD-10-CM

## 2020-10-06 ENCOUNTER — COMMUNICATION - HEALTHEAST (OUTPATIENT)
Dept: INTERNAL MEDICINE | Facility: CLINIC | Age: 60
End: 2020-10-06

## 2020-10-06 ENCOUNTER — AMBULATORY - HEALTHEAST (OUTPATIENT)
Dept: INTERNAL MEDICINE | Facility: CLINIC | Age: 60
End: 2020-10-06

## 2020-10-06 DIAGNOSIS — I63.532 ACUTE LEFT PCA STROKE (H): ICD-10-CM

## 2020-10-07 ENCOUNTER — COMMUNICATION - HEALTHEAST (OUTPATIENT)
Dept: CARE COORDINATION | Facility: CLINIC | Age: 60
End: 2020-10-07

## 2020-10-08 ENCOUNTER — HOME CARE/HOSPICE - HEALTHEAST (OUTPATIENT)
Dept: HOME HEALTH SERVICES | Facility: HOME HEALTH | Age: 60
End: 2020-10-08

## 2020-10-08 ENCOUNTER — COMMUNICATION - HEALTHEAST (OUTPATIENT)
Dept: SPEECH THERAPY | Facility: CLINIC | Age: 60
End: 2020-10-08

## 2020-10-09 ENCOUNTER — HOME CARE/HOSPICE - HEALTHEAST (OUTPATIENT)
Dept: HOME HEALTH SERVICES | Facility: HOME HEALTH | Age: 60
End: 2020-10-09

## 2020-10-12 ENCOUNTER — HOME CARE/HOSPICE - HEALTHEAST (OUTPATIENT)
Dept: HOME HEALTH SERVICES | Facility: HOME HEALTH | Age: 60
End: 2020-10-12

## 2020-10-13 ENCOUNTER — HOME CARE/HOSPICE - HEALTHEAST (OUTPATIENT)
Dept: HOME HEALTH SERVICES | Facility: HOME HEALTH | Age: 60
End: 2020-10-13

## 2020-10-14 ENCOUNTER — HOME CARE/HOSPICE - HEALTHEAST (OUTPATIENT)
Dept: HOME HEALTH SERVICES | Facility: HOME HEALTH | Age: 60
End: 2020-10-14

## 2020-10-14 ENCOUNTER — COMMUNICATION - HEALTHEAST (OUTPATIENT)
Dept: INTERNAL MEDICINE | Facility: CLINIC | Age: 60
End: 2020-10-14

## 2020-10-14 DIAGNOSIS — R11.2 NON-INTRACTABLE VOMITING WITH NAUSEA, UNSPECIFIED VOMITING TYPE: ICD-10-CM

## 2020-10-15 ENCOUNTER — COMMUNICATION - HEALTHEAST (OUTPATIENT)
Dept: HOME HEALTH SERVICES | Facility: HOME HEALTH | Age: 60
End: 2020-10-15

## 2020-10-15 ENCOUNTER — HOME CARE/HOSPICE - HEALTHEAST (OUTPATIENT)
Dept: HOME HEALTH SERVICES | Facility: HOME HEALTH | Age: 60
End: 2020-10-15

## 2020-10-15 DIAGNOSIS — R11.2 NON-INTRACTABLE VOMITING WITH NAUSEA, UNSPECIFIED VOMITING TYPE: ICD-10-CM

## 2020-10-19 ENCOUNTER — HOME CARE/HOSPICE - HEALTHEAST (OUTPATIENT)
Dept: HOME HEALTH SERVICES | Facility: HOME HEALTH | Age: 60
End: 2020-10-19

## 2020-10-20 ENCOUNTER — COMMUNICATION - HEALTHEAST (OUTPATIENT)
Dept: CARE COORDINATION | Facility: CLINIC | Age: 60
End: 2020-10-20

## 2020-10-20 ENCOUNTER — HOME CARE/HOSPICE - HEALTHEAST (OUTPATIENT)
Dept: HOME HEALTH SERVICES | Facility: HOME HEALTH | Age: 60
End: 2020-10-20

## 2020-10-21 ENCOUNTER — HOME CARE/HOSPICE - HEALTHEAST (OUTPATIENT)
Dept: HOME HEALTH SERVICES | Facility: HOME HEALTH | Age: 60
End: 2020-10-21

## 2020-10-22 ENCOUNTER — HOME CARE/HOSPICE - HEALTHEAST (OUTPATIENT)
Dept: HOME HEALTH SERVICES | Facility: HOME HEALTH | Age: 60
End: 2020-10-22

## 2020-10-23 ENCOUNTER — HOME CARE/HOSPICE - HEALTHEAST (OUTPATIENT)
Dept: HOME HEALTH SERVICES | Facility: HOME HEALTH | Age: 60
End: 2020-10-23

## 2020-10-26 ENCOUNTER — HOME CARE/HOSPICE - HEALTHEAST (OUTPATIENT)
Dept: HOME HEALTH SERVICES | Facility: HOME HEALTH | Age: 60
End: 2020-10-26

## 2020-10-28 ENCOUNTER — OFFICE VISIT - HEALTHEAST (OUTPATIENT)
Dept: INTERNAL MEDICINE | Facility: CLINIC | Age: 60
End: 2020-10-28

## 2020-10-28 DIAGNOSIS — E11.9 TYPE 2 DIABETES MELLITUS WITHOUT COMPLICATION, WITHOUT LONG-TERM CURRENT USE OF INSULIN (H): ICD-10-CM

## 2020-10-28 DIAGNOSIS — F32.1 CURRENT MODERATE EPISODE OF MAJOR DEPRESSIVE DISORDER WITHOUT PRIOR EPISODE (H): ICD-10-CM

## 2020-10-28 DIAGNOSIS — I63.532 ACUTE LEFT PCA STROKE (H): ICD-10-CM

## 2020-10-28 DIAGNOSIS — I10 BENIGN ESSENTIAL HYPERTENSION: ICD-10-CM

## 2020-10-29 ENCOUNTER — COMMUNICATION - HEALTHEAST (OUTPATIENT)
Dept: SPEECH THERAPY | Facility: CLINIC | Age: 60
End: 2020-10-29

## 2020-10-29 ENCOUNTER — COMMUNICATION - HEALTHEAST (OUTPATIENT)
Dept: SCHEDULING | Facility: CLINIC | Age: 60
End: 2020-10-29

## 2020-10-29 ENCOUNTER — HOME CARE/HOSPICE - HEALTHEAST (OUTPATIENT)
Dept: HOME HEALTH SERVICES | Facility: HOME HEALTH | Age: 60
End: 2020-10-29

## 2020-11-02 ENCOUNTER — HOME CARE/HOSPICE - HEALTHEAST (OUTPATIENT)
Dept: HOME HEALTH SERVICES | Facility: HOME HEALTH | Age: 60
End: 2020-11-02

## 2020-11-03 ENCOUNTER — HOME CARE/HOSPICE - HEALTHEAST (OUTPATIENT)
Dept: HOME HEALTH SERVICES | Facility: HOME HEALTH | Age: 60
End: 2020-11-03

## 2020-11-06 ENCOUNTER — RECORDS - HEALTHEAST (OUTPATIENT)
Dept: ADMINISTRATIVE | Facility: OTHER | Age: 60
End: 2020-11-06

## 2020-11-09 ENCOUNTER — HOME CARE/HOSPICE - HEALTHEAST (OUTPATIENT)
Dept: HOME HEALTH SERVICES | Facility: HOME HEALTH | Age: 60
End: 2020-11-09

## 2020-11-11 ENCOUNTER — COMMUNICATION - HEALTHEAST (OUTPATIENT)
Dept: INTERNAL MEDICINE | Facility: CLINIC | Age: 60
End: 2020-11-11

## 2020-11-11 ENCOUNTER — OFFICE VISIT - HEALTHEAST (OUTPATIENT)
Dept: INTERNAL MEDICINE | Facility: CLINIC | Age: 60
End: 2020-11-11

## 2020-11-11 DIAGNOSIS — R63.4 LOSS OF WEIGHT: ICD-10-CM

## 2020-11-11 DIAGNOSIS — E11.9 TYPE 2 DIABETES MELLITUS WITHOUT COMPLICATION, WITHOUT LONG-TERM CURRENT USE OF INSULIN (H): ICD-10-CM

## 2020-11-11 DIAGNOSIS — I10 BENIGN ESSENTIAL HYPERTENSION: ICD-10-CM

## 2020-11-11 DIAGNOSIS — I63.532 ACUTE LEFT PCA STROKE (H): ICD-10-CM

## 2020-11-11 DIAGNOSIS — F32.1 CURRENT MODERATE EPISODE OF MAJOR DEPRESSIVE DISORDER WITHOUT PRIOR EPISODE (H): ICD-10-CM

## 2020-11-11 DIAGNOSIS — H53.9 VISUAL DISTURBANCE: ICD-10-CM

## 2020-11-11 LAB
HBA1C MFR BLD: 6 %
LDLC SERPL CALC-MCNC: 72 MG/DL

## 2020-11-11 ASSESSMENT — PATIENT HEALTH QUESTIONNAIRE - PHQ9: SUM OF ALL RESPONSES TO PHQ QUESTIONS 1-9: 2

## 2020-11-16 ENCOUNTER — COMMUNICATION - HEALTHEAST (OUTPATIENT)
Dept: HOME HEALTH SERVICES | Facility: HOME HEALTH | Age: 60
End: 2020-11-16

## 2020-11-16 ENCOUNTER — HOME CARE/HOSPICE - HEALTHEAST (OUTPATIENT)
Dept: HOME HEALTH SERVICES | Facility: HOME HEALTH | Age: 60
End: 2020-11-16

## 2020-11-16 DIAGNOSIS — E11.9 TYPE 2 DIABETES MELLITUS WITHOUT COMPLICATION, WITHOUT LONG-TERM CURRENT USE OF INSULIN (H): ICD-10-CM

## 2020-11-18 ENCOUNTER — RECORDS - HEALTHEAST (OUTPATIENT)
Dept: ADMINISTRATIVE | Facility: OTHER | Age: 60
End: 2020-11-18

## 2020-11-18 LAB — RETINOPATHY: NORMAL

## 2020-11-20 ENCOUNTER — RECORDS - HEALTHEAST (OUTPATIENT)
Dept: HEALTH INFORMATION MANAGEMENT | Facility: CLINIC | Age: 60
End: 2020-11-20

## 2020-11-23 ENCOUNTER — HOME CARE/HOSPICE - HEALTHEAST (OUTPATIENT)
Dept: HOME HEALTH SERVICES | Facility: HOME HEALTH | Age: 60
End: 2020-11-23

## 2020-11-27 ENCOUNTER — COMMUNICATION - HEALTHEAST (OUTPATIENT)
Dept: INTERNAL MEDICINE | Facility: CLINIC | Age: 60
End: 2020-11-27

## 2020-11-27 DIAGNOSIS — F32.1 CURRENT MODERATE EPISODE OF MAJOR DEPRESSIVE DISORDER WITHOUT PRIOR EPISODE (H): ICD-10-CM

## 2020-11-30 ENCOUNTER — HOME CARE/HOSPICE - HEALTHEAST (OUTPATIENT)
Dept: HOME HEALTH SERVICES | Facility: HOME HEALTH | Age: 60
End: 2020-11-30

## 2020-12-01 ENCOUNTER — COMMUNICATION - HEALTHEAST (OUTPATIENT)
Dept: HOME HEALTH SERVICES | Facility: HOME HEALTH | Age: 60
End: 2020-12-01

## 2020-12-01 ENCOUNTER — HOME CARE/HOSPICE - HEALTHEAST (OUTPATIENT)
Dept: HOME HEALTH SERVICES | Facility: HOME HEALTH | Age: 60
End: 2020-12-01

## 2020-12-01 DIAGNOSIS — E11.9 TYPE 2 DIABETES MELLITUS WITHOUT COMPLICATION, WITHOUT LONG-TERM CURRENT USE OF INSULIN (H): ICD-10-CM

## 2020-12-02 ENCOUNTER — HOME CARE/HOSPICE - HEALTHEAST (OUTPATIENT)
Dept: HOME HEALTH SERVICES | Facility: HOME HEALTH | Age: 60
End: 2020-12-02

## 2020-12-07 ENCOUNTER — HOME CARE/HOSPICE - HEALTHEAST (OUTPATIENT)
Dept: HOME HEALTH SERVICES | Facility: HOME HEALTH | Age: 60
End: 2020-12-07

## 2020-12-08 ENCOUNTER — HOME CARE/HOSPICE - HEALTHEAST (OUTPATIENT)
Dept: HOME HEALTH SERVICES | Facility: HOME HEALTH | Age: 60
End: 2020-12-08

## 2020-12-09 ENCOUNTER — OFFICE VISIT - HEALTHEAST (OUTPATIENT)
Dept: BEHAVIORAL HEALTH | Facility: CLINIC | Age: 60
End: 2020-12-09

## 2020-12-09 DIAGNOSIS — F43.21 ADJUSTMENT DISORDER WITH DEPRESSED MOOD: ICD-10-CM

## 2020-12-11 ENCOUNTER — HOME CARE/HOSPICE - HEALTHEAST (OUTPATIENT)
Dept: HOME HEALTH SERVICES | Facility: HOME HEALTH | Age: 60
End: 2020-12-11

## 2020-12-11 ENCOUNTER — OFFICE VISIT - HEALTHEAST (OUTPATIENT)
Dept: INTERNAL MEDICINE | Facility: CLINIC | Age: 60
End: 2020-12-11

## 2020-12-11 DIAGNOSIS — F32.4 MAJOR DEPRESSIVE DISORDER WITH SINGLE EPISODE, IN PARTIAL REMISSION (H): ICD-10-CM

## 2020-12-11 DIAGNOSIS — I63.9 ACUTE CVA (CEREBROVASCULAR ACCIDENT) (H): ICD-10-CM

## 2020-12-11 DIAGNOSIS — I10 BENIGN ESSENTIAL HYPERTENSION: ICD-10-CM

## 2020-12-11 DIAGNOSIS — E11.9 TYPE 2 DIABETES MELLITUS WITHOUT COMPLICATION, WITHOUT LONG-TERM CURRENT USE OF INSULIN (H): ICD-10-CM

## 2020-12-11 LAB
CREAT UR-MCNC: 233.2 MG/DL
MICROALBUMIN UR-MCNC: 3.07 MG/DL (ref 0–1.99)
MICROALBUMIN/CREAT UR: 13.2 MG/G

## 2020-12-11 ASSESSMENT — PATIENT HEALTH QUESTIONNAIRE - PHQ9: SUM OF ALL RESPONSES TO PHQ QUESTIONS 1-9: 1

## 2020-12-14 ENCOUNTER — HOME CARE/HOSPICE - HEALTHEAST (OUTPATIENT)
Dept: HOME HEALTH SERVICES | Facility: HOME HEALTH | Age: 60
End: 2020-12-14

## 2020-12-20 ASSESSMENT — ANXIETY QUESTIONNAIRES
IF YOU CHECKED OFF ANY PROBLEMS ON THIS QUESTIONNAIRE, HOW DIFFICULT HAVE THESE PROBLEMS MADE IT FOR YOU TO DO YOUR WORK, TAKE CARE OF THINGS AT HOME, OR GET ALONG WITH OTHER PEOPLE: VERY DIFFICULT
7. FEELING AFRAID AS IF SOMETHING AWFUL MIGHT HAPPEN: NEARLY EVERY DAY
2. NOT BEING ABLE TO STOP OR CONTROL WORRYING: MORE THAN HALF THE DAYS
GAD7 TOTAL SCORE: 10
3. WORRYING TOO MUCH ABOUT DIFFERENT THINGS: SEVERAL DAYS
4. TROUBLE RELAXING: SEVERAL DAYS
6. BECOMING EASILY ANNOYED OR IRRITABLE: NEARLY EVERY DAY
5. BEING SO RESTLESS THAT IT IS HARD TO SIT STILL: NOT AT ALL
1. FEELING NERVOUS, ANXIOUS, OR ON EDGE: NOT AT ALL

## 2020-12-21 ENCOUNTER — HOME CARE/HOSPICE - HEALTHEAST (OUTPATIENT)
Dept: HOME HEALTH SERVICES | Facility: HOME HEALTH | Age: 60
End: 2020-12-21

## 2020-12-22 ENCOUNTER — COMMUNICATION - HEALTHEAST (OUTPATIENT)
Dept: INTERNAL MEDICINE | Facility: CLINIC | Age: 60
End: 2020-12-22

## 2020-12-22 ENCOUNTER — HOME CARE/HOSPICE - HEALTHEAST (OUTPATIENT)
Dept: HOME HEALTH SERVICES | Facility: HOME HEALTH | Age: 60
End: 2020-12-22

## 2020-12-22 ENCOUNTER — OFFICE VISIT - HEALTHEAST (OUTPATIENT)
Dept: BEHAVIORAL HEALTH | Facility: CLINIC | Age: 60
End: 2020-12-22

## 2020-12-22 DIAGNOSIS — I15.9 SECONDARY HYPERTENSION: ICD-10-CM

## 2020-12-22 DIAGNOSIS — F43.21 ADJUSTMENT DISORDER WITH DEPRESSED MOOD: ICD-10-CM

## 2020-12-28 ENCOUNTER — HOME CARE/HOSPICE - HEALTHEAST (OUTPATIENT)
Dept: HOME HEALTH SERVICES | Facility: HOME HEALTH | Age: 60
End: 2020-12-28

## 2020-12-29 ENCOUNTER — OFFICE VISIT - HEALTHEAST (OUTPATIENT)
Dept: BEHAVIORAL HEALTH | Facility: CLINIC | Age: 60
End: 2020-12-29

## 2020-12-29 DIAGNOSIS — F43.21 ADJUSTMENT DISORDER WITH DEPRESSED MOOD: ICD-10-CM

## 2020-12-31 ENCOUNTER — AMBULATORY - HEALTHEAST (OUTPATIENT)
Dept: BEHAVIORAL HEALTH | Facility: CLINIC | Age: 60
End: 2020-12-31

## 2020-12-31 ASSESSMENT — PATIENT HEALTH QUESTIONNAIRE - PHQ9: SUM OF ALL RESPONSES TO PHQ QUESTIONS 1-9: 13

## 2021-01-05 ENCOUNTER — COMMUNICATION - HEALTHEAST (OUTPATIENT)
Dept: INTERNAL MEDICINE | Facility: CLINIC | Age: 61
End: 2021-01-05

## 2021-01-05 ENCOUNTER — COMMUNICATION - HEALTHEAST (OUTPATIENT)
Dept: HOME HEALTH SERVICES | Facility: HOME HEALTH | Age: 61
End: 2021-01-05

## 2021-01-05 ENCOUNTER — HOME CARE/HOSPICE - HEALTHEAST (OUTPATIENT)
Dept: HOME HEALTH SERVICES | Facility: HOME HEALTH | Age: 61
End: 2021-01-05

## 2021-01-05 DIAGNOSIS — I63.532 ACUTE LEFT PCA STROKE (H): ICD-10-CM

## 2021-01-08 ENCOUNTER — HOME CARE/HOSPICE - HEALTHEAST (OUTPATIENT)
Dept: HOME HEALTH SERVICES | Facility: HOME HEALTH | Age: 61
End: 2021-01-08

## 2021-01-15 ENCOUNTER — OFFICE VISIT - HEALTHEAST (OUTPATIENT)
Dept: BEHAVIORAL HEALTH | Facility: CLINIC | Age: 61
End: 2021-01-15

## 2021-01-15 DIAGNOSIS — F43.21 ADJUSTMENT DISORDER WITH DEPRESSED MOOD: ICD-10-CM

## 2021-01-19 ENCOUNTER — HOME CARE/HOSPICE - HEALTHEAST (OUTPATIENT)
Dept: HOME HEALTH SERVICES | Facility: HOME HEALTH | Age: 61
End: 2021-01-19

## 2021-02-02 ENCOUNTER — HOME CARE/HOSPICE - HEALTHEAST (OUTPATIENT)
Dept: HOME HEALTH SERVICES | Facility: HOME HEALTH | Age: 61
End: 2021-02-02

## 2021-02-02 ENCOUNTER — COMMUNICATION - HEALTHEAST (OUTPATIENT)
Dept: INTERNAL MEDICINE | Facility: CLINIC | Age: 61
End: 2021-02-02

## 2021-02-02 DIAGNOSIS — E11.9 TYPE 2 DIABETES MELLITUS WITHOUT COMPLICATION, WITHOUT LONG-TERM CURRENT USE OF INSULIN (H): ICD-10-CM

## 2021-02-10 ENCOUNTER — COMMUNICATION - HEALTHEAST (OUTPATIENT)
Dept: PEDIATRICS | Facility: CLINIC | Age: 61
End: 2021-02-10

## 2021-02-12 ENCOUNTER — OFFICE VISIT - HEALTHEAST (OUTPATIENT)
Dept: BEHAVIORAL HEALTH | Facility: CLINIC | Age: 61
End: 2021-02-12

## 2021-02-12 DIAGNOSIS — F43.21 ADJUSTMENT DISORDER WITH DEPRESSED MOOD: ICD-10-CM

## 2021-02-16 ENCOUNTER — HOME CARE/HOSPICE - HEALTHEAST (OUTPATIENT)
Dept: HOME HEALTH SERVICES | Facility: HOME HEALTH | Age: 61
End: 2021-02-16

## 2021-02-16 ENCOUNTER — COMMUNICATION - HEALTHEAST (OUTPATIENT)
Dept: INTERNAL MEDICINE | Facility: CLINIC | Age: 61
End: 2021-02-16

## 2021-02-16 DIAGNOSIS — I15.9 SECONDARY HYPERTENSION: ICD-10-CM

## 2021-02-16 DIAGNOSIS — F32.1 CURRENT MODERATE EPISODE OF MAJOR DEPRESSIVE DISORDER WITHOUT PRIOR EPISODE (H): ICD-10-CM

## 2021-02-26 ENCOUNTER — OFFICE VISIT - HEALTHEAST (OUTPATIENT)
Dept: BEHAVIORAL HEALTH | Facility: CLINIC | Age: 61
End: 2021-02-26

## 2021-02-26 DIAGNOSIS — F43.21 ADJUSTMENT DISORDER WITH DEPRESSED MOOD: ICD-10-CM

## 2021-03-01 ENCOUNTER — COMMUNICATION - HEALTHEAST (OUTPATIENT)
Dept: INTERNAL MEDICINE | Facility: CLINIC | Age: 61
End: 2021-03-01

## 2021-03-01 DIAGNOSIS — I63.532 ACUTE LEFT PCA STROKE (H): ICD-10-CM

## 2021-03-02 ENCOUNTER — HOME CARE/HOSPICE - HEALTHEAST (OUTPATIENT)
Dept: HOME HEALTH SERVICES | Facility: HOME HEALTH | Age: 61
End: 2021-03-02

## 2021-03-02 ENCOUNTER — COMMUNICATION - HEALTHEAST (OUTPATIENT)
Dept: HOME HEALTH SERVICES | Facility: HOME HEALTH | Age: 61
End: 2021-03-02

## 2021-03-03 ENCOUNTER — COMMUNICATION - HEALTHEAST (OUTPATIENT)
Dept: CARE COORDINATION | Facility: CLINIC | Age: 61
End: 2021-03-03

## 2021-03-04 ENCOUNTER — HOME CARE/HOSPICE - HEALTHEAST (OUTPATIENT)
Dept: HOME HEALTH SERVICES | Facility: HOME HEALTH | Age: 61
End: 2021-03-04

## 2021-03-05 ENCOUNTER — COMMUNICATION - HEALTHEAST (OUTPATIENT)
Dept: INTERNAL MEDICINE | Facility: CLINIC | Age: 61
End: 2021-03-05

## 2021-03-05 DIAGNOSIS — E11.9 TYPE 2 DIABETES MELLITUS WITHOUT COMPLICATION, WITHOUT LONG-TERM CURRENT USE OF INSULIN (H): ICD-10-CM

## 2021-03-12 ENCOUNTER — OFFICE VISIT - HEALTHEAST (OUTPATIENT)
Dept: BEHAVIORAL HEALTH | Facility: CLINIC | Age: 61
End: 2021-03-12

## 2021-03-12 DIAGNOSIS — F43.21 ADJUSTMENT DISORDER WITH DEPRESSED MOOD: ICD-10-CM

## 2021-03-13 ENCOUNTER — COMMUNICATION - HEALTHEAST (OUTPATIENT)
Dept: INTERNAL MEDICINE | Facility: CLINIC | Age: 61
End: 2021-03-13

## 2021-03-13 DIAGNOSIS — I15.9 SECONDARY HYPERTENSION: ICD-10-CM

## 2021-03-16 ENCOUNTER — HOME CARE/HOSPICE - HEALTHEAST (OUTPATIENT)
Dept: HOME HEALTH SERVICES | Facility: HOME HEALTH | Age: 61
End: 2021-03-16

## 2021-03-16 ENCOUNTER — OFFICE VISIT - HEALTHEAST (OUTPATIENT)
Dept: INTERNAL MEDICINE | Facility: CLINIC | Age: 61
End: 2021-03-16

## 2021-03-16 DIAGNOSIS — N39.498 OTHER URINARY INCONTINENCE: ICD-10-CM

## 2021-03-16 DIAGNOSIS — I63.9 ACUTE CVA (CEREBROVASCULAR ACCIDENT) (H): ICD-10-CM

## 2021-03-16 DIAGNOSIS — I10 BENIGN ESSENTIAL HYPERTENSION: ICD-10-CM

## 2021-03-16 DIAGNOSIS — I15.9 SECONDARY HYPERTENSION: ICD-10-CM

## 2021-03-16 DIAGNOSIS — E55.9 VITAMIN D DEFICIENCY: ICD-10-CM

## 2021-03-16 DIAGNOSIS — E11.9 TYPE 2 DIABETES MELLITUS WITHOUT COMPLICATION, WITHOUT LONG-TERM CURRENT USE OF INSULIN (H): ICD-10-CM

## 2021-03-16 LAB
ANION GAP SERPL CALCULATED.3IONS-SCNC: 8 MMOL/L (ref 5–18)
BUN SERPL-MCNC: 18 MG/DL (ref 8–22)
CALCIUM SERPL-MCNC: 9.2 MG/DL (ref 8.5–10.5)
CHLORIDE BLD-SCNC: 104 MMOL/L (ref 98–107)
CO2 SERPL-SCNC: 28 MMOL/L (ref 22–31)
CREAT SERPL-MCNC: 0.84 MG/DL (ref 0.6–1.1)
GFR SERPL CREATININE-BSD FRML MDRD: >60 ML/MIN/1.73M2
GLUCOSE BLD-MCNC: 83 MG/DL (ref 70–125)
HBA1C MFR BLD: 5.6 %
POTASSIUM BLD-SCNC: 4.5 MMOL/L (ref 3.5–5)
SODIUM SERPL-SCNC: 140 MMOL/L (ref 136–145)

## 2021-03-16 ASSESSMENT — MIFFLIN-ST. JEOR: SCORE: 1266.22

## 2021-03-17 ENCOUNTER — COMMUNICATION - HEALTHEAST (OUTPATIENT)
Dept: INTERNAL MEDICINE | Facility: CLINIC | Age: 61
End: 2021-03-17

## 2021-03-17 DIAGNOSIS — E11.9 TYPE 2 DIABETES MELLITUS WITHOUT COMPLICATION, WITHOUT LONG-TERM CURRENT USE OF INSULIN (H): ICD-10-CM

## 2021-03-17 DIAGNOSIS — E55.9 VITAMIN D DEFICIENCY: ICD-10-CM

## 2021-03-17 LAB — 25(OH)D3 SERPL-MCNC: 19.5 NG/ML (ref 30–80)

## 2021-03-18 ENCOUNTER — COMMUNICATION - HEALTHEAST (OUTPATIENT)
Dept: INTERNAL MEDICINE | Facility: CLINIC | Age: 61
End: 2021-03-18

## 2021-03-18 ENCOUNTER — COMMUNICATION - HEALTHEAST (OUTPATIENT)
Dept: CARE COORDINATION | Facility: CLINIC | Age: 61
End: 2021-03-18

## 2021-03-18 DIAGNOSIS — E11.9 TYPE 2 DIABETES MELLITUS WITHOUT COMPLICATION, WITHOUT LONG-TERM CURRENT USE OF INSULIN (H): ICD-10-CM

## 2021-03-26 ENCOUNTER — AMBULATORY - HEALTHEAST (OUTPATIENT)
Dept: BEHAVIORAL HEALTH | Facility: CLINIC | Age: 61
End: 2021-03-26

## 2021-03-26 ENCOUNTER — OFFICE VISIT - HEALTHEAST (OUTPATIENT)
Dept: BEHAVIORAL HEALTH | Facility: CLINIC | Age: 61
End: 2021-03-26

## 2021-03-26 DIAGNOSIS — F32.4 MAJOR DEPRESSIVE DISORDER WITH SINGLE EPISODE, IN PARTIAL REMISSION (H): ICD-10-CM

## 2021-03-26 ASSESSMENT — ANXIETY QUESTIONNAIRES
5. BEING SO RESTLESS THAT IT IS HARD TO SIT STILL: NOT AT ALL
2. NOT BEING ABLE TO STOP OR CONTROL WORRYING: SEVERAL DAYS
3. WORRYING TOO MUCH ABOUT DIFFERENT THINGS: SEVERAL DAYS
4. TROUBLE RELAXING: NOT AT ALL
GAD7 TOTAL SCORE: 7
1. FEELING NERVOUS, ANXIOUS, OR ON EDGE: SEVERAL DAYS
IF YOU CHECKED OFF ANY PROBLEMS ON THIS QUESTIONNAIRE, HOW DIFFICULT HAVE THESE PROBLEMS MADE IT FOR YOU TO DO YOUR WORK, TAKE CARE OF THINGS AT HOME, OR GET ALONG WITH OTHER PEOPLE: VERY DIFFICULT
7. FEELING AFRAID AS IF SOMETHING AWFUL MIGHT HAPPEN: MORE THAN HALF THE DAYS
6. BECOMING EASILY ANNOYED OR IRRITABLE: MORE THAN HALF THE DAYS

## 2021-03-26 ASSESSMENT — PATIENT HEALTH QUESTIONNAIRE - PHQ9: SUM OF ALL RESPONSES TO PHQ QUESTIONS 1-9: 9

## 2021-03-30 ENCOUNTER — HOME CARE/HOSPICE - HEALTHEAST (OUTPATIENT)
Dept: HOME HEALTH SERVICES | Facility: HOME HEALTH | Age: 61
End: 2021-03-30

## 2021-03-31 ENCOUNTER — COMMUNICATION - HEALTHEAST (OUTPATIENT)
Dept: HOME HEALTH SERVICES | Facility: HOME HEALTH | Age: 61
End: 2021-03-31

## 2021-04-06 ENCOUNTER — COMMUNICATION - HEALTHEAST (OUTPATIENT)
Dept: BEHAVIORAL HEALTH | Facility: CLINIC | Age: 61
End: 2021-04-06

## 2021-04-07 ENCOUNTER — HOME CARE/HOSPICE - HEALTHEAST (OUTPATIENT)
Dept: HOME HEALTH SERVICES | Facility: HOME HEALTH | Age: 61
End: 2021-04-07

## 2021-04-07 ENCOUNTER — OFFICE VISIT - HEALTHEAST (OUTPATIENT)
Dept: BEHAVIORAL HEALTH | Facility: CLINIC | Age: 61
End: 2021-04-07

## 2021-04-07 DIAGNOSIS — F33.0 MAJOR DEPRESSIVE DISORDER, RECURRENT EPISODE, MILD (H): ICD-10-CM

## 2021-04-10 ENCOUNTER — RECORDS - HEALTHEAST (OUTPATIENT)
Dept: ADMINISTRATIVE | Facility: OTHER | Age: 61
End: 2021-04-10

## 2021-04-21 ENCOUNTER — COMMUNICATION - HEALTHEAST (OUTPATIENT)
Dept: INTERNAL MEDICINE | Facility: CLINIC | Age: 61
End: 2021-04-21

## 2021-04-21 DIAGNOSIS — I63.532 ACUTE LEFT PCA STROKE (H): ICD-10-CM

## 2021-04-21 DIAGNOSIS — F81.0 DIFFICULTY READING: ICD-10-CM

## 2021-04-21 DIAGNOSIS — R68.89 DIFFICULTY WRITING: ICD-10-CM

## 2021-04-21 DIAGNOSIS — R41.89 DIFFICULTY UNDERSTANDING WRITTEN LANGUAGE: ICD-10-CM

## 2021-04-22 ENCOUNTER — AMBULATORY - HEALTHEAST (OUTPATIENT)
Dept: NURSING | Facility: CLINIC | Age: 61
End: 2021-04-22

## 2021-04-23 ENCOUNTER — OFFICE VISIT - HEALTHEAST (OUTPATIENT)
Dept: BEHAVIORAL HEALTH | Facility: CLINIC | Age: 61
End: 2021-04-23

## 2021-04-23 DIAGNOSIS — F33.1 MODERATE EPISODE OF RECURRENT MAJOR DEPRESSIVE DISORDER (H): ICD-10-CM

## 2021-04-25 ENCOUNTER — AMBULATORY - HEALTHEAST (OUTPATIENT)
Dept: BEHAVIORAL HEALTH | Facility: CLINIC | Age: 61
End: 2021-04-25

## 2021-04-29 ENCOUNTER — OFFICE VISIT - HEALTHEAST (OUTPATIENT)
Dept: SPEECH THERAPY | Facility: REHABILITATION | Age: 61
End: 2021-04-29

## 2021-04-29 DIAGNOSIS — R48.8 AGRAPHIA: ICD-10-CM

## 2021-04-29 DIAGNOSIS — I69.320 APHASIA, POST-STROKE: ICD-10-CM

## 2021-04-29 DIAGNOSIS — R48.0 ALEXIA: ICD-10-CM

## 2021-05-05 ENCOUNTER — RECORDS - HEALTHEAST (OUTPATIENT)
Dept: ADMINISTRATIVE | Facility: OTHER | Age: 61
End: 2021-05-05

## 2021-05-11 ENCOUNTER — RECORDS - HEALTHEAST (OUTPATIENT)
Dept: ADMINISTRATIVE | Facility: OTHER | Age: 61
End: 2021-05-11

## 2021-05-13 ENCOUNTER — AMBULATORY - HEALTHEAST (OUTPATIENT)
Dept: NURSING | Facility: CLINIC | Age: 61
End: 2021-05-13

## 2021-05-20 ENCOUNTER — OFFICE VISIT - HEALTHEAST (OUTPATIENT)
Dept: SPEECH THERAPY | Facility: REHABILITATION | Age: 61
End: 2021-05-20

## 2021-05-20 DIAGNOSIS — R48.8 AGRAPHIA: ICD-10-CM

## 2021-05-20 DIAGNOSIS — R48.0 ALEXIA: ICD-10-CM

## 2021-05-25 ENCOUNTER — OFFICE VISIT - HEALTHEAST (OUTPATIENT)
Dept: SPEECH THERAPY | Facility: REHABILITATION | Age: 61
End: 2021-05-25

## 2021-05-25 DIAGNOSIS — R48.0 ALEXIA: ICD-10-CM

## 2021-05-25 DIAGNOSIS — R48.8 AGRAPHIA: ICD-10-CM

## 2021-05-25 DIAGNOSIS — I69.320 APHASIA, POST-STROKE: ICD-10-CM

## 2021-05-26 ENCOUNTER — RECORDS - HEALTHEAST (OUTPATIENT)
Dept: ADMINISTRATIVE | Facility: OTHER | Age: 61
End: 2021-05-26

## 2021-05-26 ENCOUNTER — COMMUNICATION - HEALTHEAST (OUTPATIENT)
Dept: FAMILY MEDICINE | Facility: CLINIC | Age: 61
End: 2021-05-26

## 2021-05-27 ENCOUNTER — OFFICE VISIT - HEALTHEAST (OUTPATIENT)
Dept: SPEECH THERAPY | Facility: REHABILITATION | Age: 61
End: 2021-05-27

## 2021-05-27 DIAGNOSIS — R48.8 AGRAPHIA: ICD-10-CM

## 2021-05-27 DIAGNOSIS — R48.0 ALEXIA: ICD-10-CM

## 2021-05-28 ENCOUNTER — OFFICE VISIT - HEALTHEAST (OUTPATIENT)
Dept: FAMILY MEDICINE | Facility: CLINIC | Age: 61
End: 2021-05-28

## 2021-05-28 DIAGNOSIS — Z01.818 PREOP EXAMINATION: ICD-10-CM

## 2021-05-28 DIAGNOSIS — I63.532 ACUTE LEFT PCA STROKE (H): ICD-10-CM

## 2021-05-28 DIAGNOSIS — H26.9 CATARACT OF BOTH EYES, UNSPECIFIED CATARACT TYPE: ICD-10-CM

## 2021-05-28 DIAGNOSIS — E11.9 TYPE 2 DIABETES MELLITUS WITHOUT COMPLICATION, WITHOUT LONG-TERM CURRENT USE OF INSULIN (H): ICD-10-CM

## 2021-05-28 ASSESSMENT — ANXIETY QUESTIONNAIRES
GAD7 TOTAL SCORE: 7
GAD7 TOTAL SCORE: 10

## 2021-06-02 ENCOUNTER — COMMUNICATION - HEALTHEAST (OUTPATIENT)
Dept: HOME HEALTH SERVICES | Facility: HOME HEALTH | Age: 61
End: 2021-06-02

## 2021-06-02 ENCOUNTER — RECORDS - HEALTHEAST (OUTPATIENT)
Dept: ADMINISTRATIVE | Facility: OTHER | Age: 61
End: 2021-06-02

## 2021-06-03 ENCOUNTER — OFFICE VISIT - HEALTHEAST (OUTPATIENT)
Dept: SPEECH THERAPY | Facility: REHABILITATION | Age: 61
End: 2021-06-03

## 2021-06-03 DIAGNOSIS — R48.0 ALEXIA: ICD-10-CM

## 2021-06-03 DIAGNOSIS — R48.8 AGRAPHIA: ICD-10-CM

## 2021-06-06 ENCOUNTER — COMMUNICATION - HEALTHEAST (OUTPATIENT)
Dept: INTERNAL MEDICINE | Facility: CLINIC | Age: 61
End: 2021-06-06

## 2021-06-06 DIAGNOSIS — E55.9 VITAMIN D DEFICIENCY: ICD-10-CM

## 2021-06-10 ENCOUNTER — OFFICE VISIT - HEALTHEAST (OUTPATIENT)
Dept: SPEECH THERAPY | Facility: REHABILITATION | Age: 61
End: 2021-06-10

## 2021-06-10 DIAGNOSIS — I69.320 APHASIA, POST-STROKE: ICD-10-CM

## 2021-06-10 DIAGNOSIS — R48.8 AGRAPHIA: ICD-10-CM

## 2021-06-10 NOTE — TELEPHONE ENCOUNTER
Request for orders    Who is requesting orders: Home care Speech Therapist    What orders are being requested: Speech therapy 2w3 for cognitive linguistic skills s/p stroke    Where to send orders to: Ok to reply to this encounter, thanks.

## 2021-06-10 NOTE — TELEPHONE ENCOUNTER
Dr. Randle or covering MD      Request verbal order for home care skilled nurse start of care assessment to be done on 8/8/20 per patient's request    Thank you,  Izabel Valdivia RN Clinical Coordinator

## 2021-06-10 NOTE — TELEPHONE ENCOUNTER
Request for Orders    Who s Requesting: Home Care Occupational Therapist    Orders being requested: OT to continue for 2w3 for ADLs/IADLs, UE function, and cognition    Where to send Orders: Respond through Marlon Vázquez OTR/L

## 2021-06-10 NOTE — TELEPHONE ENCOUNTER
Voltaren Gel d/c. Orders pended to be sent to pharmacy.  Sherry Bolivar CMA ............... 8:34 AM, 08/10/20

## 2021-06-10 NOTE — TELEPHONE ENCOUNTER
Physical Therapy [facility] is requesting verbal ok for 2W3 to work on strengthening, gait/transfer training, and balance training. You can respond to this inbasket with the ok or call and leave a message at [phone]. Thank you

## 2021-06-10 NOTE — TELEPHONE ENCOUNTER
I will review the other questions. She should be on Metformin 500 mg daily AM with breakfast. I don't know what she was using Voltaren gel for, so this should be discontinued. Please let me know if she has any joint pain.     Edwina Randle MD

## 2021-06-10 NOTE — TELEPHONE ENCOUNTER
Patient was seen for a start of care today after hospitalization and TCU stay with diagnosis of CVA, Requesting the following orders:  RN: 1w1, 2w2, 1w2 for cardiac and BP assess, DM and BG assess and education, sx management, education on disease process related to stroke, HTN and DM  PT: 1w1 for strengthening and gait training  OT: 1w1 for safety in the home with ADLs after recent stroke and right side weakness, treat cognitive changes d/t stroke, cognitive testing   SLP: 1w1 for assessment of swallow and cognitve concerns after recent stroke  MSW: 1m1 for services available within the community and other resources    Medication Clarification:  1. Metformin 500mg once daily. Patient's medication bottle from [facility] at discharge states 1 tablet twice daily as needed with meals. What should patient do with this medication QD or BID PRN?  2. voltaren gel 1%. Patient does not have this in home.  3. insulin novolog/humalog 70-30 pen. Patient does not have this at home. Was not on d/c from [facility] center medication list, but is on PCP f/u medication list. If patient to be on this medication?  4. Patient will need order for all medications to be sent to pharmacy for refill, [facility] and [facility] in [place].    Thank you.

## 2021-06-10 NOTE — TELEPHONE ENCOUNTER
This will have to wait for Dr. Randle. According to message below, patient had not been prescribed medication yet but it was on discharge summary for patient to take. We will clarify with Dr. Randle on Monday.

## 2021-06-10 NOTE — PATIENT INSTRUCTIONS - HE
1. You have a neurology appointment at [facility], with Dr. Solomon Yu, August 31, 2020 at 10.30 am. Tel: [phone], in [place] on [facility] [address].

## 2021-06-10 NOTE — TELEPHONE ENCOUNTER
Medications requested in original message were never sent in for patient. Please send these into the pharmacy.  Sherry Bolivar CMA ............... 10:06 AM, 08/21/20

## 2021-06-10 NOTE — TELEPHONE ENCOUNTER
Tara is complaining of symptoms of indigestion including fullness and belching after eating. Requesting a suggestion of an over the counter antacid or prescription medication that she can try to alleviate her symptoms. Please advise.    Ok to respond here.    Thank you,  Jose Culp RN

## 2021-06-10 NOTE — PROGRESS NOTES
Assessment/Plan:        Problem List Items Addressed This Visit        ENT/CARD/PULM/ENDO Problems    Acute left PCA stroke (H) - Primary, she has follow-up with Dr. Solomon Duarte August 31 at 10:30 AM.  Physical therapy, Occupational Therapy and speech therapy were ordered to be done at home.  Patient was admitted to hospital July 10-July 17, 2020.  MRI showed severe multifocal areas of stenosis within the proximal intradural vertebral arteries and basilar artery.  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  Large area of restricted diffusion involving the medial aspect of the left temporal and occipital lobes with a small area in the left pattern and compatible with areas of ischemic change.  She presented with acute tingling sensation on the right-hand side.  She was unable to have TPA due to uncontrolled blood pressure.  Neurology has recommended dual antiplatelet therapy indefinitely.  She has a right-sided visual field defect.      Other Visit Diagnoses     Type 2 diabetes mellitus without complication, without long-term current use of insulin (H), metformin was increased from 500 mg day 2 to 500 mg twice a day July 23, 2020.  HbA1c noted to be 7% during admission.  Patient was on subcutaneous insulin, while in the hospital, but this was discontinued in the rehab.  Her brother has diabetes, and he knows how to measure sugar, so he has been trying to help lacy.  The patient and her brother did receive some diabetic education.        Relevant Medications    metFORMIN (FORTAMET) 500 MG (OSM) 24 hr tablet    Secondary hypertension, controlled.        Relevant Medications    lisinopriL (PRINIVIL,ZESTRIL) 40 MG tablet    hydrALAZINE (APRESOLINE) 10 MG tablet               Patient does appear depressed.  She was unable to answer questions, also did not appear to want to answer questions.  She is currently on Zoloft 100 mg daily.    Patient was  admitted to [facility] with acute left-sided PCA stroke.  Admission July 10, 2020 and discharge July 17, 2020.  Albumin low at 3.2 on discharge.  MR showed severe multifocal areas of stenosis within the proximal intradural vertebral arteries and basilar artery [also seen on CTA], aneurysm or dilatation of the cavernous segment of the left internal carotid artery measuring 9 mm in diameter.  Large area of restricted diffusion involving the medial aspect of the left temporal and occipital lobes with a small area in the left putamen and compatible with areas of ischemic change.     She developed sudden onset tingling sensation on the right side while being at work at around 3:30 PM, subsequently developed nausea and emesis, then was unable to recall her address, phone number and was acting very disoriented.  On arrival to the ER patient was noted to have right visual field cut.  Stroke code was called, however TPA was not given due to uncontrolled BP.   CTA head showed left P2 occlusion which was not amenable for endovascular intervention.  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  Neurology recommends DAPT indefinitely given severe intracranial atherosclerotic disease.      Diffuse intracranial atherosclerotic disease in proximal basilar artery and left posterior cerebral artery distally.    Noted TTE unremarkable.  No arrhythmias.  Goal blood pressure 130/80.    Hypertension, amlodipine 10 mg daily, hydralazine was increased to 25 mg 3 times a day, lisinopril 20 mg daily and metoprolol 37.5 mg daily.    Cerebral angiogram showed mild  fusiform dilatation of the right and left ICA with diffuse intracranial atherosclerotic disease most notably 70% narrowing of the proximal basilar artery.  She was discharged with aspirin 81 mg daily, clopidogrel 75 mg daily.  Plan is for indefinite use of these.    New diagnosis of diabetes mellitus during this admission.  She was discharged with metformin 500 mg daily with breakfast.    7/17/2019   Basic Metabolic Panel   Result Value Ref Range    Sodium 139 136 - 145 mmol/L    Potassium 3.7 3.5 - 5.0 mmol/L    Chloride 108 (H) 98 - 107 mmol/L    CO2 22 22 - 31 mmol/L    Anion Gap, Calculation 9 5 - 18 mmol/L    Glucose 127 (H) 70 - 125 mg/dL    Calcium 8.9 8.5 - 10.5 mg/dL    BUN 12 8 - 22 mg/dL    Creatinine 0.67 0.60 - 1.10 mg/dL    GFR MDRD Af Amer >60 >60 mL/min/1.73m2    GFR MDRD Non Af Amer >60 >60 mL/min/1.73m2       July 17, 2020: White cell count 9, hemoglobin 12.9, platelet count 276.          Subjective:    Patient ID: Tara Odell is a 59 y.o. female.    HPI   I am unable to get any history from the patient regarding symptoms.  She is confused, unable to answer things, and appears distressed.  Her mother who is come with her, and usually is booked often by the patient, she is her caretaker, and is also not able to tell me much.    I reviewed paperwork that the patient had with her that she did not understand, and did not realize that this was useful.  She will be seeing Dr. Solomon Duarte August 31 at 10:30 AM, [facility] neurology, telephone #[phone].    She was admitted at [facility] and and sent to [facility] (from July 17-August 3, 2020].  She lives with her mother, and her brother.  I am concerned about the level of care she might need at home, and that her mother is not able to do this for her. She is supposed to have rehab at home as well, but no one has contacted them yet.    The patient has an early childhood degree.    The following portions of the  patient's history were reviewed and updated as appropriate:     She  has no past medical history on file.     She does not have any pertinent problems on file.   Patient Active Problem List    Diagnosis Date Noted     VBI (vertebrobasilar insufficiency) 07/12/2020     Acute CVA (cerebrovascular accident) (H) 07/10/2020     Acute left PCA stroke (H) 07/10/2020       She  has a past surgical history that includes IR Carotid Angiogram (7/14/2020).     Her family history includes Heart disease in her father and mother.     She  reports that she has never smoked. She has never used smokeless tobacco. She reports that she does not drink alcohol or use drugs.  Current Outpatient Medications   Medication Sig Dispense Refill     amLODIPine (NORVASC) 10 MG tablet Take 1 tablet (10 mg total) by mouth daily. 30 tablet 0     aspirin 81 mg chewable tablet Chew 1 tablet (81 mg total) daily.  0     atorvastatin (LIPITOR) 40 MG tablet Take 1 tablet (40 mg total) by mouth at bedtime. 30 tablet 0     clopidogreL (PLAVIX) 75 mg tablet Take 1 tablet (75 mg total) by mouth daily. 30 tablet 0     lisinopriL (PRINIVIL,ZESTRIL) 40 MG tablet Take 1 tablet (40 mg total) by mouth daily. 30 tablet 3     metFORMIN (FORTAMET) 500 MG (OSM) 24 hr tablet Take 1 tablet (500 mg total) by mouth 2 (two) times a day with meals. 60 tablet 3     sertraline (ZOLOFT) 100 MG tablet Take 100 mg by mouth.       acetaminophen (TYLENOL) 325 MG tablet Take 650 mg by mouth every 4 (four) hours as needed for fever or pain. Indications: fever, pain       hydrALAZINE (APRESOLINE) 10 MG tablet Take 1 tablet (10 mg total) by mouth 3 (three) times a day. 90 tablet 3     polyethylene glycol (MIRALAX) 17 gram packet Take 17 g by mouth daily as needed (constipation). Indications: constipation       No current facility-administered medications for this visit.    .    Review of Systems   Patient is not able to tell me anything, is not able to tell me if she has weakness or  "numbness or pain.  She appears distressed.        /52 (Patient Site: Right Arm, Patient Position: Sitting, Cuff Size: Adult Large)   Pulse (!) 59   Ht 5' 4\" (1.626 m)   Wt 174 lb 3.2 oz (79 kg)   SpO2 98%   BMI 29.90 kg/m      Objective:    Physical Exam   Constitutional: She appears distressed.   She appears distressed.  She has weakness on the right-hand side, she has truncal weakness.  She has a walker, which she does not have with her in the clinic room.  She is sitting in a wheelchair.  She has marked difficulty walking and getting off the examination bed.  She needed help.    Tearful.   HENT:   Mouth/Throat: No oropharyngeal exudate.   Eyes: Pupils are equal, round, and reactive to light. Right eye exhibits no discharge. Left eye exhibits no discharge. No scleral icterus.   The pupils are equal and reactive to light.  However she has a right temporal and left nasal homonymous hemianopia.    Neck: No thyromegaly present.   Cardiovascular: Normal rate and regular rhythm.   No murmur heard.  Pulmonary/Chest: No respiratory distress. She has no wheezes. She has no rales.   Musculoskeletal:         General: No edema.      Comments: No swelling or warmth of her joints.   Lymphadenopathy:     She has no cervical adenopathy.   Neurological:   Patient is unable to answer my questions.  She is unable to tell me if she is weak, unable to tell me what happened to her.  She is aware of having had a stroke.  She has truncal weakness, weakness more on the right than on the left.    Unable to assess for orientation, patient distressed and not wishing to answer questions.   Skin: No rash noted.   Psychiatric:   Affect appears depressed.     Total Time: 45 . I spent 45 minutes face to face with the patient with more than 50% spent on counseling regarding her stroke, hypertension and diabetes mellitus.          "

## 2021-06-11 NOTE — TELEPHONE ENCOUNTER
Prior Authorization Request  Who s requesting:  Pharmacy  Pharmacy Name and Location: [facility] #[id number]  Medication Name: metFORMIN (FORTAMET) 500 MG (OSM) 24 hr tablet   Insurance Plan: [place] Department of [employer]  Insurance Member ID Number:  [id number]  CoverMyMeds Key: N/A  Informed patient that prior authorizations can take up to 10 business days for response:   NA  Okay to leave a detailed message: No

## 2021-06-11 NOTE — TELEPHONE ENCOUNTER
Please sign for Dr. Randle.  Sherry Bolivar [facility] ............... 4:34 PM, 09/29/20

## 2021-06-11 NOTE — TELEPHONE ENCOUNTER
This writer is requesting a follow up order/visit to go over resources and help pt coordinate care.   1 SW order in 14 days.      Thank You!    Carmelina

## 2021-06-11 NOTE — TELEPHONE ENCOUNTER
PT HE is asking for verbal ok for continued PT 2w2,1w1 to work on strengthening, gait/transfer training, and balance training.  You can respond to this inbasket with the ok or call and leave a message at [phone].  Thank you

## 2021-06-11 NOTE — TELEPHONE ENCOUNTER
Spoke with patient and gave her the message below.     Pharmacy contacted and they reported that there are only 2 different doctors that can fill these for her due to the restricted program that she is on. Dr. Martinez was the name of one of the doctors. Patient will need to call her insurance and speak with her plan. She will need to get Dr. Randle approved for her plan.    Patient seems a bit confused. I did offer to speak with her daughter but she declined.  Sherry Bolivar CMA ............... 1:31 PM, 09/04/20

## 2021-06-11 NOTE — TELEPHONE ENCOUNTER
Who is calling:  Patient  Reason for Call:  Pt calling to check status of medication. Writer noted confirmed receipts and advised Pt to contact pharmacy.  Pt states they will not fill medication, writer contacted pharmacy on file and spoke with Rolando who stated 3 medications were available and 1 is on back order.  Writer attempted to contact Pt X 3 times with no answer, left VM.  Date of last appointment with primary care: N/A  Okay to leave a detailed message: No

## 2021-06-11 NOTE — TELEPHONE ENCOUNTER
Dr. Randle placed a referral on Friday for pt. to speak with Care MTM for help with MTM of post-stroke, and disability resources. Pt was on a restricted provider list upon arriving to her appt on Friday. Per [facility] pharmacy, pt is on Medicaid. Dr. Randle is qualified for Medicare, but not Medicaid.

## 2021-06-11 NOTE — TELEPHONE ENCOUNTER
Question following Office Visit  When did you see your provider: 9/2/2020  What is your question: [facility]  is calling from the patients home to stated This patient can not get her medications because Edwina Randle MD is not an approved provider for medical assistance.  Caller stated Do not resend Rx but CALL the pharmacy with another provider that is approved to order the Rx.   Caller stated This patient will end up in the hospital if we do not get this handled today as she is without medication.    Okay to leave a detailed message: Yes

## 2021-06-11 NOTE — TELEPHONE ENCOUNTER
Urszula is calling from [facility] pharmacy, stating that pt's metformin fortamet is not available, they only have the regular metformin tablets, wondering if it is okay to switch it to regular. Writer paged on call provider and spoke with Dr Cyn Simms, and she stated it is okay to switch to regular metformin . Writer called back pharmacy and informed Carli it is okay to switch to regular metformin ER.    John Zaman RN    Reason for Disposition    Pharmacy calling with prescription questions and triager unable to answer question    Protocols used: MEDICATION QUESTION CALL-A-

## 2021-06-11 NOTE — TELEPHONE ENCOUNTER
Patient Returning Call  Reason for call:  Medications   Information relayed to patient: The writer read the following to patient per CMA:  writer contacted pharmacy on file and spoke with Rolando who stated 3 medications were available and 1 is on back order.  Writer attempted to contact Pt X 3 times with no answer, left VM.  Patient has additional questions:  No  If YES, what are your questions/concerns:  NA  Okay to leave a detailed message?: No call back needed

## 2021-06-11 NOTE — TELEPHONE ENCOUNTER
Thank you, I am okay with all the orders as below. I have signed her order for lancets.     Edwina Randle MD

## 2021-06-11 NOTE — PROGRESS NOTES
Clinic Care Coordination Contact  Care Team Conversations     CCC Referral received for SW.  Patient currently working with home care SW for care conference.  CCC will monitor chart and determine next steps.    Referral deferred for 1 week at this time.

## 2021-06-11 NOTE — TELEPHONE ENCOUNTER
I agree with follow up visit with  to go over resources and help the patient to coordinate care. Thank you.    Edwina Randle MD

## 2021-06-11 NOTE — TELEPHONE ENCOUNTER
Medication Question or Clarification  Who is calling: [facility]   What medication are you calling about (include dose and sig)?: generic lancets (FINGERSTIX LANCETS)  200 each  3  9/22/2020     Sig: Dispense brand per patient's insurance at pharmacy discretion.    Sent to pharmacy as: lancets (Fingerstix Lancets)    E-Prescribing Status: Receipt confirmed by pharmacy (9/22/2020  8:51 AM CDT)   Who prescribed the medication?: Edwina Randle MD    What is your question/concern?: Pharmacy states patient insurance won't  cover prescriber for state insurance saying that it needs a different prescriber.   Requested Pharmacy: [facility] # [id number]  Okay to leave a detailed message?: No

## 2021-06-11 NOTE — TELEPHONE ENCOUNTER
Writer is requesting and Social Work order for today to help pt with paper work from the [facility] and social security.    1  order for today.  Thank you ! Carmelina

## 2021-06-11 NOTE — TELEPHONE ENCOUNTER
I spoke with Courtney Muniz CNP who is approved per the pharmacy to prescribe medications for medical assistance, and she gave me the okay to call in medications for patient. Pharmacy is filling medications for patient. Patient notified.    Sherry Bolivar CMA ............... 4:30 PM, 09/09/20

## 2021-06-11 NOTE — TELEPHONE ENCOUNTER
"Who is calling:  The patient   Reason for Call:  The patient states she went to  her medications at [facility] and was informed per [facility] pharmacy cannot fill the prescriptions unless the patient pays for out of pocket. Writer called [facility] to clarify, the pharmacy informed  writer that  is not contracted with the medical assistance plan the patient has.  The patient states she would like this \" fixed\" today as for she is out of her medications.  Date of last appointment with primary care:   Okay to leave a detailed message: Yes      "

## 2021-06-11 NOTE — TELEPHONE ENCOUNTER
I have signed for the accucheck strips jeovany, but this may need to be signed by Jolene as I am not certified with [place] yet.     Edwina Randle

## 2021-06-11 NOTE — TELEPHONE ENCOUNTER
I will send this prescription to Courtney who is certified with MA, there is an issue with my MA certification at this time.     Edwina Randle MD

## 2021-06-11 NOTE — TELEPHONE ENCOUNTER
"FROM: [facility] RN    Please send Rx refill for Zofran ODT po every 8 hours PRN.     Pt still c/o Nausea \"off and on\" through out the day. Decreased appetite and is burping frequently. No emesis today but she states yesterday she had a greater sensation for emesis.     Any other recommendations appreciated.     Thank-you,   Bushra Concepcion, RN  [facility]  [phone]  "

## 2021-06-11 NOTE — TELEPHONE ENCOUNTER
"FROM: [facility] RN     Dr. Randle.     Request orders for:     1.) Pt has no more supply of Metformin 500mg, Her pharmacy stated they are waiting for a prior authorization.    Pt's BG this , Yesterday 171, day before 155    2.) Pt reports having emesis 3x since 4AM.     3.) VS stable, pt denied H/A, denied pain. Pt reports numbness and tingling to RUE and RLE is increased today and \"has never felt this way\".     Writer advised pt to be evaluated in [facility] d/t change in numbness & tingling and continued ememsis and offered to call for ambulance transport. Writer's brother will be transporting pt to  ED.     Thank-you,   Bushra Concepcion RN  [facility]  [phone]  "

## 2021-06-11 NOTE — TELEPHONE ENCOUNTER
-Orders for SN assess/treatment to help with medication management in home.     -Please reply to this encounter or call with verbal orders, thanks.    Nelida Bush SLP  [phone]

## 2021-06-11 NOTE — TELEPHONE ENCOUNTER
Verbal orders for ongoing speech therapy in the home.     2w5 for cognitive linguistic skills.     Thanks.

## 2021-06-11 NOTE — TELEPHONE ENCOUNTER
Tiffanie for additional services for home care , for disability and long-term care planning.    Edwina Randle MD

## 2021-06-11 NOTE — TELEPHONE ENCOUNTER
Bg Randle,     Patient is out of Blood sugar strips and will need RX called in for Accu chek Helene.   Please review and advised.     LOUIS Ferrer Case Manager

## 2021-06-11 NOTE — TELEPHONE ENCOUNTER
Prescriptions were taken care of in another encounter.  Sherry Bolivar CMA ............... 4:57 PM, 09/09/20

## 2021-06-11 NOTE — TELEPHONE ENCOUNTER
Mom & pt calling  Pt has been vomiting off & on since ER  Sept 3  Pt  is taking at least 1-2 meals a day  Nausea off & on  Has significant wt loss since CVA  VSS today T98.0, Po2  99, HR 72, /90, pt has not taken her meds yet & will take now  Tingling in shoulder to mid forarm in R arm & down R leg since CVA but worse today  Hx of CVA 6/2020  Has significant wt loss since CVA  BP as been running 150/72 and lower this week  Call placed to PCP team , spoke with Dr Concepcion, recommendation is to go to ED now, call to pt to inform  She will leave now for [facility] ED, her family will transport  Magdalene Ferrell RN  [place] Nurse Advisor    Reason for Disposition    Tingling (e.g., pins and needles) of the face, arm or leg on one side of the body, that is  present now    Additional Information    Negative: Difficult to awaken or acting confused (e.g., disoriented, slurred speech)    Negative: New neurologic deficit that is present NOW, sudden onset of ANY of the following: * Weakness of the face, arm, or leg on one side of the body * Numbness of the face, arm, or leg on one side of the body * Loss of speech or garbled speech    Negative: Sounds like a life-threatening emergency to the triager    Negative: Confusion, disorientation, or hallucinations is the main symptom    Negative: Dizziness is the main symptom    Negative: Followed a head injury within last 3 days    Negative: Headache (with neurologic deficit)    Negative: Unable to urinate (or only a few drops) and bladder feels very full    Negative: Loss of control of bowel or bladder (i.e., incontinence) of new onset    Negative: Back pain with numbness (loss of sensation) in groin or rectal area    Negative: Patient sounds very sick or weak to the triager    Negative: Clayton palsy suspected (i.e., weakness only one side of the face, developing over hours to days, no other symptoms)    Negative: Neurologic deficit of gradual onset, ANY of the following: * Weakness of  the face, arm, or leg on one side of the body * Numbness of the face, arm, or leg on one side of the body * Loss of speech or garbled speech    Negative: Neurologic deficit that was brief (now gone), ANY of the following: * Weakness of the face, arm, or leg on one side of the body * Numbness of the face, arm, or leg on one side of the body * Loss of speech or garbled speech    Protocols used: NEUROLOGIC DEFICIT-A-[place]

## 2021-06-11 NOTE — PROGRESS NOTES
Community Health Worker called and left a message for the patient.  If the patient is returning my call, please transfer the patient to Carmelina at ext. [phone].   Patient has been mailed a unreachable letter and was provided with CHW contact information if they are interested in accessing Clinic Care Coordination.  Order for Care Management has been closed, no further outreach will be done at this time and patient can be re-referred.       Clinic Care Coordination Contact  [facility]/Voicemail       Clinical Data: Care Coordinator Outreach  Outreach attempted x 1. CHW attempted to contact patient today to discuss recent [facility] referral- Unable to reach patient- Voicemail box is  Full   Care Coordinator will try to reach patient again in 3-5 business days.  9/21/2020    Per chart review [facility] is [facility]. Concepcion recommendations per her symptoms  Patient is receiving home care services

## 2021-06-11 NOTE — TELEPHONE ENCOUNTER
Tiffanie for ongoing speech therapy at home as described below for cognitive linguistic skills.    Edwina Randle MD

## 2021-06-11 NOTE — TELEPHONE ENCOUNTER
Medication Question or Clarification  Who is calling: Pharmacy   What medication are you calling about (include dose and sig)?: generic lancets (FINGERSTIX LANCETS)  200 each  3  9/24/2020     Sig: Dispense brand per patient's insurance at pharmacy discretion.    Sent to pharmacy as: lancets (Fingerstix Lancets)    Who prescribed the medication?: Edwina Randle MD  What is your question/concern?: Pharmacy is requesting for frequency of how patient will be using the medications and days supply limitations. Please advise .  Requested Pharmacy: [facility] # [id number]  Okay to leave a detailed message?: No

## 2021-06-11 NOTE — TELEPHONE ENCOUNTER
Order pended again.  Sherry Bolivar [facility] ............... 4:48 PM, 09/24/20

## 2021-06-11 NOTE — TELEPHONE ENCOUNTER
Medication Question or Clarification  Who is calling: Mother is calling for the patient.  Patient came on line and gave this one time permission to discuss with her Mother.  What medication are you calling about (include dose and sig)?:   metFORMIN (FORTAMET) 500 MG (OSM) 24 hr tablet  180 tablet  1  9/2/2020      Sig - Route: Take 1 tablet (500 mg total) by mouth 2 (two) times a day with meals. - Oral     Sent to pharmacy as: metFORMIN  mg tablet,extended release 24hr (FORTAMET)     E-Prescribing Status: Receipt confirmed by pharmacy (9/2/2020  2:18 PM CDT)         Who prescribed the medication?: Edwina Randle MD    What is your question/concern?: Pharmacy is not recognizing the  newprovider, can not fill, please assist in this being completed.  Requested [facility]acy: [facility] # [id number]  Okay to leave a detailed message?: Yes

## 2021-06-11 NOTE — TELEPHONE ENCOUNTER
PTA HE Meera     Had a fall last week.in her home   Lives with mother. , stepped off ramp.  Hit side of head , left side , on a TV console.  Was in the ED last week, WW.   .Follow-Ups needed per Dr. Hopkins ER.     1 Schedule an appointment with Edwina Randle MD (Internal Medicine) in 2 days (9/5/2020)   2 Go to [facility] Emergency Department (Emergency Medicine); If symptoms worsen    Per PTA, home care, patient did not do a F/U visit with Dr. Randle.    Stroke patient . Right side was effected.  Symptoms following the fall are    She did hit her head.   CT without contrast. Done in ER.  No temp.   Calling because;   Now vomiting. For past 3 days, vomitted   Once each day. Not eating.  Not nauseated  Today, but yesterday she was burping  Appearing a little more confused/ could not remember the PTA .  Mild headache, stomach ache, no chest pain    Advised to go to ER , WW per follow up note from ED visit.    Catalina Holt RN  [facility] Triage/refill nurse        Reason for Disposition    MILD to MODERATE vomiting (e.g., 1-5 times/day) and lasts > 48 hours (2 days)    Additional Information    Negative: Shock suspected (e.g., cold/pale/clammy skin, too weak to stand, low BP, rapid pulse)    Negative: Difficult to awaken or acting confused (e.g., disoriented, slurred speech)    Negative: Sounds like a life-threatening emergency to the triager    Negative: Vomiting occurs only while coughing    Negative: Pregnant < 20 Weeks and nausea/vomiting began in early pregnancy (i.e., 4-8 weeks pregnant)    Negative: Chest pain    Negative: Headache is main symptom    Negative: SEVERE vomiting (e.g., 6 or more times/day)    Negative: MODERATE vomiting (e.g., 3 - 5 times/day) and age > 60    Negative: Vomiting contains bile (green color)    Negative: Vomiting red blood or black (coffee ground) material    Negative: Insulin-dependent diabetes and glucose > 240 mg/dL (13 mmol/L)    Negative: Recent  head injury (within 3 days)    Negative: Recent abdominal injury (within 7 days)    Negative: Drinking very little and has signs of dehydration (e.g., no urine > 12 hours, very dry mouth, very lightheaded)    Negative: Constant abdominal pain lasting > 2 hours    Negative: High-risk adult (e.g., brain tumor, V-P shunt, hernia)    Negative: Severe pain in one eye    Negative: Patient sounds very sick or weak to the triager    Negative: Vomiting and abdomen looks much more swollen than usual    Negative: Fever > 103 F (39.4 C)    Negative: Fever > 101 F (38.3 C) and over 60 years of age    Negative: Fever > 100.0 F (37.8 C) and has a weak immune system (e.g., HIV positive, cancer chemo, organ transplant, splenectomy, chronic steroids)    Negative: Fever > 100.0 F (37.8 C) and bedridden (e.g., nursing home patient, stroke, chronic illness, recovering from surgery)    Negative: Taking any of the following medications: digoxin (Lanoxin), lithium, theophylline, phenytoin (Dilantin)    Negative: SEVERE headache and vomiting    Protocols used: VOMITING-A-OH

## 2021-06-11 NOTE — TELEPHONE ENCOUNTER
FROM: [facility] RN      Inna,    Requesting Skilled nurse visit orders 2x/week for 1 week then 1x a week for 2 weeks for med. Management and med. Set up and coordination of care. Pt is unable to set up own meds safely.     Please respond with Ok for orders here.     Thank-you,   Bushra Concepcion RN  [facility]  [phone]

## 2021-06-11 NOTE — TELEPHONE ENCOUNTER
Okay for  order for help with paper work from the [facility] and social security.     Thank you.    Edwina Randle

## 2021-06-11 NOTE — PROGRESS NOTES
LAST ALCOCER VISIT  Any other disciplines in that you are aware of PT, ST, SN   Non coverage completed (y/n) No   Any equipment recommended/ordered Walker tray   HEP given and level of assist needed UE HEP, Sup for particiaption   Cognitive levelSLUMS:   score 8/30, score indicates deficits in  attention, immediate recall, orientation, delayed recall with interference, numeric calucation and registration, memory, visual spatial with execut  shikha function and executive function plus extrapolation.   ADL level of assist Mod I with dsg, Sup with bathing   IADL level of assist. Brother provides assistance.   Any barriers related to progress (what are they if yes) cognitive decline, decreased motivation at times with some times of decreased interactions   Brief summary of progress Pt has made progress towards goals. Pt has declined to participate in IADL tasks during ALCOCER visits  .  Pt. reports Mother/Brother are able to assist with IADLs. Pt. reports occasional compliance with R hand coordination and UE strengthening HEPs. Pt encouraged to use walker tray for increased I and safety transporting items.Pt. will benefit from OTR assessment to determine if pt is appropriate for discharge or further OT intervention.

## 2021-06-11 NOTE — PROGRESS NOTES
Assessment/Plan:        Problem List Items Addressed This Visit        ENT/CARD/PULM/ENDO Problems    Acute left PCA stroke, patient is to remain on dual antiplatelet therapy, due to this and due to intracranial atherosclerosis.  Patient continues to have significant issues with visual field loss and she is unable to read and write.  (H)    Relevant Medications    aspirin 81 mg chewable tablet    clopidogreL (PLAVIX) 75 mg tablet    atorvastatin (LIPITOR) 40 MG tablet      Other Visit Diagnoses     Secondary hypertension, blood pressure is controlled, and is at goal 120/78.  Continue with amlodipine 10 mg, hydralazine 10 mg 3 times a day, and lisinopril 40 mg daily.        Relevant Medications    lisinopriL (PRINIVIL,ZESTRIL) 40 MG tablet    amLODIPine (NORVASC) 10 MG tablet    hydrALAZINE (APRESOLINE) 10 MG tablet    Type 2 diabetes mellitus without complication, without long-term current use of insulin (H), and asked the patient to bring her Accu-Chek readings with to the clinic, her brother is helping her with this, she is on metformin 500 mg twice a day.  HbA1c while in hospital July 2020, 70%.        Relevant Orders    LDL Cholesterol, Direct (Completed)              Subjective:    Patient ID: Tara Odell is a 59 y.o. female.    HPI   I saw the patient 8/5/2020:   Acute left PCA stroke (H) - Primary, she had follow-up with Dr. Solomon Duarte August 31 at 10:30 AM.  Physical therapy, Occupational Therapy and speech therapy were ordered to be done at home.  Patient was admitted to hospital July 10-July 17, 2020.  MRI showed severe multifocal areas of stenosis within the proximal intradural vertebral arteries and basilar artery.  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  Large area of restricted diffusion involving the medial aspect of the left temporal and occipital lobes with a small area in the left pattern and compatible with areas of  "ischemic change.  She presented with acute tingling sensation on the right-hand side.  She was unable to have TPA due to uncontrolled blood pressure.  Neurology has recommended dual antiplatelet therapy indefinitely.  She has a right-sided visual field defect.    Type 2 diabetes mellitus without complication, without long-term current use of insulin (H), metformin was increased from 500 mg day 2 to 500 mg twice a day July 23, 2020.  HbA1c 7% during admission.  Patient was on subcutaneous insulin, while in the hospital, but this was discontinued in rehab.          She is not taking Amlodipine and atorvastatin. She is only on aspirin and Plavix. Today 9/2 Zoloft iis not on her list.    Patient does appear depressed.  She was unable to answer questions, also did not appear to want to answer questions.     Patient was seen by neurologist at [facility] [place] August 31, 2020: Solomon Duarte MD. my review at that visit: Acute left PCA stroke, continue with Plavix and statin.  No significant improvement in her right visual field cut, and patient was quite distraught as she is not able to read.  She will antiplatelet therapy and high intensity statin with a goal LDL of less than 70.  Blood pressure goal 120-130/80.  She also has intracranial atherosclerosis.     [facility] Home care visit 8/8/2020:  \"Patient was seen for a start of care today after hospitalization and TCU stay with diagnosis of CVA, Requesting the following orders:  RN: 1w1, 2w2, 1w2 for cardiac and BP assess, DM and BG assess and education, sx management, education on disease process related to stroke, HTN and DM  PT: 1w1 for strengthening and gait training  OT: 1w1 for safety in the home with ADLs after recent stroke and right side weakness, treat cognitive changes d/t stroke, cognitive testing   SLP: 1w1 for assessment of swallow and cognitve concerns after recent stroke  MSW: 1m1 for services available within the community and other " "resources.\"     Home care nurse sets up her medications in a pillbox each week.  She has a walker if she needs.  Sugars at home have been 147, 140.  Patient is no longer on Zoloft.  I have asked her to let us know if she needs that.  This is not on our list,    The following portions of the patient's history were reviewed and updated as appropriate: allergies, current medications, past family history, past medical history, past social history, past surgical history and problem list.    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 Speech therapy are following at her home.    Psychiatric/Behavioral:        Patient doesn't feel she is depressed but acknowledges her concern and frustration as to her inability to write with this stroke.   Patient also complains of stiffness in her shoulder, and pins-and-needles in her right forearm.        /72   Pulse 67   Wt 170 lb 6.4 oz (77.3 kg)   BMI 29.25 kg/m      Objective:    Physical Exam   Constitutional: No distress.   Neurological:   Alert, and able to answer my questions today. Much more interactive with me. She needs more time to process things.     Total Time: 30 . I spent 30 minutes face to face with the patient with more than 50% spent on counseling regarding stroke, hypertension, diabetes mellitus.          "

## 2021-06-11 NOTE — TELEPHONE ENCOUNTER
FROM: [facility] RN      Inna,     Pt was seen in home today by skilled nursing for after hospital f/u and med. Set. Up.     Pt missed several noon doses of Hydralazine. Pt moves meds around in med. Planner after has been set up by home care. Will continue to assess and teach med. Compliance.     Requesting for following orders:     1.Skilled nursing visits: 1x a week for 1 week. Then 2x/week for 3 weeks with emphasis on : Assess & Teach Med. Management/Med. Set up; GI/ status; Neuro status & coordination of care.    2. 3 PRN Nurse visits for med. Issues and other health assessement    3. Pt needs Rx Refill for Metformin 500mg BID     4. Pt needs an order for a new Lancet to check BG, hers is not working.     Please respond with OK for orders.     Thank-you,   Bushra Concepcion RN  [facility]  [phone]

## 2021-06-11 NOTE — TELEPHONE ENCOUNTER
This writer is asking for [facility]  order to assist pt in getting connected with disability linkage and [facility] services.      Order requested   1 sw order in the next 30 days.    Thanks!

## 2021-06-11 NOTE — TELEPHONE ENCOUNTER
Medication Question or Clarification  Who is calling: Patient   What medication are you calling about (include dose and sig)?: amLODIPine (NORVASC) 10 MG tablet  90 tablet  1  9/2/2020     Sig - Route: Take 1 tablet (10 mg total) by mouth daily. - Oral    Sent to pharmacy as: amLODIPine 10 mg tablet (NORVASC)    E-Prescribing Status: Receipt confirmed by pharmacy (9/2/2020  2:18 PM CDT)    aspirin 81 mg chewable tablet  90 tablet  1  9/2/2020     Sig - Route: Chew 1 tablet (81 mg total) daily. - Oral    atorvastatin (LIPITOR) 40 MG tablet  90 tablet  1  9/2/2020     Sig - Route: Take 1 tablet (40 mg total) by mouth at bedtime. - Oral  clopidogreL (PLAVIX) 75 mg tablet  90 tablet  1  9/2/2020     Sig - Route: Take 1 tablet (75 mg total) by mouth daily. - Oral    hydrALAZINE (APRESOLINE) 10 MG tablet  270 tablet  1  9/2/2020     Sig - Route: Take 1 tablet (10 mg total) by mouth 3 (three) times a day. - Oral    Sent to pharmacy as: hydrALAZINE 10 mg tablet (APRESOLINE)   lisinopriL (PRINIVIL,ZESTRIL) 40 MG tablet  30 tablet  3   Who prescribed the medication?: Edwina Randle MD  What is your question/concern?: Patient states above medications are prescribed by EDWINA Oliva . PCP is not qualified for medicare need to be prescribed by certified doctor to cover the medications .  Requested Pharmacy: [facility] # [id number]  Okay to leave a detailed message?: No

## 2021-06-11 NOTE — TELEPHONE ENCOUNTER
"Home Care has been requesting insurance authorization for home care services from patient's insurance. Per the insurance company, Dr. Randle is not \"credentialed\" correctly to sign home care orders.     [facility] needs victorino BRANNON to co-sign for patient's home care orders to allow us to bill insurance correctly.     Please respond to this telephone encounter with the co-signing MD.     Thank you very much.   "

## 2021-06-11 NOTE — TELEPHONE ENCOUNTER
Please advise insurance is asking if provider is willing to switch patient to covered generic Metformin XR 500mg (non-osmotic version)?    Fortamet or Osmotic Metformin XR 500mg is not covered under plan and will be much more expensive for patient (sometimes copay is over $1000).     If provider is ok with generic Metformin XR 500mg 24hr taking 2 tablets daily the pharmacy will just need a new Rx.     If patient is needing the osmotic version (fortamet) of Metformin XR 500mg please provide reasoning and route back to PA team to complete PA.

## 2021-06-11 NOTE — TELEPHONE ENCOUNTER
Medication Question or Clarification  Who is calling: Pharmacy  What medication are you calling about (include dose and sig)?:   metFORMIN (FORTAMET) 500 MG (OSM) 24 hr tablet  180 tablet  1  9/23/2020      Sig - Route: Take 1 tablet (500 mg total) by mouth 2 (two) times a day with meals. - Oral     Sent to pharmacy as: metFORMIN  mg tablet,extended release 24hr (FORTAMET)     E-Prescribing Status: Receipt confirmed by pharmacy (9/23/2020  1:32 PM CDT)         Who prescribed the medication?: Edwina Randle MD    What is your question/concern?: The OSM was ordered, if really wants this then a prio authorization would be needed.  Otherwise please call back to authorize the Metformin ER , which is what the patient has been on in the past.  Requested [facility]acy: [facility] #[id number]  Okay to leave a detailed message?: No

## 2021-06-12 NOTE — TELEPHONE ENCOUNTER
Triage call:    Pt calling. States she has received 2 calls from this number but no message was left.   Rn unable to find who had called but in last OV patient to follow up in 2 weeks.  Pt was transferred to schedule follow up appointment.      RN apologized for being unable to find who had called earlier.     Lilli Nielsen RN 10/29/20 4:07 PM  [facility] Nurse Advisor      COVID 19 Nurse Triage Plan/Patient Instructions    Please be aware that novel coronavirus (COVID-19) may be circulating in the community. If you develop symptoms such as fever, cough, or SOB or if you have concerns about the presence of another infection including coronavirus (COVID-19), please contact your health care provider or visit [email or web address].     Disposition/Instructions    Home care recommended. Follow home care protocol based instructions.    Thank you for taking steps to prevent the spread of this virus.  o Limit your contact with others.  o Wear a simple mask to cover your cough.  o Wash your hands well and often.    Resources    [facility]: About COVID-19: [email or web address]    CDC: What to Do If You're Sick: www.cdc.gov/coronavirus/2019-ncov/about/steps-when-sick.html    CDC: Ending Home Isolation: www.cdc.gov/coronavirus/2019-ncov/hcp/disposition-in-home-patients.html     CDC: Caring for Someone: www.cdc.gov/coronavirus/2019-ncov/if-you-are-sick/care-for-someone.html     [facility]: Interim Guidance for Hospital Discharge to Home: www.health.[facility].mn.us/diseases/coronavirus/hcp/hospdischarge.pdf    [facility] clinical trials (COVID-19 research studies): clinicalaffairs.[facility]/n-clinical-trials     Below are the COVID-19 hotlines at the [facility] ([facility]). Interpreters are available.   o For health questions: Call 384-642-8499 or 1-175.498.5796 (7 a.m. to 7 p.m.)  o For questions about schools and childcare: Call 816-723-3626 or 1-484.919.5192 (7 a.m. to 7 p.m.)      Additional Information    [1] Follow-up call to recent contact AND [2] information only call, no triage required    Negative: [1] Caller is not with the adult (patient) AND [2] probable NON-URGENT symptoms    Negative: Question about upcoming scheduled test, no triage required and triager able to answer question    Negative: General information question, no triage required and triager able to answer question    Negative: Health Information question, no triage required and triager able to answer question    Negative: Requesting regular office appointment    Negative: [1] Caller requesting NON-URGENT health information AND [2] PCP's office is the best resource    Negative: RN needs further essential information from caller in order to complete triage    Negative: [1] Caller is not with the adult (patient) AND [2] reporting urgent symptoms    Negative: Lab result questions    Negative: Medication questions    Negative: Caller can't be reached by phone    Negative: Caller has already spoken to PCP or another triager    Protocols used: INFORMATION ONLY CALL-A-

## 2021-06-12 NOTE — PATIENT INSTRUCTIONS - HE
Continue Zofran as needed for nausea. Try to wean off of this medication if continuing to feel well.     You received your flu shot today.  Is normal for injection site.  Observe the next 24 to 48 hours.  Ice as needed topically to help with pain control.    Consider IcyHot or Biofreeze, you can get this over-the-counter rubbed into the right shoulder area as needed for pain control.  Also consider deep tissue massage, heat 15 minutes 4 times per day to that right shoulder area.    Follow up with Dr. Randle in about a month for a recheck, before then if anything changes.

## 2021-06-12 NOTE — PROGRESS NOTES
Clinic Note    Assessment:     Assessment and Plan:  1. Acute left PCA stroke (H): 07/10/20. She continues to work on strengthening with home care, PT,OT, ST.     2. Non-intractable vomiting with nausea, unspecified vomiting type: Has subsided. She is using Zofran PRN. She is eating and drinking. Normal labs at ER, will not recheck today.   - ondansetron (ZOFRAN-ODT) 4 MG disintegrating tablet; Take 1 tablet (4 mg total) by mouth every 8 (eight) hours as needed for nausea.  Dispense: 12 tablet; Refill: 0    3. Pain in the muscles: Right side, upper thoracic spine area, radiates into shoulder. On exam today she has muscle inflammation and tenderness to palpation. Discussed home supportive measures as this is likely musculoskeletal in nature.  -Heat 15 minutes four times per day.  -Tylenol as needed for pain control.  -Consider a deep tissue massage.  -Icy hot or Biofreeze topically to area as needed for pain control.  -Follow up if symptoms worsen or do not improve.     4. Need for immunization against influenza: Flu shot given today.   - Influenza, Recombinant, Inj, Quadrivalent, PF, 18+YRS    5. Type 2 diabetes mellitus without complication, without long-term current use of insulin (H): Diabetes refills ordered. Stable.   - blood glucose test strips; Test one time daily. One touch strips.  Dispense: 100 strip; Refill: 11  - blood glucose meter (GLUCOMETER); Test one time daily. One touch Meter.  Dispense: 1 each; Refill: 0  - generic lancets (FINGERSTIX LANCETS); Test one time daily. One touch.  Dispense: 100 each; Refill: 11       Patient Instructions   Continue Zofran as needed for nausea. Try to wean off of this medication if continuing to feel well.     You received your flu shot today.  Is normal for injection site.  Observe the next 24 to 48 hours.  Ice as needed topically to help with pain control.    Consider IcyHot or Biofreeze, you can get this over-the-counter rubbed into the right shoulder area as  needed for pain control.  Also consider deep tissue massage, heat 15 minutes 4 times per day to that right shoulder area.    Follow up with Dr. Randle in about a month for a recheck, before then if anything changes.     Return in about 1 month (around 11/5/2020) for Dr. Randle. .         Subjective:      Tara Odell is a 59 y.o. female presents today for post ER discharge follow-up.    Her Mom Gabriela presents with her today.     She was seen in the [facility] emergency room on 9/24/2020 for nausea and vomiting. She reports starting on her Zofran at home, and this has helped her significantly.    She is status post acute left PCA stroke on 07/10/20. She has been working with speech therapy, PT, and OT in her home to help regain her strength.    She reports her appetite is better than prior, she is not having trouble keeping fluids down. The last time she vomited was on 09/27/20.     She reports some issues with right upper back and shoulder pain, this has not changed since her stroke.    She would like a flu shot today.    She denies a fever, chills, cough, shortness of breath, chest pain, chest pressure, nausea, vomiting, abdominal pain, urinary, or bowel symptoms.    Her last bowel movement was yesterday AM, normal consistency and amount.     The following portions of the patient's history were reviewed and updated as appropriate.    Review of Systems:    Review is otherwise negative except for what is mentioned above.     Social Hx:    Social History     Tobacco Use   Smoking Status Never Smoker   Smokeless Tobacco Never Used         Objective:     Vitals:    10/05/20 1329   BP: 98/60   Patient Site: Right Arm   Patient Position: Sitting   Cuff Size: Adult Large   Pulse: 63   SpO2: 99%   Weight: 163 lb 9.6 oz (74.2 kg)       Exam:  General: No apparent distress. Calm. Alert and Oriented X3. Pt behavior is appropriate.  Head:Atraumatic. Normocephalic.  Eyes: PERRA, No discharge. No strabismus. No  nystagmus.  Lungs: Clear to auscultation, normal respiratory effort and rate.   Heart: Regular rate and rhythm, no murmurs, gallops, or rubs. Capillary refill <2 seconds. No edema.   Abdomen: Soft, no palpable masses. No tenderness with palpation noted. Bowel sounds active in all quadrants.  Musculoskeletal: Good ROM with extremities. Right thoracic spine area, muscle inflammation and tension felt to exam. Normal ROM of shoulder.   Neurologic: Interactive, alert.  Skin: Warm, dry. Normal skin turgor.       Patient Active Problem List   Diagnosis     Acute CVA (cerebrovascular accident) (H)     Acute left PCA stroke (H)     VBI (vertebrobasilar insufficiency)     Current Outpatient Medications   Medication Sig Dispense Refill     amLODIPine (NORVASC) 10 MG tablet Take 1 tablet (10 mg total) by mouth daily. 90 tablet 1     aspirin 81 mg chewable tablet Chew 1 tablet (81 mg total) daily. 90 tablet 1     atorvastatin (LIPITOR) 40 MG tablet Take 1 tablet (40 mg total) by mouth at bedtime. 90 tablet 1     blood glucose test (ACCU-CHEK MERCY) strips Test daily before one meal a day, at different meal times 100 strip 3     clopidogreL (PLAVIX) 75 mg tablet Take 1 tablet (75 mg total) by mouth daily. 90 tablet 1     generic lancets (FINGERSTIX LANCETS) Test one times daily. Dispense brand per patient's insurance at pharmacy discretion. 100 each 3     hydrALAZINE (APRESOLINE) 10 MG tablet Take 1 tablet (10 mg total) by mouth 3 (three) times a day. 270 tablet 1     lisinopriL (PRINIVIL,ZESTRIL) 40 MG tablet Take 1 tablet (40 mg total) by mouth daily. 30 tablet 3     metFORMIN (FORTAMET) 500 MG (OSM) 24 hr tablet Take 1 tablet (500 mg total) by mouth 2 (two) times a day with meals. 180 tablet 1     ondansetron (ZOFRAN-ODT) 4 MG disintegrating tablet Take 1 tablet (4 mg total) by mouth every 8 (eight) hours as needed for nausea. 12 tablet 0     blood glucose meter (GLUCOMETER) Test one time daily. One touch Meter. 1 each 0      blood glucose test strips Test one time daily. One touch strips. 100 strip 11     generic lancets (FINGERSTIX LANCETS) Test one time daily. One touch. 100 each 11     No current facility-administered medications for this visit.        Courtney Muniz, Adult-Geriatric Nurse Practitioner  [facility] - Internal Medicine Team     10/5/2020

## 2021-06-12 NOTE — TELEPHONE ENCOUNTER
Writer is requesting 1 more Social Work order to assist with a FCC tomorrow at 1pm to go over HC recommendation and resources to assist caring for pt.      Thank you, Carmelina ARAMBULA

## 2021-06-12 NOTE — PROGRESS NOTES
Assessment/Plan:        Problem List Items Addressed This Visit        ENT/CARD/PULM/ENDO Problems    Acute left PCA stroke (H), admission July 10-July 17, 2020.  She had sensory changes on the right-hand side.  Right-sided visual field defect.  She has language, speech cognitive deficits [still receiving speech therapy], balance disorder.  Patient has a walker with her today, with front wheels, no wheels on the back.  Gait has improved.  Home care setting up her medications for her.  For her stroke she is on aspirin 81 mg, and Plavix 75 mg [plan is for is to continue indefinitely].- Primary    Diabetes mellitus, type 2 (H), latest HbA1c was in July, which was 7%.  She is on Metformin 500 mg 24-hour tablet, twice a day.  She is on lisinopril 40 mg daily, atorvastatin 80 mg daily.  When she returns in 2 weeks, I will plan on repeating an HbA1c and LDL.   July 11, 2020.    Benign essential hypertension, currently controlled, patient is on amlodipine 10 mg daily, lisinopril 40 mg daily, hydralazine 10 mg 3 times a day.      Other Visit Diagnoses     Current moderate episode of major depressive disorder without prior episode (H), patient is quite depressed, after having had the stroke, and not been able to read and write like she did before.  I discussed this with her, and she agrees to referral to psychology.  I have started citalopram 10 mg daily.     Benefits and risks of this medication were discussed with the patient.  I will see her in 2 weeks.  I will repeat a basic metabolic panel at that time.    Relevant Medications    citalopram (CELEXA) 10 MG tablet    Other Relevant Orders    AMB REFERRAL TO MENTAL HEALTH AND ADDICTION  - Adult (18+); Outpatient Treatment; Individual/Couples/Family/Group Therapy/Health Psychology; [facility]; [facility]; We will contact you to schedule the appointment or please c...              Subjective:    Patient ID: Tara BEAN Jose R is a 59 y.o.  female.    HPI   The patient appears much better than when I last saw her.  She is able to understand our conversation, and communicate.  She has depressive symptoms, because of what she has lost.  She is an avid writer, and loves to read.  She was in the process of sending a book to palpation, however she cannot even understand what she has written up after her stroke.  When therapy discussed with her and her mom, that she had plateaued in terms of improving, this made her very depressed.  I discussed with her, that I have seen a marked improvement, and progression since I first met her.  I believe that the brain takes time to heal, and other parts of the brain that were not damaged by her stroke, can take over the functions that have been lost.  Patient is interested in being seen by a psychologist and a referral is placed today.    Because of her depression, and how difficult this has been for her, I decided to start her on a small dose of citalopram.  Possible side effects, such as GI side effects, and headache have been discussed with the patient.  Patient has no suicidal thoughts.  I discussed the importance of continuing to take this medication each day.  Her medication is being set up for her by home care skilled nursing.  I discussed with her mother, after the clinic visit, with the patient's permission, Tara was present, that I had started citalopram, and referred her to psychology.    Patient has been able to do more than before.  She has not brought her diabetic meter with her today.  She says she has a different 1 compared to her mom and her brother.  She would prefer to have the same 1 [same make], if another one is ordered for her.    She has lost a lot of weight, she says that she has lost 30 pounds.  She looks a lot slimmer than she was.  Her current BMI is 27.64, weight is 161 pounds.  Her weight was 174 pounds August 5, 2020.  Patient has also had a number of admissions or visits to the ED with  nausea September 24, September 18, September 10.    BMP September 24, 2020: Sodium 139, potassium 4, chloride 102, BUN 16, creatinine 0.96.  White cell count 6.9, hemoglobin 13.2, MCV 85, platelet count 332.  Lipase normal.  Alkaline phosphatase 46, total bilirubin 0.7.  Liver enzymes normal.  She had a CT scan abdomen and pelvis September 18, 2020: This showed minimal wall thickening of the esophagus with esophagitis, mild fatty change of the liver, no bowel obstruction, normal appendix, minimal wall thickening of the urinary bladder is thought to be due to underdistention.  Urinalysis did not appear to be urinary tract infection.  There was a large amount of bilirubin in the urine September 24, 2020.  Liver enzymes however were normal, including bilirubin, September 10, September 18 and September 24.    The following portions of the patient's history were reviewed and updated as appropriate: allergies, current medications, past family history, past medical history, past social history, past surgical history and problem list.    Review of Systems   Patient has symptoms of hopelessness and helplessness and loss of pleasure in her life. Feels down, and depressed, no suicidal thoughts.Walking is better. She says she uses the walker when she is away from the house but can get around without it in the house.    Rest of the review of systems is negative.        /62   Pulse 78   Resp 16   Wt 161 lb (73 kg)   SpO2 97%   BMI 27.64 kg/m      Objective:    Physical Exam  She has lost weight,, she looks then.  She is tearful talking about her ability to read and write.  She overall has a much better understanding of what I am saying to her, and is able to tell me things about herself.  She admits that she has decreased memory about certain things.  Chest is clear, no crackles, no wheezes.  Heart sounds are normal, regular, no murmurs.  No peripheral edema.  Able to get around with her walker, which is a front  wheeled walker, no wheels on the back.

## 2021-06-12 NOTE — PROGRESS NOTES
Please see result note dated today.    Please also let the patient know that I am now credentialed through [facility] and she can see me for future office visits and I can sign for her medications as well.     I'm increasing her Lipitor today for her elevated LDL which is 132 and the goal is less than 100 with her diabetes.     Edwina Randle

## 2021-06-12 NOTE — TELEPHONE ENCOUNTER
We sent a prescription for strips to the pharmacy already for patient on 10/5/2020. I will call and speak to patient.  Sherry Bolivar CMA ............... 8:02 AM, 10/16/20

## 2021-06-12 NOTE — TELEPHONE ENCOUNTER
Writer is requesting 1 more order to helppt with paper work.    Writer to see pt tomorrow at 1030 am.      Carmelina ARAMBULA

## 2021-06-12 NOTE — TELEPHONE ENCOUNTER
Request for orders    Who is requesting orders: Home care Speech Therapist    What orders are being requested: Continued speech therapy 2w4, to address severe alexia, reading, writing.     Where to send orders to: Ok to reply to this encounter, thanks.

## 2021-06-12 NOTE — TELEPHONE ENCOUNTER
Patient Returning Call  Reason for call: Returning phone call.  Information relayed to patient:  Below message relayed to patient.  Patient has additional questions:  No  If YES, what are your questions/concerns:  No additional questions at this time.  Okay to leave a detailed message?: No call back needed

## 2021-06-12 NOTE — TELEPHONE ENCOUNTER
Order being requested:  On-going homecare SLP orders, 2w3, to address cognitive linguistic deficits and severe alexia.     Ok to reply to this encounter, thanks.     Nelida Bush SLP  [phone]

## 2021-06-12 NOTE — TELEPHONE ENCOUNTER
SW is requesting  In addition to last order a late order for 9/17 when writer did a visit with ptMiah Solitario for the late order request and thank you for the order for tomorrow.    lola ARAMBULA

## 2021-06-12 NOTE — TELEPHONE ENCOUNTER
FROM: [facility] RN    Please send new RX for Zofran ODT 4mg tabs [facility].     Thank-you,   Bushra Concepcion, RN  [facility]  [phone]

## 2021-06-12 NOTE — PROGRESS NOTES
Please send the patient a letter and also call the patient with the following comments (as she can't read due to her stroke): Your bad cholesterol, the LDL is higher than it should be at 132. The goal for you with your diabetes is less than 100.  I will start a medication called Lipitor, at 20 mg each day which will help to bring your LDL down and it is also good for diabetics. Thank you. Edwina Randle MD

## 2021-06-12 NOTE — TELEPHONE ENCOUNTER
FROM: [facility] RN    Requesting Refill for Glucose Test Strips.     Pt has none in the home and has been using brother's glucometer to check BG.     Thank-you,   Bushra Concepcion, RN  [facility]  [phone]

## 2021-06-13 ENCOUNTER — COMMUNICATION - HEALTHEAST (OUTPATIENT)
Dept: INTERNAL MEDICINE | Facility: CLINIC | Age: 61
End: 2021-06-13

## 2021-06-13 DIAGNOSIS — I15.9 SECONDARY HYPERTENSION: ICD-10-CM

## 2021-06-13 NOTE — PROGRESS NOTES
Assessment/Plan:        Problem List Items Addressed This Visit        ENT/CARD/PULM/ENDO Problems    Acute left PCA stroke (H), has had slow improvement in her physical ability over time.  She does continue to have a speech deficit [writing and reading], she has her okay, who is sitting up medication for her.  Her brother has been helping with Accu-Chek readings for diabetes mellitus.  Patient requested different Accu-Chek machine, as she knows how to use this 1 [which is the same as her brothers].  She has come to the clinic without her walker today.- Primary    Relevant Orders    LDL Cholesterol, Direct (Completed)    Ambulatory referral to Ophthalmology    Diabetes mellitus, type 2, new blood glucometer requested, as patient says she knows how to use this machine, and does not know how to use her current machine.  HbA1c 7% July 2020.  HbA1c done today November 11, 6%.  Patient is on Metformin 500 mg twice a day.  With her loss of weight, we can decrease this to once a day.  Patient has blurred vision, and a referral was placed to ophthalmology (H)    Relevant Medications    blood glucose meter (GLUCOMETER)    Other Relevant Orders    LDL Cholesterol, Direct (Completed)    Glycosylated Hemoglobin A1c (Completed)    Ambulatory referral to Ophthalmology    Benign essential hypertension, blood pressure is on the low side today, at 98/58.  Patient is on carvedilol 25 mg twice a day [discontinued August 8, 2020], and lisinopril 40 mg daily.  If her blood pressure remains on the low side, would decrease her lisinopril.      Other Visit Diagnoses     Loss of weight        Visual disturbance        Relevant Orders    Ambulatory referral to Ophthalmology    Current moderate episode of major depressive disorder without prior episode, patient has had improvement in her symptoms, however she does continue to be depressed.  I have increased her Escitalopram to 20 mg daily today.  (H)        Relevant Medications    citalopram  (CELEXA) 20 MG tablet              Subjective:    Patient ID: Tara Odell is a 59 y.o. female.    HPI   Acute left-sided PCA stroke, with admission July 10-July 17, 2020. This resulted in sensory changes on the right-hand side, right-sided visual field defect, language and speech cognitive deficits.  Patient has been receiving speech therapy, through home care.  Balance disorder, and she uses a walker [front-wheeled].  Gait has improved over time.  Home care setting up her medications for her.  She is on aspirin 81 mg, Plavix 75 mg [both to be continued indefinitely].    Diabetes mellitus, HbA1c 7% July 2020.  Metformin 24-hour tablet twice a day.  Lipitor 80 mg daily.    Hypertension, lisinopril 40 mg daily.  Hydralazine 10 mg 3 times a day, amlodipine 10 mg daily.    Major depression, which is probably related to her stroke, emotionally, and physically.  Started on citalopram 10 mg daily October 28, 2020, and we will see how she is doing today.  I also put a referral to psychology last time.  Looks like she has an appointment November 25 with behavioral health.    She had lost weight when I saw her 10/28 and today her BP is low at 98/58. She has lost a further two pounds over the past 3 weeks, weighted 161 (10/2/2020).  Her weight was 174 pounds August 5, 2020.  I discussed this with the patient today.  She tells me that she does not like much there is to eat at the house. Her son   lives in [place].  She sees her daughter once a week.    Patient tells me that she does not like the diabetic machine that she has, and asks for contour next EZ monitor. She prefers this one, is able to work with it, where she can't get the other one to work.     The following portions of the patient's history were reviewed and updated as appropriate:   She  has no past medical history on file.  She does not have any pertinent problems on file.  Current Outpatient Medications   Medication Sig Dispense Refill      amLODIPine (NORVASC) 10 MG tablet Take 1 tablet (10 mg total) by mouth daily. 90 tablet 1     aspirin 81 mg chewable tablet Chew 1 tablet (81 mg total) daily. 90 tablet 1     atorvastatin (LIPITOR) 80 MG tablet Take 1 tablet (80 mg total) by mouth at bedtime. 90 tablet 3     blood glucose meter (GLUCOMETER) Test one time daily.  Contour Next EZ monitor. 1 each 0     citalopram (CELEXA) 20 MG tablet Take 1 tablet (20 mg total) by mouth every morning. 90 tablet 1     clopidogreL (PLAVIX) 75 mg tablet Take 1 tablet (75 mg total) by mouth daily. 90 tablet 1     generic lancets (FINGERSTIX LANCETS) Test one times daily. Dispense brand per patient's insurance at pharmacy discretion. 100 each 3     generic lancets (FINGERSTIX LANCETS) Test one time daily. One touch. 100 each 11     lisinopriL (PRINIVIL,ZESTRIL) 40 MG tablet Take 1 tablet (40 mg total) by mouth daily. 30 tablet 3     metFORMIN (FORTAMET) 500 MG (OSM) 24 hr tablet Take 1 tablet (500 mg total) by mouth 2 (two) times a day with meals. 180 tablet 1     ondansetron (ZOFRAN-ODT) 4 MG disintegrating tablet Take 1 tablet (4 mg total) by mouth every 8 (eight) hours as needed for nausea. 12 tablet 0     blood glucose test strips Use 1 each As Directed as needed. Dispense brand per patient's insurance at pharmacy discretion. Asking for a Contour Next EZ monitor. 100 strip 3     No current facility-administered medications for this visit.    .    Review of Systems  Improved symptoms of depression, but continues to be depressed.        Wt 159 lb (72.1 kg)   SpO2 99%   BMI 27.29 kg/m      Objective:    Physical Exam  More interactive today, and able to tell me her story.  She walked out of the clinic, without her walker, walking slowly.        Total Time:20 minutes . I spent 20 minutes face to face with the patient with more than 50% spent on counseling regarding depression and diabetes mellitus, BP and weight loss.

## 2021-06-13 NOTE — PROGRESS NOTES
Assessment/Plan:        Problem List Items Addressed This Visit        ENT/CARD/PULM/ENDO Problems    Acute CVA (cerebrovascular accident), with admission July 10-July 17, 2020.  She continues to have sensory changes on the right-hand side, and a right-sided visual field defect.  She has language and speech cognitive deficits.  She still cannot read or write.  Balance disorder has improved.  She is no longer using a walker.  She has not had any falls.  Blood pressure is controlled, perhaps a little on the low side.  Diabetes mellitus is better controlled.  LDL 72 November 11, 2020.  Patient is on Lipitor 80 mg daily.  (H)    Diabetes mellitus, type 2, patient has lost weight, and her HbA1c has decreased to 6% November 11, 2020.  I am decreasing her Metformin to 500 mg a day today.  (H) - Primary    Relevant Medications    metFORMIN (FORTAMET) 500 MG (OSM) 24 hr tablet    Other Relevant Orders    Microalbumin, Random Urine (Completed)    Benign essential hypertension, blood pressure is on the low side, patient is on amlodipine 10 mg daily, lisinopril 40 mg daily.  May decide to cut back on her blood pressure medications as well, in future.       Other    Major depressive disorder with single episode, in partial remission, patient is on citalopram 20 mg daily, and appears to be doing better with this.  We will plan to continue with current dosing.  (H)              Subjective:    Patient ID: Tara Odell is a 60 y.o. female.    HPI   Last visit 11/11/2020:      Patient was seen for a virtual behavioral health visit 12/9/2020.  Weight has stabilized, is 159 pounds now and 159 pounds last visit 11/11/2020.     Patient says she has been told that she will need cataract surgery (both eyes).     She has a home care nurse who is setting up her medications.     She continues to have a strange feeling on the right side of her body secondary to her stroke.     She is still using her brother's diabetic machine. She  is awaiting a glucometer strips.     Results for NEISHA BARKLEY (MRN [id number]) as of 12/11/2020 11:10   Ref. Range 11/11/2020 12:27   Direct LDL Latest Ref Range: <=129 mg/dl 72   Hemoglobin A1c Latest Ref Range: <=5.6 % 6.0 (H)     BP is on the lower side, ? Cut back on her meds, currently Amlodipine 10 mg and Lisinopril 40 mg daily.      The following portions of the patient's history were reviewed and updated as appropriate: allergies, current medications, past family history, past medical history, past social history, past surgical history and problem list.    Review of Systems   The rest of the review of systems is negative.         /60   Pulse 64   Wt 159 lb (72.1 kg)   SpO2 98%   BMI 27.29 kg/m      Objective:    Physical Exam   Foot Exam done: Decreased monofilament right side (foot) versus left foot. Probably related to stroke. No ulceration. No edema. Chest is clear, normal heart sounds, Affect is improved.

## 2021-06-13 NOTE — TELEPHONE ENCOUNTER
Refill Approved    Rx renewed per Medication Renewal Policy. Medication was last renewed on 11/11/20, last OV 11/11/20.    Mallory Abebe, [facility] Triage/Med Refill 11/27/2020     Requested Prescriptions   Pending Prescriptions Disp Refills     citalopram (CELEXA) 10 MG tablet [Pharmacy Med Name: CITALOPRAM 10MG TABLETS] 30 tablet 0     Sig: TAKE 1 TABLET(10 MG) BY MOUTH EVERY MORNING       [facility] Refill Protocol  Passed - 11/27/2020  3:15 AM        Passed - PCP or prescribing provider visit in last year     Last office visit with prescriber/PCP: 11/11/2020 Edwina Randle MD OR same dept: 11/11/2020 Edwina Randle MD OR same specialty: 11/11/2020 Edwina Randle MD  Last physical: Visit date not found Last MTM visit: Visit date not found   Next visit within 3 mo: Visit date not found  Next physical within 3 mo: Visit date not found  Prescriber OR PCP: Edwina Randle MD  Last diagnosis associated with med order: 1. Current moderate episode of major depressive disorder without prior episode (H)  - citalopram (CELEXA) 10 MG tablet [Pharmacy Med Name: CITALOPRAM 10MG TABLETS]; TAKE 1 TABLET(10 MG) BY MOUTH EVERY MORNING  Dispense: 30 tablet; Refill: 0    If protocol passes may refill for 12 months if within 3 months of last provider visit (or a total of 15 months).

## 2021-06-13 NOTE — PROGRESS NOTES
"Mental Health Psychotherapy Telephone Note    Patient: Tara Odell    : 1960 MRN: [id number]    Completed a 2 point identification verification: patient verbalized full name and date of birth.     Date: 2020  Start time: 203    Stop Time: 252   Session # 1 intake    The patient has been notified of the following:   \"We have found that certain health care needs can be provided without the need for a face to face visit.  This service lets us provide the care you need with a phone conversation.  I will have full access to your [facility] medical record during this entire phone call.   I will be taking notes for your medical record. Since this is like an office visit, we will bill your insurance company for this service.  There are potential benefits and risks of telephone visits (e.g. limits to patient confidentiality) that differ from in-person visits.?  Confidentiality still applies for telephone services, and nobody will record the visit.  It is important to be in a quiet, private space that is free of distractions (including cell phone or other devices) during the visit.?? If during the course of the call I believe a telephone visit is not appropriate, you will not be charged for this service\"  Consent has been obtained for this service by care team member: Yes, per verbal agreement   Session Type: Patient is participating in a telemedicine phone visit pending WO [employer] set up.      Those present for this session: Tara and this therapist      Chief Complaint   Patient presents with     Intake     Depression        Anxiety    New symptoms or complaints: None reported    Functional Impairment:   Personal: 4  Family: 3  Work: 4  Social:4          ASSESSMENT: Current Emotional / Mental Status (status of significant symptoms):              Risk status (Self / Other harm or suicidal ideation)              Patient denied personal safety concerns              Patient denies current or recent " suicidal ideation or behaviors.              Patient denies current or recent homicidal ideation or behaviors.              Patient denies current or recent self injurious behavior or ideation.              Patient denies other safety concerns.              Patient denies change in risk factors              Patient denies change in protective factors.                Recommended that patient call 911 or go to the local ED should there be a change in any of these risk factors.                Attitude:                                   Cooperative               Orientation:                             X3              Speech                          Rate / Production:       Normal/ Responsive Normal                           Volume:                       Normal               Mood:                                      Depressed  Normal              Thought Content:                    Clear               Thought Form:                        Coherent  Logical               Insight:                                     Good       Patient's impression of their current status: Patient states her pcp provide referral to therapy.  Patient reports she had a stroke July 2020.  Patient had undiagnosed high blood pressure.  Stroke occurred 6 months into starting a new job.  Tara is unable to read as a result of the stroke. She is receiving physical therapy and speech therapy. 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 She is in process of a third book.  Denied alcohol use. Had moved in with her mother and step father to take care of them just before she had the stroke.      Therapist impression of patients current state: The patient presented for an initial intake session with this provider. Patient is a  60 year old female. Patient was referred by her PCP, Dr. Ferguson. Patient presents reporting symptoms of depression and anxiety.  Patient recently suffered a stroke.  As a result of the  Stroke she is  unemployed.   The patient appeared cooperative and open-minded throughout the intake and easily engaged in dialogue. Therapist and patient verbally reviewed consent and privacy policy. Patient reported verbally understanding and provided verbal consent. The patient has not engaged in outpatient psychotherapy services in the community so there is no diagnostic assessment on file or available. The patient completed the following questionnaires for session today:  C-SSRS. PHQ-9, KENNA 7 and CAGE. These questionnaires will be used to inform diagnoses and will be uploaded to patient chart after standard DA is completed. Therapist and patient will further review patient's history during the next follow-up encounter in order to complete a standard diagnostic assessment. Goals for treatment will be established after the 2nd or 3rd follow-up session with this provider. The patient plans to follow-up with psychotropic medication management with her PCP. Patient did not request psychiatry services at intake.  Depressed mood with anxiety.      Type of psychotherapeutic technique provided: Insight oriented, Client centered and Motivational interviewing    Progress toward short term goals: Initial session    Progress as expected, Pt discussing concerns and coping strategies during tele-sessions.  Skills to manage anxiety discussed.     Review of long term goals:   Not done at today's visit  Treatment plan pending     Diagnosis:  1. Adjustment disorder with depressed mood        Plan and Follow up: Patient will consider goals for therapy.  telemed when MyChart is set up. Otherwise we will complete session in [facility].     Discharge Criteria/Planning: Other:  Patient prognosis to be determined.             I have reviewed the note as documented above.  This accurately captures the substance of my conversation with the patient.  As the provider I attest to compliance with applicable laws and regulations related to telemedicine.  Nicolle  SUMA Pedro, [facility]   12/9/2020

## 2021-06-13 NOTE — PROGRESS NOTES
An HBA1C which tells us what your average sugar reading has been like has come back at 6% (this falls into the prediabetic range). This is better than it was, 7% in July 2020. Please continue with Metformin 500 mg twice a day. Please continue to monitor your accuchecks once a day before meals at different times. Please let me know if your accuchecks are less than 100.  Thank you. Edwina Randle MD

## 2021-06-13 NOTE — PATIENT INSTRUCTIONS - HE
Please let your home care nurse know that I have decreased your Metformin to 500 mg once a day instead of twice a day.

## 2021-06-13 NOTE — TELEPHONE ENCOUNTER
FROM:  HOME CARE RN      Hello,     Please send Rx for Contour Next EZ test strips to pt pharmacy. Pharmacy states they do not have a current RX.     Thank-you,     Bushra Concepcion, RN  [facility]  [phone]

## 2021-06-13 NOTE — TELEPHONE ENCOUNTER
I have ordered Contour next EZ previously, and have ordered these again, I hope it works.    Edwina Randle MD.

## 2021-06-13 NOTE — TELEPHONE ENCOUNTER
Bg Randle,     I saw patient today for med mgmt. Pateint reports her bg strips sent from pharmacy is not the correct one.  Patient will need Contour next EZ strips for glucometer in the home. NOT Accur check jeovany plus as ordered.     Please call in correct strips  Contour next EZ s for patient to her pharmacy. Patient getting nervous she is almost out.     LOUIS Ferrer Case Manager.

## 2021-06-14 NOTE — TELEPHONE ENCOUNTER
Refill Approved    Rx renewed per Medication Renewal Policy. Medication was last renewed on 9/2/20.    Magdalene Gutierrez, [facility] Triage/Med Refill 1/6/2021     Requested Prescriptions   Pending Prescriptions Disp Refills     aspirin 81 mg chewable tablet [Pharmacy Med Name: ASPIRIN 81MG CHEWABLE TABLETS] 90 tablet 1     Sig: CHEW AND SWALLOW 1 TABLET(81 MG) BY MOUTH DAILY       Aspirin/Dipyridamole Refill Protocol Passed - 1/5/2021  2:04 PM        Passed - PCP or prescribing provider visit in past 12 months       Last office visit with prescriber/PCP: 10/5/2020 Courtney Muniz CNP OR same dept: 12/11/2020 Edwina Randle MD OR same specialty: 12/11/2020 Edwina Randle MD  Last physical: Visit date not found Last MTM visit: Visit date not found    Next appt within 3 mo: Visit date not found Next physical within 3 mo: Visit date not found  Prescriber OR PCP: Courtney Muniz CNP  Last diagnosis associated with med order: 1. Acute left PCA stroke (H)  - aspirin 81 mg chewable tablet [Pharmacy Med Name: ASPIRIN 81MG CHEWABLE TABLETS]; CHEW AND SWALLOW 1 TABLET(81 MG) BY MOUTH DAILY  Dispense: 90 tablet; Refill: 1    If protocol passes may refill for 6 months if within 3 months of last provider visit (or a total of 9 months).

## 2021-06-14 NOTE — PROGRESS NOTES
"Mental Health Psychotherapy Telephone Note    Patient: Tara Odell    : 1960 MRN: [id number]    Completed a 2 point identification verification: patient verbalized full name and date of birth.     Date: 2020  Start time:    Stop Time: 250   Session # 2    The patient has been notified of the following:   \"We have found that certain health care needs can be provided without the need for a face to face visit.  This service lets us provide the care you need with a phone conversation.  I will have full access to your [facility] medical record during this entire phone call.   I will be taking notes for your medical record. Since this is like an office visit, we will bill your insurance company for this service.  There are potential benefits and risks of telephone visits (e.g. limits to patient confidentiality) that differ from in-person visits.?  Confidentiality still applies for telephone services, and nobody will record the visit.  It is important to be in a quiet, private space that is free of distractions (including cell phone or other devices) during the visit.?? If during the course of the call I believe a telephone visit is not appropriate, you will not be charged for this service\"  Consent has been obtained for this service by care team member: Yes, per verbal agreement   Session Type: Patient is participating in a telemedicine phone visit. Individual therapy    Those present for this session: Tara and this therapist       Chief Complaint   Patient presents with     Depression     New symptoms or complaints: Overwhelmed with health concerns    Functional Impairment:   Personal: 4  Family: 2  Work: 4  Social:4          ASSESSMENT: Current Emotional / Mental Status (status of significant symptoms):              Risk status (Self / Other harm or suicidal ideation)              Patient denies personal safety concerns              Patient denies current or recent suicidal ideation or " behaviors.              Patient denies current or recent homicidal ideation or behaviors.              Patient denies current or recent self injurious behavior or ideation.              Patient denies other safety concerns.              Patient denies change in risk factors               Patient denies change in protective factors                 Recommended that patient call 911 or go to the local ED should there be a change in any of these risk factors.                Attitude:                                   Cooperative               Orientation:                             X4              Speech                          Rate / Production:       Normal/ Responsive Normal                           Volume:                       Normal               Mood:                                      Depressed  Normal              Thought Content:                    Clear               Thought Form:                        Coherent  Logical               Insight:                                     Good       Patient's impression of their current status: I thought things were going good until I had the stroke.  Patient identified goals for therapy as be able to read and write again to complete book.  Decrease symptoms of depression.      Therapist impression of patients current state: Patient returns for second session. Diagnostic assessment is in process.  Patient has many health issues.  She had a full  Life she describes as a good thing.  The stoke in July 2020 resulted in total life change.  Patient does not have an income.  Discussed options for her. She states she does not qualify for benefits because she is listed as a beneficiary on her mothers life insurance.  She states she would be required to sign away her rights to the inheritance.   Will add grief issues to the treatment plan.      Type of psychotherapeutic technique provided: Motivational interviewing.    Progress toward short term goals: Progress as  expected, second session.  Patient has identified goals.   Progress as expected, Pt discussing concerns and coping strategies during tele-sessions.     Review of long term goals:   Treatment plan to be developed       Diagnosis:  1. Adjustment disorder with depressed mood          Plan and Follow up: Renew the MD order for speach therapy.  Consider other therapies or technology that will help patient complete writing.  Will employ CBT/ACT.    Discharge Criteria/Planning: Patient will continue with follow-up until therapy can be discontinued without return of signs and symptoms.    I have reviewed the note as documented above.  This accurately captures the substance of my conversation with the patient.    As the provider I attest to compliance with applicable laws and regulations related to telemedicine.  Nicolle Pedro, [facility]  12/28/2020

## 2021-06-14 NOTE — PROGRESS NOTES
Outpatient Mental Health Psychotherapy Treatment Plan    Name:  Lacy Odell  :  1960  MRN:  [id number]    Treatment Plan:  Initial Treatment Plan  Treatment Plan: Updated Treatment Plan Initial  Intake/initial treatment plan date: 2020  Benefit and risks and alternatives have been discussed:     Plan:      ? Depression    Goal:  Decrease average depression scores on the PHQ-9 from a    Strategies:    ?[x] Decrease social isolation.     [x] Increase involvement in meaningful activities     ?[x] Discuss sleep hygiene     ?[x] Explore thoughts and expectations about self and others     ?[x] Process grief (loss of significant person, independence, role, etc.)     ?[x] Assess for suicide risk     ?[] Implement physical activity routine (with physician approval)     [] Consider introduction of bibliotherapy and/or videos     [x] Continue compliance with medical treatment plan (or explore barriers)       ?    Degree to which this is a problem (1-4): 4  Degree to which goal is met (1-4)2  Date of Review: initial treatment plan      Functional Impairment: 1=Not at all/Rarely  2=Some days  3=Most Days  4=Every Day     Personal: 4  Family: 3  Social: 4  Work: 4    Diagnosis:      Strengths:  Very bright;  Has overcome obstacles in the past and is a survivor.  Limitations:  because of a stroke Lacy is unable to read.  Cultural Considerations: father  when /lacy was in young, take care of family.  Family involvement Yes/No, If no reason  Not at this time.     Anticipated intensity of services:  Every until stable then 1x/2weeka weeks    Estimated duration of treatment:  Up to 24 individual sessions  Necessity for frequency: This frequency is needed to establish therapeutic goals and for continuity of care in order to monitor progress.   Necessity for treatment: To address cognitive, behavioral, and/or emotional barriers in order to work toward goals and to improve quality of life.   Is this  treatment appropriate with minimal intrusion/restrictions: yes    Persons responsible for this plan:  ? [x] Patient ? [x] Provider ? [] Other:  __________________    Due to COVID Therapy is provided remotely. Patient verbally agrees and consents.    Provider: Nicolle Pedro  Date:  12/29/2020

## 2021-06-14 NOTE — TELEPHONE ENCOUNTER
Refill Approved    Rx renewed per Medication Renewal Policy. Medication was last renewed on 11/11/2020.    Benita Narvaez, [facility] Triage/Med Refill 2/2/2021     Requested Prescriptions   Pending Prescriptions Disp Refills     blood-glucose meter (CONTOUR NEXT EZ METER) Misc [Pharmacy Med Name: CONTOUR NEXT EZ BLOOD GLUCOSE KIT] 1 each 0     Sig: USE ONCE DAILY AS DIRECTED       Diabetic Supplies Refill Protocol Passed - 2/2/2021 11:33 AM        Passed - Visit with PCP or prescribing provider visit in last 6 months     Last office visit with prescriber/PCP: 12/11/2020 Edwina Randle MD OR same dept: 12/11/2020 Edwina Randle MD OR same specialty: 12/11/2020 Edwina Randle MD  Last physical: Visit date not found Last MTM visit: Visit date not found   Next visit within 3 mo: Visit date not found  Next physical within 3 mo: Visit date not found  Prescriber OR PCP: Edwina Randle MD  Last diagnosis associated with med order: 1. Type 2 diabetes mellitus without complication, without long-term current use of insulin (H)  - CONTOUR NEXT EZ METER Misc [Pharmacy Med Name: CONTOUR NEXT EZ BLOOD GLUCOSE KIT]; USE ONCE DAILY AS DIRECTED  Dispense: 1 each; Refill: 0    If protocol passes may refill for 12 months if within 3 months of last provider visit (or a total of 15 months).             Passed - A1C in last 6 months     Hemoglobin A1c   Date Value Ref Range Status   11/11/2020 6.0 (H) <=5.6 % Final     Comment:     Normal <5.7% Prediabete 5.7-6.4% Diabletes 6.5% or higher - adopted from ADA consensus guidelines

## 2021-06-14 NOTE — PROGRESS NOTES
"Mental Health Psychotherapy Telephone Note    Patient: Tara Odell    : 1960 MRN: [id number]    Completed a 2 point identification verification: patient verbalized full name and date of birth.     Date: 2020  Start time: 1003   Stop Time: 1050   Session #   The patient has been notified of the following:   \"We have found that certain health care needs can be provided without the need for a face to face visit.  This service lets us provide the care you need with a phone conversation.  I will have full access to your [facility] medical record during this entire phone call.   I will be taking notes for your medical record. Since this is like an office visit, we will bill your insurance company for this service.  There are potential benefits and risks of telephone visits (e.g. limits to patient confidentiality) that differ from in-person visits.?  Confidentiality still applies for telephone services, and nobody will record the visit.  It is important to be in a quiet, private space that is free of distractions (including cell phone or other devices) during the visit.?? If during the course of the call I believe a telephone visit is not appropriate, you will not be charged for this service\"  Consent has been obtained for this service by care team member: Yes, per verbal agreement   Session Type: Patient is participating in a telemedicine phone visit. Individual therapy.  Does not have telemed video technology     Those present for this session: Tara and this therapist       Chief Complaint   Patient presents with     Depression     New symptoms or complaints: Overwhelmed with health concerns    Functional Impairment:   Personal: 3  Family: 2  Work: 4  Social:3          ASSESSMENT: Current Emotional / Mental Status (status of significant symptoms):  No change since last visit on 20              Risk status (Self / Other harm or suicidal ideation)              Patient denies personal safety " concerns              Patient denies current or recent suicidal ideation or behaviors.              Patient denies current or recent homicidal ideation or behaviors.              Patient denies current or recent self injurious behavior or ideation.              Patient denies other safety concerns.              Patient denies change in risk factors               Patient denies change in protective factors                 Recommended that patient call 911 or go to the local ED should there be a change in any of these risk factors.                Attitude:                                   Cooperative               Orientation:                             X4              Speech                          Rate / Production:       Normal/ Responsive Normal                           Volume:                       Normal               Mood:                                      Depressed  Normal              Thought Content:                    Clear               Thought Form:                        Coherent  Logical               Insight:                                     Good       Patient's impression of their current status: Im proved mood.  Called unemployment and found out she qualifies for additional benefits. Talked to daughter and friend and feels supported.  Continues to work on storage area. Has requested a referral from primary for another program to help with speach. I feel hopeful.  Requested to decrease appointment to 1x/2weeks.     Therapist impression of patients current state:  Patient is in car in a parking lot for session.  Waiting for her daughter.  Is quite talkative. Difficult to get a word in.  Increased energy noted. 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  Tara is considering using a recorder to complete her book. Speech therapy to continue.    Type of psychotherapeutic technique provided: Motivational interviewing. CBT-identify and challenge  self defeating thoughts and beliefs. Focus on thoughts of helplessness and failure to follow through on tasks.     Progress toward short term goals: Progress as expected.  Tara has followed through on identifying self defeating thoughts that increase feeling of helplessness and inadequacy.   Patient has identified goals. Tara continues to discuss concerns and coping strategies during tele-sessions.     Review of long term goals:   Treatment completed. Tara agreed to goals and interventions.     Diagnosis:  1. Adjustment disorder with depressed mood        Plan and Follow up: Renew the MD order for speach therapy.  Follow through with ultenative therapies that will improve reading and writing skills and help patient complete writing.  Will employ CBT.    Discharge Criteria/Planning: Patient will continue with follow-up until therapy can be discontinued without return of signs and symptoms.    I have reviewed the note as documented above.  This accurately captures the substance of my conversation with the patient.    As the provider I attest to compliance with applicable laws and regulations related to telemedicine.  Nicolle Pedro, [facility]  12/29/2020
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
## 2021-06-14 NOTE — TELEPHONE ENCOUNTER
FROM:  HOME CARE RN    Hello,     Pt was seen today.     Requesting orders for 4 PRN Skilled Nurse visits for med. Issues, general health assessment, and [place] assessments.     Please respond here with OK for orders.     Thank-you     Bushra Concepcion RN  [facility]  [phone]

## 2021-06-15 ENCOUNTER — COMMUNICATION - HEALTHEAST (OUTPATIENT)
Dept: INTERNAL MEDICINE | Facility: CLINIC | Age: 61
End: 2021-06-15

## 2021-06-15 ENCOUNTER — OFFICE VISIT - HEALTHEAST (OUTPATIENT)
Dept: SPEECH THERAPY | Facility: REHABILITATION | Age: 61
End: 2021-06-15

## 2021-06-15 DIAGNOSIS — R48.8 AGRAPHIA: ICD-10-CM

## 2021-06-15 DIAGNOSIS — I15.9 SECONDARY HYPERTENSION: ICD-10-CM

## 2021-06-15 DIAGNOSIS — I69.320 APHASIA, POST-STROKE: ICD-10-CM

## 2021-06-15 NOTE — TELEPHONE ENCOUNTER
Social Work is requesting another  order to go an see pt with [employer] paper work on 3-.  [employer] to mail missing paper wk next week and writer will help pt complete.  Social Work to see pt 3-16 at 9am.      1 SW order in 14 days.    Carmelina ARAMBULA

## 2021-06-15 NOTE — TELEPHONE ENCOUNTER
Refill Approved    Rx renewed per Medication Renewal Policy. Medication was last renewed on 11/11/2-, last OV .12/11/20    Mallory Abebe, [facility] Triage/Med Refill 2/16/2021     Requested Prescriptions   Pending Prescriptions Disp Refills     citalopram (CELEXA) 20 MG tablet [Pharmacy Med Name: CITALOPRAM 20MG TABLETS] 90 tablet 1     Sig: TAKE 1 TABLET(20 MG) BY MOUTH EVERY MORNING       SSRI Refill Protocol  Passed - 2/16/2021 11:31 AM        Passed - PCP or prescribing provider visit in last year     Last office visit with prescriber/PCP: 12/11/2020 Edwina Randle MD OR same dept: 12/11/2020 Edwina Randle MD OR same specialty: 12/11/2020 Edwina Randle MD  Last physical: Visit date not found Last MTM visit: Visit date not found   Next visit within 3 mo: Visit date not found  Next physical within 3 mo: Visit date not found  Prescriber OR PCP: Edwina Randle MD  Last diagnosis associated with med order: 1. Current moderate episode of major depressive disorder without prior episode (H)  - citalopram (CELEXA) 20 MG tablet [Pharmacy Med Name: CITALOPRAM 20MG TABLETS]; TAKE 1 TABLET(20 MG) BY MOUTH EVERY MORNING  Dispense: 90 tablet; Refill: 1    If protocol passes may refill for 12 months if within 3 months of last provider visit (or a total of 15 months).

## 2021-06-15 NOTE — TELEPHONE ENCOUNTER
Requesting verbal okay for MSW eval for assistance in financial concerns and applying for disability.

## 2021-06-15 NOTE — TELEPHONE ENCOUNTER
Refill Request  Medication name:   Requested Prescriptions     Pending Prescriptions Disp Refills     aspirin 81 mg chewable tablet 90 tablet 3     Sig: CHEW AND SWALLOW 1 TABLET(81 MG) BY MOUTH DAILY     Who prescribed the medication: pcp  Last refill on medication: 1/5/21  [facility]ed [facility]  Last appointment with PCP: 12/11/20  Next appointment: Appointment scheduled for 3/12/21    Marjorie Lutz CMA

## 2021-06-15 NOTE — TELEPHONE ENCOUNTER
Refill Approved    Rx renewed per Medication Renewal Policy. Medication was last renewed on 9/2/20, last 12/11/20.    Mallory Abebe, [facility] Triage/Med Refill 2/16/2021     Requested Prescriptions   Pending Prescriptions Disp Refills     amLODIPine (NORVASC) 10 MG tablet [Pharmacy Med Name: AMLODIPINE BESYLATE 10MG TABLETS] 90 tablet 1     Sig: TAKE 1 TABLET(10 MG) BY MOUTH DAILY       Calcium-Channel Blockers Protocol Passed - 2/16/2021 11:31 AM        Passed - PCP or prescribing provider visit in past 12 months or next 3 months     Last office visit with prescriber/PCP: 10/5/2020 Courtney Muniz CNP OR same dept: 12/11/2020 Edwina Randle MD OR same specialty: 12/11/2020 Edwina Randle MD  Last physical: Visit date not found Last MTM visit: Visit date not found   Next visit within 3 mo: Visit date not found  Next physical within 3 mo: Visit date not found  Prescriber OR PCP: Courtney Muniz CNP  Last diagnosis associated with med order: 1. Secondary hypertension  - amLODIPine (NORVASC) 10 MG tablet [Pharmacy Med Name: AMLODIPINE BESYLATE 10MG TABLETS]; TAKE 1 TABLET(10 MG) BY MOUTH DAILY  Dispense: 90 tablet; Refill: 1    If protocol passes may refill for 12 months if within 3 months of last provider visit (or a total of 15 months).             Passed - Blood pressure filed in past 12 months     BP Readings from Last 1 Encounters:   02/16/21 112/62

## 2021-06-15 NOTE — TELEPHONE ENCOUNTER
RN cannot approve Refill Request    RN can NOT refill this medication medication not on med list. Last office visit: 10/5/2020 Courtney Muniz CNP Last Physical: Visit date not found Last MTM visit: Visit date not found Last visit same specialty: 12/11/2020 Edwina Randle MD.  Next visit within 3 mo: Visit date not found  Next physical within 3 mo: Visit date not found      Mallory Abebe, [facility] Triage/Med Refill 3/13/2021    Requested Prescriptions   Pending Prescriptions Disp Refills     hydrALAZINE (APRESOLINE) 10 MG tablet [Pharmacy Med Name: HYDRALAZINE 10 MG TABLETS (ORANGE)] 270 tablet 1     Sig: TAKE 1 TABLET(10 MG) BY MOUTH THREE TIMES DAILY       Clonidine/Hydralazine Refill Protocol Passed - 3/13/2021  3:15 AM        Passed - PCP or prescribing provider visit in past 12 months       Last office visit with prescriber/PCP: 10/5/2020 Courtney Muniz CNP OR same dept: 12/11/2020 Edwina Randle MD OR same specialty: 12/11/2020 Edwina Randle MD  Last physical: Visit date not found Last MTM visit: Visit date not found   Next visit within 3 mo: Visit date not found  Next physical within 3 mo: Visit date not found  Prescriber OR PCP: Courtney Muniz CNP  Last diagnosis associated with med order: 1. Secondary hypertension  - hydrALAZINE (APRESOLINE) 10 MG tablet [Pharmacy Med Name: HYDRALAZINE 10 MG TABLETS (ORANGE)]; TAKE 1 TABLET(10 MG) BY MOUTH THREE TIMES DAILY  Dispense: 270 tablet; Refill: 1    If protocol passes may refill for 12 months if within 3 months of last provider visit (or a total of 15 months).             Passed - Blood pressure filed in past 12 months     BP Readings from Last 1 Encounters:   03/02/21 110/60

## 2021-06-15 NOTE — TELEPHONE ENCOUNTER
Refill Request  Medication name:   Requested Prescriptions     Pending Prescriptions Disp Refills     metFORMIN (FORTAMET) 500 MG (OSM) 24 hr tablet 90 tablet 1     Sig: Take 1 tablet (500 mg total) by mouth daily with breakfast.     Who prescribed the medication: pcp  Last refill on medication: 12/8/2020  [facility]ed [facility]acy[facility]  Last appointment with PCP: 12/11/2020  Next appointment: Appointment scheduled for 3/16/2021    Marjorie Lutz, [facility]

## 2021-06-15 NOTE — PROGRESS NOTES
"Mental Health Psychotherapy Telephone Note    Patient: Tara Odell    : 1960 MRN: [id number]    Completed a 2 point identification verification: patient verbalized full name and date of birth.     Date: 2021 Start time: 1003   Stop Time: 1050   Session # 5  The patient has been notified of the following:   \"We have found that certain health care needs can be provided without the need for a face to face visit.  This service lets us provide the care you need with a phone conversation.  I will have full access to your [place] medical record during this entire phone call.   I will be taking notes for your medical record. Since this is like an office visit, we will bill your insurance company for this service.  There are potential benefits and risks of telephone visits (e.g. limits to patient confidentiality) that differ from in-person visits.?  Confidentiality still applies for telephone services, and nobody will record the visit.  It is important to be in a quiet, private space that is free of distractions (including cell phone or other devices) during the visit.?? If during the course of the call I believe a telephone visit is not appropriate, you will not be charged for this service\"  Consent has been obtained for this service by care team member: Yes, per verbal agreement   Session Type: Patient is participating in a telemedicine phone visit. Individual therapy.  Does not have telemed video technology     Those present for this session: Tara and this therapist       Chief Complaint   Patient presents with     Depression     New symptoms or complaints: Overwhelmed with health concerns    Functional Impairment:   Personal: 3  Family: 2  Work: 4  Social:3          ASSESSMENT: Current Emotional / Mental Status (status of significant symptoms):  No change since last visit on 20              Risk status (Self / Other harm or suicidal ideation)              Patient denies personal safety " concerns              Patient denies current or recent suicidal ideation or behaviors.              Patient denies current or recent homicidal ideation or behaviors.              Patient denies current or recent self injurious behavior or ideation.              Patient denies other safety concerns.              Patient denies change in risk factors               Patient denies change in protective factors                 Recommended that patient call 911 or go to the local ED should there be a change in any of these risk factors.                Attitude:                                   Cooperative               Orientation:                             X4              Speech                          Rate / Production:       Normal/ Responsive Normal                           Volume:                       Normal               Mood:                                      Depressed  Normal              Thought Content:                    Clear               Thought Form:                        Coherent  Logical               Insight:                                     Good       Patient's impression of their current status: Im proved mood.  As socialized with friends and family.  Mother is currently in the hospital having fallen twice.  She states she is frustrated with [facility] moving her mother to [facility] for test needed and only provided at [facility].  Has a dinner date with a beasley friend.  She will cancel however if mother is discharged today.She shared she is hesitant to says date. He has been a friend for 6 years. She states she is worried about her mother and knows at 84 with several serious health issues she is fragile.  Tara shared she is thinking about the day her mother passes away. She states she will have a tough time with it. She will need to help her brother age 65 to adjust and relocate.  Tara states the home they all share is to costly for them even if pooling funds.     Therapist impression of  patients current state:  Tara is not engaged this session. This can be because of her concern for her mother and fatigue with the events of the last week.  Is in anticipatory grief. Imagining the day her mother passes.  She is aware of her mothers fragile state.   Provided insight into date with friend.  The best relationships start as friendships. Will continue therapy at 1x/2weeks unless Tara reports increased symptoms.     Type of psychotherapeutic technique provided: Motivational interviewing. CBT-identify and challenge self defeating thoughts and beliefs. Focus on thoughts of helplessness and failure to follow through on tasks.     Progress toward short term goals: Progress as expected.  Tara has followed through on identifying self defeating thoughts that increase feeling of helplessness and inadequacy.  Tara continues to discuss concerns and coping strategies during tele-sessions. Has requested a new order for speech therapy.     Review of long term goals:   Treatment completed. Tara agreed to goals and interventions. Review on 3/29/2021     Diagnosis:  1. Adjustment disorder with depressed mood        Plan and Follow up: Follow through with ultenative therapies that will improve reading and writing skills and help patient complete writing.      Discharge Criteria/Planning: Patient will continue with follow-up until therapy can be discontinued without return of signs and symptoms.    I have reviewed the note as documented above.  This accurately captures the substance of my conversation with the patient.    As the provider I attest to compliance with applicable laws and regulations related to telemedicine.  Nicolle Pedro, [facility]  2/12/2021

## 2021-06-15 NOTE — PROGRESS NOTES
"Mental Health Psychotherapy Telephone Note    Patient: Tara Odell    : 1960 MRN: [id number]    Completed a 2 point identification verification: patient verbalized full name and date of birth.     Date: 2021 Start time: 3:02   Stop Time: 3:52   Session # 6  The patient has been notified of the following:   \"We have found that certain health care needs can be provided without the need for a face to face visit.  This service lets us provide the care you need with a phone conversation.  I will have full access to your [facility] medical record during this entire phone call.   I will be taking notes for your medical record. Since this is like an office visit, we will bill your insurance company for this service.  There are potential benefits and risks of telephone visits (e.g. limits to patient confidentiality) that differ from in-person visits.?  Confidentiality still applies for telephone services, and nobody will record the visit.  It is important to be in a quiet, private space that is free of distractions (including cell phone or other devices) during the visit.?? If during the course of the call I believe a telephone visit is not appropriate, you will not be charged for this service\"  Consent has been obtained for this service by care team member: Yes, per verbal agreement   Session Type: Patient is participating in a telemedicine phone visit. Individual therapy.  Does not have telemed video technology     Those present for this session: Tara and this therapist       Chief Complaint   Patient presents with     Depression     New symptoms or complaints: Overwhelmed with health concerns    Functional Impairment:   Personal: 3  Family: 2  Work: 4  Social:3          ASSESSMENT: Current Emotional / Mental Status (status of significant symptoms):  No change since last visit on 2021              Risk status (Self / Other harm or suicidal ideation)              Patient denies personal safety " concerns              Patient denies current or recent suicidal ideation or behaviors.              Patient denies current or recent homicidal ideation or behaviors.              Patient denies current or recent self injurious behavior or ideation.              Patient denies other safety concerns.              Patient denies change in risk factors               Patient denies change in protective factors                 Recommended that patient call 911 or go to the local ED should there be a change in any of these risk factors.                Attitude:                                   Cooperative               Orientation:                             X4              Speech                          Rate / Production:       Normal/ Responsive Normal                           Volume:                       Normal               Mood:                                      Depressed  Normal              Thought Content:                    Clear               Thought Form:                        Coherent  Logical               Insight:                                     Good       Patient's impression of their current status: Im proved mood.  As socialized with friends and family.  Mother is back home. Doing fairly well and is having many medical test. Tara discussed her thought some of these test or procedures may not be necessary for her mother at her age.   Mothers lfe insurance policy is a concern.  Tara believes she would have to sign away her inheritance to qualify for disability.  Tara shared her dinner date with an old male friend was enjoyable.  She states she is spending more time with him. Pt has requested a new order for speech therapy.    Therapist impression of patients current state:  More engaged this session. Mother remains quite fragile.  Tara sounded excited and energized in discussion about her male friend. Introduced mindfulness and will send a link to additional education and steps to practice  mindfulness.  Life insurance policy has come up several times in several sessions.  Over focused or concern about how it will be used and by who.  Discussed speech therapy and encourage Tara to follow through requesting new order form PCP.    Type of psychotherapeutic technique provided: Motivational interviewing. CBT-identify and challenge self defeating thoughts and beliefs. Focus on thoughts of helplessness and failure to follow through on tasks.       Progress toward short term goals: Progress as expected.  Tara has followed through on identifying self defeating thoughts that increase feeling of helplessness and inadequacy.  Tara continues to discuss concerns and coping strategies during tele-sessions.     Review of long term goals:   Treatment plan completed. Tara agreed to goals and interventions. Review on 3/29/2021     Diagnosis:  1. Adjustment disorder with depressed mood        Plan and Follow up: Follow through with ulternative therapies/skills or tools that will improve reading and writing skills and help patient complete writing.  Review materials provided on Mindfulness.  Practice mindfulness  To reduce depression and anxiety.    Discharge Criteria/Planning: Patient will continue with follow-up until therapy can be discontinued without return of signs and symptoms.    I have reviewed the note as documented above.  This accurately captures the substance of my conversation with the patient.    As the provider I attest to compliance with applicable laws and regulations related to telemedicine.  Nicolle Pedro, [facility]  2/26/2021

## 2021-06-16 ENCOUNTER — OFFICE VISIT - HEALTHEAST (OUTPATIENT)
Dept: INTERNAL MEDICINE | Facility: CLINIC | Age: 61
End: 2021-06-16

## 2021-06-16 DIAGNOSIS — H26.8 OTHER CATARACT, UNSPECIFIED LATERALITY: ICD-10-CM

## 2021-06-16 DIAGNOSIS — E11.9 TYPE 2 DIABETES MELLITUS WITHOUT COMPLICATION, WITHOUT LONG-TERM CURRENT USE OF INSULIN (H): ICD-10-CM

## 2021-06-16 DIAGNOSIS — E55.9 VITAMIN D DEFICIENCY: ICD-10-CM

## 2021-06-16 DIAGNOSIS — R29.898 PROXIMAL LEG WEAKNESS: ICD-10-CM

## 2021-06-16 DIAGNOSIS — Z86.73 CHRONIC ISCHEMIC LEFT PCA STROKE: ICD-10-CM

## 2021-06-16 DIAGNOSIS — Z12.31 ENCOUNTER FOR SCREENING MAMMOGRAM FOR BREAST CANCER: ICD-10-CM

## 2021-06-16 DIAGNOSIS — Z23 NEED FOR PNEUMOCOCCAL VACCINATION: ICD-10-CM

## 2021-06-16 DIAGNOSIS — Z00.00 ROUTINE GENERAL MEDICAL EXAMINATION AT A HEALTH CARE FACILITY: ICD-10-CM

## 2021-06-16 DIAGNOSIS — D64.89 ANEMIA DUE TO OTHER CAUSE, NOT CLASSIFIED: ICD-10-CM

## 2021-06-16 DIAGNOSIS — M79.661 PAIN OF RIGHT LOWER LEG: ICD-10-CM

## 2021-06-16 DIAGNOSIS — R26.9 GAIT DISORDER: ICD-10-CM

## 2021-06-16 DIAGNOSIS — M24.275 DISORDER OF LIGAMENT OF LEFT FOOT: ICD-10-CM

## 2021-06-16 DIAGNOSIS — Z12.11 SCREEN FOR COLON CANCER: ICD-10-CM

## 2021-06-16 LAB
BASOPHILS # BLD AUTO: 0 THOU/UL (ref 0–0.2)
BASOPHILS NFR BLD AUTO: 0 % (ref 0–2)
EOSINOPHIL # BLD AUTO: 0.1 THOU/UL (ref 0–0.4)
EOSINOPHIL NFR BLD AUTO: 2 % (ref 0–6)
ERYTHROCYTE [DISTWIDTH] IN BLOOD BY AUTOMATED COUNT: 12.6 % (ref 11–14.5)
HBA1C MFR BLD: 5.9 %
HCT VFR BLD AUTO: 35 % (ref 35–47)
HGB BLD-MCNC: 11.7 G/DL (ref 12–16)
IMM GRANULOCYTES # BLD: 0 THOU/UL
IMM GRANULOCYTES NFR BLD: 0 %
LYMPHOCYTES # BLD AUTO: 1.8 THOU/UL (ref 0.8–4.4)
LYMPHOCYTES NFR BLD AUTO: 25 % (ref 20–40)
MCH RBC QN AUTO: 29.6 PG (ref 27–34)
MCHC RBC AUTO-ENTMCNC: 33.4 G/DL (ref 32–36)
MCV RBC AUTO: 89 FL (ref 80–100)
MONOCYTES # BLD AUTO: 0.6 THOU/UL (ref 0–0.9)
MONOCYTES NFR BLD AUTO: 9 % (ref 2–10)
NEUTROPHILS # BLD AUTO: 4.5 THOU/UL (ref 2–7.7)
NEUTROPHILS NFR BLD AUTO: 64 % (ref 50–70)
PLATELET # BLD AUTO: 264 THOU/UL (ref 140–440)
PMV BLD AUTO: 9.1 FL (ref 7–10)
RBC # BLD AUTO: 3.95 MILL/UL (ref 3.8–5.4)
WBC: 7 THOU/UL (ref 4–11)

## 2021-06-16 ASSESSMENT — MIFFLIN-ST. JEOR: SCORE: 1338.79

## 2021-06-16 NOTE — PATIENT INSTRUCTIONS - HE
1. Please decrease your Amlodipine to 5 mg every morning from 10 mg every morning because your blood pressure is low at 94/62 today.

## 2021-06-16 NOTE — PROGRESS NOTES
Outpatient Mental Health Psychotherapy Treatment Plan    Name:  Lacy Odell  :  1960  MRN:  [id number]    Treatment Plan: Updated Treatment Plan 2021  Intake/initial treatment plan date: 2020  Benefit and risks and alternatives have been discussed:     Plan:      ? Depression    Goal:  Decrease average depression scores on the PHQ-9 from a    Strategies:    ?[x] Decrease social isolation.     [x] Increase involvement in meaningful activities     ?[x] Discuss sleep hygiene     ?[x] Explore thoughts and expectations about self and others     ?[x] Process grief (loss of significant person, independence, role, etc.)     ?[x] Assess for suicide risk     ?[] Implement physical activity routine (with physician approval)     [] Consider introduction of bibliotherapy and/or videos     [x] Continue compliance with medical treatment plan (or explore barriers)       ?    Degree to which this is a problem (1-4): 4  Degree to which goal is met (1-4)2  Date of Review: 2021    Functional Impairment: 1=Not at all/Rarely  2=Some days  3=Most Days  4=Every Day     Personal: 3  Family: 2  Social: 3  Work: 4    Diagnosis: Major depressive disorder recurrent, moderate.       Strengths:  Very bright;  Has overcome obstacles in the past and is a survivor.  Limitations:  because of a stroke Lacy is unable to read.  Cultural Considerations: father  when /lacy was in young, take care of family.  Family involvement Yes/No, If no reason  Not at this time.     Anticipated intensity of services:  Every until stable then 1x/2weeka weeks    Estimated duration of treatment:  Up to 24 individual sessions  Necessity for frequency: This frequency is needed to establish therapeutic goals and for continuity of care in order to monitor progress.   Necessity for treatment: To address cognitive, behavioral, and/or emotional barriers in order to work toward goals and to improve quality of life.   Is this treatment  appropriate with minimal intrusion/restrictions: yes    Persons responsible for this plan:  ? [x] Patient ? [x] Provider ? [] Other:  __________________    Due to COVID Therapy is provided remotely. Patient verbally agrees and consents.    Provider: Nicolle Pedro  Date:  04/23/2020

## 2021-06-16 NOTE — PROGRESS NOTES
Assessment & Plan     Tara was seen today for follow-up.    Diagnoses and all orders for this visit:    Secondary hypertension  -     amLODIPine (NORVASC) 5 MG tablet; Take 1 tablet (5 mg total) by mouth daily.  -     Basic Metabolic Panel    Other urinary incontinence, this has occurred since her stroke.  She has to go to the bathroom, particularly at night.  She uses a pad during the day, and a thicker pad during the nighttime.  She is not on any medications for this, discussed with her that these have side effects.    Acute CVA (cerebrovascular accident) (H), admission July 10-July 17, 2020.  Urine incontinence may be due to this.  Stroke was secondary to posterior cerebral artery involvement.  She has sensory changes on the right-hand side.  She has a right-sided visual field defect, which is continued.  She has language, cognitive deficits.  She is unable to read, or write.  This has been very hard for her.  She has home care, and her medications are being set up for her.  Patient thought that she was going to be referred to a different speech therapist, however this has not occurred.  Speech therapy, through home care was discontinued.  She still has skilled nursing setting up her medications every 2 weeks.    Type 2 diabetes mellitus without complication, without long-term current use of insulin (H), last HbA1c was 6%.  Patient is on Metformin 500 mg daily.  She has significant loss of weight, however this is remained stable at 159 pounds since November 2020.  -     Glycosylated Hemoglobin A1c    Benign essential hypertension, blood pressure is low today, 94/62.  I am decreasing her amlodipine from 10 mg to 5 mg.    Vitamin D deficiency, vitamin D has come back low at 19 today.  I have prescribed ergocalciferol 50,000 units weekly for 12 weeks today.  -     Vitamin D, Total (25-Hydroxy)    Depression, doing better, on citalopram 20 mg daily.  She is smiling today.      Patient's blood pressure is on the  "low side today at 94/62, I have discussed with her decreasing her amlodipine to 5 mg from 10 mg.  Patient has a skilled nurse who sets up her medications every 2 weeks, on a Tuesday, and she has just done this today.  She will show the skilled nurse her after visit summary.  Patient has not had any dizziness related to this.  She is also on lisinopril 40 mg daily, which she needs for diabetes mellitus.    Diabetes mellitus very well controlled, HbA1c 6%, November 2020.  I am checking this again today, and will decide if we should decrease her Metformin, she is currently on 500 mg daily.  Patient's weight is staying stable at 159 pounds, since November 2020.    Patient was seen by the [facility], November 18, 2020.  Plan was for her to have cataract surgery, for both eyes.  However she did not hear back from them.  I have given her the telephone number, for her to contact them.    I am planning on a preventative health visit, in 3 months, but if she needs a preoperative history and physical this should be done for her cataract surgery.      30 minutes spent on the date of the encounter doing chart review, review of test results, patient visit and documentation   {     BMI:   Estimated body mass index is 27.29 kg/m  as calculated from the following:    Height as of this encounter: 5' 4\" (1.626 m).    Weight as of this encounter: 159 lb (72.1 kg).   She actually lost weight after her stroke.  Her weight has become stable now, 159 pounds since November 2020.  BMI is 27.39.    Return in about 3 months (around 6/16/2021), or Physical with Dr. Randle.    Edwina Randle MD  [facility]   Tara GENEVA Odell is 60 y.o. and presents today for the following health issues     HPI   Acute CVA (cerebrovascular accident), with admission July 10-July 17, 2020.  She continues to have sensory changes on the right-hand side, and a right-sided visual field defect.  " "She has language and speech cognitive deficits.  She still cannot read or write.  Balance disorder has improved.  She is no longer using a walker.  She has not had any falls.  Diabetes mellitus is better controlled.  LDL 72 November 11, 2020.  Patient is on Lipitor 80 mg daily.  (H) patient describes urinary incontinence, which may be related to her stroke.    Diabetes mellitus, type 2, patient has lost weight, and her HbA1c has decreased to 6% November 11, 2020.  I am decreasing her Metformin to 500 mg a day 12/11/2020.      She is concerned that she still continues to have difficulties with writing and reading.  She thought that she was going to be seeing a different speech therapist.    Review of Systems   Rest of the review systems is negative.        Objective    BP 94/62 (Patient Site: Right Arm, Patient Position: Sitting)   Pulse 62   Temp 97.4  F (36.3  C)   Ht 5' 4\" (1.626 m)   Wt 159 lb (72.1 kg)   SpO2 98%   BMI 27.29 kg/m    Body mass index is 27.29 kg/m .  Physical Exam   Constitutional: No distress.   Patient looks good.   Eyes: Right eye exhibits no discharge. Left eye exhibits no discharge. No scleral icterus.   Cardiovascular: Normal rate and regular rhythm.   No murmur heard.  Pulmonary/Chest: No respiratory distress. She has no wheezes. She has no rales.   Musculoskeletal:         General: No edema.      Comments: No peripheral edema.               "

## 2021-06-16 NOTE — PROGRESS NOTES
"Mental Health Psychotherapy Telephone Note    Patient: Tara Odell    : 1960 MRN: [id number]    Completed a 2 point identification verification: patient verbalized full name and date of birth.     Date: 2021 Start time: 3:04  Stop Time: 3:50   Session # 8  The patient has been notified of the following:   \"We have found that certain health care needs can be provided without the need for a face to face visit.  This service lets us provide the care you need with a phone conversation.  I will have full access to your [facility] medical record during this entire phone call.   I will be taking notes for your medical record. Since this is like an office visit, we will bill your insurance company for this service.  There are potential benefits and risks of telephone visits (e.g. limits to patient confidentiality) that differ from in-person visits.?  Confidentiality still applies for telephone services, and nobody will record the visit.  It is important to be in a quiet, private space that is free of distractions (including cell phone or other devices) during the visit.?? If during the course of the call I believe a telephone visit is not appropriate, you will not be charged for this service\"  Consent has been obtained for this service by care team member: Yes, per verbal agreement   Session Type: Patient is participating in a telemedicine phone visit. Individual therapy.  Does not have telemed video technology     Those present for this session: Tara and this therapist     chief complaint:depressed mood following stroke.    New symptoms or complaints: Overwhelmed with health concerns    Functional Impairment:   Personal: 3  Family: 2  Work: Disabled  Social:3          ASSESSMENT: Current Emotional / Mental Status (status of significant symptoms):  No change since last visit on 3/26/2021               Risk status (Self / Other harm or suicidal ideation)              Patient denies personal safety " concerns              Patient denies current or recent suicidal ideation or behaviors.              Patient denies current or recent homicidal ideation or behaviors.              Patient denies current or recent self injurious behavior or ideation.              Patient denies other safety concerns.              Patient denies change in risk factors               Patient denies change in protective factors                 Recommended that patient call 911 or go to the local ED should there be a change in any of these risk factors.                Attitude:                                   Cooperative               Orientation:                             X4              Speech                          Rate / Production:       Normal/ Responsive Normal                           Volume:                       Normal               Mood:                                      Depressed  Normal              Thought Content:                    Clear               Thought Form:                        Coherent  Logical               Insight:                                     Good     Patient's impression of their current status: Improved mood. States she is doing nothing but watching TV.  Has been out in the yard but not for long. 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  Has talked to her children but have not seen them in a while.  Tara reports she will be receiving SSDI. Tara has waited for OT for assistance in reading comprehension but has not heard anything. She does not see Dr. Randle until June 2021.       Therapist impression of patients current state:  Continued depression. Presents disengaged. Disinterested.  Mood is depressed and present blah.   Talked to Tara about the time it takes to engage in session and the sense of disengagement. Tara explained it takes her time to make sure no  one else id around to hear her and to get started in talking.  She talks to few  others.  This writer agreed to text lcay 10 minutes to session with a simple message that I will call in 10 minutes.  Encouraged engaging in more activities .  States she does not know any one and all friends are out of the area. Discussed need for PT/OT. Lacy was agreeable to this provider emailed Dr. Randle to request referral for OT/PT.  Lacy has been slow to follow through on identifying self defeating thoughts that increase feeling of helplessness and inadequacy.  Discussed alternative audio books or lessons. Discussed possible U-tube presentations.  Lacy states she can ask her Daughter will help Lacy set up her PC for U-tube and this provider will look for audio lesson in mindfulness.      Type of psychotherapeutic technique provided: Motivational interviewing. CBT-identify and challenge self defeating thoughts and beliefs. Mindfulness information provided.     Progress toward short term goals: Progress as expected.  Lacy continues to discuss concerns and coping strategies during tele-sessions.  Continues to focus on thoughts of helplessness.         Review of long term goals:   Treatment plan completed. Lacy agreed to goals and interventions. Review on 3/29/2021     Diagnosis:  Adjustment disorder with depressed mood    Plan and Follow up: Continue to explore alternative therapies/skills or tools that will improve reading and writing skills and help patient complete writing.  Practice mindfulness and techniques of relaxation to reduce depression and anxiety. Follow-up on any referrals from PCP for OT/PT.    Discharge Criteria/Planning: Patient will continue with follow-up until therapy can be discontinued without return of signs and symptoms.    I have reviewed the note as documented above.  This accurately captures the substance of my conversation with the patient.    As the provider I attest to compliance with applicable laws and regulations related to telemedicine.  Nicolle Pedro, [facility]   4/7/2021

## 2021-06-16 NOTE — TELEPHONE ENCOUNTER
Dr Randle,  Updating you with medication interaction of prn medication ondansetron and citalopram of level 2 severity interaction. Patient has not used ondansetron ini greater then 6 months. Provided education on interaction to patient.  Lexy SALOMON RN Case Manager

## 2021-06-16 NOTE — TELEPHONE ENCOUNTER
Patient is due for a re-certification, requesting the following orders;  SN to see patient 1 x every other week for 9 weeks for medication set up, BP assess and mental health monitoring.  MSW for continued assistance in SS benefits process.  Please respond by end of day today with verbal approval of orders as able, Thank you.    Lexy MYERS Case Manager

## 2021-06-16 NOTE — PROGRESS NOTES
"Mental Health Psychotherapy Telephone Note    Patient: Tara Odell    : 1960 MRN: [id number]    Completed a 2 point identification verification: patient verbalized full name and date of birth.     Date: 3/26/2021 Start time: 3:05   Stop Time: 3:53   Session # 7  The patient has been notified of the following:   \"We have found that certain health care needs can be provided without the need for a face to face visit.  This service lets us provide the care you need with a phone conversation.  I will have full access to your [facility] medical record during this entire phone call.   I will be taking notes for your medical record. Since this is like an office visit, we will bill your insurance company for this service.  There are potential benefits and risks of telephone visits (e.g. limits to patient confidentiality) that differ from in-person visits.?  Confidentiality still applies for telephone services, and nobody will record the visit.  It is important to be in a quiet, private space that is free of distractions (including cell phone or other devices) during the visit.?? If during the course of the call I believe a telephone visit is not appropriate, you will not be charged for this service\"  Consent has been obtained for this service by care team member: Yes, per verbal agreement   Session Type: Patient is participating in a telemedicine phone visit. Individual therapy.  Does not have telemed video technology     Those present for this session: Tara and this therapist     chief complaint:depressed mood following stroke.    New symptoms or complaints: Overwhelmed with health concerns    Functional Impairment:   Personal: 3  Family: 2  Work: 4  Social:3          ASSESSMENT: Current Emotional / Mental Status (status of significant symptoms):  No change since last visit on                Risk status (Self / Other harm or suicidal ideation)              Patient denies personal safety concerns    " "          Patient denies current or recent suicidal ideation or behaviors.              Patient denies current or recent homicidal ideation or behaviors.              Patient denies current or recent self injurious behavior or ideation.              Patient denies other safety concerns.              Patient denies change in risk factors               Patient denies change in protective factors                 Recommended that patient call 911 or go to the local ED should there be a change in any of these risk factors.                Attitude:                                   Cooperative               Orientation:                             X4              Speech                          Rate / Production:       Normal/ Responsive Normal                           Volume:                       Normal               Mood:                                      Depressed  Normal              Thought Content:                    Clear               Thought Form:                        Coherent  Logical               Insight:                                     Good     Patient's impression of their current status: Improved mood. States \"nothing much has changed\".  Disclosed her mood is up and down. Worried her brother will try to have her declared incompetent.  Discussed difference between selective SSRI/SSI/SSDI.  Patient states letter she received tells her to apply for Social Security Income.  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      Therapist impression of patients current state:  Continued depression. Continues to start the session with disengaged attitude. She does open up after a few minutes but does require prompting. Questions around trust/paranoia with family. Reports no anxiety.  Reassured her it would be difficult for her brother to have er declared incompetent when no professional has indicated concerns.   Explore worry about what might happen. Encouraged Tara to continue to work with social " "security for correct info on disability benefits. Completed screens this session. Screens show improved mood since intake. PHQ-9 was a 13 and is now a 9.  GAD7 was a 10 and now a 7.     Type of psychotherapeutic technique provided: Motivational interviewing. CBT-identify and challenge self defeating thoughts and beliefs.     Progress toward short term goals: Progress as expected.  Tara continues to discuss concerns and coping strategies during tele-sessions. Tara has followed through on identifying self defeating thoughts that increase feeling of helplessness and inadequacy.  Continue to focus on thoughts of helplessness. \"What ifs\" challenged.         Review of long term goals:   Treatment plan completed. Tara agreed to goals and interventions. Review on 3/29/2021     Diagnosis:  Adjustment disorder with depressed mood    Plan and Follow up: Continue to explore alternative therapies/skills or tools that will improve reading and writing skills and help patient complete writing.  Practice mindfulness and techniques of relaxation to reduce depression and anxiety.     Discharge Criteria/Planning: Patient will continue with follow-up until therapy can be discontinued without return of signs and symptoms.    I have reviewed the note as documented above.  This accurately captures the substance of my conversation with the patient.    As the provider I attest to compliance with applicable laws and regulations related to telemedicine.  Nicolle Pedro, [facility]  3/26/2021  "

## 2021-06-16 NOTE — PROGRESS NOTES
"Mental Health Psychotherapy Telephone Note    Patient: Tara Odell    : 1960 MRN: [id number]    Completed a 2 point identification verification: patient verbalized full name and date of birth.     Date: 2021 Start time: 3:04  Stop Time: 3:52   Session # 9  The patient has been notified of the following:   \"We have found that certain health care needs can be provided without the need for a face to face visit.  This service lets us provide the care you need with a phone conversation.  I will have full access to your [facility] medical record during this entire phone call.   I will be taking notes for your medical record. Since this is like an office visit, we will bill your insurance company for this service.  There are potential benefits and risks of telephone visits (e.g. limits to patient confidentiality) that differ from in-person visits.?  Confidentiality still applies for telephone services, and nobody will record the visit.  It is important to be in a quiet, private space that is free of distractions (including cell phone or other devices) during the visit.?? If during the course of the call I believe a telephone visit is not appropriate, you will not be charged for this service\"  Consent has been obtained for this service by care team member: Yes, per verbal agreement   Session Type: Patient is participating in a telemedicine phone visit. Individual therapy.  Does not have telemed video technology     Those present for this session: Tara and this therapist     chief complaint:depressed mood following stroke.    New symptoms or complaints: employment/unemployment    Functional Impairment:   Personal: 3  Family: 2  Work: Disabled  Social:3          ASSESSMENT: Current Emotional / Mental Status (status of significant symptoms):  No change since last visit on 3/26/2021               Risk status (Self / Other harm or suicidal ideation)              Patient denies personal safety concerns      "         Patient denies current or recent suicidal ideation or behaviors.              Patient denies current or recent homicidal ideation or behaviors.              Patient denies current or recent self injurious behavior or ideation.              Patient denies other safety concerns.              Patient denies change in risk factors               Patient denies change in protective factors                 Recommended that patient call 911 or go to the local ED should there be a change in any of these risk factors.                Attitude:                                   Cooperative               Orientation:                             X4              Speech                          Rate / Production:       Normal/ Responsive Normal                           Volume:                       Normal               Mood:                                      Depressed  Normal              Thought Content:                    Clear               Thought Form:                        Coherent  Logical               Insight:                                     Good     Patient's impression of their current status: Improved mood. Has been working in her yard.  Happy speech therapy will start.  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  Discussed her brother overhearing her on the phone.  She does not want her brother to know about her therapy. Lost umemployement.  Her former employer stated she refused to return to work.  Tara shared he never offered her another position.  Tara will apply for SSDI though she believes because she is named as beneficiary on her mothers will she will not qualify. Her mother is still alive.    Therapist impression of patients current state:  Continued depression though some improvement in mood. Session focused on speech therapy and the level of improvement she would like to see.  Discussed having help to edit  her book.   Referred Tara to the social security admin.  Discussed benefits of Mindfulness and techniques of relaxation with mindfulness.  Encouraged continued regular practice.     Type of psychotherapeutic technique provided: Motivational interviewing. CBT-identify and challenge self defeating thoughts and beliefs. Mindfulness information provided.     Progress toward short term goals: Progress as expected.  Tara continues to discuss concerns and coping strategies during tele-sessions.  Continues to focus on thoughts of helplessness.         Review of long term goals:   Treatment plan completed. Tara agreed to goals and interventions. 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  Practice mindfulness and techniques of relaxation to reduce depression and anxiety. Follow-up with speech therapy.return in 2 weeks.    Discharge Criteria/Planning: Patient will continue with follow-up until therapy can be discontinued without return of signs and symptoms.    I have reviewed the note as documented above.  This accurately captures the substance of my conversation with the patient.  As the provider I attest to compliance with applicable laws and regulations related to telemedicine.  Nicolle Pedro, [facility]  4/23/2021

## 2021-06-16 NOTE — TELEPHONE ENCOUNTER
Vitamin D, Total (25-Hydroxy)   Date Value Ref Range Status   03/16/2021 19.5 (L) 30.0 - 80.0 ng/mL Final     Please let the patient know that her vitamin D is low at 19.5.  The normal is 30-80.  I have ordered high-dose vitamin D for her, 50,000 units weekly for 12 weeks.    Her HbA1c, has decreased even further to 5.6%.  She is currently on Metformin 500 mg daily.  I would like her to cut back on her Metformin to 250 Milligrams [half of a 500 mg pill, daily]. I have changed her Metformin to an immediate release formulation.  She is currently on the 24-hour formulation, which cannot be prescribed, as 1/2 pill dose.  A new prescription for immediate release Metformin has been sent to her pharmacy, to take half a pill once a day, with her largest meal.    Edwina Randle MD

## 2021-06-16 NOTE — TELEPHONE ENCOUNTER
----- Message from JOSELO Villegas sent at 4/21/2021  1:59 PM CDT -----  Just back from a brief steff. Thanks for doing the referral.  I'm hoping this will help move Tara forward in her treatment to reduce depression.  Jamey  ----- Message -----  From: Edwina Randle MD  Sent: 4/7/2021   8:41 PM CDT  To: JOSELO Villegas    I think it would be speech, I can put in a referral for her to be seen as an outpatient. Thanks for reaching out. Edwina Randle MD  ----- Message -----  From: Nicolle Pedro LICSW  Sent: 4/7/2021   3:18 PM CDT  To: Edwina Randle MD    Good afternoon,  I am working with Tara to reduce symptoms of depression.  She is no longer receiving any OT/PT services and could use further help with reading and writing.  She thought she would be hearing from someone to start PT but has not.  I'm not sure if it would be PT or OT.  Do you think she could benefit from additional assistance with ability to comprehend what she is reading.    Tara has an appointment with you but not until June and she doesn't want to wait till then to get help.    Thank you,  Jamey

## 2021-06-16 NOTE — TELEPHONE ENCOUNTER
Writer is requesting another order to assist pt on  April 6 with an appeals hearing regarding her social security disability.  The hearing will be done via phone.     1 Social Work order in 30 days.      Thanks you!  [facility]   [phone]

## 2021-06-16 NOTE — PROGRESS NOTES
Outpatient Mental Health Psychotherapy Treatment Plan    Name:  Tara Odell  :  1960  MRN:  [id number]    Treatment Plan:  3/26/2021  Treatment Plan: Updated Treatment Plan Initial  Intake/initial treatment plan date: 2020  Benefit and risks and alternatives have been discussed:     Plan:      ? Depression    Goal:  Decrease average depression scores on the PHQ-9 from a 13 to a 6  Depressive symptoms on PHQ-9 is a 9.   Strategies:    ?[x] Decrease social isolation.     [x] Increase involvement in meaningful activities     ?[x] Discuss sleep hygiene     ?[x] Explore thoughts and expectations about self and others     ?[x] Process grief (loss of significant person, independence, role, etc.)     ?[x] Assess for suicide risk     ?[] Implement physical activity routine (with physician approval)     [] Consider introduction of bibliotherapy and/or videos     [x] Continue compliance with medical treatment plan (or explore barriers)    ?    Degree to which this is a problem (1-4): 4  Degree to which goal is met (1-4)2  Date of Review: 3/26/2021      Plan:           ?   ? Anxiety    Goal:  Decrease average anxiety level using GAD7 from a 10 to a 5 or 6 to .   Strategies: ? [x]Learn and practice relaxation techniques and other coping strategies (e.g., thought stopping, reframing, meditation)     ? [x] Increase involvement in meaningful activities     ? [x] Discuss sleep hygiene     ? [x] Explore thoughts and expectations about self and others     ? [x] Identify and monitor triggers for panic/anxiety symptoms     ? [] Implement physical activity routine (with physician approval)     ? [x] Consider introduction of bibliotherapy and/or videos     ? 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  Limitations:  because of a stroke Lacy is unable to read.  Cultural Considerations: father  when /lacy was in young, take care of family.  Family involvement Yes/No, If no reason  Not at this time.     Anticipated intensity of services:  Every until stable then 1x/2weeka weeks    Estimated duration of treatment:  Up to 24 individual sessions  Necessity for frequency: This frequency is needed to establish therapeutic goals and for continuity of care in order to monitor progress.   Necessity for treatment: To address cognitive, behavioral, and/or emotional barriers in order to work toward goals and to improve quality of life.   Is this treatment appropriate with minimal intrusion/restrictions: yes    Persons responsible for this plan:  ? [x] Patient ? [x] Provider ? [] Other:  __________________    Due to COVID Therapy is provided remotely.    Discussed progress in therapy.  Depression Patient verbally agrees and consents.    Provider: Nicolle Pedro     Date:  2020

## 2021-06-17 ENCOUNTER — OFFICE VISIT - HEALTHEAST (OUTPATIENT)
Dept: SPEECH THERAPY | Facility: REHABILITATION | Age: 61
End: 2021-06-17

## 2021-06-17 DIAGNOSIS — R48.8 AGRAPHIA: ICD-10-CM

## 2021-06-17 NOTE — PROGRESS NOTES
[facility]   Speech Therapy Daily Progress     Patient Name: Tara Odell  Date: 5/20/2021  Visit #: 2  Referral Diagnosis: Acute left PCA Stroke I63.532, Difficulty understanding written language R41.89, Difficulty reading F81.0, Difficulty writing R68.89  Referring provider: Edwina Randle  Visit Diagnosis:     ICD-10-CM    1. Agraphia  R48.8    2. Alexia  R48.0          Session 2 of 25    Assessment:   Patient is benefitting from skilled speech therapy and is making steady progress toward functional goals.  Patient is appropriate to continue with skilled speech therapy intervention, as indicated by initial plan of care.  Therapy initiated on today's date.  Patient motivated and engaged.    Goal Status:   Long Term Goals  1. Patient will increase reading, writing, and expressive language skills as foundation for functional skills needed for social interaction and participation in ADLs (e.g., making/receiving phone calls, conversing with medical providers, providing personal information, expressing needs for assistance, ect)     Short Term Goals  1. Patient will demonstrate independence with x3+ word finding strategies in structured tasks and conversation to improve communication success in conversation.  2. Patient will complete phrase-sentence written language tasks with no more than one error in 80% of opportunities given min cues.  3. Patient will label single letters/numbers with 80% accuracy SARAI.  4. Patient will complete word level reading tasks with 60% accuracy given min cues.  5. Patient will read x20 functional words/phrases with 80% accuracy SARAI.  6. Patient will verbalize use of x2 novel compensatory strategies to aid in completion of reading/written communication tasks in the home environment.            Plan / Patient Education:     Continue with initial plan of care.    Subjective:     Patient presents as cooperative and engaged during the session.  Pain Rating: Mild  "right sided discomfort    Patient arrived to therapy in good spirits.  She has not been seen since initial evaluation on 4/29/21.  She reported that \"things have been kind of the same\".  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      Patient reported that she has some pain on her right side, which has been weak post CVA.  She expressed she believes this is her body healing.  Patient stated that she had \"a few visits\" of PT in her home, but was interested in PT referral, will request order from PCP.    Objective:     Reviewed goals and POC with patient.  Patient expressed understanding and agreement.    Text to Speech  -Patient reported that she continues to use speech to text on her phone for texting, and is mostly able to read her messages.  Patient did state that it does not always  what she says correctly.  -SLP demonstrated text to speech option on her phone, with examples of how to use for texting or reading longer pieces of information.  -Attempted to add to patient's phone, but unable to do on this date as patient has a different type of phone.  Plan to research and attempt again in upcoming session.    Reading/Writing:  -Sentence Identification: Patient able to identify 1/3 sentences given F:4 options; however, this appeared to be a guess.  -Word Identification: Patient able to identify 9/10 words given F:4 options.  Difficulty with closely related words (e.g., peach/pear).  Patient was slow and purposeful during this task, and was never 100% confident in her response.  -Reading single words: Patient able to read 1/6 single words SARAI.  Increased to 3/6 with modeled use of using alphabet to locate letters/sounds.    -Writing single words: Patient able to write 3/3 words she was unable to read with x1 spelling error (I.e., \"E\" for w).  -Patient able to say and write ABC's.  Patient able to " locate letters on the alphabet to increase naming of single letter given modeling of this strategy, with increased independence.  Patient able to stated phonetic sound per letters with 100% accuracy.  -Patient able to identify grammatical marks (e.g., ?, !) as well as street signs.  -Patient able to name 2/4 shapes; increased to 3/4 given verbal cues for word finding.  This appeared r/t word finding vs not recognizing symbols such as letters/numbers.    Interventions Today   Interventions MINUTES   Speech - Language 55   Cognition    Dysphagia    Assistive technology    Voice            Total 55     Ana Fox MS, CCC-SLP  5/20/2021

## 2021-06-17 NOTE — TELEPHONE ENCOUNTER
Telephone Encounter by Estephania Drake at 9/28/2020 10:51 AM     Author: Estephania Drake Service: -- Author Type: --    Filed: 9/28/2020 10:52 AM Encounter Date: 9/25/2020 Status: Signed    : Estephania Drake       Per nursing note this has already been addressed.  No further action needed provider switched to covered medication.

## 2021-06-20 NOTE — LETTER
Letter by Sharon Ojeda CHW at      Author: Sharon Ojeda CHW Service: -- Author Type: --    Filed:  Encounter Date: 9/18/2020 Status: (Other)       CARE COORDINATION  [facility]  [address] [place] [address]    September 23, 2020    Tara Odell  [address]  [place] [address]      Dear Tara,    I am a clinic community health worker who works with Edwina Randle MD at [facility]. I have been trying to reach you recently to introduce Clinic Care Coordination and to see if there was anything I could assist you with.  Below is a description of clinic care coordination and how I can further assist you.      The clinic care coordination team is made up of a registered nurse,  and community health worker who understand the health care system. The goal of clinic care coordination is to help you manage your health and improve access to the health care system in the most efficient manner. The team can assist you in meeting your health care goals by providing education, coordinating services, strengthening the communication among your providers and supporting you with any resource needs.    Please feel free to contact me at [phone] with any questions or concerns. We are focused on providing you with the highest-quality healthcare experience possible and that all starts with you.     Sincerely,     Carmelina SÁNCHEZ  Community Health Worker  [phone]

## 2021-06-20 NOTE — LETTER
Letter by Edwina Randle MD at      Author: Edwina Randle MD Service: -- Author Type: --    Filed:  Encounter Date: 10/6/2020 Status: (Other)         Tara Odell  [address]  [place] [address]             October 6, 2020         Dear Ms. Jose R,    Your bad cholesterol, the LDL is higher than it should be at 132. The goal for you with your diabetes is less than 100.  I have doubled your Lipitor medication to 80 mg each day, which will help to bring your LDL down and it is also good for diabetics. Thank you.    Below are the results from your recent visit:    Resulted Orders   LDL Cholesterol, Direct   Result Value Ref Range    Direct  (H) <=129 mg/dl         Please call with questions or contact us using [employer].        Sincerely,        Edwina Randle MD

## 2021-06-21 NOTE — LETTER
Letter by Edwina Randle MD at      Author: Edwina Randle MD Service: -- Author Type: --    Filed:  Encounter Date: 11/11/2020 Status: (Other)         Tara Odell  [address]  [place] [address]             November 11, 2020         Dear MsMiah Odell,    Below are the results from your recent visit:    Resulted Orders   Glycosylated Hemoglobin A1c   Result Value Ref Range    Hemoglobin A1c 6.0 (H) <=5.6 %      Comment:      Normal <5.7% Prediabete 5.7-6.4% Diabletes 6.5% or higher - adopted from ADA consensus guidelines       The HBA1C which tells us what your average sugar reading has been like has come back at 6% (this falls into the prediabetic range). This is better than it was, 7% in July 2020. Please continue with Metformin 500 mg twice a day. Please continue to monitor your accuchecks once a day before meals at different times. Please let me know if your accuchecks are less than 100.      Please call with questions or contact us using [employer].    Sincerely,        Electronically signed by Edwina Randle MD

## 2021-06-21 NOTE — LETTER
Letter by Edwina Randle MD at      Author: Edwina Randle MD Service: -- Author Type: --    Filed:  Encounter Date: 12/11/2020 Status: (Other)                    My Depression Action Plan  Name: Tara Odell   Date of Birth 1960  Date: 12/11/2020    My Doctor: Edwina Randle MD   My Clinic: [address] [address]  [phone]          GREEN    ZONE   Good Control    What it looks like:     Things are going generally well. You have normal ups and downs. You may even feel depressed from time to time, but bad moods usually last less than a day.   What you need to do:  1. Continue to care for yourself (see self care plan)  2. Check your depression survival kit and update it as needed  3. Follow your physicians recommendations including any medication.  4. Do not stop taking medication unless you consult with your physician first.           YELLOW         ZONE Getting Worse    What it looks like:     Depression is starting to interfere with your life.     It may be hard to get out of bed; you may be starting to isolate yourself from others.    Symptoms of depression are starting to last most all day and this has happened for several days.     You may have suicidal thoughts but they are not constant.   What you need to do:     1. Call your care team. Your response to treatment will improve if you keep your care team informed of your progress. Yellow periods are signs an adjustment may need to be made.     2. Continue your self-care.  Just get dressed and ready for the day.  Don't give yourself time to talk yourself out of it.    3. Talk to someone in your support network.    4. Open up your depression Depression Self-Care Plan / Wellness kit.           RED    ZONE Medical Alert - Get Help    What it looks like:     Depression is seriously interfering with your life.     You may experience these or other  symptoms: You cant get out of bed most days, cant work or engage in other necessary activities, you have trouble taking care of basic hygiene, or basic responsibilities, thoughts of suicide or death that will not go away, self-injurious behavior.     What you need to do:  1. Call your care team and request a same-day appointment. If they are not available (weekends or after hours) call your local crisis line, emergency room or 911.            Self-Care Plan / Wellness Kit    Self-Care for Depression  Heres the deal. Your body and mind are really not as separate as most people think.  What you do and think affects how you feel and how you feel influences what you do and think. This means if you do things that people who feel good do, it will help you feel better.  Sometimes this is all it takes.  There is also a place for medication and therapy depending on how severe your depression is, so be sure to consult with your medical provider and/ or Behavioral Health Consultant if your symptoms are worsening or not improving.     In order to better manage my stress, I will:    Exercise  Get some form of exercise, every day. This will help reduce pain and release endorphins, the feel good chemicals in your brain. This is almost as good as taking antidepressants!  This is not the same as joining a gym and then never going! (they count on that by the way?) It can be as simple as just going for a walk or doing some gardening, anything that will get you moving.      Hygiene   Maintain good hygiene (get out of bed in the morning, make your bed, brush your teeth, take a shower, and get dressed like you were going to work, even if you are unemployed).  If your clothes don't fit try to get ones that do.    Diet  Strive to eat foods that are good for me, drink plenty of water, and avoid excessive sugar, caffeine, alcohol, and other mood-altering substances.  Some foods that are helpful in depression are: complex carbohydrates, B  vitamins, flaxseed, fish or fish oil, fresh fruits and vegetables.    Psychotherapy  Agree to participate in Individual Therapy (if recommended).    Medication  If prescribed medications, I agree to take them.  Missing doses can result in serious side effects.  I understand that drinking alcohol, or other illicit drug use, may cause potential side effects.  I will not stop my medication abruptly without first discussing it with my provider.    Staying Connected With Others  Stay in touch with my friends, family members, and my primary care provider/team.    Use your imagination  Be creative.  We all have a creative side; it doesnt matter if its oil painting, sand castles, or mud pies! This will also kick up the endorphins.    Witness Beauty  (AKA stop and smell the roses) Take a look outside, even in mid-winter. Notice colors, textures. Watch the squirrels and birds.     Service to others  Be of service to others.  There is always someone else in need.  By helping others we can get out of ourselves and remember the really important things.  This also provides opportunities for practicing all the other parts of the program.    Humor  Laugh and be silly!  Adjust your TV habits for less news and crime-drama and more comedy.    Control your stress  Try breathing deep, massage therapy, biofeedback, and meditation. Find time to relax each day.     Crisis Text Line  http://www.crisistextline.org    The Crisis Text Line serves anyone, in any type of crisis, providing access to free, 24/7 support and information via the medium people already use and trust:    Here's how it works:  1.  Text 200-910 from anywhere in the [place], anytime, about any type of crisis.  2.  A live, trained Crisis Counselor receives the text and responds quickly.  3.  The volunteer Crisis Counselor will help you move from a 'hot moment to a cool moment'.  My support system    Clinic Contact:  Phone number:    Contact 1:  Phone number:    Contact 2:   Phone number:    Faith[facility]:  Phone number:    Therapist:  Phone number:    [facility] center:    Phone number:    Other community support:  Phone number:

## 2021-06-25 NOTE — PROGRESS NOTES
[facility]   Speech Therapy Daily Progress     Patient Name: Tara Odell  Date: 5/25/2021  Visit #: 3  Referral Diagnosis: Acute left PCA Stroke I63.532, Difficulty understanding written language R41.89, Difficulty reading F81.0, Difficulty writing R68.89  Referring provider: Edwina Randle  Visit Diagnosis:     ICD-10-CM    1. Agraphia  R48.8    2. Alexia  R48.0    3. Aphasia, post-stroke  I69.320          Session 3 of 25    Assessment:   Patient is benefitting from skilled speech therapy and is making steady progress toward functional goals.  Patient is appropriate to continue with skilled speech therapy intervention, as indicated by initial plan of care.  Patient demonstrates significantly reduced number recognition, improved with strategies and repetition.    Goal Status:   Long Term Goals  1. Patient will increase reading, writing, and expressive language skills as foundation for functional skills needed for social interaction and participation in ADLs (e.g., making/receiving phone calls, conversing with medical providers, providing personal information, expressing needs for assistance, ect)     Short Term Goals  1. Patient will demonstrate independence with x3+ word finding strategies in structured tasks and conversation to improve communication success in conversation.  2. Patient will complete phrase-sentence written language tasks with no more than one error in 80% of opportunities given min cues.  3. Patient will label single letters/numbers with 80% accuracy SARAI.  4. Patient will complete word level reading tasks with 60% accuracy given min cues.  5. Patient will read x20 functional words/phrases with 80% accuracy SARAI.  6. Patient will verbalize use of x2 novel compensatory strategies to aid in completion of reading/written communication tasks in the home environment.            Plan / Patient Education:     Continue with initial plan of care.  Recommended home practice  of typing phone number, , and other numeric sequences.    Subjective:     Patient presents as cooperative and engaged during the session.  Pain Rating: None reported    Patient arrived to therapy in good spirits.  She expressed that she had dropped her phone in the toilet so did not currently have one.  Patient noted difficulty when attempting to message her house phone number to a friend and was unable to type it out, despite knowing what it was.    Objective:     Text to Speech  -Defer to next session, patient is getting a new phone today.    Reading/Writing:  -Patient able to write the numbers 1-10 SARAI.  -Patient able to write phone number and  SARAI.  -Patient unable to accurately type phone number SARAI.  Patient taught strategy of counting up to the number in the numeric sequence on the keyboard to locate the correct number.  With use of strategy, patient was able to type her phone number with 80% accuracy x3 given moderate/max cues.  Patient was unaware of errors when made.  SLP had patient write her phone number and cross check, in which she was able to identify error in 50% of opportunities.  -Patient able to type  with 90% accuracy given min cues to use strategy of counting to type.    -Accuracy decreased when attempting to remember where she was at in sequence vs presented verbal numbers 1 at a time.  -Patient demonstrated improve accuracy with number recognition/muscle memory with repetitions.    Interventions Today   Interventions MINUTES   Speech - Language 30   Cognition    Dysphagia    Assistive technology    Voice            Total 30     Ana Fox MS, CCC-SLP  2021

## 2021-06-25 NOTE — TELEPHONE ENCOUNTER
RN cannot approve Refill Request    RN can NOT refill this medication Protocol failed and NO refill given. Last office visit: 3/16/2021 Edwina Randle MD Last Physical: Visit date not found Last MTM visit: Visit date not found Last visit same specialty: 3/16/2021 Edwina Randle MD.  Next visit within 3 mo: Visit date not found  Next physical within 3 mo: Visit date not found      Mallory Abebe, [facility] Triage/Med Refill 6/6/2021    Requested Prescriptions   Pending Prescriptions Disp Refills     ergocalciferol (ERGOCALCIFEROL) 1,250 mcg (50,000 unit) capsule [Pharmacy Med Name: VITAMIN D2 50,000IU (ERGO) CAP RX] 12 capsule 0     Sig: TAKE 1 CAPSULE BY MOUTH 1 TIME A WEEK FOR 12 DOSES       There is no refill protocol information for this order

## 2021-06-25 NOTE — TELEPHONE ENCOUNTER
RN cannot approve Refill Request    RN can NOT refill this medication Duplicate request. Last office visit: 10/5/2020 Courtney Muniz CNP Last Physical: Visit date not found Last MTM visit: Visit date not found Last visit same specialty: 3/16/2021 Edwina Randle MD.  Next visit within 3 mo: Visit date not found  Next physical within 3 mo: Visit date not found      Lizette Moreno, [facility] Connection Triage/Med Refill 6/15/2021    Requested Prescriptions   Refused Prescriptions Disp Refills     amLODIPine (NORVASC) 10 MG tablet [Pharmacy Med Name: AMLODIPINE BESYLATE 10MG TABLETS] 90 tablet 1     Sig: TAKE 1 TABLET(10 MG) BY MOUTH DAILY       Calcium-Channel Blockers Protocol Passed - 6/15/2021 10:49 AM        Passed - PCP or prescribing provider visit in past 12 months or next 3 months     Last office visit with prescriber/PCP: 10/5/2020 Courtney Muniz CNP OR same dept: 3/16/2021 Edwina Randle MD OR same specialty: 3/16/2021 Edwina Randle MD  Last physical: Visit date not found Last MTM visit: Visit date not found   Next visit within 3 mo: Visit date not found  Next physical within 3 mo: Visit date not found  Prescriber OR PCP: Courtney Muniz CNP  Last diagnosis associated with med order: 1. Secondary hypertension  - amLODIPine (NORVASC) 10 MG tablet [Pharmacy Med Name: AMLODIPINE BESYLATE 10MG TABLETS]; TAKE 1 TABLET(10 MG) BY MOUTH DAILY  Dispense: 90 tablet; Refill: 1    If protocol passes may refill for 12 months if within 3 months of last provider visit (or a total of 15 months).             Passed - Blood pressure filed in past 12 months     BP Readings from Last 1 Encounters:   05/28/21 120/78

## 2021-06-25 NOTE — PROGRESS NOTES
[facility]   Speech Therapy Daily Progress     Patient Name: Tara Odell  Date: 5/27/2021  Visit #: 4  Referral Diagnosis: Acute left PCA Stroke I63.532, Difficulty understanding written language R41.89, Difficulty reading F81.0, Difficulty writing R68.89  Referring provider: Edwina Randle*  Visit Diagnosis:     ICD-10-CM    1. Agraphia  R48.8    2. Alexia  R48.0          Session 4 of 25    Assessment:   Patient is benefitting from skilled speech therapy and is making steady progress toward functional goals.  Patient is appropriate to continue with skilled speech therapy intervention, as indicated by initial plan of care.    Goal Status:   Long Term Goals  1. Patient will increase reading, writing, and expressive language skills as foundation for functional skills needed for social interaction and participation in ADLs (e.g., making/receiving phone calls, conversing with medical providers, providing personal information, expressing needs for assistance, ect)     Short Term Goals  1. Patient will demonstrate independence with x3+ word finding strategies in structured tasks and conversation to improve communication success in conversation.  2. Patient will complete phrase-sentence written language tasks with no more than one error in 80% of opportunities given min cues.  3. Patient will label single letters/numbers with 80% accuracy SARAI.  4. Patient will complete word level reading tasks with 60% accuracy given min cues.  5. Patient will read x20 functional words/phrases with 80% accuracy SARAI.  6. Patient will verbalize use of x2 novel compensatory strategies to aid in completion of reading/written communication tasks in the home environment.            Plan / Patient Education:     Continue with initial plan of care.  Provided single letter and number flash cards for home practice, with sheet to monitor for accuracy.    Subjective:     Patient presents as cooperative and engaged  during the session.  Pain Rating: None reported    Patient arrived to therapy in good spirits. She expressed that she had a rough night previously due to a fall.  She reported she was walking to the kitchen nook and fell, but was unable to recall why (e.g., tripping on something).  She noted she did not get hurt, but her left hip was bruised.    Objective:     Text to Speech  -Patient got a new phone and brought it to the therapy session.  -Attempted to assist patient in setting up text to speech; however, functionality of phone reads entire text on screen without prompting, rather than reading only text when prompted.    Letter Identification:  -Patient reported frustration with being unable to type her email into her new phone to log into the account.  -SLP provided assistance with letter by letter location.  Patient accurately typed correct letter in 65% of opportunities given visual model.  Patient at times made errors that were not close in symbolic representation (e.g., s for r) with minimal awareness.  100% accuracy with vowels.    Number Labeling:  -Provided flash cards with single numbers on them for drill labeling.  Patient able to accurately label 8/10, increased to 9/10 given x1 self-correct. Patient confident on x3 and appeared to be not confident in her response on remaining numbers.  -Provided sheet for patient to self-check herself by counting up and verifying the symbols matched.  Patient able to apply self-check with 100% accuracy SARAI.    Interventions Today   Interventions MINUTES   Speech - Language 30   Cognition    Dysphagia    Assistive technology    Voice            Total 30     Ana Fox MS, CCC-SLP  5/27/2021

## 2021-06-25 NOTE — TELEPHONE ENCOUNTER
Vitamin D, Total (25-Hydroxy)   Date Value Ref Range Status   03/16/2021 19.5 (L) 30.0 - 80.0 ng/mL Final     I am signing for 12 weeks of high-dose vitamin D, after this is complete, she should go on to vitamin D 2000 units every day.    Edwina Randle MD

## 2021-06-25 NOTE — TELEPHONE ENCOUNTER
Please respond to below message as soon as able, requesting order by covering provider if necessary. Thank you.      Recertification vist completed today Patient had a fall within the last week. She verbalizes that she feel on her bottom and now it continues to remain sore. Patient verbalizes ongoing nerve pain to R side feels worse recently, she plans to dscuss options at her next follow-up appt with you.    Requesting the following orders:  Sn 1 visit every other week for 9 weeks with 3 prn snv as needed for medication related concerns.  PT for strengthening and falls prevention.    Medication clarification, patient only has citalopram 10mg tablets in home, current order reads citalopram 20mg daily. Which dose should patient be on?    Please respond as soon as able.    Thank you,  Lexy SALOMON RN

## 2021-06-25 NOTE — TELEPHONE ENCOUNTER
Refill Approved    Rx renewed per Medication Renewal Policy. Medication was last renewed on 3/16/21.    Claude Wellington, [facility] Triage/Med Refill 6/14/2021     Requested Prescriptions   Pending Prescriptions Disp Refills     amLODIPine (NORVASC) 5 MG tablet [Pharmacy Med Name: AMLODIPINE BESYLATE 5MG TABLETS] 90 tablet 0     Sig: TAKE 1 TABLET(5 MG) BY MOUTH DAILY       Calcium-Channel Blockers Protocol Passed - 6/13/2021  3:19 AM        Passed - PCP or prescribing provider visit in past 12 months or next 3 months     Last office visit with prescriber/PCP: 3/16/2021 Edwina Randle MD OR same dept: 3/16/2021 Edwina Randle MD OR same specialty: 3/16/2021 Edwina Randle MD  Last physical: Visit date not found Last MTM visit: Visit date not found   Next visit within 3 mo: Visit date not found  Next physical within 3 mo: Visit date not found  Prescriber OR PCP: Edwina Randle MD  Last diagnosis associated with med order: 1. Secondary hypertension  - amLODIPine (NORVASC) 5 MG tablet [Pharmacy Med Name: AMLODIPINE BESYLATE 5MG TABLETS]; TAKE 1 TABLET(5 MG) BY MOUTH DAILY  Dispense: 90 tablet; Refill: 0    If protocol passes may refill for 12 months if within 3 months of last provider visit (or a total of 15 months).             Passed - Blood pressure filed in past 12 months     BP Readings from Last 1 Encounters:   05/28/21 120/78

## 2021-06-25 NOTE — PROGRESS NOTES
[facility]  [address] [facility] [address]  Dept: [phone]  Dept Fax: [phone]  Primary Provider: Edwina Randle MD  Pre-op Performing Provider: RILEY WOOD       PREOPERATIVE EVALUATION:  Today's date: 5/28/2021    Tara Odell is a 60 y.o. female who presents for a preoperative evaluation.    Surgical Information:  Surgery/Procedure: LEFT and RT cataract  Surgery Location: [facility]  Surgeon: Paolo Canas MD  Surgery Date: 06/01/2021 and 06/22/2021  Time of Surgery: 08:25 am  Where patient plans to recover: At home with family  Fax number for surgical facility: [phone]    Type of Anesthesia Anticipated: Local with MAC    Assessment & Plan      The proposed surgical procedure is considered LOW risk.    Preop examination  For bilateral cataract repair    Cataract of both eyes, unspecified cataract type  Both eyes affected    Type 2 diabetes mellitus without complication, without long-term current use of insulin (H)  A1c March 13, 2021 of 5.6    Acute left PCA stroke (H)  Sustained 1 year ago with gait and memory issues.    PLAN  1.  Patient is otherwise cleared for surgery  2.  Follow-up with her primary in the next 3 to 6 months.         Risks and Recommendations:  The patient has the following additional risks and recommendations for perioperative complications:   - No identified additional risk factors other than previously addressed    Medication Instructions:  Patient is to take all scheduled medications on the day of surgery    RECOMMENDATION:  APPROVAL GIVEN to proceed with proposed procedure, without further diagnostic evaluation.        Subjective     HPI related to upcoming procedure: Patient has a history of bilateral cataracts, she is scheduled for surgical correction.  She has noticed for some time decreased visual acuity.    Patient has a history of underlying diabetes mellitus however has been extremely  well controlled.    Patient suffered a stroke 1 year ago this is affected both gait and memory.    Patient otherwise reports no other change in her health status.          Preop Questions 5/28/2021   Have you ever had a heart attack or stroke? YES -stroke sustained in 2020   Have you ever had surgery on your heart or blood vessels, such as a stent placement, a coronary artery bypass, or surgery on an artery in your head, neck, heart, or legs? No   Do you have chest pain with activity? No   Do you have a history of  heart failure? No   Do you currently have a cold, bronchitis or symptoms of other infection? No   Do you have a cough, shortness of breath, or wheezing? No   Do you or anyone in your family have previous history of blood clots? UNKNOWN -not in the patient   Do you or does anyone in your family have a serious bleeding problem such as prolonged bleeding following surgeries or cuts? No   Have you ever had problems with anemia or been told to take iron pills? No   Have you had any abnormal blood loss such as black, tarry or bloody stools, or abnormal vaginal bleeding? No   Have you ever had a blood transfusion? No   Are you willing to have a blood transfusion if it is medically needed before, during, or after your surgery? Yes   Have you or any of your relatives ever had problems with anesthesia? No   Do you have sleep apnea, excessive snoring or daytime drowsiness? No   Do you have any artifical heart valves or other implanted medical devices like a pacemaker, defibrillator, or continuous glucose monitor? No   Do you have artificial joints? No   Are you allergic to latex? No         Health Care Directive:  Patient does not have a Health Care Directive or Living Will: Advance Directive received and scanned. Click on Code in the patient header to view.    Preoperative Review of :    reviewed - no record of controlled substances prescribed.     See problem list for active medical problems.  Problems all  longstanding and stable, except as noted/documented.  See ROS for pertinent symptoms related to these conditions.      Review of Systems  CONSTITUTIONAL: NEGATIVE for fever, chills, change in weight  INTEGUMENTARY/SKIN: NEGATIVE for worrisome rashes, moles or lesions  EYES: NEGATIVE for vision changes or irritation  ENT/MOUTH: NEGATIVE for ear, mouth and throat problems  RESP: NEGATIVE for significant cough or SOB  BREAST: NEGATIVE for masses, tenderness or discharge  CV: NEGATIVE for chest pain, palpitations or peripheral edema  GI: NEGATIVE for nausea, abdominal pain, heartburn, or change in bowel habits  : NEGATIVE for frequency, dysuria, or hematuria  MUSCULOSKELETAL: NEGATIVE for significant arthralgias or myalgia  NEURO: NEGATIVE for weakness, dizziness or paresthesias  ENDOCRINE: NEGATIVE for temperature intolerance, skin/hair changes  HEME: NEGATIVE for bleeding problems  PSYCHIATRIC: NEGATIVE for changes in mood or affect    Patient Active Problem List    Diagnosis Date Noted     Major depressive disorder with single episode, in partial remission (H) 01/11/2021     Benign essential hypertension 10/30/2020     Diabetes mellitus, type 2 (H) 10/28/2020     Acute left PCA stroke (H) 07/10/2020     History reviewed. No pertinent past medical history.  Past Surgical History:   Procedure Laterality Date     IR CAROTID ANGIOGRAM  7/14/2020     Current Outpatient Medications   Medication Sig Dispense Refill     amLODIPine (NORVASC) 5 MG tablet Take 1 tablet (5 mg total) by mouth daily. 90 tablet 0     aspirin 81 mg chewable tablet CHEW AND SWALLOW 1 TABLET(81 MG) BY MOUTH DAILY 90 tablet 3     atorvastatin (LIPITOR) 80 MG tablet Take 1 tablet (80 mg total) by mouth at bedtime. 90 tablet 3     blood glucose test strips Inject 1 each under the skin daily. Contour Next EZ strips please for her contour next EZ monitor. Daily accucheck premeal 100 strip 3     blood-glucose meter (CONTOUR NEXT EZ METER) Misc USE ONCE  DAILY AS DIRECTED 1 each 0     citalopram (CELEXA) 20 MG tablet TAKE 1 TABLET(20 MG) BY MOUTH EVERY MORNING 90 tablet 2     clopidogreL (PLAVIX) 75 mg tablet Take 1 tablet (75 mg total) by mouth daily. 90 tablet 1     ergocalciferol (ERGOCALCIFEROL) 1,250 mcg (50,000 unit) capsule Take 1 capsule (50,000 Units total) by mouth once a week for 12 doses. 12 capsule 0     generic lancets (FINGERSTIX LANCETS) Test one times daily. Dispense brand per patient's insurance at pharmacy discretion. 100 each 3     generic lancets (FINGERSTIX LANCETS) Test one time daily. One touch. 100 each 11     lisinopriL (PRINIVIL,ZESTRIL) 40 MG tablet Take 1 tablet (40 mg total) by mouth daily. 30 tablet 5     metFORMIN (GLUCOPHAGE) 500 MG tablet Take 0.5 tablets (250 mg total) by mouth daily with breakfast. 45 tablet 3     metFORMIN (GLUCOPHAGE-XR) 500 MG 24 hr tablet Take 1 tablet (500 mg total) by mouth daily with breakfast. 90 tablet 3     prednisoLONE acetate (PRED-FORTE) 1 % ophthalmic suspension INSTILL 1 DROP INTO THE RIGHT EYE FOUR TIMES DAILY FOR 1 WEEK THEN TAPER. START AFTER SURGERY       No current facility-administered medications for this visit.        Allergies   Allergen Reactions     Other Drug Allergy (See Comments) Other (See Comments)     States had an allergic reaction to an antibiotic but does remember name     Seafood Other (See Comments)     Not sure of reaction, gets sick       Social History     Tobacco Use     Smoking status: Never Smoker     Smokeless tobacco: Never Used   Substance Use Topics     Alcohol use: Never     Frequency: Never      Family History   Problem Relation Age of Onset     Heart disease Mother      Heart disease Father      Social History     Substance and Sexual Activity   Drug Use Never        Objective     /78   Pulse (!) 55   Wt 173 lb 11.2 oz (78.8 kg)   SpO2 100%   BMI 29.82 kg/m    Physical Exam    GENERAL APPEARANCE: healthy, alert and no distress     EYES: EOMI, PERRL      HENT: ear canals and TM's normal and nose and mouth without ulcers or lesions     NECK: no adenopathy, no asymmetry, masses, or scars and thyroid normal to palpation     RESP: lungs clear to auscultation - no rales, rhonchi or wheezes     BREAST: normal without masses, tenderness or nipple discharge and no palpable axillary masses or adenopathy     CV: regular rates and rhythm, normal S1 S2, no S3 or S4 and no murmur, click or rub     ABDOMEN:  soft, nontender, no HSM or masses and bowel sounds normal     MS: extremities normal- no gross deformities noted, no evidence of inflammation in joints, FROM in all extremities.     SKIN: no suspicious lesions or rashes     NEURO: Normal strength and tone, sensory exam grossly normal, mentation intact and speech normal     PSYCH: mentation appears normal. and affect normal/bright     LYMPHATICS: No cervical adenopathy    Recent Labs   Lab Test 03/16/21  1338 09/24/20  1641 09/18/20  1317 09/10/20  1045 07/10/20  1827 07/10/20  1827   HGB  --  13.2 13.0 12.9   < > 13.8   PLT  --  332 283 264   < > 260   INR  --   --   --  1.06  --  1.09    139 138 137   < >  --    K 4.5 4.0 3.6 3.5   < >  --    CREATININE 0.84 0.96 0.78 0.79   < >  --     < > = values in this interval not displayed.        PRE-OP Diagnostics:   No labs were ordered during this visit.  No EKG required for low risk surgery (cataract, skin procedure, breast biopsy, etc).    REVISED CARDIAC RISK INDEX (RCRI)   The patient has the following serious cardiovascular risks for perioperative complications:   - No serious cardiac risks = 0 points    RCRI INTERPRETATION: 0 points: Class I (very low risk - 0.4% complication rate)         Signed Electronically by: Marty Chapman MD    Copy of this evaluation report is provided to requesting physician.

## 2021-06-25 NOTE — TELEPHONE ENCOUNTER
Agree with skilled nursing and PT orders as described below.    She should be on Citalopram 20 mg daily. I had increased this to 20 mg daily from 10 mg daily in February 2021.    Edwina Randle MD

## 2021-06-25 NOTE — TELEPHONE ENCOUNTER
Refill Approved    Rx renewed per Medication Renewal Policy. Medication was last renewed on 12/22/2020.  Last OV 5/28/2021.    Shawnee Lehman, [facility] Connection Triage/Med Refill 6/15/2021     Requested Prescriptions   Pending Prescriptions Disp Refills     lisinopriL (PRINIVIL,ZESTRIL) 40 MG tablet [Pharmacy Med Name: LISINOPRIL 40MG TABLETS] 30 tablet 5     Sig: TAKE 1 TABLET(40 MG) BY MOUTH DAILY       Ace Inhibitors Refill Protocol Passed - 6/15/2021  3:23 AM        Passed - PCP or prescribing provider visit in past 12 months       Last office visit with prescriber/PCP: 3/16/2021 Edwina Randle MD OR same dept: 3/16/2021 Edwina Randle MD OR same specialty: 3/16/2021 Edwina Randle MD  Last physical: Visit date not found Last MTM visit: Visit date not found   Next visit within 3 mo: Visit date not found  Next physical within 3 mo: Visit date not found  Prescriber OR PCP: Edwina Randle MD  Last diagnosis associated with med order: 1. Secondary hypertension  - lisinopriL (PRINIVIL,ZESTRIL) 40 MG tablet [Pharmacy Med Name: LISINOPRIL 40MG TABLETS]; TAKE 1 TABLET(40 MG) BY MOUTH DAILY  Dispense: 30 tablet; Refill: 5    If protocol passes may refill for 12 months if within 3 months of last provider visit (or a total of 15 months).             Passed - Serum Potassium in past 12 months     Lab Results   Component Value Date    Potassium 4.5 03/16/2021             Passed - Blood pressure filed in past 12 months     BP Readings from Last 1 Encounters:   05/28/21 120/78             Passed - Serum Creatinine in past 12 months     Creatinine   Date Value Ref Range Status   03/16/2021 0.84 0.60 - 1.10 mg/dL Final

## 2021-06-25 NOTE — PROGRESS NOTES
[facility]   Speech Therapy Daily Progress     Patient Name: Tara Odell  Date: 6/3/2021  Visit #: 5  Referral Diagnosis: Acute left PCA Stroke I63.532, Difficulty understanding written language R41.89, Difficulty reading F81.0, Difficulty writing R68.89  Referring provider: Edwina Randle*  Visit Diagnosis:     ICD-10-CM    1. Agraphia  R48.8    2. Alexia  R48.0          Session 5 of 25    Assessment:   Patient is benefitting from skilled speech therapy and is making steady progress toward functional goals.  Patient is appropriate to continue with skilled speech therapy intervention, as indicated by initial plan of care.  Patient continues to have significant recognition of single letters/numbers, improved word recognition on today's date.    Goal Status:   Long Term Goals  1. Patient will increase reading, writing, and expressive language skills as foundation for functional skills needed for social interaction and participation in ADLs (e.g., making/receiving phone calls, conversing with medical providers, providing personal information, expressing needs for assistance, ect)     Short Term Goals  1. Patient will demonstrate independence with x3+ word finding strategies in structured tasks and conversation to improve communication success in conversation.  2. Patient will complete phrase-sentence written language tasks with no more than one error in 80% of opportunities given min cues.  3. Patient will label single letters/numbers with 80% accuracy SARAI.  4. Patient will complete word level reading tasks with 60% accuracy given min cues.  5. Patient will read x20 functional words/phrases with 80% accuracy SARAI.  6. Patient will verbalize use of x2 novel compensatory strategies to aid in completion of reading/written communication tasks in the home environment.            Plan / Patient Education:     Continue with initial plan of care.  Recommended continue with flash cards previously  "provided and carryover activity via home practice from today's session.    Subjective:     Patient presents as cooperative and engaged during the session.  Pain Rating: None reported    Patient arrived to therapy in good spirits.  She expressed frustration that she had missed her cataract surgery this week due to confusion between surgery date and post-op date from her brother.  She expressed frustration with her brother and feels as though he \"treats me like I'm stupid\" post CVA.    Objective:     Number/Letter Labeling:  -Numbers: 5/7 with x1 self correction.  Patient able to increase to 7/7 given verbal cues to use supports and trained strategies to count up with number scale.  -Letters:4/5 SARAI, with uncertaintly x1.  Patient unsure if \"f\" was an f or j, despite use of alphabet scale.  Patient able to increase to 5/5 given verbal cue to use supports and trained strategies to recite abc's along alphabet scale.  Patient able SARAI write 5/5 same letters.    Single Word Recognition, F:2 with visual aid  - to identify word that matches with picture from F:2 SARAI.  -Confrontation Namin/9 SARAI, with slow response time.  Patient able to increase to 9/9 given verbal cues to give the category, function, and/or parts.  For example, patient able to state, \"you drive it\" for a car, and then label car given semantic cue of \"you drive a _____\".    -Home carryover provided.    Interventions Today   Interventions MINUTES   Speech - Language 30   Cognition    Dysphagia    Assistive technology    Voice            Total 30     Ana Fox MS, CCC-SLP  6/3/2021  "

## 2021-06-25 NOTE — TELEPHONE ENCOUNTER
Reason for Call:  Other call back      Detailed comments: Heart rate is 56 parameter is below 60 and they need to call. Calling to bee if you want to lower the parameter     Phone Number Patient can be reached at: Other phone number:  [phone]    Best Time: anytime    Can we leave a detailed message on this number?: Yes    Call taken on 5/26/2021 at 11:01 AM by Lainey Toledo

## 2021-06-25 NOTE — TELEPHONE ENCOUNTER
Okay for HR to be less than or equal to 55, as long as she has no dizziness, chest pain or shortness of brreath.    I will be seeing her June 11, 2021.     Edwina Randle MD

## 2021-06-26 NOTE — PROGRESS NOTES
[facility]   Speech Therapy Daily Progress     Patient Name: Tara Odell  Date: 6/15/2021  Visit #: 7  Referral Diagnosis: Acute left PCA Stroke I63.532, Difficulty understanding written language R41.89, Difficulty reading F81.0, Difficulty writing R68.89  Referring provider: Edwina Randle  Visit Diagnosis:     ICD-10-CM    1. Aphasia, post-stroke  I69.320    2. Agraphia  R48.8        Session 7 of 25    Assessment:   Patient is benefitting from skilled speech therapy and is making steady progress toward functional goals.  Patient is appropriate to continue with skilled speech therapy intervention, as indicated by initial plan of care.    Goal Status:   Long Term Goals  1. Patient will increase reading, writing, and expressive language skills as foundation for functional skills needed for social interaction and participation in ADLs (e.g., making/receiving phone calls, conversing with medical providers, providing personal information, expressing needs for assistance, ect)     Short Term Goals  1. Patient will demonstrate independence with x3+ word finding strategies in structured tasks and conversation to improve communication success in conversation.  2. Patient will complete phrase-sentence written language tasks with no more than one error in 80% of opportunities given min cues.  3. Patient will label single letters/numbers with 80% accuracy SARAI.  4. Patient will complete word level reading tasks with 60% accuracy given min cues.  5. Patient will read x20 functional words/phrases with 80% accuracy SARAI.  6. Patient will verbalize use of x2 novel compensatory strategies to aid in completion of reading/written communication tasks in the home environment.            Plan / Patient Education:     Continue with initial plan of care.  Home exercises provided with carryover from today's session.    Subjective:     Patient presents as cooperative and engaged during the session.  Pain  "Rating: None reported    Patient arrived to therapy in good spirits.  She expressed feeling \"stiff\".    Objective:     Word Finding Strategies:  -Patient unable to recall word finding strategies trained in past session; however, reported recall when reminded of strategies.  -Patient able to SARAI label 9/11 pictures of functional daily objects; however, does not appear confident in her response.  Able to increased to 10/11 given prompt to use strategies, and to 11/11 give phonemic cue.  -Patient able to SARAI label 4/5 objects in room, increased to 5/5 given semantic cue.  Patient able to describe use and state \"it's not a baseball\" to describe object.    Word Discrimination:  -Patient able to identify word given F:2 options in 9/10 opportunities SARAI.  Patient does not immediately recognize words; however, is able to rule out using process of elimination (e.g., I know that word didn't have this letter in it).  Demonstrated use of writing word to cross check, as patient never is fully confident in her response.  Patient able to apply given min cues.  -Patient unable to read 3 letter word after identifying it.  She was able to use alphabet strategy to identify first 2 letters and eventually guess (e.g., for \"bye\" she identified /b/ and /y/ and then guessed boy and bye.    Interventions Today   Interventions MINUTES   Speech - Language 30   Cognition    Dysphagia    Assistive technology    Voice            Total 30     Ana Fox MS, CCC-SLP  6/15/2021  "

## 2021-06-26 NOTE — PROGRESS NOTES
[facility]   Speech Therapy Daily Progress     Patient Name: Tara Odell  Date: 6/10/2021  Visit #: 6  Referral Diagnosis: Acute left PCA Stroke I63.532, Difficulty understanding written language R41.89, Difficulty reading F81.0, Difficulty writing R68.89  Referring provider: Edwina aRndle  Visit Diagnosis:     ICD-10-CM    1. Agraphia  R48.8    2. Aphasia, post-stroke  I69.320          Session 6 of 25    Assessment:   Patient is benefitting from skilled speech therapy and is making steady progress toward functional goals.  Patient is appropriate to continue with skilled speech therapy intervention, as indicated by initial plan of care.    Goal Status:   Long Term Goals  1. Patient will increase reading, writing, and expressive language skills as foundation for functional skills needed for social interaction and participation in ADLs (e.g., making/receiving phone calls, conversing with medical providers, providing personal information, expressing needs for assistance, ect)     Short Term Goals  1. Patient will demonstrate independence with x3+ word finding strategies in structured tasks and conversation to improve communication success in conversation.  2. Patient will complete phrase-sentence written language tasks with no more than one error in 80% of opportunities given min cues.  3. Patient will label single letters/numbers with 80% accuracy SARAI.  4. Patient will complete word level reading tasks with 60% accuracy given min cues.  5. Patient will read x20 functional words/phrases with 80% accuracy SARAI.  6. Patient will verbalize use of x2 novel compensatory strategies to aid in completion of reading/written communication tasks in the home environment.            Plan / Patient Education:     Continue with initial plan of care.  Home exercises provided with carryover from today's session.    Subjective:     Patient presents as cooperative and engaged during the session.  Pain  "Rating: None reported    Patient arrived to therapy in good spirits. She expressed that she had to cancel her last session as she had cataract surgery this week on right, will have left eye done in a few weeks.    Objective:     Home Practice:  -Patient completed home practice including single word recognition from a F:2 with visual aid.  Patient accurately identified 15/18, with x2 left blank.  Patient able to increase to 18/18 given moderate cues for word finding and recognition.  Patient noted x1 that \"I could tell it wasn't this one because it was too short, but the other one didn't look right either\".    Word Finding Strategies:  -Trained patient on use of x5 word finding strategies (I.e., description, fist letter/sound, association, category, gestures).  -Patient able to name 4/7 concrete nouns given additional time for word finding with use of word finding strategies given min cue, increased to 7/7 given moderate/max cues/modeling.  Patient consistently able to provide at least 1 description, category, or gesture to cue word retrieval.  She additionally benefited from phonemic or semantic cues.    Interventions Today   Interventions MINUTES   Speech - Language 30   Cognition    Dysphagia    Assistive technology    Voice            Total 30     Ana Fox MS, CCC-SLP  6/10/2021  "

## 2021-06-26 NOTE — PROGRESS NOTES
Your HbA1c is 5.9%.  I would like you to continue with Metformin, 250 mg each morning.  Your hemoglobin is slightly decreased 11.7, you are on blood thinning medications, aspirin and Plavix, we will have to follow-up.  I ordered a Cologuard test (stool test) when you were with me in clinic, we'll see what that shows.    Edwina Randle MD

## 2021-06-26 NOTE — PROGRESS NOTES
[facility]   Speech Therapy Daily Progress     Patient Name: Tara Odell  Date: 6/17/2021  Visit #: 8  Referral Diagnosis: Acute left PCA Stroke I63.532, Difficulty understanding written language R41.89, Difficulty reading F81.0, Difficulty writing R68.89  Referring provider: Edwina Randle*  Visit Diagnosis:     ICD-10-CM    1. Agraphia  R48.8        Session 8 of 25    Assessment:   Patient is benefitting from skilled speech therapy and is making steady progress toward functional goals.  Patient is appropriate to continue with skilled speech therapy intervention, as indicated by initial plan of care.    Goal Status:   Long Term Goals  1. Patient will increase reading, writing, and expressive language skills as foundation for functional skills needed for social interaction and participation in ADLs (e.g., making/receiving phone calls, conversing with medical providers, providing personal information, expressing needs for assistance, ect)     Short Term Goals  1. Patient will demonstrate independence with x3+ word finding strategies in structured tasks and conversation to improve communication success in conversation.  2. Patient will complete phrase-sentence written language tasks with no more than one error in 80% of opportunities given min cues.  3. Patient will label single letters/numbers with 80% accuracy SARAI.  4. Patient will complete word level reading tasks with 60% accuracy given min cues.  5. Patient will read x20 functional words/phrases with 80% accuracy SARAI.  6. Patient will verbalize use of x2 novel compensatory strategies to aid in completion of reading/written communication tasks in the home environment.            Plan / Patient Education:     Continue with initial plan of care.  Home exercises provided with carryover from today's session.    Subjective:     Patient presents as cooperative and engaged during the session.  Pain Rating: None reported    Patient arrived  to therapy in good spirits.     Objective:     Word Discrimination:  -Patient able to identify word given F:2 options in 10/10 opportunities SARAI, noted to be not completely sure at times.  -Patient able to read 1/2 words, increased to 2/2 with use of alphabet board previously trained to identify initial 1-2 letters.    Functional Word Lists  -SLP and patient created functional list of x10 names of family members/close friends.  Recommendations made to use for home practice with identification and to read, with use of alphabet board as needed.    Text to Speech b[place]:  -SLP modeled [place], [employer], to patient, to be used to copy/paste texts/emails into to be read out loud.  Assisted patient in locating the b[place] on her phone, but unable to get as patient unsure of her password.  Patient to attempt to download with assistance from her daughter this weekend, further practice in upcoming session.    Interventions Today   Interventions MINUTES   Speech - Language 30   Cognition    Dysphagia    Assistive technology    Voice            Total 30     Ana Fox MS, CCC-SLP  6/17/2021

## 2021-06-26 NOTE — PATIENT INSTRUCTIONS - HE
1. ON the morning of your cataract surgery (Tuesday June 22, 2021), please take your Amlodipine, Citalopram and Lisinopril only, with a sip of water. Don't take any other medications.  2. Start vitamin D 2000 units each morning once the high dose vitamin D is complete (you can get this over the counter).  3. I have put in a referral for a mammogram, food doctor referral and physical therapy referral (for your right leg stiffness and pain).   4. I am doing some blood tests today.   5. I have ordered something called a cologuard test, which looks for blood and changes related to colon cancer. This is a screening test instead of sending you for a colonoscopy.

## 2021-06-30 NOTE — PROGRESS NOTES
Progress Notes by Ana Fox MS, CCC-SLP at 4/29/2021  8:00 AM     Author: Ana Fox MS, CCC-SLP Service: -- Author Type: Speech and Language Pathologist    Filed: 4/29/2021  1:26 PM Encounter Date: 4/29/2021 Status: Attested    : Ana Fox MS, CCC-SLP (Speech and Language Pathologist) Cosigner: Edwina Randle MD at 5/4/2021  5:58 PM    Attestation signed by Edwina Randle MD at 5/4/2021  5:58 PM    Thank you, I agree with the plan.    Edwina Randle MD                  [facility] Speech Therapy  Certification Request    April 29, 2021        Patient: Tara Odell   MR Number: [id number]   YOB: 1960   Date of Visit: 4/29/2021       Dear Dr. Randle,    Thank you for this referral.   We are seeing Tara Odell for Speech Language Therapy.    Medicare and/or Medicaid requires physician review and approval of the treatment plan. Please review the plan of care and verify that you agree with the therapy plan of care by co-signing this note.     Onset date: 4/21/21  Start of care date (eval): 4/29/21  Diagnosis:   -Medical diagnosis: Acute left PCA Stroke I63.532, Difficulty understanding written language R41.89, Difficulty reading F81.0, Difficulty writing R68.89  -Treatment diagnosis: Alexia R48.0, Agraphia R48.8, Aphasia, post-stroke I69.320      Plan of Care  Authorization / Certification Start Date: 04/29/21  Authorization / Certification End Date: 07/29/21  Authorization / Certification Number of Visits: 25  Times per Week: 2  Number of Weeks: 12  Number of Visits: 25      Goals  Long Term Goals  1. Patient will increase reading, writing, and expressive language skills as foundation for functional skills needed for social interaction and participation in ADLs (e.g., making/receiving phone calls, conversing with medical providers, providing personal information, expressing needs for assistance, ect)    Short  Term Goals  1. Patient will demonstrate independence with x3+ word finding strategies in structured tasks and conversation to improve communication success in conversation.  2. Patient will complete phrase-sentence written language tasks with no more than one error in 80% of opportunities given min cues.  3. Patient will label single letters/numbers with 80% accuracy SARAI.  4. Patient will complete word level reading tasks with 60% accuracy given min cues.  5. Patient will read x20 functional words/phrases with 80% accuracy SARAI.  6. Patient will verbalize use of x2 novel compensatory strategies to aid in completion of reading/written communication tasks in the home environment.      If you have any questions or concerns, please don't hesitate to call.    Sincerely,    Ana Fox MS, CCC-SLP          [facility] Speech Therapy   Evaluation and Initial Plan of Care    Patient Name: Traa Odell  Date of evaluation: 4/29/2021  Referral Diagnosis: Acute left PCA Stroke I63.532, Difficulty understanding written language R41.89, Difficulty reading F81.0, Difficulty writing R68.89  Referring provider: Edwina Randle*  Visit Diagnosis:     ICD-10-CM    1. Alexia  R48.0    2. Agraphia  R48.8    3. Aphasia, post-stroke  I69.320          Assessment:      Patient presents with moderate deficits in writing and word finding and severe deficits in reading and letter/number recognition post CVA.  These deficits impact her ability to participate in functional daily activities she previously enjoyed.  Patient is appropriate for direct speech therapy per plan of care below.    Long Term Goals  1. Patient will increase reading, writing, and expressive language skills as foundation for functional skills needed for social interaction and participation in ADLs (e.g., making/receiving phone calls, conversing with medical providers, providing personal information, expressing needs for assistance,  ect)    Short Term Goals  1. Patient will demonstrate independence with x3+ word finding strategies in structured tasks and conversation to improve communication success in conversation.  2. Patient will complete phrase-sentence written language tasks with no more than one error in 80% of opportunities given min cues.  3. Patient will label single letters/numbers with 80% accuracy SARAI.  4. Patient will complete word level reading tasks with 60% accuracy given min cues.  5. Patient will read x20 functional words/phrases with 80% accuracy SARAI.  6. Patient will verbalize use of x2 novel compensatory strategies to aid in completion of reading/written communication tasks in the home environment.    Patient goal:  To improve reading and writing  Patient's expectations/goals are realistic.    Rehab potential: Fair based on evaluation results, motivation and cooperation and time post onset.     Plan / Patient Instructions:      Plan of Care:  Authorization / Certification Start Date: 04/29/21  Authorization / Certification End Date: 07/29/21  Authorization / Certification Number of Visits: 25  Times per Week: 2  Number of Weeks: 12  Number of Visits: 25      Plan:   Speech therapy 2 times a week for 12 weeks.    Education:   Patient participated in education regarding evaluation results, treatment plan/rationale, home program and expected functional outcome  Patient demonstrate understanding       Subjective:         Social information:   Living Situation:single family home, moved in with her mom and brother lives there as well   Occupation: Was working but no longer able to.  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    History of Present Illness:    Tara is a 60 y.o. female who presents to therapy today with complaints of difficulty reading/writing post stroke. Date of onset/duration of symptoms is 7/102020 when patient was found to have acute ischemic left EULALIA stroke.  Patient has been seen by speech therapy inpatient at  "the acute care site, at acute rehabilitation facility, and via home health.  She endorses some improvement in communication deficits, but expressed ongoing challenges with reading and writing, as well as some difficulty with word finding.  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      Patient endorsed vision deficits that also impact her reading/writing.  She stated that she is waiting on cataract surgery.    Patient presents as cooperative and engaged during the session.  Patient reports no pain     Objective:      Examination    Oral motor function was not impaired.    Motor speech was not impaired.   Speech intelligibility was approximately 100 % at the conversation level    Voice was not impaired.     Trach/Vent: NA    Auditory Comprehension:    -Patient accurately identified 100% of objects in pictures.  -Patient accurately responded to 100% of personal, simple, and moderately complex yes/no questions.  -Patient followed 100% of 2 & 3 step directions, including r/l discrimination.     Reading Comprehension:  -Patient able to match single word in F:4 options with 100% accuracy.  -Patient able to match single word to picture in 100% of opportunities, with skipping x1 question and coming back to it.  -Patient able to ID single word to label a picture given F:2 and F:3 options with 100% accuracy.  -Patient able to ID sentence to match a picture given F:2 options in 50% of opportunities.  -Patient able to read/follow written direction in 1/2 trials.  -Patient able to label numbers in 100% of opportunities given additional time, and was not confident in her response.  For labeling the \"2\" she stated, \"I thought it should have a w\".  When shown a \"5\" she stated, \"10, no it's not that\".  -Patient able to verbally read single words in 0% of opportunities.  Unable to increase given semantic cues, able to ID word given F:2 or F:3 options (e.g., is it hand or " "eye?).    Verbal Expression:  -Patient imitated single words to complex sentences with 100% accuracy.  -Patient responded to object function questions with 100% accuracy.  -Patient was able to fluently participate in conversation to explain/describe with vague language, suspect further language impairments during moderate-complex structured language tasks.    A portion of the [place] Naming Test (BNT) was administered to assess confrontational naming.  Patient able to independently label 4/10 objects in pictures given additional time for word finding.      Written Expression:  -Patient able to trace correct shapes/letters with 100% accuracy; however, noted to be off line in 2/3 trials.  -Patient able to copy single letter/number in 50% of opportunities.  -Patient able to write name, with x1 omitted letter from last name.  -Patient able to write city of residence.  -Patient able to write date of birth.  -Patient able to write/spell single words in 3/3 opportunities (e.g., cat, letter, information).  She was unable to read word after writing it.  -Patient able to write sentence with x1 error (e.g., the man opened to [the] box of cookies)    Cognition: Not formally assessed on this date due to time constraints and primary concerns regarding reading/writing.  Suspect at least mild impairments.  During orientation questions, patient stated that the year was 1992; however, acknowledged that \"numbers are hard for me\".    Speech-Language Intervention:  -Education provided regarding verbal expression, written, and reading deficits post stroke as well as purpose of rehabilitation/recovery with use of visual aids.  Discussed that therapy will focus on both rehabilitation of skills as well as compensatory strategy use.  -Patient reported that she recently started using voice to text options on her phone.  When texting a close friend, she stated that sometimes her messages don't make sense, but other times they are " "\"silly\".  -Patient was receptive to cueing to aid in word finding.  Given verbal cues, patient was able to use description, gestures, category, and associations inconsistently (e.g., gesturing pencil, stating \"blow\" for whistle, saying \"it's a tool, not a hammer\" for saw).  Patient was able to increased naming by 20% given semantic cues, and additional 20% given phonemic cue.  Further therapy warranted to increase independence with use of word finding strategies and generalize across structured tasks/conversation.    Interventions Today  Interventions MINUTES   Speech - Language Evaluation 45   Cognition    Dysphagia    Assistive technology    Voice    Speech-Language Intervention 15       Total 60          Ana Fox MS, CCC-SLP  4/29/2021                      "

## 2021-07-03 NOTE — ADDENDUM NOTE
Addendum Note by Edwina Garcia MD at 9/29/2020  1:23 PM     Author: Edwina Garcia MD Service: -- Author Type: Physician    Filed: 9/29/2020  1:23 PM Encounter Date: 9/29/2020 Status: Signed    : Edwina Garcia MD (Physician)    Addended by: EDWINA GARCIA on: 9/29/2020 01:23 PM        Modules accepted: Orders         Is she in [place]?

## 2021-07-03 NOTE — ADDENDUM NOTE
Addendum Note by Carlos Bolivar CMA at 9/29/2020  4:49 PM     Author: Carlos Bolivar CMA Service: -- Author Type: Certified Medical Assistant    Filed: 9/29/2020  4:49 PM Encounter Date: 9/29/2020 Status: Signed    : Carlos Bolivar CMA (Certified Medical Assistant)    Addended by: CARLOS BOLIVAR on: 9/29/2020 04:49 PM        Modules accepted: Orders

## 2021-07-03 NOTE — ADDENDUM NOTE
Addendum Note by Serg Culp MD at 9/29/2020  5:10 PM     Author: Serg Culp MD Service: -- Author Type: Physician    Filed: 9/29/2020  5:10 PM Encounter Date: 9/29/2020 Status: Signed    : Serg Culp MD (Physician)    Addended by: SERG CULP on: 9/29/2020 05:10 PM        Modules accepted: Orders

## 2021-07-03 NOTE — ADDENDUM NOTE
Addendum Note by Edwina Garcia MD at 12/1/2020  5:20 PM     Author: Edwina Garcia MD Service: -- Author Type: Physician    Filed: 12/1/2020  5:20 PM Encounter Date: 12/1/2020 Status: Signed    : Edwina Garcia MD (Physician)    Addended by: EDWINA GARCIA on: 12/1/2020 05:20 PM        Modules accepted: Orders

## 2021-07-04 NOTE — PROGRESS NOTES
Progress Notes by Edwina Randle MD at 6/16/2021 10:00 AM     Author: Edwina Randle MD Service: -- Author Type: Physician    Filed: 7/4/2021  2:19 PM Encounter Date: 6/16/2021 Status: Signed    : Edwina Randle MD (Physician)       [address] [address]  Dept: [phone]  Dept Fax: [phone]  Primary Provider:     Preventative Visit    Today's date: 6/16/2021    Tara Odell is a 60 y.o. female who presents for a preoperative evaluation. This visit was scheduled as a physical but she is having cataract surgery next week on Tuesday so the visit was changed to a preoperative H+P. And now we have figured out that this is a physical because she had a preoperative H+P 5/28/2021 with Dr. Chapman, please see that note for details about preop.    Because of her poor memory, related to her stroke, I did give advice regarding what medications to take on the morning of the procedure, and wrote this on her after visit summary.  No family members are with her in the clinic visit today.    Type of Anesthesia Anticipated: to be determined    Assessment & Plan     Tara was seen today for annual exam.    Diagnoses and all orders for this visit:    Routine general medical examination at a health care facility, patient declines colonoscopy referral, for screening for colon cancer.  We will plan on doing a Cologuard test, discussed this with the patient.  However because of her memory issues, I am not sure if she will remember the instructions.  I have written a list of what was discussed on her after visit summary, in the hopes that her family will see what is being planned [but her mother is significantly visually impaired].    Type 2 diabetes mellitus without complication, without long-term current use of insulin (H), I had planned on decreasing her Metformin to a 250 mg immediate release dose, however the Metformin 500 mg  extended release dose was ordered for her March 18, 2021.  I have been decreasing her Metformin, and due to loss of weight, and her HbA1c being very low, have decreased to 5.6%.  Patient says she is gaining weight now.  Will check HbA1c again.  -     Glycosylated Hemoglobin A1c  -     Ambulatory referral to Podiatry - [facility] (includes [facility] groups)    Chronic ischemic left PCA stroke, patient is on aspirin and Plavix.  This occurred July 10-July 17, 2020.  Patient has urinary incontinence.  She has pain involving the right side of her body.  She is able to walk, and today does not have a walker with her.  She has had significant effects on her cognition, language, is unable to read or to write, which is very distressing to her.  She has been attending speech therapy, as an outpatient.  Home care is setting up her medications for her.  She has proximal weakness, and gait disorder.  Patient requests referral to physical therapy.  -     HM1(CBC and Differential)    Gait disorder: Patient had a left sided PCA stroke with right sided weakness, visual effects and speech effects, which occurred July 2020. She had some home PT She is complaining of stiffness in her right leg which is causing pain particularly in her right upper leg, leading to difficulty with her gait. She also has pain in her right upper arm. She would like some help with this.  -     Ambulatory referral to Adult PT- External    Anemia due to other cause, not classified, hemoglobin came back today slightly decreased at 11.7.  She is on aspirin and Plavix as mentioned above.  We will have to follow-up.  -     Cancel: Ferritin  -     Cancel: Iron and Transferrin Iron Binding Capacity  -     Cancel: Transferrin    Proximal leg weakness, as above  -     Ambulatory referral to Adult PT- External    Pain of right lower leg, as above  -     Ambulatory referral to Adult PT- External    Disorder of ligament of left foot: Patient says she had some  sort of surgery as a senior in [place], and this was fine but since a stroke (PCA L), which affected her right side (and affected her gait), is causing her some trouble. Clinically has some deformity of her left sided toes. She is diabetic.  -     Ambulatory referral to Podiatry - [facility] (includes F[facility] groups)    Screen for colon cancer, discussed with patient, she does not want to have a colonoscopy.  I doubt that she be able to follow through with the prep beforehand either, and does not have help at home for this either.  She has had a colonoscopy once before, which was normal.  -     Cologuard    Other cataract, unspecified laterality, going for a procedure.  June 22, preop was done by Dr. Chapman.    Need for pneumococcal vaccination  -     Pneumococcal polysaccharide vaccine 23-valent 3 yo or older, subq/IM    Vitamin D deficiency  -     cholecalciferol, vitamin D3, 25 mcg (1,000 unit) capsule; Take 2 capsules (2,000 Units total) by mouth daily. Start taking 2000 units every day (2 capsules) once the high dose weekly vitamin D doses are finished.    Encounter for screening mammogram for breast cancer  -     Mammo Screening Bilateral; Future      Patient Instructions.  1. ON the morning of your cataract surgery (Tuesday June 22, 2021), please take your Amlodipine, Citalopram and Lisinopril only, with a sip of water. Don't take any other medications.  2. Start vitamin D 2000 units each morning once the high dose vitamin D is complete (you can get this over the counter).  3. I have put in a referral for a mammogram, food doctor referral and physical therapy referral (for your right leg stiffness and pain).   4. I am doing some blood tests today.   5. I have ordered something called a cologuard test, which looks for blood and changes related to colon cancer. This is a screening test instead of sending you for a colonoscopy.    54 minutes spent on the date of the encounter doing chart review,  review of test results, patient visit and documentation, as detailed.       Subjective    Patient is feeling good, no changes in her symptoms.  No shortness of breath.  Patient is complaining of pain on the right side of her body, including her right foot.  Referral to podiatry discussed with the patient today.  Patient says she is previous surgery on her left foot.  She has hammertoe deformities.  She also has pain involving the right leg, especially her right upper leg.  Pain in her right upper arm.  Another thought would be referral to physical medicine and rehabilitation.    Patient has to get up at night to go to the bathroom.    Weight was 159 pounds December 11, 2020.  This is currently 175 pounds.    Rest of review systems is negative.    Patient Active Problem List    Diagnosis Date Noted   ? Preop general physical exam 06/16/2021   ? Major depressive disorder with single episode, in partial remission (H) 01/11/2021   ? Benign essential hypertension 10/30/2020   ? Diabetes mellitus, type 2 (H) 10/28/2020   ? Acute left PCA stroke (H) 07/10/2020     No past medical history on file.  Past Surgical History:   Procedure Laterality Date   ? IR CAROTID ANGIOGRAM  7/14/2020     Current Outpatient Medications   Medication Sig Dispense Refill   ? amLODIPine (NORVASC) 5 MG tablet TAKE 1 TABLET(5 MG) BY MOUTH DAILY 90 tablet 3   ? aspirin 81 mg chewable tablet CHEW AND SWALLOW 1 TABLET(81 MG) BY MOUTH DAILY 90 tablet 3   ? atorvastatin (LIPITOR) 80 MG tablet Take 1 tablet (80 mg total) by mouth at bedtime. 90 tablet 3   ? blood glucose test strips Inject 1 each under the skin daily. Contour Next EZ strips please for her contour next EZ monitor. Daily accucheck premeal 100 strip 3   ? blood-glucose meter (CONTOUR NEXT EZ METER) Misc USE ONCE DAILY AS DIRECTED 1 each 0   ? citalopram (CELEXA) 20 MG tablet TAKE 1 TABLET(20 MG) BY MOUTH EVERY MORNING 90 tablet 2   ? clopidogreL (PLAVIX) 75 mg tablet Take 1 tablet (75 mg  "total) by mouth daily. 90 tablet 1   ? ergocalciferol (ERGOCALCIFEROL) 1,250 mcg (50,000 unit) capsule TAKE 1 CAPSULE BY MOUTH 1 TIME A WEEK FOR 12 DOSES 12 capsule 0   ? generic lancets (FINGERSTIX LANCETS) Test one times daily. Dispense brand per patient's insurance at pharmacy discretion. 100 each 3   ? generic lancets (FINGERSTIX LANCETS) Test one time daily. One touch. 100 each 11   ? lisinopriL (PRINIVIL,ZESTRIL) 40 MG tablet TAKE 1 TABLET(40 MG) BY MOUTH DAILY 90 tablet 3   ? metFORMIN (GLUCOPHAGE) 500 MG tablet Take 0.5 tablets (250 mg total) by mouth daily with breakfast. 45 tablet 3   ? metFORMIN (GLUCOPHAGE-XR) 500 MG 24 hr tablet Take 1 tablet (500 mg total) by mouth daily with breakfast. 90 tablet 3   ? prednisoLONE acetate (PRED-FORTE) 1 % ophthalmic suspension INSTILL 1 DROP INTO THE RIGHT EYE FOUR TIMES DAILY FOR 1 WEEK THEN TAPER. START AFTER SURGERY     ? cholecalciferol, vitamin D3, 25 mcg (1,000 unit) capsule Take 2 capsules (2,000 Units total) by mouth daily. Start taking 2000 units every day (2 capsules) once the high dose weekly vitamin D doses are finished.  0     No current facility-administered medications for this visit.        Allergies   Allergen Reactions   ? Other Drug Allergy (See Comments) Other (See Comments)     States had an allergic reaction to an antibiotic but does remember name   ? Seafood Other (See Comments)     Not sure of reaction, gets sick       Social History     Tobacco Use   ? Smoking status: Never Smoker   ? Smokeless tobacco: Never Used   Substance Use Topics   ? Alcohol use: Never     Frequency: Never     Social History     Substance and Sexual Activity   Drug Use Never        Objective     /70 (Patient Site: Right Arm, Patient Position: Sitting)   Pulse (!) 56   Ht 5' 4\" (1.626 m)   Wt 175 lb (79.4 kg)   SpO2 99%   BMI 30.04 kg/m    Physical Exam  Patient has an abnormal gait, secondary to her stroke, she complains of pain and stiffness in her right " upper leg, and right upper arm.  Chest is clear.  Normal heart sounds, I do not hear any murmurs.  No respiratory distress.    Recent Labs   Lab Test 06/16/21  1131 03/16/21  1338 09/24/20  1641 09/10/20  1045 09/10/20  1045 07/10/20  1827 07/10/20  1827   HGB 11.7*  --  13.2   < > 12.9   < > 13.8     --  332   < > 264   < > 260   INR  --   --   --   --  1.06  --  1.09   NA  --  140 139   < > 137   < >  --    K  --  4.5 4.0   < > 3.5   < >  --    CREATININE  --  0.84 0.96   < > 0.79   < >  --     < > = values in this interval not displayed.          Signed Electronically by: Edwina Randle MD

## 2021-07-13 DIAGNOSIS — F33.41 RECURRENT MAJOR DEPRESSIVE DISORDER, IN PARTIAL REMISSION (H): Primary | ICD-10-CM

## 2021-07-13 RX ORDER — CITALOPRAM HYDROBROMIDE 20 MG/1
TABLET ORAL
COMMUNITY
Start: 2021-02-16 | End: 2021-07-13

## 2021-07-13 RX ORDER — CITALOPRAM HYDROBROMIDE 20 MG/1
TABLET ORAL
Qty: 90 TABLET | Refills: 3 | Status: SHIPPED | OUTPATIENT
Start: 2021-07-13 | End: 2021-08-18

## 2021-07-14 PROBLEM — G45.0 VBI (VERTEBROBASILAR INSUFFICIENCY): Status: RESOLVED | Noted: 2020-07-12 | Resolved: 2021-05-28

## 2021-07-14 PROBLEM — I63.9 ACUTE CVA (CEREBROVASCULAR ACCIDENT) (H): Status: RESOLVED | Noted: 2020-07-10 | Resolved: 2021-05-28

## 2021-07-15 ENCOUNTER — HOSPITAL ENCOUNTER (OUTPATIENT)
Dept: SPEECH THERAPY | Facility: REHABILITATION | Age: 61
End: 2021-07-15
Payer: COMMERCIAL

## 2021-07-15 DIAGNOSIS — R48.8 AGRAPHIA: Primary | ICD-10-CM

## 2021-07-15 DIAGNOSIS — I69.320 APHASIA, POST-STROKE: ICD-10-CM

## 2021-07-15 PROCEDURE — 92507 TX SP LANG VOICE COMM INDIV: CPT | Mod: GN | Performed by: SPEECH-LANGUAGE PATHOLOGIST

## 2021-07-15 NOTE — PROGRESS NOTES
OUTPATIENT SPEECH LANGUAGE PATHOLOGY  PLAN OF TREATMENT FOR OUTPATIENT REHABILITATION AND PROGRESS NOTE                                                          Patient's Last Name, First Name, M.JERILYN.                Tara Liu Date of Birth  1960   Provider's Name  [facility] Medical Record No.  [id number]    Onset Date  4/21/21 Start of Care Date  4/29/21   Type:     __PT   ___OT   _X_SLP Medical Diagnosis  Acute left PCA stroke I63.532, difficulty understanding written language R41.89, difficulty reading F81.0, Difficulty writing R68.89   SLP Diagnosis  Alexia R48.0, Agraphia R48.8, Aphasia, post-stroke I69.320 Plan of Treatment  Frequency/Duration: 2x per week for 12 weeks  Certification date from 7/29/21 to 10/29/21     Goals:  Goal Identifier 1   Goal Description Patient will demonstrate independence with x3+ word finding strategies in structured tasks and conversation to improve communication success in conversation.    Target Date 10/29/21   Date Met      Progress (detail required for progress note): GOAL PROGRESSING, patient able to apply strategies during structured naming tasks given verbal cues, improved word finding in conversation vs structured tasks.  Continue goal.     Goal Identifier 2   Goal Description Patient will complete phrase-sentence written language tasks with no more than one error in 80% of opportunities given min cues.   Target Date     Date Met      Progress (detail required for progress note): Goal not yet addressed.  Discontinue goal to prioritize other functional areas of need.     Goal Identifier 3   Goal Description Patient will label single letters/numbers with 80% accuracy SARAI.   Target Date 10/29/21   Date Met      Progress (detail required for progress note): Goal progressing, benefits from use of strategies to aid in recognition including alphabet/number board.  Continue goal.     Goal Identifier 4   Goal Description Patient will  complete word level reading tasks with 60% accuracy given min cues.   Target Date 10/29/21   Date Met      Progress (detail required for progress note): Goal progressing with 90% accuracy for familiar word list and 40% accuracy for less familiar word list. 60% accuracy with single word reading with known context SARAI on 7/15/21.  Continue goal     Goal Identifier 5   Goal Description Patient will read x20 functional words/phrases with 80% accuracy SARAI.   Target Date 10/29/21   Date Met      Progress (detail required for progress note): -Goal progressing with 90% accuracy for initial x10 words created.  Continue goal.     Goal Identifier 6   Goal Description Patient will verbalize use of x2 novel compensatory strategies to aid in completion of reading/written communication tasks in the home environment.    Target Date 07/29/21   Date Met  07/15/21   Progress (detail required for progress note): Goal met.  Patient able to recall and demonstrate use of text to speech [place].  Additionally using speech to text and alphabet board.           I CERTIFY THE NEED FOR THESE SERVICES FURNISHED UNDER        THIS PLAN OF TREATMENT AND WHILE UNDER MY CARE     (Physician co-signature of this document indicates review and certification of the therapy plan).                Referring Provider: Edwina Fox, SLP

## 2021-07-20 ENCOUNTER — HOSPITAL ENCOUNTER (OUTPATIENT)
Dept: SPEECH THERAPY | Facility: REHABILITATION | Age: 61
End: 2021-07-20
Payer: COMMERCIAL

## 2021-07-20 DIAGNOSIS — R48.8 AGRAPHIA: Primary | ICD-10-CM

## 2021-07-20 PROCEDURE — 92507 TX SP LANG VOICE COMM INDIV: CPT | Mod: GN | Performed by: SPEECH-LANGUAGE PATHOLOGIST

## 2021-07-21 ENCOUNTER — TRANSFERRED RECORDS (OUTPATIENT)
Dept: HEALTH INFORMATION MANAGEMENT | Facility: CLINIC | Age: 61
End: 2021-07-21

## 2021-07-21 LAB — RETINOPATHY: NEGATIVE

## 2021-07-22 ENCOUNTER — HOSPITAL ENCOUNTER (OUTPATIENT)
Dept: SPEECH THERAPY | Facility: REHABILITATION | Age: 61
End: 2021-07-22
Payer: COMMERCIAL

## 2021-07-22 DIAGNOSIS — R48.8 AGRAPHIA: Primary | ICD-10-CM

## 2021-07-22 PROCEDURE — 92507 TX SP LANG VOICE COMM INDIV: CPT | Mod: GN | Performed by: SPEECH-LANGUAGE PATHOLOGIST

## 2021-07-27 DIAGNOSIS — E55.9 VITAMIN D DEFICIENCY: Primary | ICD-10-CM

## 2021-07-29 ENCOUNTER — TELEPHONE (OUTPATIENT)
Dept: INTERNAL MEDICINE | Facility: CLINIC | Age: 61
End: 2021-07-29

## 2021-07-29 ENCOUNTER — HOSPITAL ENCOUNTER (OUTPATIENT)
Dept: SPEECH THERAPY | Facility: REHABILITATION | Age: 61
End: 2021-07-29
Payer: COMMERCIAL

## 2021-07-29 DIAGNOSIS — R48.8 AGRAPHIA: ICD-10-CM

## 2021-07-29 DIAGNOSIS — I69.320 APHASIA, POST-STROKE: Primary | ICD-10-CM

## 2021-07-29 PROCEDURE — 92507 TX SP LANG VOICE COMM INDIV: CPT | Mod: GN | Performed by: SPEECH-LANGUAGE PATHOLOGIST

## 2021-07-29 NOTE — TELEPHONE ENCOUNTER
Reason for Call:  Home Health Care    [facility] called regarding (reason for call): verbal orders    Orders are needed for this patient. Skilled nursing every 2 weeks for 2 weeks, then every 3 weeks for 7 weeks for med management and general health assessment. 3 PRNs to adjust med box as needed.      Can we leave a detailed message on this number? YES    Phone number patient can be reached at: Other phone number:  *[phone]    Best Time: Any    Call taken on 7/29/2021 at 4:41 PM by Nya Irizarry

## 2021-07-30 NOTE — TELEPHONE ENCOUNTER
Routing refill request to provider for review/approval because:  Drug not on the [facility] refill protocol   ___________________________________________________________    Copy of Last Rx:  OTC.     Last office visit with provider:  6/16/21   ___________________________________________________________    Requested Prescriptions   Pending Prescriptions Disp Refills     vitamin D2 (ERGOCALCIFEROL) 30524 units (1250 mcg) capsule [Pharmacy Med Name: VITAMIN D2 50,000IU (ERGO) CAP RX] 12 capsule      Sig: TAKE 1 CAPSULE BY MOUTH 1 TIME A WEEK FOR 12 DOSES       There is no refill protocol information for this order          Graciela Craft RN 07/29/21 8:08 PM

## 2021-08-04 ENCOUNTER — HOSPITAL ENCOUNTER (OUTPATIENT)
Dept: PHYSICAL THERAPY | Facility: REHABILITATION | Age: 61
End: 2021-08-04
Payer: COMMERCIAL

## 2021-08-04 DIAGNOSIS — R26.9 GAIT DISORDER: Primary | ICD-10-CM

## 2021-08-04 DIAGNOSIS — R29.898 RIGHT LEG WEAKNESS: ICD-10-CM

## 2021-08-04 DIAGNOSIS — R26.81 UNSTEADINESS ON FEET: ICD-10-CM

## 2021-08-04 PROCEDURE — 97110 THERAPEUTIC EXERCISES: CPT | Mod: GP | Performed by: PHYSICAL THERAPIST

## 2021-08-04 NOTE — PROGRESS NOTES
Goals:  No data recorded  Pt will: Able to walk on uneven surfaces in the grass and do some light gardening without fear of falling as balance and strength improve within 12 visits  Pt will: Able to walk on even surfaces, like 1/2-1 mile through the neighborhood, with a normal pace as endurance and strength improves within 12 visits      Date 8/4/21   Exercise    Nu-step  Seat 7, arms 9, workload 3 3'   Standing ex:  Hip flex, ext, abd, heel raises, toe raises, marching X 10 working up to 20

## 2021-08-05 ENCOUNTER — HOSPITAL ENCOUNTER (OUTPATIENT)
Dept: SPEECH THERAPY | Facility: REHABILITATION | Age: 61
End: 2021-08-05
Payer: COMMERCIAL

## 2021-08-05 DIAGNOSIS — R48.0 ALEXIA: ICD-10-CM

## 2021-08-05 DIAGNOSIS — I69.320 APHASIA, POST-STROKE: Primary | ICD-10-CM

## 2021-08-05 PROCEDURE — 92507 TX SP LANG VOICE COMM INDIV: CPT | Mod: GN | Performed by: SPEECH-LANGUAGE PATHOLOGIST

## 2021-08-05 NOTE — PROGRESS NOTES
OUTPATIENT SPEECH LANGUAGE PATHOLOGY  PLAN OF TREATMENT FOR OUTPATIENT REHABILITATION AND PROGRESS NOTE                                                          Patient's Last Name, First Name, M.I.                Tara Liu Date of Birth  1960   Provider's Name  [facility] Medical Record No.  [id number]    Onset Date  4/21/21 Start of Care Date  4/29/21   Type:     __PT   ___OT   _X_SLP Medical Diagnosis  Acute left PCA stroke I63.532, difficulty understanding written language R41.89, difficulty reading F81.0, Difficulty writing R68.89   SLP Diagnosis  Alexia R48.0, Agraphia R48.8, Aphasia, post-stroke I69.320 Plan of Treatment  Frequency/Duration: 2x per week for 12 weeks  Certification date from 7/29/21 to 10/29/21     Goals:  Goal Identifier 1   Goal Description Patient will demonstrate independence with x3+ word finding strategies in structured tasks and conversation to improve communication success in conversation.    Target Date 10/29/21   Date Met      Progress (detail required for progress note): GOAL PROGRESSING, patient able to increase confrontation naming from 70% to 100% with use of word finding strategies.  Noted independent use of word finding strategies in conversation in 50% of opportunities on today's date.  Continue goal for increased accuracy, independence, and generalization across conversation.     Goal Identifier 3   Goal Description Patient will label single letters/numbers with 80% accuracy SARAI.   Target Date 10/29/21   Date Met      Progress (detail required for progress note): GOAL PROGRESSING: Patient able to label single letters with 60% accuracy and numbers with 80% accuracy on today's date.  Improved recognition with letters/numbers on key board vs written text.  Patient continues to benefit from use of alphabet/number board for recognition. Continue goal for increased accuracy/independence.     Goal Identifier 4   Goal Description  Patient will complete word level reading tasks with 60% accuracy given min cues.   Target Date 10/29/21   Date Met      Progress (detail required for progress note): GOAL PROGRESSING.  Patient able to read single words from 40%-100% accuracy.  Accuracy increases as context is known (given category).  Patient able to increase accuracy from 40% to 100% with use of alphabet board and context known.  Continue goal for increased accuracy/independence.     Goal Identifier 5   Goal Description Patient will read x20 functional words/phrases with 80% accuracy SARAI.   Target Date 10/29/21   Date Met      Progress (detail required for progress note): GOAL PROGRESSING.  Patient able to recognize 80% of words for x20 functional word lists, increased for x10 in past reporting period.  Continue goal with addition of additional functional word lists.     Goal Identifier 6   Goal Description Patient will verbalize use of x2 novel compensatory strategies to aid in completion of reading/written communication tasks in the home environment.    Target Date 07/29/21   Date Met  07/15/21   Progress (detail required for progress note):     Goal Identifier 7   Goal Description Patient will utilize text to speech option on phone and/or computer to complete simple functional reading tasks with 100% accuracy SARAI.   Target Date 10/29/21   Date Met      Progress (detail required for progress note): goal added 8/5/21     Beginning/End Dates of Progress Note Reporting Period:  7/5/21 to 8/5/21    Progress Toward Goals:   Progress this reporting period: See above.    Client Self (Subjective) Report for Progress Note Reporting Period: Patient arrived to therapy in good spirits.  She recalled to bring her computer with her for today's session.  She noted ongoing trouble with her glasses prescription, with mild difficulty noted reading screen.    Ana Fox, SLP

## 2021-08-09 RX ORDER — VITAMIN B COMPLEX
50 TABLET ORAL DAILY
Qty: 180 TABLET | Refills: 3 | COMMUNITY
Start: 2021-08-09 | End: 2024-01-25

## 2021-08-09 NOTE — TELEPHONE ENCOUNTER
Vitamin D 19.5, March 16, 2021.  Patient was given high-dose vitamin D.  She needs to continue with vitamin D 2000 units every day, which she can get over-the-counter.    Edwina Randle MD

## 2021-08-09 NOTE — TELEPHONE ENCOUNTER
Pt was informed, ok to take Vitamin D 2000IU daily.    Pt also states she did receive her cologard kit in the mail but she is unable to do it. She states she cant figure out how to do it and she doesn't have anyone to help her. Just wanted to pass along the message. thanks

## 2021-08-12 ENCOUNTER — HOSPITAL ENCOUNTER (OUTPATIENT)
Dept: SPEECH THERAPY | Facility: REHABILITATION | Age: 61
End: 2021-08-12
Payer: COMMERCIAL

## 2021-08-12 DIAGNOSIS — I69.320 APHASIA, POST-STROKE: ICD-10-CM

## 2021-08-12 DIAGNOSIS — R48.8 AGRAPHIA: Primary | ICD-10-CM

## 2021-08-12 PROCEDURE — 92507 TX SP LANG VOICE COMM INDIV: CPT | Mod: GN | Performed by: SPEECH-LANGUAGE PATHOLOGIST

## 2021-08-17 ENCOUNTER — HOSPITAL ENCOUNTER (OUTPATIENT)
Dept: SPEECH THERAPY | Facility: REHABILITATION | Age: 61
End: 2021-08-17
Payer: COMMERCIAL

## 2021-08-17 DIAGNOSIS — R48.8 AGRAPHIA: Primary | ICD-10-CM

## 2021-08-17 DIAGNOSIS — I69.320 APHASIA, POST-STROKE: ICD-10-CM

## 2021-08-17 PROCEDURE — 92507 TX SP LANG VOICE COMM INDIV: CPT | Mod: GN | Performed by: SPEECH-LANGUAGE PATHOLOGIST

## 2021-08-18 ENCOUNTER — OFFICE VISIT (OUTPATIENT)
Dept: INTERNAL MEDICINE | Facility: CLINIC | Age: 61
End: 2021-08-18
Payer: COMMERCIAL

## 2021-08-18 VITALS
HEART RATE: 51 BPM | SYSTOLIC BLOOD PRESSURE: 122 MMHG | BODY MASS INDEX: 30.05 KG/M2 | WEIGHT: 176 LBS | OXYGEN SATURATION: 96 % | DIASTOLIC BLOOD PRESSURE: 72 MMHG | HEIGHT: 64 IN

## 2021-08-18 DIAGNOSIS — E55.9 VITAMIN D DEFICIENCY: ICD-10-CM

## 2021-08-18 DIAGNOSIS — Z86.73 CHRONIC ISCHEMIC LEFT PCA STROKE: Primary | ICD-10-CM

## 2021-08-18 DIAGNOSIS — F32.1 CURRENT MODERATE EPISODE OF MAJOR DEPRESSIVE DISORDER WITHOUT PRIOR EPISODE (H): ICD-10-CM

## 2021-08-18 DIAGNOSIS — F33.41 RECURRENT MAJOR DEPRESSIVE DISORDER, IN PARTIAL REMISSION (H): ICD-10-CM

## 2021-08-18 DIAGNOSIS — E11.9 TYPE 2 DIABETES MELLITUS WITHOUT COMPLICATION, WITHOUT LONG-TERM CURRENT USE OF INSULIN (H): ICD-10-CM

## 2021-08-18 DIAGNOSIS — K59.09 OTHER CONSTIPATION: ICD-10-CM

## 2021-08-18 PROCEDURE — 99214 OFFICE O/P EST MOD 30 MIN: CPT | Performed by: INTERNAL MEDICINE

## 2021-08-18 RX ORDER — ATORVASTATIN CALCIUM 80 MG/1
80 TABLET, FILM COATED ORAL EVERY EVENING
Qty: 90 TABLET | Refills: 3 | Status: SHIPPED | OUTPATIENT
Start: 2021-08-18 | End: 2022-08-23

## 2021-08-18 RX ORDER — ASPIRIN 81 MG/1
TABLET, CHEWABLE ORAL
COMMUNITY
Start: 2021-03-01 | End: 2022-01-21

## 2021-08-18 RX ORDER — POLYETHYLENE GLYCOL 3350 17 G/17G
17 POWDER, FOR SOLUTION ORAL DAILY PRN
Qty: 510 G | Refills: 1 | Status: SHIPPED | OUTPATIENT
Start: 2021-08-18 | End: 2022-01-17

## 2021-08-18 RX ORDER — ONDANSETRON 4 MG/1
TABLET, ORALLY DISINTEGRATING ORAL
COMMUNITY
Start: 2021-06-29 | End: 2024-01-25

## 2021-08-18 RX ORDER — CITALOPRAM HYDROBROMIDE 20 MG/1
20 TABLET ORAL EVERY MORNING
Qty: 90 TABLET | Refills: 3 | Status: SHIPPED | OUTPATIENT
Start: 2021-08-18 | End: 2022-07-01

## 2021-08-18 RX ORDER — ERGOCALCIFEROL 1.25 MG/1
CAPSULE ORAL
COMMUNITY
Start: 2021-06-06 | End: 2021-08-18

## 2021-08-18 ASSESSMENT — MIFFLIN-ST. JEOR: SCORE: 1348.33

## 2021-08-18 NOTE — PROGRESS NOTES
Assessment & Plan     Chronic ischemic stroke (H), left PCA, admission July 10-July 17, 2020.  Patient has pain in the right side of her body, including her right shoulder, right arm, right leg, and right side of her trunk.  She has language and speech cognitive deficits.  She is not able to read, or write.  She is attending outpatient speech therapy, which she is finding extremely helpful.    - atorvastatin (LIPITOR) 80 MG tablet  Dispense: 90 tablet; Refill: 3  - polyethylene glycol (MIRALAX) 17 GM/Dose powder  Dispense: 510 g; Refill: 1    Type 2 diabetes mellitus without complication, without long-term current use of insulin (H), patient has significant loss of weight, and this led to better control of her diabetes mellitus.  This was a new diagnosis at the time of her stroke. I have been cutting back on her Metformin and she is currently on 1/2 of a 500 mg tablet daily.  HbA1c decreased to the low as 5.6%, March 16, 2021.  Current HbA1c is 5.9%, June 16, 2021.  LDL 72, November 11, 2020.  Urine microalbumin is increased December 11, 2020.  - atorvastatin (LIPITOR) 80 MG tablet  Dispense: 90 tablet; Refill: 3  - metFORMIN (GLUCOPHAGE) 500 MG tablet  Dispense: 45 tablet; Refill: 3    Recurrent major depressive disorder, in partial remission (H), for some reason on our list for citalopram was 10 mg, however I know I have increased this to 20 mg daily.  This is changed in the system today.  Home health also appear to have moved on 10 mg daily.  Discussed with patient, and she thinks she may benefit from an increase in dose.  - citalopram (CELEXA) 20 MG tablet  Dispense: 90 tablet; Refill: 3    Other constipation, continue with miralax.    Vitamin D deficiency, high-dose vitamin D was started June 6, 2021.  She is still on this.  Discussed with the patient that when she has completed this she may go on to vitamin D 2000 units daily.  Vitamin D 19.5 March 16, 2021.     BMI:   Estimated body mass index is 30.21  "kg/m  as calculated from the following:    Height as of this encounter: 1.626 m (5' 4\").    Weight as of this encounter: 79.8 kg (176 lb).     MEDICATIONS:   Orders Placed This Encounter   Medications     aspirin (ASA) 81 MG chewable tablet     Sig: CHEW AND SWALLOW 1 TABLET(81 MG) BY MOUTH DAILY     DISCONTD: ergocalciferol (ERGOCALCIFEROL) 1.25 MG (68533 UT) capsule     Sig: TAKE 1 CAPSULE BY MOUTH 1 TIME A WEEK FOR 12 DOSES     ondansetron (ZOFRAN-ODT) 4 MG ODT tab     cholecalciferol (VITAMIN D3) 25 mcg (1000 units) capsule     Sig: Take 2,000 Units by mouth     DISCONTD: metFORMIN (GLUCOPHAGE) 500 MG tablet     Sig: Take 250 mg by mouth     atorvastatin (LIPITOR) 80 MG tablet     Sig: Take 1 tablet (80 mg) by mouth every evening     Dispense:  90 tablet     Refill:  3     citalopram (CELEXA) 20 MG tablet     Sig: Take 1 tablet (20 mg) by mouth every morning TAKE 1 TABLET(20 MG) BY MOUTH EVERY MORNING     Dispense:  90 tablet     Refill:  3     Citalopram dose should be 20 mg every day.     metFORMIN (GLUCOPHAGE) 500 MG tablet     Sig: Take 0.5 tablets (250 mg) by mouth daily (with breakfast)     Dispense:  45 tablet     Refill:  3     Correct dose is 250 mg daily     polyethylene glycol (MIRALAX) 17 GM/Dose powder     Sig: Take 17 g by mouth daily as needed     Dispense:  510 g     Refill:  1     Medications Discontinued During This Encounter   Medication Reason     amLODIPine (NORVASC) 10 MG tablet      ergocalciferol (ERGOCALCIFEROL) 1.25 MG (10559 UT) capsule      hydrALAZINE (APRESOLINE) 10 MG tablet      acetaminophen (TYLENOL) 325 MG tablet      Alcohol Swabs PADS      atorvastatin (LIPITOR) 40 MG tablet      citalopram (CELEXA) 20 MG tablet Reorder     metFORMIN (GLUCOPHAGE) 500 MG tablet      metFORMIN (GLUCOPHAGE) 500 MG tablet Reorder     sertraline (ZOLOFT) 100 MG tablet      polyethylene glycol (MIRALAX) 17 g packet Reorder          - Continue other medications without change  Patient Instructions "   1. Once your high dose vitamin D (60354 dose that you take weekly) is complete, please take vitamin D 2000 units, every day, and you can get this over the counter.   2. You can use Miralax for constipation, you can take this every day until your bowels start to work, and then after that see how much you need to take to keep you regular.  We can also discuss fiber instead in future.       Return in about 3 months (around 11/18/2021), or Follow with Dr. Randle, 30 minutes.    Edwina Randle MD  [facility]    Candida Pacheco is a 60 year old who presents for the following health issues, patient is here by herself,.     HPI   Patient is here for follow-up.  I last saw the patient June 16, 2021.     Type 2 diabetes mellitus without complication, without long-term current use of insulin (H), I had planned on decreasing her Metformin to a 250 mg immediate release dose,    Chronic ischemic left PCA stroke, patient is on aspirin and Plavix.  This occurred July 10-July 17, 2020.  Patient has urinary incontinence.  She has pain involving the right side of her body.  She is able to walk, and today does not have a walker with her.  She has had significant effects on her cognition, language, is unable to read or to write, which is very distressing to her.  She has been attending speech therapy, as an outpatient.  Home care is setting up her medications for her.  She has proximal weakness, and gait disorder.  Patient requests referral to physical therapy.     Gait disorder: Patient had a left sided PCA stroke with right sided weakness, visual effects and speech effects, which occurred July 2020. She had some home PT She is complaining of stiffness in her right leg which is causing pain particularly in her right upper leg, leading to difficulty with her gait. She also has pain in her right upper arm. She would like some help with this.     Anemia due to other cause, not classified,  "hemoglobin came back today slightly decreased at 11.7.  She is on aspirin and Plavix as mentioned above.  We will have to follow-up.  -     Cancel: Ferritin  -     Cancel: Iron and Transferrin Iron Binding Capacity  -     Cancel: Transferrin     Disorder of ligament of left foot: Patient says she had some sort of surgery as a senior in [email or web address], and this was fine but since a stroke (PCA L), which affected her right side (and affected her gait), is causing her some trouble. Clinically has some deformity of her left sided toes. She is diabetic.     Other cataract, unspecified laterality, going for a procedure.  June 22, preop was done by Dr. Chapman.       Vitamin D deficiency, Results for NEISHA NUR (MRN [id number]) as of 8/18/2021 10:56   Ref. Range 3/16/2021 13:38   Vitamin D, Total (25-Hydroxy) Latest Ref Range: 30.0 - 80.0 ng/mL 19.5 (L)     -     cholecalciferol, vitamin D3, 25 mcg (1,000 unit) capsule; Take 2 capsules (2,000 Units total) by mouth daily. Start taking 2000 units every day (2 capsules) once the high dose weekly vitamin D doses are finished.     Results for NEISHA NUR (MRN [id number]) as of 8/18/2021 10:56   Ref. Range 3/16/2021 13:38 6/16/2021 11:31   Hemoglobin A1C Latest Ref Range: <=5.6 % 5.6 5.9 (H)     Results for NEISHA NUR (MRN [id number]) as of 8/18/2021 10:56   Ref. Range 6/16/2021 11:31   WBC Latest Ref Range: 4.0 - 11.0 thou/uL 7.0   Hemoglobin Latest Ref Range: 12.0 - 16.0 g/dL 11.7 (L)   Hematocrit Latest Ref Range: 35.0 - 47.0 % 35.0   Platelet Count Latest Ref Range: 140 - 440 thou/uL 264   RBC Count Latest Ref Range: 3.80 - 5.40 mill/uL 3.95   MCV Latest Ref Range: 80 - 100 fL 89     Review of Systems   Rest of the review of systems is negative.      Objective    /72 (BP Location: Right arm, Patient Position: Sitting)   Pulse 51   Ht 1.626 m (5' 4\")   Wt 79.8 kg (176 lb)   SpO2 96%   BMI 30.21 kg/m    Body mass index is 30.21 " kg/m .  Physical Exam   Patient is nondistressed, she is interactive and alert.  She appears to be having an easier time discussing things today.  She does not have an assistive device with her.  Chest is clear.  No peripheral edema.

## 2021-08-18 NOTE — PATIENT INSTRUCTIONS
1. Once your high dose vitamin D (59978 dose that you take weekly) is complete, please take vitamin D 2000 units, every day, and you can get this over the counter.   2. You can use Miralax for constipation, you can take this every day until your bowels start to work, and then after that see how much you need to take to keep you regular.  We can also discuss fiber instead in future.

## 2021-08-24 DIAGNOSIS — E11.9 TYPE 2 DIABETES MELLITUS WITHOUT COMPLICATION, WITHOUT LONG-TERM CURRENT USE OF INSULIN (H): ICD-10-CM

## 2021-08-24 DIAGNOSIS — I63.9 ACUTE ISCHEMIC STROKE (H): ICD-10-CM

## 2021-08-24 RX ORDER — CLOPIDOGREL BISULFATE 75 MG/1
75 TABLET ORAL DAILY
Qty: 30 TABLET | Refills: 0 | Status: SHIPPED | OUTPATIENT
Start: 2021-08-24 | End: 2021-09-24

## 2021-08-24 NOTE — LETTER
8/24/2021        RE: Tara Plaza  [address]  [place] [address]            Dear Tara,      We recently provided you with medication refills.  Many medications require routine follow-up with your doctor.    Your prescription(s) have been refilled for 30 days so you may have time for the above noted follow-up. Please call to schedule soon so we can assure you have an appointment before your next refills are needed. If you have already made a follow up appointment, please disregard this letter.           Sincerely,        Solomon Duarte MD  [facility]     (Formerly known as [facility])

## 2021-08-24 NOTE — TELEPHONE ENCOUNTER
Refill request for Plavix  Letter mailed to patient to schedule follow up appt  Medication T'd for review and signature  Vidya Soliman CMA on 8/24/2021 at 9:02 AM

## 2021-08-26 ENCOUNTER — HOSPITAL ENCOUNTER (OUTPATIENT)
Dept: PHYSICAL THERAPY | Facility: REHABILITATION | Age: 61
End: 2021-08-26
Payer: COMMERCIAL

## 2021-08-26 ENCOUNTER — HOSPITAL ENCOUNTER (OUTPATIENT)
Dept: SPEECH THERAPY | Facility: REHABILITATION | Age: 61
End: 2021-08-26
Payer: COMMERCIAL

## 2021-08-26 DIAGNOSIS — R29.898 RIGHT LEG WEAKNESS: ICD-10-CM

## 2021-08-26 DIAGNOSIS — R26.9 GAIT DISORDER: Primary | ICD-10-CM

## 2021-08-26 DIAGNOSIS — R26.81 UNSTEADINESS ON FEET: ICD-10-CM

## 2021-08-26 PROCEDURE — 97110 THERAPEUTIC EXERCISES: CPT | Mod: GP | Performed by: PHYSICAL THERAPIST

## 2021-08-26 PROCEDURE — 92507 TX SP LANG VOICE COMM INDIV: CPT | Mod: GN | Performed by: SPEECH-LANGUAGE PATHOLOGIST

## 2021-08-26 NOTE — PROGRESS NOTES
POC Dates:  7/8/21-10/7/21  12 visits    Goals:   Pt will: Able to walk on uneven surfaces in the grass and do some light gardening without fear of falling as balance and strength improve within 12 visits  Pt will: Able to walk on even surfaces, like 1/2-1 mile through the neighborhood, with a normal pace as endurance and strength improves within 12 visits      Date 8/26/21 8/4/21   Exercise     Nu-step  Seat 7, arms 9, workload 3 3' 3'   Standing ex:  Hip flex, ext, abd, heel raises, toe raises, marching X 10 each ex X 10 working up to 20                                                       Neuro re-edu  Date 8/26/21   Exercise    1 leg stance R=2 seconds, mod A  L=0   Standing two feet together Mild to mod sway, 20 s   CG

## 2021-08-31 ENCOUNTER — HOSPITAL ENCOUNTER (OUTPATIENT)
Dept: SPEECH THERAPY | Facility: REHABILITATION | Age: 61
End: 2021-08-31
Payer: COMMERCIAL

## 2021-08-31 DIAGNOSIS — R48.0 ALEXIA: Primary | ICD-10-CM

## 2021-08-31 DIAGNOSIS — R48.8 AGRAPHIA: ICD-10-CM

## 2021-08-31 DIAGNOSIS — Z53.9 DIAGNOSIS NOT YET DEFINED: Primary | ICD-10-CM

## 2021-08-31 PROCEDURE — G0179 MD RECERTIFICATION HHA PT: HCPCS | Performed by: INTERNAL MEDICINE

## 2021-08-31 PROCEDURE — 92507 TX SP LANG VOICE COMM INDIV: CPT | Mod: GN | Performed by: SPEECH-LANGUAGE PATHOLOGIST

## 2021-09-02 ENCOUNTER — HOSPITAL ENCOUNTER (OUTPATIENT)
Dept: SPEECH THERAPY | Facility: REHABILITATION | Age: 61
End: 2021-09-02
Payer: COMMERCIAL

## 2021-09-02 DIAGNOSIS — R48.8 AGRAPHIA: Primary | ICD-10-CM

## 2021-09-02 PROCEDURE — 92507 TX SP LANG VOICE COMM INDIV: CPT | Mod: GN | Performed by: SPEECH-LANGUAGE PATHOLOGIST

## 2021-09-09 ENCOUNTER — HOSPITAL ENCOUNTER (OUTPATIENT)
Dept: SPEECH THERAPY | Facility: REHABILITATION | Age: 61
End: 2021-09-09
Payer: COMMERCIAL

## 2021-09-09 DIAGNOSIS — I69.320 APHASIA, POST-STROKE: ICD-10-CM

## 2021-09-09 DIAGNOSIS — R48.0 ALEXIA: Primary | ICD-10-CM

## 2021-09-09 PROCEDURE — 92507 TX SP LANG VOICE COMM INDIV: CPT | Mod: GN | Performed by: SPEECH-LANGUAGE PATHOLOGIST

## 2021-09-09 NOTE — PROGRESS NOTES
OUTPATIENT SPEECH LANGUAGE PATHOLOGY  PLAN OF TREATMENT FOR OUTPATIENT REHABILITATION AND PROGRESS NOTE        Patient's Last Name, First Name, M.JERILYN.                              Tara Liu Date of Birth  1960   Provider's Name  [facility] Medical Record No.  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    Target Date 10/29/21   Date Met      Progress (detail required for progress note): 9/9/21: GOAL PROGRESSING, Patient able to successfully increase word finding in conversation by 80% with use of word finding strategies, primarily gestures, description, and category.  Patient continues to require verbal cues/prompts to use strategies.  Continue goal.     Goal Identifier 2   Goal Description Patient will label single letters/numbers with 80% accuracy SARAI.   Target Date 10/29/21   Date Met      Progress (detail required for progress note): 9/9/21: GOAL PROGRESSING: Emphasis on vowel labeling/recognition.  Patient 85% accurate with vowel recognition from F:5 and 63% accurate with labeling SARAI on today's date.  Able to increase accuracy to 100% with use of strategy with single vowel determination and/or alphabet board.  Continue goal.     Goal Identifier 3   Goal Description Patient will complete word level reading tasks with 60% accuracy given min cues.   Target Date 10/29/21   Date  Met      Progress (detail required for progress note): 9/9/21: GOAL SLOWLY PROGRESSING: Patient able to read single words from 20%-100% accuracy.  Patient continues to benefit from known context and use of an alphabet board/text to speech options.  Patient is able to identify words per given category with 100% accuracy even when unable to read/label word.  Continue goal.     Goal Identifier 4   Goal Description Patient will read x20 functional words/phrases with 80% accuracy SARAI.   Target Date 10/29/21   Date Met      Progress (detail required for progress note): 9/9/21: Goal minimally addressed this reporting period.  Patient able to % of names from F:10 and label 100% of names.  Patient able to identify common foods from F:10 with 70% accuracy, and label with 20% accuracy SARAI.  Continue goal with further identification of top 20 most relevant words for high level drill/practice.     Goal Identifier 5   Goal Description Patient will utilize text to speech option on phone and/or computer to complete simple functional reading tasks with 100% accuracy SARAI.   Target Date 10/29/21   Date Met  09/09/21   Progress (detail required for progress note): GOAL MET.  Patient able to utilize text to speech options at home functionally, and reports use to aid in home practice by typing words into text to speech to aid in comprehension as needed.       Beginning/End Dates of Progress Note Reporting Period:  8/5/21 to 9/9/21    Progress Toward Goals:   Progress this reporting period: See above.  Patient is making slow progress with goals for reading/writing; however, does continue to benefit from strategy use.  Most functional progress noted with use of external aids of text to speech options.    Client Self (Subjective) Report for Progress Note Reporting Period: Patient arrived to therapy in good spirits.  She expressed she has been busy sorting through her mothers belongings.  She noted that she found a box of 200 cards and  was able to sort them into like types with pictures and some interpretation of the words on them.  Patient appeared more unsteady on her feet today and slower to complete structured therapy tasks.    Ana Fox, SLP

## 2021-09-10 ENCOUNTER — HOSPITAL ENCOUNTER (OUTPATIENT)
Dept: MAMMOGRAPHY | Facility: CLINIC | Age: 61
Discharge: HOME | End: 2021-09-10
Attending: INTERNAL MEDICINE | Admitting: INTERNAL MEDICINE
Payer: COMMERCIAL

## 2021-09-10 DIAGNOSIS — Z12.31 ENCOUNTER FOR SCREENING MAMMOGRAM FOR BREAST CANCER: ICD-10-CM

## 2021-09-10 PROCEDURE — 77067 SCR MAMMO BI INCL CAD: CPT

## 2021-09-11 DIAGNOSIS — E11.9 TYPE 2 DIABETES MELLITUS WITHOUT COMPLICATION, WITHOUT LONG-TERM CURRENT USE OF INSULIN (H): ICD-10-CM

## 2021-09-11 DIAGNOSIS — I63.9 ACUTE ISCHEMIC STROKE (H): ICD-10-CM

## 2021-09-12 NOTE — TELEPHONE ENCOUNTER
"Last Written Prescription Date:  8/1/20  Last Fill Quantity: 100,  # refills: 0   Last office visit provider:  7/8/21     Requested Prescriptions   Pending Prescriptions Disp Refills     CONTOUR NEXT TEST test strip [Pharmacy Med Name: CONTOUR NEXT TEST STRIPS 50S] 100 strip      Sig: USE TO TEST BLOOD GLUCOSE PREMEAL ONCE DAILY       Diabetic Supplies Protocol Passed - 9/11/2021  9:40 AM        Passed - Medication is active on med list        Passed - Patient is 18 years of age or older        Passed - Recent (6 mo) or future (30 days) visit within the authorizing provider's specialty     Patient had office visit in the last 6 months or has a visit in the next 30 days with authorizing provider.  See \"Patient Info\" tab in inbasket, or \"Choose Columns\" in Meds & Orders section of the refill encounter.                 casi talbert RN 09/12/21 5:48 AM  "

## 2021-09-13 ENCOUNTER — HOSPITAL ENCOUNTER (OUTPATIENT)
Dept: PHYSICAL THERAPY | Facility: REHABILITATION | Age: 61
End: 2021-09-13
Payer: COMMERCIAL

## 2021-09-13 DIAGNOSIS — R26.81 UNSTEADINESS ON FEET: ICD-10-CM

## 2021-09-13 DIAGNOSIS — R26.9 GAIT DISORDER: Primary | ICD-10-CM

## 2021-09-13 DIAGNOSIS — R29.898 RIGHT LEG WEAKNESS: ICD-10-CM

## 2021-09-13 PROCEDURE — 97112 NEUROMUSCULAR REEDUCATION: CPT | Mod: GP | Performed by: PHYSICAL THERAPIST

## 2021-09-13 PROCEDURE — 97116 GAIT TRAINING THERAPY: CPT | Mod: GP | Performed by: PHYSICAL THERAPIST

## 2021-09-13 NOTE — PROGRESS NOTES
POC Dates:  7/8/21-10/7/21  12 visits    Goals:   Pt will: Able to walk on uneven surfaces in the grass and do some light gardening without fear of falling as balance and strength improve within 12 visits  Pt will: Able to walk on even surfaces, like 1/2-1 mile through the neighborhood, with a normal pace as endurance and strength improves within 12 visits      Date 9/13/21 8/26/21 8/4/21   Exercise      Nu-step  Seat 7, arms 9, workload 3 3' 3' 3'   Standing ex:  Hip flex, ext, abd, heel raises, toe raises, marching  X 10 each ex X 10 working up to 20                                                                 Neuro re-edu  Date 9/13/21 8/26/21   Exercise     1 leg stance R and L leg-30 seconds with min to mod A, minimal hand touches.  2 tries each side R=2 seconds, mod A  L=0   Standing two feet together  Mild to mod sway, 20 s   CG

## 2021-09-16 ENCOUNTER — HOSPITAL ENCOUNTER (OUTPATIENT)
Dept: SPEECH THERAPY | Facility: REHABILITATION | Age: 61
End: 2021-09-16
Payer: COMMERCIAL

## 2021-09-16 DIAGNOSIS — I69.320 APHASIA, POST-STROKE: ICD-10-CM

## 2021-09-16 DIAGNOSIS — E11.9 TYPE 2 DIABETES MELLITUS WITHOUT COMPLICATION, WITHOUT LONG-TERM CURRENT USE OF INSULIN (H): ICD-10-CM

## 2021-09-16 DIAGNOSIS — R48.0 ALEXIA: Primary | ICD-10-CM

## 2021-09-16 PROCEDURE — 92507 TX SP LANG VOICE COMM INDIV: CPT | Mod: GN | Performed by: SPEECH-LANGUAGE PATHOLOGIST

## 2021-09-17 RX ORDER — ATORVASTATIN CALCIUM 80 MG/1
TABLET, FILM COATED ORAL
Qty: 90 TABLET | Refills: 3 | OUTPATIENT
Start: 2021-09-17

## 2021-09-21 ENCOUNTER — HOSPITAL ENCOUNTER (OUTPATIENT)
Dept: SPEECH THERAPY | Facility: REHABILITATION | Age: 61
End: 2021-09-21
Payer: COMMERCIAL

## 2021-09-21 DIAGNOSIS — I69.320 APHASIA, POST-STROKE: Primary | ICD-10-CM

## 2021-09-21 PROCEDURE — 92507 TX SP LANG VOICE COMM INDIV: CPT | Mod: GN | Performed by: SPEECH-LANGUAGE PATHOLOGIST

## 2021-09-23 ENCOUNTER — HOSPITAL ENCOUNTER (OUTPATIENT)
Dept: PHYSICAL THERAPY | Facility: REHABILITATION | Age: 61
End: 2021-09-23
Payer: COMMERCIAL

## 2021-09-23 ENCOUNTER — HOSPITAL ENCOUNTER (OUTPATIENT)
Dept: SPEECH THERAPY | Facility: REHABILITATION | Age: 61
End: 2021-09-23
Payer: COMMERCIAL

## 2021-09-23 DIAGNOSIS — R29.898 RIGHT LEG WEAKNESS: ICD-10-CM

## 2021-09-23 DIAGNOSIS — R26.81 UNSTEADINESS ON FEET: ICD-10-CM

## 2021-09-23 DIAGNOSIS — R26.9 GAIT DISORDER: Primary | ICD-10-CM

## 2021-09-23 DIAGNOSIS — R48.8 AGRAPHIA: Primary | ICD-10-CM

## 2021-09-23 PROCEDURE — 92507 TX SP LANG VOICE COMM INDIV: CPT | Mod: GN | Performed by: SPEECH-LANGUAGE PATHOLOGIST

## 2021-09-23 PROCEDURE — 97112 NEUROMUSCULAR REEDUCATION: CPT | Mod: GP | Performed by: PHYSICAL THERAPIST

## 2021-09-23 PROCEDURE — 97116 GAIT TRAINING THERAPY: CPT | Mod: GP | Performed by: PHYSICAL THERAPIST

## 2021-09-23 NOTE — PROGRESS NOTES
"  POC Dates:  7/8/21-10/7/21  12 visits    Goals:   Pt will: Able to walk on uneven surfaces in the grass and do some light gardening without fear of falling as balance and strength improve within 12 visits  Pt will: Able to walk on even surfaces, like 1/2-1 mile through the neighborhood, with a normal pace as endurance and strength improves within 12 visits      Date 9/23/21 9/13/21 8/26/21 8/4/21   Exercise       Nu-step  Seat 7, arms 9, workload 3 3' 3' 3' 3'   Standing ex:  Hip flex, ext, abd, heel raises, toe raises, marching   X 10 each ex X 10 working up to 20                                                                           Neuro re-edu  Date 9/23/21 9/13/21 8/26/21   Exercise      1 leg stance  R and L leg-30 seconds with min to mod A, minimal hand touches.  2 tries each side R=2 seconds, mod A  L=0   Standing two feet together   Mild to mod sway, 20 s   CG   Toe tapping 4\" step X 10, CG     Toe tapping on 6 cones in semi [place] 1X through each foot, CG, 1 hand for support     Picking up cones from floor in semi [place] 1X through with each hand, CG                                                   "

## 2021-09-24 ENCOUNTER — HOSPITAL ENCOUNTER (OUTPATIENT)
Dept: PHYSICAL THERAPY | Facility: REHABILITATION | Age: 61
End: 2021-09-24
Payer: COMMERCIAL

## 2021-09-24 DIAGNOSIS — I63.9 ACUTE ISCHEMIC STROKE (H): ICD-10-CM

## 2021-09-24 DIAGNOSIS — R29.898 RIGHT LEG WEAKNESS: ICD-10-CM

## 2021-09-24 DIAGNOSIS — R26.81 UNSTEADINESS ON FEET: ICD-10-CM

## 2021-09-24 DIAGNOSIS — R26.9 GAIT DISORDER: Primary | ICD-10-CM

## 2021-09-24 DIAGNOSIS — E11.9 TYPE 2 DIABETES MELLITUS WITHOUT COMPLICATION, WITHOUT LONG-TERM CURRENT USE OF INSULIN (H): ICD-10-CM

## 2021-09-24 PROCEDURE — 97116 GAIT TRAINING THERAPY: CPT | Mod: GP | Performed by: PHYSICAL THERAPIST

## 2021-09-24 PROCEDURE — 97112 NEUROMUSCULAR REEDUCATION: CPT | Mod: GP | Performed by: PHYSICAL THERAPIST

## 2021-09-24 RX ORDER — CLOPIDOGREL BISULFATE 75 MG/1
TABLET ORAL
Qty: 14 TABLET | Refills: 0 | Status: SHIPPED | OUTPATIENT
Start: 2021-09-24 | End: 2021-10-25

## 2021-09-24 NOTE — PROGRESS NOTES
"    POC Dates:  7/8/21-10/7/21  12 visits    Goals:   Pt will: Able to walk on uneven surfaces in the grass and do some light gardening without fear of falling as balance and strength improve within 12 visits  Pt will: Able to walk on even surfaces, like 1/2-1 mile through the neighborhood, with a normal pace as endurance and strength improves within 12 visits      Date 9/24/21 9/23/21 9/13/21 8/26/21 8/4/21   Exercise        Nu-step  Seat 7, arms 9,  Workload 5  3'  3' 3' 3' 3'   Standing ex:  Hip flex, ext, abd, heel raises, toe raises, marching    X 10 each ex X 10 working up to 20                                                                                     Neuro re-edu  Date 9/24/21 9/23/21 9/13/21 8/26/21   Exercise       1 leg stance   R and L leg-30 seconds with min to mod A, minimal hand touches.  2 tries each side R=2 seconds, mod A  L=0   Standing two feet together    Mild to mod sway, 20 s   CG   Toe tapping 4\" step  X 10, CG     Toe tapping on 6 cones in semi [place]  1X through each foot, CG, 1 hand for support     Picking up cones from floor in semi [place]  1X through with each hand, CG     Wand- reaching up, down, and to sides with each hand for balance X 10 each hand      airex standing with feet together 60 seconds with CG                                           GAIT: (9/24/21)  SEC:  Cues needed to step through with R foot.  Able to use SEC and L foot with no cues needed today for sequencing. Still pauses a little after stepping with R LE.  Good carryover from last visit  ROLLING WALKER: 100'X4    Focused on walking with step through pattern on both feet with a more normal kady and no pauses. Hoping to get carryover with re-edu her walking with a walker to using the SEC    "

## 2021-09-24 NOTE — TELEPHONE ENCOUNTER
Refill request for clopidogrel.  Dr. Duarte is out of the office until 10/4/21. Can you please refill in his absence?  Due for follow up appt.  Letter already mailed to pt to remind to schedule an appt on 8/24/21.  14 day supply of Medication T'd for review and signature    Norma Galaviz LPN on 9/24/2021 at 4:01 PM

## 2021-09-24 NOTE — LETTER
9/24/2021        RE: Tara Plaza  [address]  [place] [address]        Tara,        We recently provided you with medication refills.  Many medications require routine follow-up with your doctor.    Your prescription(s) have been refilled for 30 days so you may have time for the above noted follow-up. Please call to schedule soon so we can assure you have an appointment before your next refills are needed. If you have already made a follow up appointment, please disregard this letter.           Sincerely,        [facility] [place]     (Formerly known as [facility])

## 2021-09-27 ENCOUNTER — HOSPITAL ENCOUNTER (OUTPATIENT)
Dept: PHYSICAL THERAPY | Facility: REHABILITATION | Age: 61
End: 2021-09-27
Payer: COMMERCIAL

## 2021-09-27 DIAGNOSIS — R29.898 RIGHT LEG WEAKNESS: ICD-10-CM

## 2021-09-27 DIAGNOSIS — R26.9 GAIT DISORDER: Primary | ICD-10-CM

## 2021-09-27 DIAGNOSIS — R26.81 UNSTEADINESS ON FEET: ICD-10-CM

## 2021-09-27 PROCEDURE — 97116 GAIT TRAINING THERAPY: CPT | Mod: GP | Performed by: PHYSICAL THERAPIST

## 2021-09-27 PROCEDURE — 97112 NEUROMUSCULAR REEDUCATION: CPT | Mod: GP | Performed by: PHYSICAL THERAPIST

## 2021-09-27 NOTE — PROGRESS NOTES
"    POC Dates:  7/8/21-10/7/21  12 visits    Goals:   Pt will: Able to walk on uneven surfaces in the grass and do some light gardening without fear of falling as balance and strength improve within 12 visits  Pt will: Able to walk on even surfaces, like 1/2-1 mile through the neighborhood, with a normal pace as endurance and strength improves within 12 visits      Date 9/27/21 9/24/21 9/23/21 9/13/21 8/26/21 8/4/21   Exercise         Nu-step  Seat 7, arms 9,  Workload 5, 3 minutes Workload 5  3'  3' 3' 3' 3'   Standing ex:  Hip flex, ext, abd, heel raises, toe raises, marching     X 10 each ex X 10 working up to 20                                                                                               Neuro re-edu  Date 9/24/21 9/23/21 9/13/21 8/26/21   Exercise       1 leg stance   R and L leg-30 seconds with min to mod A, minimal hand touches.  2 tries each side R=2 seconds, mod A  L=0   Standing two feet together    Mild to mod sway, 20 s   CG   Toe tapping 4\" step  X 10, CG     Toe tapping on 6 cones in semi [place]  1X through each foot, CG, 1 hand for support     Picking up cones from floor in semi [place]  1X through with each hand, CG     Wand- reaching up, down, and to sides with each hand for balance X 10 each hand      airex standing with feet together 60 seconds with CG                                           GAIT: (9/27/21)  ROLLING WALKER: 100'X4    Focused on walking with step through pattern on both feet with a more normal kady and no pauses.  Also focused on heel to toe stepping.  With cues every ~ 10' she had a normal step through pattern.  Stand by A  Neuro-re-edu (9/27/21)  Flush ladder:  X 4 lengths-stepping through with SEC, each foot in a square to focus on stride/step length, CG  Flush ladder: side stepping-mod cues throughout, pt very cautious and guarded. X 1X each direction  "

## 2021-09-28 ENCOUNTER — HOSPITAL ENCOUNTER (OUTPATIENT)
Dept: SPEECH THERAPY | Facility: REHABILITATION | Age: 61
End: 2021-09-28
Payer: COMMERCIAL

## 2021-09-28 DIAGNOSIS — I69.320 APHASIA, POST-STROKE: Primary | ICD-10-CM

## 2021-09-28 PROCEDURE — 92507 TX SP LANG VOICE COMM INDIV: CPT | Mod: GN | Performed by: SPEECH-LANGUAGE PATHOLOGIST

## 2021-09-29 ENCOUNTER — HOSPITAL ENCOUNTER (OUTPATIENT)
Dept: PHYSICAL THERAPY | Facility: REHABILITATION | Age: 61
End: 2021-09-29
Payer: COMMERCIAL

## 2021-09-29 DIAGNOSIS — R29.898 RIGHT LEG WEAKNESS: ICD-10-CM

## 2021-09-29 DIAGNOSIS — R26.9 GAIT DISORDER: Primary | ICD-10-CM

## 2021-09-29 DIAGNOSIS — R26.81 UNSTEADINESS ON FEET: ICD-10-CM

## 2021-09-29 PROCEDURE — 97112 NEUROMUSCULAR REEDUCATION: CPT | Mod: GP | Performed by: PHYSICAL THERAPIST

## 2021-09-29 PROCEDURE — 97116 GAIT TRAINING THERAPY: CPT | Mod: GP | Performed by: PHYSICAL THERAPIST

## 2021-09-29 NOTE — PROGRESS NOTES
"    POC Dates:  7/8/21-10/7/21  12 visits    Goals:   Pt will: Able to walk on uneven surfaces in the grass and do some light gardening without fear of falling as balance and strength improve within 12 visits  Pt will: Able to walk on even surfaces, like 1/2-1 mile through the neighborhood, with a normal pace as endurance and strength improves within 12 visits      Date 9/29/21 9/27/21 9/24/21 9/23/21 9/13/21 8/26/21 8/4/21   Exercise          Nu-step  Seat 7, arms 9,  Workload 4, 3 minutes Workload 5, 3 minutes Workload 5  3'  3' 3' 3' 3'   Standing ex:  Hip flex, ext, abd, heel raises, toe raises, marching      X 10 each ex X 10 working up to 20                                                                                                         Neuro re-edu  Date 9/29/21 9/24/21 9/23/21 9/13/21 8/26/21   Exercise        1 leg stance    R and L leg-30 seconds with min to mod A, minimal hand touches.  2 tries each side R=2 seconds, mod A  L=0   Standing two feet together     Mild to mod sway, 20 s   CG   Toe tapping 4\" step   X 10, CG     Toe tapping on 6 cones in semi [place]   1X through each foot, CG, 1 hand for support     Picking up cones from floor in semi [place]   1X through with each hand, CG     Wand- reaching up, down, and to sides with each hand for balance  X 10 each hand      airex standing with feet together 60 seconds with CG 60 seconds with CG      Floor-feet together-eyes closed Mild to mod sway, CG X 60 seconds                                         GAIT: (9/29/21)  ROLLING WALKER: 100'X4    Focused on walking with step through pattern on both feet with a more normal kady and no pauses.  Also focused on heel to toe stepping.  With cues every other step on the left to take a normal heel to toe step and not shuffle.  Veered to the right  Stand by A  Neuro-re-edu (9/29/21) (therapist stands on right)  Flush ladder:  X 2 lengths-stepping through with SEC, each foot in a square to focus on " stride/step length, CG-VERY CHALLENGED TODAY, cues throughout to step into space not ladder  Flush ladder: side stepping-mod cues throughout, pt very cautious and guarded. After a a couple squares stepped outside of the ladder and just worked on side stepping.  Feet still pointed in the direction she was side stepping versus straight ahead.   -Carioca: min A, cues for feet placement, 10' each direction  -backward walking with SEC and min A, cues for normal steps not shuffling backward    Assessment:  Pt did not have as good of carry over today with stepping more normally with her left foot.  She veered to the right while using the rolling walker as well as in the flush ladder.  Will try to focus on standing on the right side so she continues to look right with her head and not compensate that direction.

## 2021-09-30 ENCOUNTER — TELEPHONE (OUTPATIENT)
Dept: INTERNAL MEDICINE | Facility: CLINIC | Age: 61
End: 2021-09-30

## 2021-09-30 ENCOUNTER — HOSPITAL ENCOUNTER (OUTPATIENT)
Dept: SPEECH THERAPY | Facility: REHABILITATION | Age: 61
End: 2021-09-30
Payer: COMMERCIAL

## 2021-09-30 DIAGNOSIS — R48.8 AGRAPHIA: Primary | ICD-10-CM

## 2021-09-30 PROCEDURE — 92507 TX SP LANG VOICE COMM INDIV: CPT | Mod: GN | Performed by: SPEECH-LANGUAGE PATHOLOGIST

## 2021-09-30 NOTE — TELEPHONE ENCOUNTER
..Reason for Call:  Home Health Care    Naanh with [facility] called regarding (reason for call): Verbal    Orders are needed for this patient. Skilled nursing     PT: NONE     OT: NONE     Skilled Nursing: Every 2 weeks w/ 3 PRNs for the next 60 days for medication management.     Pt Provider: Edwina Randle MD    Phone Number Homecare Nurse can be reached at: [phone]    Can we leave a detailed message on this number? YES      Call taken on 9/30/2021 at 4:26 PM by CARMITA Savage

## 2021-10-01 NOTE — TELEPHONE ENCOUNTER
I approve    Skilled Nursing: Every 2 weeks w/ 3 PRNs for the next 60 days for medication management.

## 2021-10-02 ENCOUNTER — MEDICAL CORRESPONDENCE (OUTPATIENT)
Dept: HEALTH INFORMATION MANAGEMENT | Facility: CLINIC | Age: 61
End: 2021-10-02
Payer: COMMERCIAL

## 2021-10-04 ENCOUNTER — HOSPITAL ENCOUNTER (OUTPATIENT)
Dept: PHYSICAL THERAPY | Facility: REHABILITATION | Age: 61
End: 2021-10-04
Payer: COMMERCIAL

## 2021-10-04 DIAGNOSIS — R26.81 UNSTEADINESS ON FEET: ICD-10-CM

## 2021-10-04 DIAGNOSIS — R29.898 RIGHT LEG WEAKNESS: ICD-10-CM

## 2021-10-04 DIAGNOSIS — R26.9 GAIT DISORDER: Primary | ICD-10-CM

## 2021-10-04 PROCEDURE — 97112 NEUROMUSCULAR REEDUCATION: CPT | Mod: GP | Performed by: PHYSICAL THERAPIST

## 2021-10-04 PROCEDURE — 97116 GAIT TRAINING THERAPY: CPT | Mod: GP | Performed by: PHYSICAL THERAPIST

## 2021-10-04 NOTE — PROGRESS NOTES
"    POC Dates:  7/8/21-10/7/21  12 visits    Goals:   Pt will: Able to walk on uneven surfaces in the grass and do some light gardening without fear of falling as balance and strength improve within 12 visits  Pt will: Able to walk on even surfaces, like 1/2-1 mile through the neighborhood, with a normal pace as endurance and strength improves within 12 visits      Date 10/4/21 9/29/21 9/27/21 9/24/21 9/23/21 9/13/21 8/26/21 8/4/21   Exercise           Nu-step  Seat 7, arms 9,  Workload 4, 3 minutes Workload 4, 3 minutes Workload 5, 3 minutes Workload 5  3'  3' 3' 3' 3'   Standing ex:  Hip flex, ext, abd, heel raises, toe raises, marching       X 10 each ex X 10 working up to 20   marching X 10, no hands, CG to min A                                                                                                               Neuro re-edu  Date 10/4/21 9/29/21 9/24/21 9/23/21 9/13/21 8/26/21   Exercise         1 leg stance L=12 s with CG to min A, no hands  R=3s, CG to Wili, no hands    R and L leg-30 seconds with min to mod A, minimal hand touches.  2 tries each side R=2 seconds, mod A  L=0   Standing two feet together      Mild to mod sway, 20 s   CG   Toe tapping 4\" step    X 10, CG     Toe tapping on 6 cones in semi [place]    1X through each foot, CG, 1 hand for support     Picking up cones from floor in semi [place]    1X through with each hand, CG     Wand- reaching up, down, and to sides with each hand for balance   X 10 each hand      airex standing with feet together 60 seconds with CG, mild sway, no hand touches 60 seconds with CG 60 seconds with CG      Floor-feet together-eyes closed  Mild to mod sway, CG X 60 seconds                                             GAIT: (10/4/21)  ROLLING WALKER: 100'X4    Focused on walking with step through pattern on both feet with a more normal kady and no pauses.  Also focused on heel to toe stepping.  With cues every other step on the left to take a normal heel to " toe step and not shuffle.  Veered to the right  Stand by A.  Stood on pt's left side and had her look to the left here and there as she walked to prevent her from walking into the wall on the right  SEC: X 35' X 2  walked outside on uneven sidewalks with inclines and declines.  Pt required VC throughout for step length on left foot, head up and sequencing.        Neuro-re-edu (not today) (therapist stands on right)  Flush ladder:  X 2 lengths-stepping through with SEC, each foot in a square to focus on stride/step length, CG-VERY CHALLENGED TODAY, cues throughout to step into space not ladder  Flush ladder: not today) side stepping-mod cues throughout, pt very cautious and guarded. After a a couple squares stepped outside of the ladder and just worked on side stepping.  Feet still pointed in the direction she was side stepping versus straight ahead.   -Carioca: Not todaymin A, cues for feet placement, 10' each direction  -backward (walking not today) with SEC and min A, cues for normal steps not shuffling backward    Assessment:  Pt did not have as good of carry over today with step length on the left foot with the walker or SEC.  When she did follow- through she had good steady ambulation with good step length, heel to toe and walking straight, when she stopped focusing there was no carry over and required cues.  Due to memory had pt recite what she should focus on while walking and did require cues for help 1/2 of the time.    She did improve her 1 leg stance from one month ago since she has been coming to therapy more consistently.

## 2021-10-05 ENCOUNTER — HOSPITAL ENCOUNTER (OUTPATIENT)
Dept: SPEECH THERAPY | Facility: REHABILITATION | Age: 61
End: 2021-10-05
Payer: COMMERCIAL

## 2021-10-05 DIAGNOSIS — R48.8 AGRAPHIA: Primary | ICD-10-CM

## 2021-10-05 PROCEDURE — 92507 TX SP LANG VOICE COMM INDIV: CPT | Mod: GN | Performed by: SPEECH-LANGUAGE PATHOLOGIST

## 2021-10-07 ENCOUNTER — HOSPITAL ENCOUNTER (OUTPATIENT)
Dept: SPEECH THERAPY | Facility: REHABILITATION | Age: 61
End: 2021-10-07
Payer: COMMERCIAL

## 2021-10-07 DIAGNOSIS — R48.0 ALEXIA: Primary | ICD-10-CM

## 2021-10-07 PROCEDURE — 92507 TX SP LANG VOICE COMM INDIV: CPT | Mod: GN | Performed by: SPEECH-LANGUAGE PATHOLOGIST

## 2021-10-12 ENCOUNTER — HOSPITAL ENCOUNTER (OUTPATIENT)
Dept: SPEECH THERAPY | Facility: REHABILITATION | Age: 61
End: 2021-10-12
Payer: COMMERCIAL

## 2021-10-12 DIAGNOSIS — R48.0 ALEXIA: ICD-10-CM

## 2021-10-12 DIAGNOSIS — R48.8 AGRAPHIA: Primary | ICD-10-CM

## 2021-10-12 PROCEDURE — 92507 TX SP LANG VOICE COMM INDIV: CPT | Mod: GN | Performed by: SPEECH-LANGUAGE PATHOLOGIST

## 2021-10-14 ENCOUNTER — HOSPITAL ENCOUNTER (OUTPATIENT)
Dept: PHYSICAL THERAPY | Facility: REHABILITATION | Age: 61
End: 2021-10-14
Payer: COMMERCIAL

## 2021-10-14 ENCOUNTER — HOSPITAL ENCOUNTER (OUTPATIENT)
Dept: SPEECH THERAPY | Facility: REHABILITATION | Age: 61
End: 2021-10-14
Payer: COMMERCIAL

## 2021-10-14 DIAGNOSIS — R29.898 RIGHT LEG WEAKNESS: ICD-10-CM

## 2021-10-14 DIAGNOSIS — R48.0 ALEXIA: ICD-10-CM

## 2021-10-14 DIAGNOSIS — R26.9 GAIT DISORDER: Primary | ICD-10-CM

## 2021-10-14 DIAGNOSIS — I69.320 APHASIA, POST-STROKE: ICD-10-CM

## 2021-10-14 DIAGNOSIS — R26.81 UNSTEADINESS ON FEET: ICD-10-CM

## 2021-10-14 DIAGNOSIS — R48.8 AGRAPHIA: Primary | ICD-10-CM

## 2021-10-14 PROCEDURE — 92507 TX SP LANG VOICE COMM INDIV: CPT | Mod: GN | Performed by: SPEECH-LANGUAGE PATHOLOGIST

## 2021-10-14 PROCEDURE — 97750 PHYSICAL PERFORMANCE TEST: CPT | Mod: GP | Performed by: PHYSICAL THERAPIST

## 2021-10-14 PROCEDURE — 97112 NEUROMUSCULAR REEDUCATION: CPT | Mod: GP | Performed by: PHYSICAL THERAPIST

## 2021-10-14 NOTE — PROGRESS NOTES
OUTPATIENT PHYSICAL THERAPY  PLAN OF TREATMENT FOR OUTPATIENT REHABILITATION AND PROGRESS NOTE           Patient's Last Name, First Name, Tara Jack Date of Birth  1960   Provider's Name  [facility] Medical Record No.  [id number]    Onset Date  7/2020 Start of Care Date  7/8/21   Type:     _X_PT   ___OT   ___SLP Medical Diagnosis  Weakness, CVA   PT Diagnosis  Weakness, gait instability,  Plan of Treatment  Frequency/Duration: 2X/week  Certification date from 10/14/2021 to 12/14/2021     Goals:  See below    Beginning/End Dates of Progress Note Reporting Period:  10/14/21 to 12/14/21    Progress Toward Goals:   Progress this reporting period: pt demonstrates an improvement in her APTA and CHAVEZ scores as well as MMT improvements.    Client Self (Subjective) Report for Progress Note Reporting Period: I was able to schedule more visits.  I am feeling a little off due to the colder weather today.  I like the fall so this is nice             I CERTIFY THE NEED FOR THESE SERVICES FURNISHED UNDER        THIS PLAN OF TREATMENT AND WHILE UNDER MY CARE     (Physician co-signature of this document indicates review and certification of the therapy plan).                Referring Provider: Dr. Razia Parks, PT      POC Dates:  7/8/21-10/7/21  NEW POC DATES: 10/14/21-12/14/21  10 more visits          Goals:   Pt will: Able to walk on uneven surfaces in the grass and do some light gardening without fear of falling as balance and strength improve within 12 visits has done some weeding but not much.  Very challenged walking on uneven surfaces.  Will continue goal   Pt will: Able to walk on even surfaces, like 1/2-1 mile through the neighborhood, with a normal pace as endurance and strength improves within 12 visits pt feels the quality of her walking is better but has not tried to walk in the neighborhood.  She feels more stable with the SEC  "and has been working on better quality.  She was able to shop a little yesterday and felt it went ok.  Will continue goal to work on longer distance and continued improvement of quality.       Date 10/14/21 10/4/21 9/29/21 9/27/21 9/24/21 9/23/21 9/13/21 8/26/21 8/4/21   Exercise            Nu-step  Seat 7, arms 9,  Workload 5, 3 min Workload 4, 3 minutes Workload 4, 3 minutes Workload 5, 3 minutes Workload 5  3'  3' 3' 3' 3'   Standing ex:  Hip flex, ext, abd, heel raises, toe raises, marching        X 10 each ex X 10 working up to 20   marching  X 10, no hands, CG to min A                                                                                                                        Neuro re-edu  Date 10/14/21 10/4/21 9/29/21 9/24/21 9/23/21 9/13/21 8/26/21   Exercise          1 leg stance Very challenged today.  Min A B but difficult holding leg up L=12 s with CG to min A, no hands  R=3s, CG to Wili, no hands    R and L leg-30 seconds with min to mod A, minimal hand touches.  2 tries each side R=2 seconds, mod A  L=0   Standing two feet together 60 seconds      Mild to mod sway, 20 s   CG   Toe tapping 4\" step X 20,CG    X 10, CG     Toe tapping on 6 cones in semi [place]     1X through each foot, CG, 1 hand for support     Picking up cones from floor in semi [place]     1X through with each hand, CG     Wand- reaching up, down, and to sides with each hand for balance    X 10 each hand      airex standing with feet together  60 seconds with CG, mild sway, no hand touches 60 seconds with CG 60 seconds with CG      Floor-feet together-eyes closed   Mild to mod sway, CG X 60 seconds                                                 GAIT: not today  ROLLING WALKER: 100'X4    Focused on walking with step through pattern on both feet with a more normal kady and no pauses.  Also focused on heel to toe stepping.  With cues every other step on the left to take a normal heel to toe step and not shuffle.  Veered to " the right  Stand by OTIS  Stood on pt's left side and had her look to the left here and there as she walked to prevent her from walking into the wall on the right  SEC: X 35' X 2  walked outside on uneven sidewalks with inclines and declines.  Pt required VC throughout for step length on left foot, head up and sequencing.        Neuro-re-edu (not today) (therapist stands on right)  Flush ladder:  X 2 lengths-stepping through with SEC, each foot in a square to focus on stride/step length, CG-VERY CHALLENGED TODAY, cues throughout to step into space not ladder  Flush ladder: not today) side stepping-mod cues throughout, pt very cautious and guarded. After a a couple squares stepped outside of the ladder and just worked on side stepping.  Feet still pointed in the direction she was side stepping versus straight ahead.   -Carioca: Not todaymin A, cues for feet placement, 10' each direction  -backward (walking not today) with SEC and min A, cues for normal steps not shuffling backward        RE-ASSESSMENT: (10/14/21)  APTA:  10/14/21 8, NO HANDS  7/8/21 5, intermittent use of hands  CHAVEZ:  10/14/21:  38/56-walking with assistance  7/8/21: 32/56-walking with assistance    Date   MMT   5/5 normal 10/14/21 7/8/21   Exercise     Hip flex R=5-  L=5 R=4  L=5   Hip abd R=4+  L=5 R=4  L=5   Hip add R=4  L=5 R=4-  L=5-     Knee ext R=4+  L=5- R=3+  L=4+   Knee flex R=4+  L=5- R=3+  L=4-         Assessment:  Pt returns for a follow-up/reassessment today.  She demonstrates as improvement in her LE strength with MMT, increased CHAVEZ and APTA score which carries over to her better quality ambulation since starting therapy.    When she first started therapy her attendance was sporadic due to her mother being ill then passing but she has been more consistent this fall and feel that has helped with her progress.    She continues to demonstrate altered gait and is still a fall risk but does show improvements with some of her balance  exercises and quality of her gait.  She demonstrates more steady ambulation with the SEC but her preference is to not use it.  She does understand that is helping to prevent falls so uses it.  We have worked on the quality of the ambulation with a WW where she walks more smooth and more normally hoping to get carryover with her SEC.  At times she is able to demonstrate this.  Feel she will benefit from continued skilled care to continue to work on her balance, strength and ambulation to improve her quality of function and help prevent falls.

## 2021-10-21 ENCOUNTER — HOSPITAL ENCOUNTER (OUTPATIENT)
Dept: SPEECH THERAPY | Facility: REHABILITATION | Age: 61
End: 2021-10-21
Payer: COMMERCIAL

## 2021-10-21 DIAGNOSIS — R48.0 ALEXIA: ICD-10-CM

## 2021-10-21 DIAGNOSIS — R48.8 AGRAPHIA: Primary | ICD-10-CM

## 2021-10-21 DIAGNOSIS — I69.320 APHASIA, POST-STROKE: ICD-10-CM

## 2021-10-21 PROCEDURE — 92507 TX SP LANG VOICE COMM INDIV: CPT | Mod: GN | Performed by: SPEECH-LANGUAGE PATHOLOGIST

## 2021-10-21 NOTE — PROGRESS NOTES
OUTPATIENT SPEECH LANGUAGE PATHOLOGY  PLAN OF TREATMENT FOR OUTPATIENT REHABILITATION AND PROGRESS NOTE                                                          Patient's Last Name, First Name, M.JERILYN.                Tara Liu Date of Birth  1960   Provider's Name  [facility] Medical Record No.  [id number]    Onset Date  4/21/21 Start of Care Date  4/29/21   Type:     __PT   ___OT   _X_SLP Medical Diagnosis  Acute left PCA stroke I63.532, Difficulty understanding written language R41.89, Difficulty reading F81.0, Difficulty writing R68.89   SLP Diagnosis  Aphasia, Agraphia, Alexia Plan of Treatment  Frequency/Duration: 2x per week  Certification date from 10/29/21 to 1/29/21     Goals:  Goal Identifier 1   Goal Description Patient will demonstrate independence with x3+ word finding strategies in structured tasks and conversation to improve communication success in conversation.    Target Date 10/29/21   Date Met  10/21/21   Progress (detail required for progress note): 10/21/21: GOAL MET.  Patient demonstrates use of at least 3 word finding strategies to support expression in conversation and structured tasks both SARAI or given min cue.       Goal Identifier 2   Goal Description Patient will label single letters/numbers with 80% accuracy SARAI.   Target Date 01/29/22   Date Met      Progress (detail required for progress note): 10/21/21: Goal not consistently progressing.  Collected new baseline for letter recognition (17/26, 65%).  Established home practice to target letter recognition/labeling with recommendations for high frequency completion (multiple times per day).  Patient to independently complete and will reassess for progress in 1 month.     Goal Identifier 3   Goal Description Patient will complete word level reading tasks with 60% accuracy given min cues.   Target Date     Date Met  (P) 10/21/21   Progress (detail required for progress note): (P) 10/21/21:  "Discontinue goal.  Patient able to complete word level reading tasks with variable accuracy; however, is able to consistently increase to near 100% accuracy with trained text to speech strategies and/or use of letter board.     Goal Identifier 4   Goal Description Patient will read x20 functional words/phrases with 80% accuracy SARAI.   Target Date     Date Met  (P) 10/21/21   Progress (detail required for progress note): (P) 10/21/21: Goal minimally addressed this reporting period.  Per last progress note, \"Patient able to % of names from F:10 and label 100% of names.  Patient able to identify common foods from F:10 with 70% accuracy, and label with 20% accuracy SARAI.\"  Discontinue goal due to prioritization of assistive technology and number tasks.     Goal Identifier 5   Goal Description (P) Patient will complete functional naming tasks with independent use of word finding strategies in >80% of opportunities.   Target Date (P) 01/29/22   Date Met      Progress (detail required for progress note): (P) goal added 10/21/21     Goal Identifier 6   Goal Description Patient will complete functional numbers tasks (e.g., stating dollar amounts, time) with use of external aid with >80% accuracy.   Target Date (P) 01/29/22   Date Met      Progress (detail required for progress note): (P) 10/21/21: Goal added 10/5/21.  Initially trained patient on use of text to speech options for numeric tasks (e.g., time, bills) with emerging success.  Minimally addressed, continued as worded.     Goal Identifier 7   Goal Description Patient will complete sentence level writing tasks with use of external aid (e.g., speech to text) with >80% accuracy.   Target Date (P) 01/29/22   Date Met      Progress (detail required for progress note): (P) 10/21/21: Goal added 10/5/21, PROGRESSING.  Patient able to complete sentence level writing tasks with 83% accuracy for semantically/grammatically correct sentences on paper on today's date, with " 50% accuracy with text to speech.  Continue goal as worded.       Client Self (Subjective) Report for Progress Note Reporting Period: Patient arrived to therapy in good spirits.  She expressed that she has been working on home practice SARAI.            I CERTIFY THE NEED FOR THESE SERVICES FURNISHED UNDER        THIS PLAN OF TREATMENT AND WHILE UNDER MY CARE     (Physician co-signature of this document indicates review and certification of the therapy plan).                Referring Provider: Edwina Fox, SLP

## 2021-10-22 ENCOUNTER — HOSPITAL ENCOUNTER (OUTPATIENT)
Dept: PHYSICAL THERAPY | Facility: REHABILITATION | Age: 61
End: 2021-10-22
Payer: COMMERCIAL

## 2021-10-22 DIAGNOSIS — R26.81 UNSTEADINESS ON FEET: ICD-10-CM

## 2021-10-22 DIAGNOSIS — R29.898 RIGHT LEG WEAKNESS: ICD-10-CM

## 2021-10-22 DIAGNOSIS — R26.9 GAIT DISORDER: Primary | ICD-10-CM

## 2021-10-22 PROCEDURE — 97112 NEUROMUSCULAR REEDUCATION: CPT | Mod: GP | Performed by: PHYSICAL THERAPIST

## 2021-10-22 PROCEDURE — 97116 GAIT TRAINING THERAPY: CPT | Mod: GP | Performed by: PHYSICAL THERAPIST

## 2021-10-22 NOTE — PROGRESS NOTES
"  POC Dates:  7/8/21-10/7/21  NEW POC DATES: 10/14/21-12/14/21  10 more visits    Goals:   Pt will: Able to walk on uneven surfaces in the grass and do some light gardening without fear of falling as balance and strength improve within 12 visits has done some weeding but not much.  Very challenged walking on uneven surfaces.  Will continue goal   Pt will: Able to walk on even surfaces, like 1/2-1 mile through the neighborhood, with a normal pace as endurance and strength improves within 12 visits pt feels the quality of her walking is better but has not tried to walk in the neighborhood.  She feels more stable with the SEC and has been working on better quality.  She was able to shop a little yesterday and felt it went ok.  Will continue goal to work on longer distance and continued improvement of quality.       Date 10/22/21 10/14/21 10/4/21 9/29/21 9/27/21 9/24/21 9/23/21 9/13/21 8/26/21 8/4/21   Exercise             Nu-step  Seat 7, arms 9,  Workload 5, 3 min Workload 5, 3 min Workload 4, 3 minutes Workload 4, 3 minutes Workload 5, 3 minutes Workload 5  3'  3' 3' 3' 3'   Standing ex:  Hip flex, ext, abd, heel raises, toe raises, marching         X 10 each ex X 10 working up to 20   marching   X 10, no hands, CG to min A                                                                                                                                 Neuro re-edu  Date 10/22/21 10/14/21 10/4/21 9/29/21 9/24/21 9/23/21 9/13/21 8/26/21   Exercise           1 leg stance  Very challenged today.  Min A B but difficult holding leg up L=12 s with CG to min A, no hands  R=3s, CG to Wili, no hands    R and L leg-30 seconds with min to mod A, minimal hand touches.  2 tries each side R=2 seconds, mod A  L=0   Standing two feet together  60 seconds      Mild to mod sway, 20 s   CG   Toe tapping 4\" step  X 20,CG    X 10, CG     Toe tapping on 6 cones in semi [place]      1X through each foot, CG, 1 hand for support     Picking up " "cones from floor in semi [place]      1X through with each hand, CG     Wand- reaching up, down, and to sides with each hand for balance     X 10 each hand      airex standing with feet together 60 seconds with CG, mild sway, no hand touches  60 seconds with CG, mild sway, no hand touches 60 seconds with CG 60 seconds with CG      Floor-feet together-eyes closed    Mild to mod sway, CG X 60 seconds       8\" step, toe tap, standing on airex X 10, min A, no hands          Tandem standing on airex 20s, min A                                  GAIT: 10/22/21  ROLLING WALKER: 100'X4    Focused on walking with step through pattern on both feet with a more normal kady and no pauses.  Also focused on heel to toe stepping. She did not require many cues today for pace or heel to toe   SEC: X 35' X 2  Focused on pace and step length.  The first 50' were good then the step length decreased again.  Little carry over from the walker today.   .        Neuro-re-edu (not today) (therapist stands on right)  Flush ladder:  X 2 lengths-stepping through with SEC, each foot in a square to focus on stride/step length, CG-VERY CHALLENGED TODAY, cues throughout to step into space not ladder  Flush ladder: not today) side stepping-mod cues throughout, pt very cautious and guarded. After a a couple squares stepped outside of the ladder and just worked on side stepping.  Feet still pointed in the direction she was side stepping versus straight ahead.   -Carioca: Not todaymin A, cues for feet placement, 10' each direction  -backward (walking not today) with SEC and min A, cues for normal steps not shuffling backward        RE-ASSESSMENT: (10/14/21)  APTA:  10/14/21 8, NO HANDS  7/8/21 5, intermittent use of hands  CHAVEZ:  10/14/21:  38/56-walking with assistance  7/8/21: 32/56-walking with assistance    Date   MMT   5/5 normal 10/14/21 7/8/21   Exercise     Hip flex R=5-  L=5 R=4  L=5   Hip abd R=4+  L=5 R=4  L=5   Hip add R=4  L=5 R=4-  L=5-   "   Knee ext R=4+  L=5- R=3+  L=4+   Knee flex R=4+  L=5- R=3+  L=4-         Assessment:  Pt returns for a follow-up today.  She did get a smaller purse which has decreased her shoulder pain. She found her hand weights so is working on her arm strength at home.  She has been trying to work on her walking with her SEC but finds her sequencing gets off.  Edu pt not to focus on it when she walks and see if that changes anything.  She was able to perform some higher level balance tasks today in the clinic but did need some assist from the therapist.  Hoping this will help with some carryover with her balance.  Feel she will benefit from continued skilled care to continue to work on her balance, strength and ambulation to improve her quality of function and help prevent falls.

## 2021-10-25 ENCOUNTER — HOSPITAL ENCOUNTER (OUTPATIENT)
Dept: PHYSICAL THERAPY | Facility: REHABILITATION | Age: 61
End: 2021-10-25
Payer: COMMERCIAL

## 2021-10-25 DIAGNOSIS — R26.81 UNSTEADINESS ON FEET: ICD-10-CM

## 2021-10-25 DIAGNOSIS — I63.9 ACUTE ISCHEMIC STROKE (H): ICD-10-CM

## 2021-10-25 DIAGNOSIS — R29.898 RIGHT LEG WEAKNESS: ICD-10-CM

## 2021-10-25 DIAGNOSIS — R26.9 GAIT DISORDER: Primary | ICD-10-CM

## 2021-10-25 DIAGNOSIS — E11.9 TYPE 2 DIABETES MELLITUS WITHOUT COMPLICATION, WITHOUT LONG-TERM CURRENT USE OF INSULIN (H): ICD-10-CM

## 2021-10-25 PROCEDURE — 97116 GAIT TRAINING THERAPY: CPT | Mod: GP | Performed by: PHYSICAL THERAPIST

## 2021-10-25 PROCEDURE — 97110 THERAPEUTIC EXERCISES: CPT | Mod: GP | Performed by: PHYSICAL THERAPIST

## 2021-10-25 NOTE — PROGRESS NOTES
"    POC Dates:  7/8/21-10/7/21  NEW POC DATES: 10/14/21-12/14/21  10 more visits    Goals:   Pt will: Able to walk on uneven surfaces in the grass and do some light gardening without fear of falling as balance and strength improve within 12 visits has done some weeding but not much.  Very challenged walking on uneven surfaces.  Will continue goal   Pt will: Able to walk on even surfaces, like 1/2-1 mile through the neighborhood, with a normal pace as endurance and strength improves within 12 visits pt feels the quality of her walking is better but has not tried to walk in the neighborhood.  She feels more stable with the SEC and has been working on better quality.  She was able to shop a little yesterday and felt it went ok.  Will continue goal to work on longer distance and continued improvement of quality.       Date 10/25/21 10/22/21 10/14/21 10/4/21 9/29/21 9/27/21 9/24/21 9/23/21 9/13/21 8/26/21 8/4/21   Exercise              Nu-step  Seat 7, arms 9,  Workload 5, 3 min Workload 5, 3 min Workload 5, 3 min Workload 4, 3 minutes Workload 4, 3 minutes Workload 5, 3 minutes Workload 5  3'  3' 3' 3' 3'   Standing ex:  Hip flex, ext, abd, heel raises, toe raises, marching X 10 each         X 10 each ex X 10 working up to 20   marching    X 10, no hands, CG to min A                                                                                                                                          Neuro re-edu  Date 10/25/21 10/22/21 10/14/21 10/4/21 9/29/21 9/24/21 9/23/21 9/13/21 8/26/21   Exercise            1 leg stance   Very challenged today.  Min A B but difficult holding leg up L=12 s with CG to min A, no hands  R=3s, CG to Wili, no hands    R and L leg-30 seconds with min to mod A, minimal hand touches.  2 tries each side R=2 seconds, mod A  L=0   Standing two feet together   60 seconds      Mild to mod sway, 20 s   CG   Toe tapping 4\" step   X 20,CG    X 10, CG     Toe tapping on 6 cones in semi [place]     " "  1X through each foot, CG, 1 hand for support     Picking up cones from floor in semi [place]       1X through with each hand, CG     Wand- reaching up, down, and to sides with each hand for balance      X 10 each hand      airex standing with feet together  60 seconds with CG, mild sway, no hand touches  60 seconds with CG, mild sway, no hand touches 60 seconds with CG 60 seconds with CG      Floor-feet together-eyes closed     Mild to mod sway, CG X 60 seconds       8\" step, toe tap, standing on airex X 10, min A, hand touches today X 10, min A, no hands          Tandem standing on airex 30s, min A, many hand touches, more challenging leading with L 20s, min A                                    GAIT: 10/22/21  ROLLING WALKER: 100'X4    Focused on walking with step through pattern on both feet with a more normal kady and no pauses.  Also focused on heel to toe stepping. She did not require many cues today for pace or heel to toe   SEC: X 35' X 2  Focused on pace and step length.  The first 50' were good then the step length decreased again.  Little carry over from the walker today.   .        Neuro-re-edu (not today) (therapist stands on right)  Flush ladder:  X 2 lengths-stepping through with SEC, each foot in a square to focus on stride/step length, CG-VERY CHALLENGED TODAY, cues throughout to step into space not ladder  Flush ladder: not today) side stepping-mod cues throughout, pt very cautious and guarded. After a a couple squares stepped outside of the ladder and just worked on side stepping.  Feet still pointed in the direction she was side stepping versus straight ahead.   -Carioca: Not todaymin A, cues for feet placement, 10' each direction  -backward (walking not today) with SEC and min A, cues for normal steps not shuffling backward        RE-ASSESSMENT: (10/14/21)  APTA:  10/14/21 8, NO HANDS  7/8/21 5, intermittent use of hands  CHAVEZ:  10/14/21:  38/56-walking with assistance  7/8/21: 32/56-walking " with assistance    Date   MMT   5/5 normal 10/14/21 7/8/21   Exercise     Hip flex R=5-  L=5 R=4  L=5   Hip abd R=4+  L=5 R=4  L=5   Hip add R=4  L=5 R=4-  L=5-     Knee ext R=4+  L=5- R=3+  L=4+   Knee flex R=4+  L=5- R=3+  L=4-         Assessment:  Pt returns for a follow-up today. She walks in the clinic very guarded with short steps.  Continue to encourage pt to always walk with a bigger step length and more confidence in her movement. She was more cautious and guarded with all tasks today.  Unsure if she was more fatigued from the weekend.  She needed more assist with the balance ex on the airex today.  She did admit it was easier to walk with a walker and her pace and quality of her ambulation was better.Again, continued to encourage her to walk with a bigger stride length with every step, even with the cane.  The walker did offer minimal carry over when switching to the cane today. Will continue to work on her balance, proprioception and ambulation to improve her quality of movement and strength.

## 2021-10-25 NOTE — TELEPHONE ENCOUNTER
Refill request for clopidogrel 75 mg   Pt has an appointment with you 01/21/2022  Rx set to 30 day supply and 1 refill  Medication T'd for review and signature  Teja Schmitz MA on 10/25/2021 at 3:23 PM

## 2021-10-25 NOTE — TELEPHONE ENCOUNTER
[facility] sent a request for refill of Clopidogrel 75 mg 1 tab every day. Pt will see  Dr. Duarte in January.

## 2021-10-26 ENCOUNTER — HOSPITAL ENCOUNTER (OUTPATIENT)
Dept: SPEECH THERAPY | Facility: REHABILITATION | Age: 61
End: 2021-10-26
Payer: COMMERCIAL

## 2021-10-26 DIAGNOSIS — R48.0 ALEXIA: ICD-10-CM

## 2021-10-26 DIAGNOSIS — I69.320 APHASIA, POST-STROKE: ICD-10-CM

## 2021-10-26 DIAGNOSIS — R48.8 AGRAPHIA: Primary | ICD-10-CM

## 2021-10-26 PROCEDURE — 92507 TX SP LANG VOICE COMM INDIV: CPT | Mod: GN | Performed by: SPEECH-LANGUAGE PATHOLOGIST

## 2021-10-26 RX ORDER — CLOPIDOGREL BISULFATE 75 MG/1
TABLET ORAL
Qty: 30 TABLET | Refills: 1 | Status: SHIPPED | OUTPATIENT
Start: 2021-10-26 | End: 2021-12-02

## 2021-10-27 ENCOUNTER — HOSPITAL ENCOUNTER (OUTPATIENT)
Dept: PHYSICAL THERAPY | Facility: REHABILITATION | Age: 61
End: 2021-10-27
Payer: COMMERCIAL

## 2021-10-27 DIAGNOSIS — R26.81 UNSTEADINESS ON FEET: ICD-10-CM

## 2021-10-27 DIAGNOSIS — R26.9 GAIT DISORDER: Primary | ICD-10-CM

## 2021-10-27 DIAGNOSIS — R29.898 RIGHT LEG WEAKNESS: ICD-10-CM

## 2021-10-27 PROCEDURE — 97110 THERAPEUTIC EXERCISES: CPT | Mod: GP | Performed by: PHYSICAL THERAPIST

## 2021-10-27 PROCEDURE — 97116 GAIT TRAINING THERAPY: CPT | Mod: GP | Performed by: PHYSICAL THERAPIST

## 2021-10-27 NOTE — PROGRESS NOTES
POC Dates:  7/8/21-10/7/21  NEW POC DATES: 10/14/21-12/14/21  10 more visits    Goals:   Pt will: Able to walk on uneven surfaces in the grass and do some light gardening without fear of falling as balance and strength improve within 12 visits has done some weeding but not much.  Very challenged walking on uneven surfaces.  Will continue goal   Pt will: Able to walk on even surfaces, like 1/2-1 mile through the neighborhood, with a normal pace as endurance and strength improves within 12 visits pt feels the quality of her walking is better but has not tried to walk in the neighborhood.  She feels more stable with the SEC and has been working on better quality.  She was able to shop a little yesterday and felt it went ok.  Will continue goal to work on longer distance and continued improvement of quality.       Date 10/27/21 10/25/21 10/22/21 10/14/21 10/4/21 9/29/21 9/27/21 9/24/21 9/23/21 9/13/21 8/26/21 8/4/21   Exercise               Nu-step  Seat 7, arms 9,  Workload 5, 3 min Workload 5, 3 min Workload 5, 3 min Workload 5, 3 min Workload 4, 3 minutes Workload 4, 3 minutes Workload 5, 3 minutes Workload 5  3'  3' 3' 3' 3'   Standing ex:  Hip flex, ext, abd, heel raises, toe raises, marching  X 10 each         X 10 each ex X 10 working up to 20   marching     X 10, no hands, CG to min A          Heel and toe raises X 10              TG bilateral leg squat with block between knees Level 17 X 15              Sit<>stands X 5reps, 3 chairs, to be done throughout the day                                                                                                          Neuro re-edu  Date 10/27/21 10/25/21 10/22/21 10/14/21 10/4/21 9/29/21 9/24/21 9/23/21 9/13/21 8/26/21   Exercise             1 leg stance    Very challenged today.  Min A B but difficult holding leg up L=12 s with CG to min A, no hands  R=3s, CG to Wili, no hands    R and L leg-30 seconds with min to mod A, minimal hand touches.  2 tries each  "side R=2 seconds, mod A  L=0   Standing two feet together    60 seconds      Mild to mod sway, 20 s   CG   Toe tapping 4\" step    X 20,CG    X 10, CG     Toe tapping on 6 cones in semi [place]        1X through each foot, CG, 1 hand for support     Picking up cones from floor in semi [place]        1X through with each hand, CG     Wand- reaching up, down, and to sides with each hand for balance       X 10 each hand      airex standing with feet together   60 seconds with CG, mild sway, no hand touches  60 seconds with CG, mild sway, no hand touches 60 seconds with CG 60 seconds with CG      Floor-feet together-eyes closed      Mild to mod sway, CG X 60 seconds       8\" step, toe tap, standing on airex X 15, CG to min A, hand touches X 10, min A, hand touches today X 10, min A, no hands          Tandem standing on airex  30s, min A, many hand touches, more challenging leading with L 20s, min A                                      GAIT: 10/27/21   SEC: X 35' X 2  Focused on pace and step length.  She was good to start with but as steps continued and as she got distracted with multi tasking and talking her left foot would shuffle and take small steps again.  With cues she is able to correct this.  Also needed cues to swing R UE.    -did not use walker today as unsure if there was a lot of carry over with that task.    .        Neuro-re-edu (not today) (therapist stands on right)  Flush ladder:  X 2 lengths-stepping through with SEC, each foot in a square to focus on stride/step length, CG-VERY CHALLENGED TODAY, cues throughout to step into space not ladder  Flush ladder: not today) side stepping-mod cues throughout, pt very cautious and guarded. After a a couple squares stepped outside of the ladder and just worked on side stepping.  Feet still pointed in the direction she was side stepping versus straight ahead.   -Carioca: Not todaymin A, cues for feet placement, 10' each direction  -backward (walking not today) with " SEC and min A, cues for normal steps not shuffling backward        RE-ASSESSMENT: (10/14/21)  APTA:  10/14/21 8, NO HANDS  7/8/21 5, intermittent use of hands  CHAVEZ:  10/14/21:  38/56-walking with assistance  7/8/21: 32/56-walking with assistance    Date   MMT   5/5 normal 10/14/21 7/8/21   Exercise     Hip flex R=5-  L=5 R=4  L=5   Hip abd R=4+  L=5 R=4  L=5   Hip add R=4  L=5 R=4-  L=5-     Knee ext R=4+  L=5- R=3+  L=4+   Knee flex R=4+  L=5- R=3+  L=4-         Assessment:  Pt returns for a follow-up today. She continues to need cues when she walks in from the waiting room to take bigger steps; increasing her step length.  Once she is cued she is able to do it but there is no carryover until reminded. Added a couple more leg strengthening ex today.  Pt was challenged getting on and off the TG.  Feel it was a good effort using multiple mm groups so will continue that each session. Will continue to work on her balance, proprioception and ambulation to improve her quality of movement and strength.

## 2021-10-28 ENCOUNTER — HOSPITAL ENCOUNTER (OUTPATIENT)
Dept: SPEECH THERAPY | Facility: REHABILITATION | Age: 61
End: 2021-10-28
Payer: COMMERCIAL

## 2021-10-28 DIAGNOSIS — R48.8 AGRAPHIA: Primary | ICD-10-CM

## 2021-10-28 DIAGNOSIS — R48.0 ALEXIA: ICD-10-CM

## 2021-10-28 DIAGNOSIS — I69.320 APHASIA, POST-STROKE: ICD-10-CM

## 2021-10-28 PROCEDURE — 92507 TX SP LANG VOICE COMM INDIV: CPT | Mod: GN | Performed by: SPEECH-LANGUAGE PATHOLOGIST

## 2021-11-01 ENCOUNTER — HOSPITAL ENCOUNTER (OUTPATIENT)
Dept: PHYSICAL THERAPY | Facility: REHABILITATION | Age: 61
End: 2021-11-01
Payer: COMMERCIAL

## 2021-11-01 DIAGNOSIS — R26.9 GAIT DISORDER: Primary | ICD-10-CM

## 2021-11-01 DIAGNOSIS — R29.898 RIGHT LEG WEAKNESS: ICD-10-CM

## 2021-11-01 DIAGNOSIS — R26.81 UNSTEADINESS ON FEET: ICD-10-CM

## 2021-11-01 PROCEDURE — 97110 THERAPEUTIC EXERCISES: CPT | Mod: GP | Performed by: PHYSICAL THERAPIST

## 2021-11-01 PROCEDURE — 97116 GAIT TRAINING THERAPY: CPT | Mod: GP | Performed by: PHYSICAL THERAPIST

## 2021-11-01 NOTE — PROGRESS NOTES
POC Dates:  7/8/21-10/7/21  NEW POC DATES: 10/14/21-12/14/21  10 more visits    Goals:   Pt will: Able to walk on uneven surfaces in the grass and do some light gardening without fear of falling as balance and strength improve within 12 visits has done some weeding but not much.  Very challenged walking on uneven surfaces.  Will continue goal   Pt will: Able to walk on even surfaces, like 1/2-1 mile through the neighborhood, with a normal pace as endurance and strength improves within 12 visits pt feels the quality of her walking is better but has not tried to walk in the neighborhood.  She feels more stable with the SEC and has been working on better quality.  She was able to shop a little yesterday and felt it went ok.  Will continue goal to work on longer distance and continued improvement of quality.       Date 11/1/21 10/27/21 10/25/21 10/22/21 10/14/21 10/4/21 9/29/21 9/27/21 9/24/21 9/23/21 9/13/21 8/26/21 8/4/21   Exercise                Nu-step  Seat 7, arms 9,  Workload 5, 3 min Workload 5, 3 min Workload 5, 3 min Workload 5, 3 min Workload 5, 3 min Workload 4, 3 minutes Workload 4, 3 minutes Workload 5, 3 minutes Workload 5  3'  3' 3' 3' 3'   Standing ex:  Hip flex, ext, abd, heel raises, toe raises, marching   X 10 each         X 10 each ex X 10 working up to 20   marching      X 10, no hands, CG to min A          Heel and toe raises  X 10              TG bilateral leg squat with block between knees  Level 17 X 15              Sit<>stands  X 5reps, 3 chairs, to be done throughout the day                                                                                                                Neuro re-edu  Date 11/1/21 10/27/21 10/25/21 10/22/21 10/14/21 10/4/21 9/29/21 9/24/21 9/23/21 9/13/21 8/26/21   Exercise              1 leg stance R=5s  L=1-2s min to mod A  Very challenged    Very challenged today.  Min A B but difficult holding leg up L=12 s with CG to min A, no hands  R=3s, CG to Wili,  "no hands    R and L leg-30 seconds with min to mod A, minimal hand touches.  2 tries each side R=2 seconds, mod A  L=0   Standing two feet together     60 seconds      Mild to mod sway, 20 s   CG   Toe tapping 4\" step     X 20,CG    X 10, CG     Toe tapping on 6 cones in semi [place]         1X through each foot, CG, 1 hand for support     Picking up cones from floor in semi [place]         1X through with each hand, CG     Wand- reaching up, down, and to sides with each hand for balance        X 10 each hand      airex standing with feet together    60 seconds with CG, mild sway, no hand touches  60 seconds with CG, mild sway, no hand touches 60 seconds with CG 60 seconds with CG      Floor-feet together-eyes closed 60s with mild swag, CG      Mild to mod sway, CG X 60 seconds       8\" step, toe tap, standing on airex  X 15, CG to min A, hand touches X 10, min A, hand touches today X 10, min A, no hands          Tandem standing on airex   30s, min A, many hand touches, more challenging leading with L 20s, min A          Tandem standing on floor 25s, CG to min A             8\" step, toe tap X 15 X 10 with no hands, CG                GAIT: 11/01/21   SEC: X 35' X 2  Focused on pace and step length.  She continues to need cues for step length.  Talking distracts her so will continue to need work with multi-tasking so she feels safe and doesn't lose her balance.          Neuro-re-edu (not today) (therapist stands on right)  Flush ladder:  X 2 lengths-stepping through with SEC, each foot in a square to focus on stride/step length, CG-VERY CHALLENGED TODAY, cues throughout to step into space not ladder  Flush ladder: not today) side stepping-mod cues throughout, pt very cautious and guarded. After a a couple squares stepped outside of the ladder and just worked on side stepping.  Feet still pointed in the direction she was side stepping versus straight ahead.   -Carioca: Not todaymin A, cues for feet placement, 10' each " direction  -backward (walking not today) with SEC and min A, cues for normal steps not shuffling backward        RE-ASSESSMENT: (10/14/21)  APTA:  10/14/21 8, NO HANDS  7/8/21 5, intermittent use of hands  CHAVEZ:  10/14/21:  38/56-walking with assistance  7/8/21: 32/56-walking with assistance    Date   MMT   5/5 normal 10/14/21 7/8/21   Exercise     Hip flex R=5-  L=5 R=4  L=5   Hip abd R=4+  L=5 R=4  L=5   Hip add R=4  L=5 R=4-  L=5-     Knee ext R=4+  L=5- R=3+  L=4+   Knee flex R=4+  L=5- R=3+  L=4-         Assessment:  Pt returns for a follow-up today. Pt remains distracted with multi-tasking while walking.  Once she is cued to step with a good strep length she is able to perform, but once distracted she is unable to maintain it.  She struggled with her balance in the parallel bars today, not using the airex.  Feel some days are better than others.  She did find her exercise bike at home and is starting to use it to build her strength and endurance.

## 2021-11-02 ENCOUNTER — HOSPITAL ENCOUNTER (OUTPATIENT)
Dept: SPEECH THERAPY | Facility: REHABILITATION | Age: 61
End: 2021-11-02
Payer: COMMERCIAL

## 2021-11-02 DIAGNOSIS — I69.320 APHASIA, POST-STROKE: Primary | ICD-10-CM

## 2021-11-02 DIAGNOSIS — R48.0 ALEXIA: ICD-10-CM

## 2021-11-02 DIAGNOSIS — R48.8 AGRAPHIA: ICD-10-CM

## 2021-11-02 PROCEDURE — 92507 TX SP LANG VOICE COMM INDIV: CPT | Mod: GN | Performed by: SPEECH-LANGUAGE PATHOLOGIST

## 2021-11-03 ENCOUNTER — HOSPITAL ENCOUNTER (OUTPATIENT)
Dept: PHYSICAL THERAPY | Facility: REHABILITATION | Age: 61
End: 2021-11-03
Payer: COMMERCIAL

## 2021-11-03 DIAGNOSIS — R26.9 GAIT DISORDER: Primary | ICD-10-CM

## 2021-11-03 DIAGNOSIS — R26.81 UNSTEADINESS ON FEET: ICD-10-CM

## 2021-11-03 DIAGNOSIS — R29.898 RIGHT LEG WEAKNESS: ICD-10-CM

## 2021-11-03 PROCEDURE — 97110 THERAPEUTIC EXERCISES: CPT | Mod: GP | Performed by: PHYSICAL THERAPIST

## 2021-11-03 PROCEDURE — 97112 NEUROMUSCULAR REEDUCATION: CPT | Mod: GP | Performed by: PHYSICAL THERAPIST

## 2021-11-03 NOTE — PROGRESS NOTES
POC Dates:  7/8/21-10/7/21  NEW POC DATES: 10/14/21-12/14/21  10 more visits    Goals:   Pt will: Able to walk on uneven surfaces in the grass and do some light gardening without fear of falling as balance and strength improve within 12 visits has done some weeding but not much.  Very challenged walking on uneven surfaces.  Will continue goal   Pt will: Able to walk on even surfaces, like 1/2-1 mile through the neighborhood, with a normal pace as endurance and strength improves within 12 visits pt feels the quality of her walking is better but has not tried to walk in the neighborhood.  She feels more stable with the SEC and has been working on better quality.  She was able to shop a little yesterday and felt it went ok.  Will continue goal to work on longer distance and continued improvement of quality.       Date 11/3/21 11/1/21 10/27/21 10/25/21 10/22/21 10/14/21 10/4/21 9/29/21 9/27/21 9/24/21 9/23/21 9/13/21 8/26/21 8/4/21   Exercise                 Nu-step  Seat 7, arms 9,  Workload 5, 3 min Workload 5, 3 min Workload 5, 3 min Workload 5, 3 min Workload 5, 3 min Workload 5, 3 min Workload 4, 3 minutes Workload 4, 3 minutes Workload 5, 3 minutes Workload 5  3'  3' 3' 3' 3'   Standing ex:  Hip flex, ext, abd, heel raises, toe raises, marching    X 10 each         X 10 each ex X 10 working up to 20   marching       X 10, no hands, CG to min A          Heel and toe raises   X 10              TG bilateral leg squat with block between knees   Level 17 X 15              Sit<>stands   X 5reps, 3 chairs, to be done throughout the day                                                                                                                      Neuro re-edu  Date  11/1/21 10/27/21 10/25/21 10/22/21 10/14/21 10/4/21 9/29/21 9/24/21 9/23/21 9/13/21 8/26/21   Exercise               1 leg stance  R=5s  L=1-2s min to mod A  Very challenged    Very challenged today.  Min A B but difficult holding leg up L=12 s with  "CG to min A, no hands  R=3s, CG to Wili, no hands    R and L leg-30 seconds with min to mod A, minimal hand touches.  2 tries each side R=2 seconds, mod A  L=0   Standing two feet together      60 seconds      Mild to mod sway, 20 s   CG   Toe tapping 4\" step      X 20,CG    X 10, CG     Toe tapping on 6 cones in semi [place]          1X through each foot, CG, 1 hand for support     Picking up cones from floor in semi [place]          1X through with each hand, CG     Wand- reaching up, down, and to sides with each hand for balance         X 10 each hand      airex standing with feet together     60 seconds with CG, mild sway, no hand touches  60 seconds with CG, mild sway, no hand touches 60 seconds with CG 60 seconds with CG      Floor-feet together-eyes closed  60s with mild sway, CG      Mild to mod sway, CG X 60 seconds       8\" step, toe tap, standing on airex   X 15, CG to min A, hand touches X 10, min A, hand touches today X 10, min A, no hands          Tandem standing on airex    30s, min A, many hand touches, more challenging leading with L 20s, min A          Tandem standing on floor R=42s  L=13s  CG B 25s, CG to min A             8\" step, toe tap X 15 X10 with no hands, CG X 10 with no hands, CG              Stand on airex eyes closed 18s, mod sway  48s, CG, mild to mod sway                                              GAIT: (not today)   SEC: X 35' X 2  Focused on pace and step length.  She continues to need cues for step length.  Talking distracts her so will continue to need work with multi-tasking so she feels safe and doesn't lose her balance.          Neuro-re-edu (not today) (therapist stands on right)  Flush ladder:  X 2 lengths-stepping through with SEC, each foot in a square to focus on stride/step length, CG-VERY CHALLENGED TODAY, cues throughout to step into space not ladder  Flush ladder: not today) side stepping-mod cues throughout, pt very cautious and guarded. After a a couple squares " stepped outside of the ladder and just worked on side stepping.  Feet still pointed in the direction she was side stepping versus straight ahead.   -Carioca: Not todaymin A, cues for feet placement, 10' each direction  -backward (walking not today) with SEC and min A, cues for normal steps not shuffling backward        RE-ASSESSMENT: (10/14/21)  APTA:  10/14/21 8, NO HANDS  7/8/21 5, intermittent use of hands  CHAVEZ:  10/14/21:  38/56-walking with assistance  7/8/21: 32/56-walking with assistance    Date   MMT   5/5 normal 10/14/21 7/8/21   Exercise     Hip flex R=5-  L=5 R=4  L=5   Hip abd R=4+  L=5 R=4  L=5   Hip add R=4  L=5 R=4-  L=5-     Knee ext R=4+  L=5- R=3+  L=4+   Knee flex R=4+  L=5- R=3+  L=4-         Assessment:  Pt returns and continues to be guarded with her amb into clinic with short choppy steps.  Focused on different tasks today with one being floor<>stand transfers.  She was able to demonstrate a good understanding with supervision to get on and off the floor.  She will work on this at home to continue to gain strength with this task.

## 2021-11-04 ENCOUNTER — HOSPITAL ENCOUNTER (OUTPATIENT)
Dept: SPEECH THERAPY | Facility: REHABILITATION | Age: 61
End: 2021-11-04
Payer: COMMERCIAL

## 2021-11-04 DIAGNOSIS — R48.8 AGRAPHIA: ICD-10-CM

## 2021-11-04 DIAGNOSIS — I69.320 APHASIA, POST-STROKE: Primary | ICD-10-CM

## 2021-11-04 DIAGNOSIS — R48.0 ALEXIA: ICD-10-CM

## 2021-11-04 PROCEDURE — 92507 TX SP LANG VOICE COMM INDIV: CPT | Mod: GN | Performed by: SPEECH-LANGUAGE PATHOLOGIST

## 2021-11-08 ENCOUNTER — HOSPITAL ENCOUNTER (OUTPATIENT)
Dept: PHYSICAL THERAPY | Facility: REHABILITATION | Age: 61
End: 2021-11-08
Payer: COMMERCIAL

## 2021-11-08 DIAGNOSIS — R26.81 UNSTEADINESS ON FEET: ICD-10-CM

## 2021-11-08 DIAGNOSIS — R29.898 RIGHT LEG WEAKNESS: ICD-10-CM

## 2021-11-08 DIAGNOSIS — R26.9 GAIT DISORDER: Primary | ICD-10-CM

## 2021-11-08 PROCEDURE — 97110 THERAPEUTIC EXERCISES: CPT | Mod: GP | Performed by: PHYSICAL THERAPIST

## 2021-11-08 PROCEDURE — 97112 NEUROMUSCULAR REEDUCATION: CPT | Mod: GP | Performed by: PHYSICAL THERAPIST

## 2021-11-08 NOTE — PROGRESS NOTES
POC Dates:  7/8/21-10/7/21  NEW POC DATES: 10/14/21-12/14/21  10 more visits    Goals:   Pt will: Able to walk on uneven surfaces in the grass and do some light gardening without fear of falling as balance and strength improve within 12 visits has done some weeding but not much.  Very challenged walking on uneven surfaces.  Will continue goal   Pt will: Able to walk on even surfaces, like 1/2-1 mile through the neighborhood, with a normal pace as endurance and strength improves within 12 visits pt feels the quality of her walking is better but has not tried to walk in the neighborhood.  She feels more stable with the SEC and has been working on better quality.  She was able to shop a little yesterday and felt it went ok.  Will continue goal to work on longer distance and continued improvement of quality.       Date 11/8/21 11/3/21 11/1/21 10/27/21 10/25/21 10/22/21 10/14/21 10/4/21 9/29/21 9/27/21 9/24/21 9/23/21 9/13/21 8/26/21 8/4/21   Exercise                  Nu-step  Seat 7, arms 9,  Workload 5, 3 min Workload 5, 3 min Workload 5, 3 min Workload 5, 3 min Workload 5, 3 min Workload 5, 3 min Workload 5, 3 min Workload 4, 3 minutes Workload 4, 3 minutes Workload 5, 3 minutes Workload 5  3'  3' 3' 3' 3'   Standing ex:  Hip flex, ext, abd, heel raises, toe raises, marching     X 10 each         X 10 each ex X 10 working up to 20   marching        X 10, no hands, CG to min A          Heel and toe raises    X 10              TG bilateral leg squat with block between knees    Level 17 X 15              Sit<>stands    X 5reps, 3 chairs, to be done throughout the day                                                                                                                            Neuro re-edu  Date 11/8/21 11/3/21 11/1/21 10/27/21 10/25/21 10/22/21 10/14/21 10/4/21 9/29/21 9/24/21 9/23/21 9/13/21 8/26/21   Exercise                1 leg stance   R=5s  L=1-2s min to mod A  Very challenged    Very challenged  "today.  Min A B but difficult holding leg up L=12 s with CG to min A, no hands  R=3s, CG to Wili, no hands    R and L leg-30 seconds with min to mod A, minimal hand touches.  2 tries each side R=2 seconds, mod A  L=0   Standing two feet together       60 seconds      Mild to mod sway, 20 s   CG   Toe tapping 4\" step       X 20,CG    X 10, CG     Toe tapping on 6 cones in semi [place]           1X through each foot, CG, 1 hand for support     Picking up cones from floor in semi [place]           1X through with each hand, CG     Wand- reaching up, down, and to sides with each hand for balance          X 10 each hand      airex standing with feet together      60 seconds with CG, mild sway, no hand touches  60 seconds with CG, mild sway, no hand touches 60 seconds with CG 60 seconds with CG      Floor-feet together-eyes closed   60s with mild sway, CG      Mild to mod sway, CG X 60 seconds       8\" step, toe tap, standing on airex    X 15, CG to min A, hand touches X 10, min A, hand touches today X 10, min A, no hands          Tandem standing on airex 60s with no hands, CG to supervision    30s, min A, many hand touches, more challenging leading with L 20s, min A          Tandem standing on floor R=16s  L-27s, CG R=42s  L=13s  CG B 25s, CG to min A             8\" step, toe tap X 15 X20, no hands CG X10 with no hands, CG X 10 with no hands, CG              Stand on airex eyes closed 40s with CG, mild to mod sway 18s, mod sway  48s, CG, mild to mod sway                                                GAIT: (not today)   SEC: X 35' X 2  Focused on pace and step length.  She continues to need cues for step length.  Talking distracts her so will continue to need work with multi-tasking so she feels safe and doesn't lose her balance.          Neuro-re-edu (not today) (therapist stands on right)  Flush ladder:  X 2 lengths-stepping through with SEC, each foot in a square to focus on stride/step length, CG-VERY CHALLENGED TODAY, " cues throughout to step into space not ladder  Flush ladder: not today) side stepping-mod cues throughout, pt very cautious and guarded. After a a couple squares stepped outside of the ladder and just worked on side stepping.  Feet still pointed in the direction she was side stepping versus straight ahead.   -Carioca: Not todaymin A, cues for feet placement, 10' each direction  -backward (walking not today) with SEC and min A, cues for normal steps not shuffling backward        RE-ASSESSMENT: (10/14/21)  APTA:  10/14/21 8, NO HANDS  7/8/21 5, intermittent use of hands  CHAVEZ:  10/14/21:  38/56-walking with assistance  7/8/21: 32/56-walking with assistance    Date   MMT   5/5 normal 10/14/21 7/8/21   Exercise     Hip flex R=5-  L=5 R=4  L=5   Hip abd R=4+  L=5 R=4  L=5   Hip add R=4  L=5 R=4-  L=5-     Knee ext R=4+  L=5- R=3+  L=4+   Knee flex R=4+  L=5- R=3+  L=4-         Assessment:  Pt returns and does feel the cane does help her feel more stable with her walking to walk most places.  She still prefers a cart while shopping but has come to understand how the cane can offer her support for her balance when there is no cart. Today she continued her short choppy steps but she feels stiff when she gets up which is why she says she walks like that.  Even after a therapy session she walked with the short steps today.  Will continue to cue pt to use big steps.

## 2021-11-09 ENCOUNTER — HOSPITAL ENCOUNTER (OUTPATIENT)
Dept: SPEECH THERAPY | Facility: REHABILITATION | Age: 61
End: 2021-11-09
Payer: COMMERCIAL

## 2021-11-09 DIAGNOSIS — R48.8 AGRAPHIA: ICD-10-CM

## 2021-11-09 DIAGNOSIS — R48.0 ALEXIA: ICD-10-CM

## 2021-11-09 DIAGNOSIS — I69.320 APHASIA, POST-STROKE: Primary | ICD-10-CM

## 2021-11-09 PROCEDURE — 92507 TX SP LANG VOICE COMM INDIV: CPT | Mod: GN | Performed by: SPEECH-LANGUAGE PATHOLOGIST

## 2021-11-10 ENCOUNTER — HOSPITAL ENCOUNTER (OUTPATIENT)
Dept: PHYSICAL THERAPY | Facility: REHABILITATION | Age: 61
End: 2021-11-10
Payer: COMMERCIAL

## 2021-11-10 DIAGNOSIS — R29.898 RIGHT LEG WEAKNESS: ICD-10-CM

## 2021-11-10 DIAGNOSIS — R26.81 UNSTEADINESS ON FEET: ICD-10-CM

## 2021-11-10 DIAGNOSIS — R26.9 GAIT DISORDER: Primary | ICD-10-CM

## 2021-11-10 PROCEDURE — 97112 NEUROMUSCULAR REEDUCATION: CPT | Mod: GP | Performed by: PHYSICAL THERAPIST

## 2021-11-10 PROCEDURE — 97110 THERAPEUTIC EXERCISES: CPT | Mod: GP | Performed by: PHYSICAL THERAPIST

## 2021-11-10 NOTE — PROGRESS NOTES
POC Dates:  7/8/21-10/7/21  NEW POC DATES: 10/14/21-12/14/21  10 more visits    Goals:   Pt will: Able to walk on uneven surfaces in the grass and do some light gardening without fear of falling as balance and strength improve within 12 visits has done some weeding but not much.  Very challenged walking on uneven surfaces.  Will continue goal   Pt will: Able to walk on even surfaces, like 1/2-1 mile through the neighborhood, with a normal pace as endurance and strength improves within 12 visits pt feels the quality of her walking is better but has not tried to walk in the neighborhood.  She feels more stable with the SEC and has been working on better quality.  She was able to shop a little yesterday and felt it went ok.  Will continue goal to work on longer distance and continued improvement of quality.       Date 11/10/21 11/8/21 11/3/21 11/1/21 10/27/21 10/25/21 10/22/21 10/14/21 10/4/21 9/29/21 9/27/21 9/24/21 9/23/21 9/13/21 8/26/21 8/4/21   Exercise                   Nu-step  Seat 7, arms 9,  Workload 5, 3 min Workload 5, 3 min Workload 5, 3 min Workload 5, 3 min Workload 5, 3 min Workload 5, 3 min Workload 5, 3 min Workload 5, 3 min Workload 4, 3 minutes Workload 4, 3 minutes Workload 5, 3 minutes Workload 5  3'  3' 3' 3' 3'   Standing ex:  Hip flex, ext, abd, heel raises, toe raises, marching Marching X 20     X 10 each         X 10 each ex X 10 working up to 20   marching         X 10, no hands, CG to min A          Heel and toe raises     X 10              TG bilateral leg squat with block between knees Level 19  X 20 reps    Level 17 X 15              Sit<>stands     X 5reps, 3 chairs, to be done throughout the day                                                                                                                                  Neuro re-edu  Date 11/10/21 11/8/21 11/3/21 11/1/21 10/27/21 10/25/21 10/22/21 10/14/21 10/4/21 9/29/21 9/24/21 9/23/21 9/13/21 8/26/21   Exercise                "  1 leg stance    R=5s  L=1-2s min to mod A  Very challenged    Very challenged today.  Min A B but difficult holding leg up L=12 s with CG to min A, no hands  R=3s, CG to Wili, no hands    R and L leg-30 seconds with min to mod A, minimal hand touches.  2 tries each side R=2 seconds, mod A  L=0   Standing two feet together        60 seconds      Mild to mod sway, 20 s   CG   Toe tapping 4\" step        X 20,CG    X 10, CG     Toe tapping on 6 cones in semi [place]            1X through each foot, CG, 1 hand for support     Picking up cones from floor in semi [place]            1X through with each hand, CG     Wand- reaching up, down, and to sides with each hand for balance           X 10 each hand      airex standing with feet together       60 seconds with CG, mild sway, no hand touches  60 seconds with CG, mild sway, no hand touches 60 seconds with CG 60 seconds with CG      Floor-feet together-eyes closed    60s with mild sway, CG      Mild to mod sway, CG X 60 seconds       8\" step, toe tap, standing on airex     X 15, CG to min A, hand touches X 10, min A, hand touches today X 10, min A, no hands          Tandem standing on airex  60s with no hands, CG to supervision    30s, min A, many hand touches, more challenging leading with L 20s, min A          Tandem standing on floor  R=16s  L-27s, CG R=42s  L=13s  CG B 25s, CG to min A             8\" step, toe tap X 15 X 20, intermittent hand touch, supervision X20, no hands CG X10 with no hands, CG X 10 with no hands, CG              Stand on airex eyes closed  40s with CG, mild to mod sway 18s, mod sway  48s, CG, mild to mod sway              Trampoline: 1 leg stance 30 seconds with hand touches, CG                Trampoline: tandem stance 30s with few hand touches, CG                Trampoline: feet together 30s, CG                    GAIT: (not today)   SEC: X 35' X 2  Focused on pace and step length.  She continues to need cues for step length.  Talking distracts " her so will continue to need work with multi-tasking so she feels safe and doesn't lose her balance.          Neuro-re-edu (not today) (therapist stands on right)  Flush ladder:  X 2 lengths-stepping through with SEC, each foot in a square to focus on stride/step length, CG-VERY CHALLENGED TODAY, cues throughout to step into space not ladder  Flush ladder: not today) side stepping-mod cues throughout, pt very cautious and guarded. After a a couple squares stepped outside of the ladder and just worked on side stepping.  Feet still pointed in the direction she was side stepping versus straight ahead.   -Carioca: Not todaymin A, cues for feet placement, 10' each direction  -backward (walking not today) with SEC and min A, cues for normal steps not shuffling backward        RE-ASSESSMENT:   APTA:  11/10/21:10, no hands  10/14/21 8, NO HANDS  7/8/21 5, intermittent use of hands  CHAVEZ:  10/14/21:  38/56-walking with assistance  7/8/21: 32/56-walking with assistance    Date   MMT   5/5 normal 10/14/21 7/8/21   Exercise     Hip flex R=5-  L=5 R=4  L=5   Hip abd R=4+  L=5 R=4  L=5   Hip add R=4  L=5 R=4-  L=5-     Knee ext R=4+  L=5- R=3+  L=4+   Knee flex R=4+  L=5- R=3+  L=4-         Assessment:  Pt returns and starting to prepare for her discharge next visit.  She was able to perform 10 reps with the APTA sit<>stand which puts her in the moderate risk for falls, versus the high risk when she started therapy. Feel she is ready to discharge and work on some of the exercises on her own at home and see what strength she can continue to work on.  She is starting to plateau with her walking technique but feel the quality improved since the first visits, due to improved strength and balance. Will continue the discharge next visit with a CHAVEZ and MMT.

## 2021-11-11 ENCOUNTER — HOSPITAL ENCOUNTER (OUTPATIENT)
Dept: SPEECH THERAPY | Facility: REHABILITATION | Age: 61
End: 2021-11-11
Payer: COMMERCIAL

## 2021-11-11 DIAGNOSIS — R48.8 AGRAPHIA: ICD-10-CM

## 2021-11-11 DIAGNOSIS — I69.320 APHASIA, POST-STROKE: Primary | ICD-10-CM

## 2021-11-11 DIAGNOSIS — R48.0 ALEXIA: ICD-10-CM

## 2021-11-11 PROCEDURE — 92507 TX SP LANG VOICE COMM INDIV: CPT | Mod: GN | Performed by: SPEECH-LANGUAGE PATHOLOGIST

## 2021-11-15 ENCOUNTER — HOSPITAL ENCOUNTER (OUTPATIENT)
Dept: PHYSICAL THERAPY | Facility: REHABILITATION | Age: 61
End: 2021-11-15
Payer: COMMERCIAL

## 2021-11-15 DIAGNOSIS — R26.81 UNSTEADINESS ON FEET: ICD-10-CM

## 2021-11-15 DIAGNOSIS — R26.9 GAIT DISORDER: Primary | ICD-10-CM

## 2021-11-15 DIAGNOSIS — R29.898 RIGHT LEG WEAKNESS: ICD-10-CM

## 2021-11-15 PROCEDURE — 97112 NEUROMUSCULAR REEDUCATION: CPT | Mod: GP | Performed by: PHYSICAL THERAPIST

## 2021-11-15 PROCEDURE — 97750 PHYSICAL PERFORMANCE TEST: CPT | Mod: GP | Performed by: PHYSICAL THERAPIST

## 2021-11-15 NOTE — PROGRESS NOTES
Outpatient Physical Therapy Discharge Note     Patient: Tara Plaza  : 1960    Beginning/End Dates of Reporting Period:  21 to 11/15/21    Referring Provider:     Therapy Diagnosis: S/P CVA, imbalance     Client Self Report: (P) Overall I feel it is easier to get around than it was, with and without the cane.     Plan:  Discharge from therapy.    Discharge:    Reason for Discharge: pt will try her exercises on her own for awhile    Equipment Issued:     Discharge Plan: Patient to continue home program.    Goals:   Pt will: Able to walk on uneven surfaces in the grass and do some light gardening without fear of falling as balance and strength improve within 12 visits Pt feels this goal has been met but therapist observes continued imbalances and feel she is at risk for falls.  Overall she feels stronger and feels it is easier.  Goal partially met    Pt will: Able to walk on even surfaces, like 1/2-1 mile through the neighborhood, with a normal pace as endurance and strength improves within 12 visits pt has walked through the neighborhood a few times but has not done it frequently.  She doesn't feel she can walk as far as she wants but the distance is more than it was.  She can be challenged with her pace and endurance at times as well.  This will continue to improve as she continues her exercises.  Goal partially met      Date 11/15/21 11/10/21 11/8/21 11/3/21 11/1/21 10/27/21 10/25/21 10/22/21 10/14/21 10/4/21 9/29/21 9/27/21 9/24/21 9/23/21 9/13/21 8/26/21 8/4/21   Exercise                    Nu-step  Seat 7, arms 9,  3 min Workload 5, 3 min Workload 5, 3 min Workload 5, 3 min Workload 5, 3 min Workload 5, 3 min Workload 5, 3 min Workload 5, 3 min Workload 5, 3 min Workload 4, 3 minutes Workload 4, 3 minutes Workload 5, 3 minutes Workload 5  3'  3' 3' 3' 3'   Standing ex:  Hip flex, ext, abd, heel raises, toe raises, marching  Marching X 20     X 10 each         X 10 each ex X 10 working  "up to 20   marching          X 10, no hands, CG to min A          Heel and toe raises      X 10              TG bilateral leg squat with block between knees  Level 19  X 20 reps    Level 17 X 15              Sit<>stands      X 5reps, 3 chairs, to be done throughout the day                                                                                                                                        Neuro re-edu  Date 11/15/21 11/10/21 11/8/21 11/3/21 11/1/21 10/27/21 10/25/21 10/22/21 10/14/21 10/4/21 9/29/21 9/24/21 9/23/21 9/13/21 8/26/21   Exercise                  1 leg stance     R=5s  L=1-2s min to mod A  Very challenged    Very challenged today.  Min A B but difficult holding leg up L=12 s with CG to min A, no hands  R=3s, CG to Wili, no hands    R and L leg-30 seconds with min to mod A, minimal hand touches.  2 tries each side R=2 seconds, mod A  L=0   Standing two feet together         60 seconds      Mild to mod sway, 20 s   CG   Toe tapping 4\" step         X 20,CG    X 10, CG     Toe tapping on 6 cones in semi [place]             1X through each foot, CG, 1 hand for support     Picking up cones from floor in semi [place]             1X through with each hand, CG     Wand- reaching up, down, and to sides with each hand for balance            X 10 each hand      airex standing with feet together        60 seconds with CG, mild sway, no hand touches  60 seconds with CG, mild sway, no hand touches 60 seconds with CG 60 seconds with CG      Floor-feet together-eyes closed     60s with mild sway, CG      Mild to mod sway, CG X 60 seconds       8\" step, toe tap, standing on airex      X 15, CG to min A, hand touches X 10, min A, hand touches today X 10, min A, no hands          Tandem standing on airex 15s  60s with no hands, CG to supervision    30s, min A, many hand touches, more challenging leading with L 20s, min A          Tandem standing on floor   R=16s  L-27s, CG R=42s  L=13s  CG B 25s, CG to min A  " "           8\" step, toe tap X 15 X 20 with supervision X 20, intermittent hand touch, supervision X20, no hands CG X10 with no hands, CG X 10 with no hands, CG              Stand on airex eyes closed 42s with mod sway  40s with CG, mild to mod sway 18s, mod sway  48s, CG, mild to mod sway              Trampoline: 1 leg stance  30 seconds with hand touches, CG                Trampoline: tandem stance  30s with few hand touches, CG                Trampoline: feet together  30s, CG                    GAIT: (not today)   SEC: X 35' X 2  Focused on pace and step length.  She continues to need cues for step length.  Talking distracts her so will continue to need work with multi-tasking so she feels safe and doesn't lose her balance.          Neuro-re-edu (not today) (therapist stands on right)  Flush ladder:  X 2 lengths-stepping through with SEC, each foot in a square to focus on stride/step length, CG-VERY CHALLENGED TODAY, cues throughout to step into space not ladder  Flush ladder: not today) side stepping-mod cues throughout, pt very cautious and guarded. After a a couple squares stepped outside of the ladder and just worked on side stepping.  Feet still pointed in the direction she was side stepping versus straight ahead.   -Carioca: Not todaymin A, cues for feet placement, 10' each direction  -backward (walking not today) with SEC and min A, cues for normal steps not shuffling backward        RE-ASSESSMENT:   APTA:  11/10/21:10, no hands  10/14/21 8, NO HANDS  21 5, intermittent use of hands      CHAVEZ/15/21 41/56-walking independently  10/14/21:  38/56-walking with assistance  21: 32/56-walking with assistance    Date   MMT   5 normal 11/15/21 10/14/21 7/8/21   Exercise      Hip flex R=5-  L=5 R=5-  L=5 R=4  L=5   Hip abd R=5-  L=5 R=4+  L=5 R=4  L=5   Hip add R=4+  L=5 R=4  L=5 R=4-  L=5-     Knee ext R=5-  L=5- R=4+  L=5- R=3+  L=4+   Knee flex R=4+  L=5- R=4+  L=5- R=3+  L=4- "         Assessment:  Pt returns and is ready to be discharged.  She demonstrated an improvement on her CHAVEZ to independent but do not feel she should ambulate without her cane.  She still demonstrates short choppy steps but able to correct after time when she notices it or if cues are given. Feel she is still unstable and a fall risk.  Pt feels she can get around better with her cane so will continue to use it.  Pt will continue to do her exercises at home and maybe this spring return if she feels she needs to. Unsure of her follow through with her HEP due to cognition, but she will try to do them regularly.

## 2021-11-18 ENCOUNTER — HOSPITAL ENCOUNTER (OUTPATIENT)
Dept: SPEECH THERAPY | Facility: REHABILITATION | Age: 61
End: 2021-11-18
Payer: COMMERCIAL

## 2021-11-18 DIAGNOSIS — R48.0 ALEXIA: ICD-10-CM

## 2021-11-18 DIAGNOSIS — R48.8 AGRAPHIA: ICD-10-CM

## 2021-11-18 DIAGNOSIS — I69.320 APHASIA, POST-STROKE: Primary | ICD-10-CM

## 2021-11-18 PROCEDURE — 92507 TX SP LANG VOICE COMM INDIV: CPT | Mod: GN | Performed by: SPEECH-LANGUAGE PATHOLOGIST

## 2021-11-23 ENCOUNTER — TELEPHONE (OUTPATIENT)
Dept: INTERNAL MEDICINE | Facility: CLINIC | Age: 61
End: 2021-11-23
Payer: COMMERCIAL

## 2021-11-23 NOTE — TELEPHONE ENCOUNTER
Reason for Call:  Other FYI    Detailed comments: Sheila from [facility] called and said that pt is now using this pharmacy for all of her medications. They sent over 14 medications and I was given a different fax number from what they were sending it to and gave them IM fax number [phone] to fax all of the patients mediations to that number,    Phone Number Patient can be reached at: Home number on file [phone] (home)    Best Time: any    Can we leave a detailed message on this number? YES    Call taken on 11/23/2021 at 3:22 PM by García Law

## 2021-11-26 ENCOUNTER — OFFICE VISIT (OUTPATIENT)
Dept: INTERNAL MEDICINE | Facility: CLINIC | Age: 61
End: 2021-11-26
Payer: COMMERCIAL

## 2021-11-26 VITALS
DIASTOLIC BLOOD PRESSURE: 72 MMHG | HEIGHT: 64 IN | HEART RATE: 68 BPM | BODY MASS INDEX: 30.34 KG/M2 | SYSTOLIC BLOOD PRESSURE: 116 MMHG | WEIGHT: 177.7 LBS | OXYGEN SATURATION: 97 %

## 2021-11-26 DIAGNOSIS — Z86.73 CHRONIC ISCHEMIC LEFT PCA STROKE: ICD-10-CM

## 2021-11-26 DIAGNOSIS — E11.9 TYPE 2 DIABETES MELLITUS WITHOUT COMPLICATION, WITHOUT LONG-TERM CURRENT USE OF INSULIN (H): ICD-10-CM

## 2021-11-26 DIAGNOSIS — I10 BENIGN ESSENTIAL HYPERTENSION: ICD-10-CM

## 2021-11-26 DIAGNOSIS — K59.01 SLOW TRANSIT CONSTIPATION: ICD-10-CM

## 2021-11-26 DIAGNOSIS — Z23 HIGH PRIORITY FOR 2019-NCOV VACCINE: ICD-10-CM

## 2021-11-26 PROBLEM — F32.4 MAJOR DEPRESSIVE DISORDER WITH SINGLE EPISODE, IN PARTIAL REMISSION (H): Status: ACTIVE | Noted: 2021-01-11

## 2021-11-26 LAB
ANION GAP SERPL CALCULATED.3IONS-SCNC: 9 MMOL/L (ref 5–18)
BUN SERPL-MCNC: 23 MG/DL (ref 8–22)
CALCIUM SERPL-MCNC: 9.4 MG/DL (ref 8.5–10.5)
CHLORIDE BLD-SCNC: 104 MMOL/L (ref 98–107)
CO2 SERPL-SCNC: 26 MMOL/L (ref 22–31)
CREAT SERPL-MCNC: 0.84 MG/DL (ref 0.6–1.1)
GFR SERPL CREATININE-BSD FRML MDRD: 75 ML/MIN/1.73M2
GLUCOSE BLD-MCNC: 112 MG/DL (ref 70–125)
HBA1C MFR BLD: 5.6 % (ref 0–5.6)
POTASSIUM BLD-SCNC: 4.2 MMOL/L (ref 3.5–5)
SODIUM SERPL-SCNC: 139 MMOL/L (ref 136–145)

## 2021-11-26 PROCEDURE — 90682 RIV4 VACC RECOMBINANT DNA IM: CPT | Performed by: NURSE PRACTITIONER

## 2021-11-26 PROCEDURE — 80048 BASIC METABOLIC PNL TOTAL CA: CPT | Performed by: NURSE PRACTITIONER

## 2021-11-26 PROCEDURE — 36415 COLL VENOUS BLD VENIPUNCTURE: CPT | Performed by: NURSE PRACTITIONER

## 2021-11-26 PROCEDURE — 83036 HEMOGLOBIN GLYCOSYLATED A1C: CPT | Performed by: NURSE PRACTITIONER

## 2021-11-26 PROCEDURE — 99214 OFFICE O/P EST MOD 30 MIN: CPT | Mod: 25 | Performed by: NURSE PRACTITIONER

## 2021-11-26 PROCEDURE — 0004A COVID-19,PF,PFIZER (12+ YRS): CPT | Performed by: NURSE PRACTITIONER

## 2021-11-26 PROCEDURE — 91300 COVID-19,PF,PFIZER (12+ YRS): CPT | Performed by: NURSE PRACTITIONER

## 2021-11-26 PROCEDURE — 90471 IMMUNIZATION ADMIN: CPT | Performed by: NURSE PRACTITIONER

## 2021-11-26 RX ORDER — DOCUSATE SODIUM 100 MG/1
100 CAPSULE, LIQUID FILLED ORAL 2 TIMES DAILY
Qty: 100 CAPSULE | Refills: 3 | Status: SHIPPED | OUTPATIENT
Start: 2021-11-26 | End: 2022-02-23

## 2021-11-26 ASSESSMENT — MIFFLIN-ST. JEOR: SCORE: 1356.04

## 2021-11-26 NOTE — PATIENT INSTRUCTIONS
Your labs are processing, we will call you with results once they are back on Monday.    Continue your current medications.    Start Colace 100mg twice daily for constipation.    Drink more water, goals are 8 glasses per day.    More fiber, fruits and veggies.    Stay active.    Follow up with Dr. Randle in three months for your physical with fasted labs.   Patient Education     Common Symptoms of Parkinson Disease    You have Parkinson disease. This disease is caused by a loss of a chemical in your brain needed to help control movement and balance. For reasons that are not clear, cells that make this brain chemical stop working. This causes symptoms. This sheet tells you more about symptoms of Parkinson disease.  How symptoms may affect you  Parkinson disease symptoms vary for each person. You may have many severe symptoms. Or you may have only a few mild ones. Your symptoms may involve only one side of your body. Or they may involve both sides of your body. Also, your symptoms may change over time. And you may have different symptoms at different stages. Your symptoms may also get worse as your disease progresses.  Symptoms that affect movement and balance  These can include:    Tremor (shaking). This is a very common symptom. Most often, a hand or arm shakes on one or both sides of the body. Tremor may also affect other areas of the body, such as a leg, a foot, or the chin. Shaking may lessen when the affected part is used. It may worsen when at rest.    Rigidity. This refers to having stiff or tight muscles. This happens because the muscles don t get the signal to relax. Rigidity may cause muscle pain and cramping. It may also cause a stooped posture.    Problems with balance. This can affect how well you stand and move. This can also increase your risk of falls.  Other symptoms  Other symptoms of Parkinson disease include speaking too softly and in a monotone, writing that gets shaky and smaller across the  "page, and trouble swallowing. They also include constipation, oily skin, and changes in blood pressure. Memory loss and other problems with thinking can occur later in the disease progression. Bradykinesia--or slow movement--can also occur. This can cause problems with actions such as getting out of chairs and beds. Walking may be limited to short, shuffling steps. You may feel \"frozen,\" or unable to move. Blinking, facial expressions, swinging of your arms when walking, and other \"unconscious movements\" are also slowed down. Some people may have problems with their urination and others may also feel depressed.  [employer] last reviewed this educational content on 2/1/2018 2000-2021 [employer]. All rights reserved. This information is not intended as a substitute for professional medical care. Always follow your healthcare professional's instructions.           "

## 2021-11-26 NOTE — PROGRESS NOTES
Clinic Note    Assessment:     Assessment and Plan:  1. Type 2 diabetes mellitus without complication, without long-term current use of insulin (H): Continues on Metformin. Will recheck her A1C today.   - Hemoglobin A1c; Future  - Basic metabolic panel  (Ca, Cl, CO2, Creat, Gluc, K, Na, BUN); Future    2. Slow transit constipation: Discussed increasing water intake, more fiber, stay active. Continue Miralax, Will add colace one tablet twice daily. Follow up if symptoms persist or worsen. No abdominal pain on exam today.   - docusate sodium (COLACE) 100 MG capsule; Take 1 capsule (100 mg) by mouth 2 times daily  Dispense: 100 capsule; Refill: 3    3. Chronic ischemic left PCA stroke: hx of this. Still has weakness/stiffness on her right side, this is stable. She continues on Atorvastatin, ASA, and Plavix.    4. Benign essential hypertension: Blood pressure today was 116/72. Stable. She continues on Lisinopril.     5. High priority for 2019-nCoV vaccine: Pfizer booster given today.        Patient Instructions   Your labs are processing, we will call you with results once they are back on Monday.    Continue your current medications.    Start Colace 100mg twice daily for constipation.    Drink more water, goals are 8 glasses per day.    More fiber, fruits and veggies.    Stay active.    Follow up with Dr. Randle in three months for your physical with fasted labs.   Patient Education     Common Symptoms of Parkinson Disease    You have Parkinson disease. This disease is caused by a loss of a chemical in your brain needed to help control movement and balance. For reasons that are not clear, cells that make this brain chemical stop working. This causes symptoms. This sheet tells you more about symptoms of Parkinson disease.  How symptoms may affect you  Parkinson disease symptoms vary for each person. You may have many severe symptoms. Or you may have only a few mild ones. Your symptoms may involve only one side of your  "body. Or they may involve both sides of your body. Also, your symptoms may change over time. And you may have different symptoms at different stages. Your symptoms may also get worse as your disease progresses.  Symptoms that affect movement and balance  These can include:    Tremor (shaking). This is a very common symptom. Most often, a hand or arm shakes on one or both sides of the body. Tremor may also affect other areas of the body, such as a leg, a foot, or the chin. Shaking may lessen when the affected part is used. It may worsen when at rest.    Rigidity. This refers to having stiff or tight muscles. This happens because the muscles don t get the signal to relax. Rigidity may cause muscle pain and cramping. It may also cause a stooped posture.    Problems with balance. This can affect how well you stand and move. This can also increase your risk of falls.  Other symptoms  Other symptoms of Parkinson disease include speaking too softly and in a monotone, writing that gets shaky and smaller across the page, and trouble swallowing. They also include constipation, oily skin, and changes in blood pressure. Memory loss and other problems with thinking can occur later in the disease progression. Bradykinesia--or slow movement--can also occur. This can cause problems with actions such as getting out of chairs and beds. Walking may be limited to short, shuffling steps. You may feel \"frozen,\" or unable to move. Blinking, facial expressions, swinging of your arms when walking, and other \"unconscious movements\" are also slowed down. Some people may have problems with their urination and others may also feel depressed.  [employer] last reviewed this educational content on 2/1/2018 2000-2021 [employer]. All rights reserved. This information is not intended as a substitute for professional medical care. Always follow your healthcare professional's instructions.             Return in about 3 months (around " "2/26/2022) for Routine preventive, Follow up.         Subjective:      Tara Plaza is a 60 year old female presents today for a three month follow up.    She is a patient of Dr. Randle.    She reports that her blood sugar has been running around 150 at home, she continues to test daily. She is currently on metformin only. She reports continuing to watch her diet.    She also reports that she has continued issues with constipation. She has been taking Miralax once daily, but this has not helped a whole bunch. She is having bowel movements every other day, stools are hard in nature.    She is drinking two glasses of water daily, encouraged her to increase this. She continues to have weakness post stroke so exercise is difficult. Encouraged her to eat more fiber.    She would like her flu shot and her COVID booster today.    She denies other concerns.    The following portions of the patient's history were reviewed and updated as appropriate.    Review of Systems:    Review is otherwise negative except for what is mentioned above.     Social Hx:    History   Smoking Status     Never Smoker   Smokeless Tobacco     Never Used         Objective:     Vitals:    11/26/21 1453   BP: 116/72   BP Location: Right arm   Patient Position: Sitting   Cuff Size: Adult Large   Pulse: 68   SpO2: 97%   Weight: 80.6 kg (177 lb 11.2 oz)   Height: 1.626 m (5' 4\")       Exam:  General: No apparent distress. Calm. Alert and Oriented X3. Pt behavior is appropriate.  Head:Atraumatic. Normocephalic.  Chest/Lungs: Clear to auscultation, normal respiratory effort and rate.   Heart/Pulses: Regular rate and rhythm, no murmurs, gallops, or rubs. Capillary refill <2 seconds. No edema.   Abdomen: Soft, no palpable masses. No hepatosplenomegaly, no tenderness with palpation noted. Bowel sounds active in all quadrants. No increased tympany.   Musculoskeletal: No CVA tenderness with palpation. Good ROM with extremities. Slowed " gait.  Neurologic: Interactive, alert, no focal findings.  Skin: Warm, dry. Normal skin turgor.       Patient Active Problem List   Diagnosis     Acute left PCA stroke (H)     VBI (vertebrobasilar insufficiency)     Intracranial atherosclerosis     Benign essential hypertension     Type 2 diabetes mellitus (H)     Major depressive disorder with single episode, in partial remission (H)     Current Outpatient Medications   Medication Sig Dispense Refill     aspirin (ASA) 81 MG chewable tablet CHEW AND SWALLOW 1 TABLET(81 MG) BY MOUTH DAILY       atorvastatin (LIPITOR) 80 MG tablet Take 1 tablet (80 mg) by mouth every evening 90 tablet 3     blood glucose (NO BRAND SPECIFIED) lancets standard Accu-chek FastClix lancet drums Use to test blood sugar  2  times daily as directed. 100 each 0     blood glucose monitoring (NO BRAND SPECIFIED) meter device kit Accu-chek Guide Per insurance coverage 1 kit 0     cholecalciferol (VITAMIN D3) 25 mcg (1000 units) capsule Take 2,000 Units by mouth       citalopram (CELEXA) 20 MG tablet Take 1 tablet (20 mg) by mouth every morning TAKE 1 TABLET(20 MG) BY MOUTH EVERY MORNING 90 tablet 3     clopidogrel (PLAVIX) 75 MG tablet TAKE 1 TABLET(75 MG) BY MOUTH DAILY. 30 tablet 1     CONTOUR NEXT TEST test strip USE TO TEST BLOOD GLUCOSE PREMEAL ONCE DAILY 100 strip 2     docusate sodium (COLACE) 100 MG capsule Take 1 capsule (100 mg) by mouth 2 times daily 100 capsule 3     metFORMIN (GLUCOPHAGE) 500 MG tablet Take 0.5 tablets (250 mg) by mouth daily (with breakfast) 45 tablet 3     ondansetron (ZOFRAN-ODT) 4 MG ODT tab        polyethylene glycol (MIRALAX) 17 GM/Dose powder Take 17 g by mouth daily as needed 510 g 1     Vitamin D3 (CHOLECALCIFEROL) 25 mcg (1000 units) tablet Take 2 tablets (50 mcg) by mouth daily 180 tablet 3     lisinopril (ZESTRIL) 40 MG tablet Take 1 tablet (40 mg) by mouth daily 30 tablet 0     Sharps Container MISC Use as directed to dispose of needles, lancets and  other sharps Per Insurance coverage 1 each 0       Courtney Muniz, Adult-Geriatric Nurse Practitioner  [facility] - Internal Medicine Team     11/26/2021

## 2021-11-30 ENCOUNTER — TELEPHONE (OUTPATIENT)
Dept: INTERNAL MEDICINE | Facility: CLINIC | Age: 61
End: 2021-11-30

## 2021-11-30 ENCOUNTER — HOSPITAL ENCOUNTER (OUTPATIENT)
Dept: SPEECH THERAPY | Facility: REHABILITATION | Age: 61
End: 2021-11-30
Payer: COMMERCIAL

## 2021-11-30 DIAGNOSIS — I69.320 APHASIA, POST-STROKE: Primary | ICD-10-CM

## 2021-11-30 DIAGNOSIS — R48.0 ALEXIA: ICD-10-CM

## 2021-11-30 DIAGNOSIS — R48.8 AGRAPHIA: ICD-10-CM

## 2021-11-30 PROCEDURE — 92507 TX SP LANG VOICE COMM INDIV: CPT | Mod: GN | Performed by: SPEECH-LANGUAGE PATHOLOGIST

## 2021-11-30 NOTE — TELEPHONE ENCOUNTER
Reason for Call:  Home Health Care    Lilia with [facility] called regarding (reason for call): Verbal Orders    Orders are needed for this patient. Verbal orders for re certification from dec 1st to jan 29th 2022.     PT: NONE    OT: NONE    Skilled Nursing: NONE    Pt Provider: DR GARCIA    Phone Number Homecare Nurse can be reached at: [phone]    Can we leave a detailed message on this number? YES    Phone number patient can be reached at: Home number on file [phone] (home)    Best Time: ANY    Call taken on 11/30/2021 at 9:29 AM by García Law

## 2021-12-02 ENCOUNTER — HOSPITAL ENCOUNTER (OUTPATIENT)
Dept: SPEECH THERAPY | Facility: REHABILITATION | Age: 61
End: 2021-12-02
Payer: COMMERCIAL

## 2021-12-02 DIAGNOSIS — I63.9 ACUTE ISCHEMIC STROKE (H): ICD-10-CM

## 2021-12-02 DIAGNOSIS — R48.0 ALEXIA: ICD-10-CM

## 2021-12-02 DIAGNOSIS — E11.9 TYPE 2 DIABETES MELLITUS WITHOUT COMPLICATION, WITHOUT LONG-TERM CURRENT USE OF INSULIN (H): ICD-10-CM

## 2021-12-02 DIAGNOSIS — R48.8 AGRAPHIA: Primary | ICD-10-CM

## 2021-12-02 PROCEDURE — 92507 TX SP LANG VOICE COMM INDIV: CPT | Mod: GN | Performed by: SPEECH-LANGUAGE PATHOLOGIST

## 2021-12-02 RX ORDER — CLOPIDOGREL BISULFATE 75 MG/1
TABLET ORAL
Qty: 30 TABLET | Refills: 1 | Status: SHIPPED | OUTPATIENT
Start: 2021-12-02 | End: 2022-01-21

## 2021-12-02 NOTE — TELEPHONE ENCOUNTER
Refill request for Plavix  Follow up scheduled for 1/21/22  Medication T'd for review and signature  Vidya Soliman CMA on 12/2/2021 at 4:06 PM

## 2021-12-07 ENCOUNTER — HOSPITAL ENCOUNTER (OUTPATIENT)
Dept: SPEECH THERAPY | Facility: REHABILITATION | Age: 61
End: 2021-12-07
Payer: COMMERCIAL

## 2021-12-07 DIAGNOSIS — R48.8 AGRAPHIA: Primary | ICD-10-CM

## 2021-12-07 DIAGNOSIS — R48.0 ALEXIA: ICD-10-CM

## 2021-12-07 DIAGNOSIS — I69.320 APHASIA, POST-STROKE: ICD-10-CM

## 2021-12-07 PROCEDURE — 92507 TX SP LANG VOICE COMM INDIV: CPT | Mod: GN | Performed by: SPEECH-LANGUAGE PATHOLOGIST

## 2021-12-09 ENCOUNTER — HOSPITAL ENCOUNTER (OUTPATIENT)
Dept: SPEECH THERAPY | Facility: REHABILITATION | Age: 61
End: 2021-12-09
Payer: COMMERCIAL

## 2021-12-09 DIAGNOSIS — R48.0 ALEXIA: ICD-10-CM

## 2021-12-09 DIAGNOSIS — I69.320 APHASIA, POST-STROKE: ICD-10-CM

## 2021-12-09 DIAGNOSIS — R48.8 AGRAPHIA: Primary | ICD-10-CM

## 2021-12-09 PROCEDURE — 92507 TX SP LANG VOICE COMM INDIV: CPT | Mod: GN | Performed by: SPEECH-LANGUAGE PATHOLOGIST

## 2021-12-14 ENCOUNTER — HOSPITAL ENCOUNTER (OUTPATIENT)
Dept: SPEECH THERAPY | Facility: REHABILITATION | Age: 61
End: 2021-12-14
Payer: COMMERCIAL

## 2021-12-14 DIAGNOSIS — I69.320 APHASIA, POST-STROKE: ICD-10-CM

## 2021-12-14 DIAGNOSIS — R48.8 AGRAPHIA: Primary | ICD-10-CM

## 2021-12-14 DIAGNOSIS — R48.0 ALEXIA: ICD-10-CM

## 2021-12-14 PROCEDURE — 92507 TX SP LANG VOICE COMM INDIV: CPT | Mod: GN | Performed by: SPEECH-LANGUAGE PATHOLOGIST

## 2021-12-16 ENCOUNTER — HOSPITAL ENCOUNTER (OUTPATIENT)
Dept: SPEECH THERAPY | Facility: REHABILITATION | Age: 61
End: 2021-12-16
Payer: COMMERCIAL

## 2021-12-16 DIAGNOSIS — R48.8 AGRAPHIA: Primary | ICD-10-CM

## 2021-12-16 DIAGNOSIS — I69.320 APHASIA, POST-STROKE: ICD-10-CM

## 2021-12-16 DIAGNOSIS — R48.0 ALEXIA: ICD-10-CM

## 2021-12-16 PROCEDURE — 92507 TX SP LANG VOICE COMM INDIV: CPT | Mod: GN | Performed by: SPEECH-LANGUAGE PATHOLOGIST

## 2021-12-30 ENCOUNTER — HOSPITAL ENCOUNTER (OUTPATIENT)
Dept: SPEECH THERAPY | Facility: REHABILITATION | Age: 61
End: 2021-12-30
Payer: COMMERCIAL

## 2021-12-30 DIAGNOSIS — R48.8 AGRAPHIA: Primary | ICD-10-CM

## 2021-12-30 DIAGNOSIS — R48.0 ALEXIA: ICD-10-CM

## 2021-12-30 DIAGNOSIS — I69.320 APHASIA, POST-STROKE: ICD-10-CM

## 2021-12-30 PROCEDURE — 92507 TX SP LANG VOICE COMM INDIV: CPT | Mod: GN | Performed by: SPEECH-LANGUAGE PATHOLOGIST

## 2021-12-30 NOTE — PROGRESS NOTES
[facility]    Outpatient Speech Language Pathology Progress Note  Patient: Tara Plaza  : 1960    Beginning/End Dates of Reporting Period:  10/21/21 to 21    Referring Provider: Edwina Jordan Diagnosis: Alexia R48.0, Agraphia R48.8, Aphasia, post-stroke I69.320    Client Self Report: Patient arrived to therapy in good spirits.  She expressed that she continues to find benefit from use of assistive technology to support functional reading/writing.     Goals:  Goal Identifier 1   Goal Description Patient will label single letters/numbers with 80% accuracy SARAI.   Target Date     Date Met      Progress (detail required for progress note): 21: Goal not progressing.  Baseline 17/ (65%) on 10/21/21 and 16/ (61%) on today's date.  Discontinue goal with ongoing home practice recommended.     Goal Identifier 2   Goal Description Patient will complete functional naming tasks with independent use of word finding strategies in >80% of opportunities.   Target Date 22   Date Met      Progress (detail required for progress note): 21: 65% accuracy SARAI to object/picture naming with use of word finding strategies, increases to 100% given min cues/prompts.  Continue goal as worded     Goal Identifier 3   Goal Description Patient will complete functional numbers tasks (e.g., stating dollar amounts, time) with use of external aid with >80% accuracy.   Target Date 22   Date Met  21   Progress (detail required for progress note): 21: Goal met, minimally addressed; however, able to utilize text to speech [place] on phone for numeric recognition with 90% accuracy SARAI.  Goal met.     Goal Identifier 4   Goal Description Patient will complete sentence level writing tasks with use of external aid (e.g., speech to text) with >80% accuracy.   Target Date 22    Date Met      Progress (detail required for progress note): 12/30/21: Progressing.  Patient able to utilize speech to text function on computer with variable success, with noted difficulties with technology.  Improved with strategies to think of sentence first and speak loud/clear, and to add context.  Patient with 50% accuracy for fully accurate sentence generation.  Continue goal as worded.     Goal Identifier 5   Goal Description Updated Goal: Patient will SARAI complete x3 functional reading/writing tasks with use of assistive technology.  Old Goal: Patient will complete functional reading/writing tasks (e.g., internet search, bill decoding) with use of assistive technology with >80% accuracy.   Target Date 01/29/22   Date Met      Progress (detail required for progress note): 12/30/21: Goal progressing.  Patient has demonstrated progress with functional reading/writing tasks with use of assistive technology including internet searches, email writing, and bill reading.  Patient benefits from repetition to recall steps and visual instruction.  Challenging to measure goal as worded, updated goal.     Plan:  Continue therapy per current plan of care.    Discharge:  No

## 2022-01-06 ENCOUNTER — HOSPITAL ENCOUNTER (OUTPATIENT)
Dept: SPEECH THERAPY | Facility: REHABILITATION | Age: 62
End: 2022-01-06
Payer: COMMERCIAL

## 2022-01-06 DIAGNOSIS — R48.0 ALEXIA: ICD-10-CM

## 2022-01-06 DIAGNOSIS — R48.8 AGRAPHIA: Primary | ICD-10-CM

## 2022-01-06 PROCEDURE — 92507 TX SP LANG VOICE COMM INDIV: CPT | Mod: GN | Performed by: SPEECH-LANGUAGE PATHOLOGIST

## 2022-01-13 ENCOUNTER — HOSPITAL ENCOUNTER (OUTPATIENT)
Dept: SPEECH THERAPY | Facility: REHABILITATION | Age: 62
End: 2022-01-13
Payer: COMMERCIAL

## 2022-01-13 DIAGNOSIS — R48.8 AGRAPHIA: ICD-10-CM

## 2022-01-13 DIAGNOSIS — R48.0 ALEXIA: Primary | ICD-10-CM

## 2022-01-13 DIAGNOSIS — K59.01 SLOW TRANSIT CONSTIPATION: Primary | ICD-10-CM

## 2022-01-13 DIAGNOSIS — I69.320 APHASIA, POST-STROKE: ICD-10-CM

## 2022-01-13 PROCEDURE — 92507 TX SP LANG VOICE COMM INDIV: CPT | Mod: GN | Performed by: SPEECH-LANGUAGE PATHOLOGIST

## 2022-01-16 NOTE — TELEPHONE ENCOUNTER
"Routing refill request to provider for review/approval because:  Drug not active on patient's medication list    Last Written Prescription Date:  n/a  Last Fill Quantity: n/a,  # refills: n/a   Last office visit provider:  11/26/21     Requested Prescriptions   Pending Prescriptions Disp Refills     GOODSENSE CLEARLAX 17 GM/SCOOP powder [Pharmacy Med Name: CLEARLAX PWD 510GM Powder] 510 g 10     Sig: TAKE 17 GRAMS BY MOUTH EVERY DAY AS NEEDED       Laxatives Protocol Passed - 1/13/2022 10:03 AM        Passed - Patient is age 6 or older        Passed - Recent (12 mo) or future (30 days) visit within the authorizing provider's specialty     Patient has had an office visit with the authorizing provider or a provider within the authorizing providers department within the previous 12 mos or has a future within next 30 days. See \"Patient Info\" tab in inbasket, or \"Choose Columns\" in Meds & Orders section of the refill encounter.              Passed - Medication is active on med list             Marta Dennis RN 01/16/22 11:53 AM  "

## 2022-01-17 RX ORDER — POLYETHYLENE GLYCOL 3350 17 G/17G
POWDER, FOR SOLUTION ORAL
Qty: 510 G | Refills: 10 | Status: SHIPPED | OUTPATIENT
Start: 2022-01-17 | End: 2022-11-30

## 2022-01-18 VITALS
DIASTOLIC BLOOD PRESSURE: 80 MMHG | RESPIRATION RATE: 16 BRPM | SYSTOLIC BLOOD PRESSURE: 140 MMHG | HEART RATE: 72 BPM | TEMPERATURE: 98.8 F | OXYGEN SATURATION: 95 % | SYSTOLIC BLOOD PRESSURE: 130 MMHG | TEMPERATURE: 99 F | SYSTOLIC BLOOD PRESSURE: 118 MMHG | OXYGEN SATURATION: 98 % | RESPIRATION RATE: 16 BRPM | TEMPERATURE: 98.7 F | DIASTOLIC BLOOD PRESSURE: 70 MMHG | OXYGEN SATURATION: 96 % | DIASTOLIC BLOOD PRESSURE: 80 MMHG | HEART RATE: 65 BPM | HEART RATE: 50 BPM

## 2022-01-18 VITALS
HEART RATE: 73 BPM | TEMPERATURE: 96.8 F | SYSTOLIC BLOOD PRESSURE: 120 MMHG | OXYGEN SATURATION: 98 % | TEMPERATURE: 97.9 F | DIASTOLIC BLOOD PRESSURE: 72 MMHG | RESPIRATION RATE: 16 BRPM | SYSTOLIC BLOOD PRESSURE: 122 MMHG | OXYGEN SATURATION: 97 % | HEART RATE: 69 BPM | DIASTOLIC BLOOD PRESSURE: 72 MMHG | RESPIRATION RATE: 18 BRPM

## 2022-01-18 VITALS
HEART RATE: 66 BPM | TEMPERATURE: 98.7 F | SYSTOLIC BLOOD PRESSURE: 112 MMHG | DIASTOLIC BLOOD PRESSURE: 62 MMHG | OXYGEN SATURATION: 98 %

## 2022-01-18 VITALS
TEMPERATURE: 98 F | WEIGHT: 170.4 LBS | DIASTOLIC BLOOD PRESSURE: 90 MMHG | SYSTOLIC BLOOD PRESSURE: 162 MMHG | SYSTOLIC BLOOD PRESSURE: 138 MMHG | TEMPERATURE: 98.2 F | DIASTOLIC BLOOD PRESSURE: 72 MMHG | HEART RATE: 67 BPM | DIASTOLIC BLOOD PRESSURE: 70 MMHG | SYSTOLIC BLOOD PRESSURE: 110 MMHG | OXYGEN SATURATION: 99 % | HEART RATE: 63 BPM | BODY MASS INDEX: 29.25 KG/M2 | DIASTOLIC BLOOD PRESSURE: 72 MMHG | HEART RATE: 72 BPM | HEART RATE: 54 BPM | TEMPERATURE: 96.8 F | SYSTOLIC BLOOD PRESSURE: 112 MMHG | OXYGEN SATURATION: 97 % | OXYGEN SATURATION: 97 % | RESPIRATION RATE: 16 BRPM | RESPIRATION RATE: 18 BRPM

## 2022-01-18 VITALS
RESPIRATION RATE: 16 BRPM | DIASTOLIC BLOOD PRESSURE: 60 MMHG | SYSTOLIC BLOOD PRESSURE: 120 MMHG | OXYGEN SATURATION: 96 % | HEART RATE: 72 BPM | TEMPERATURE: 98.1 F | SYSTOLIC BLOOD PRESSURE: 110 MMHG | HEART RATE: 60 BPM | RESPIRATION RATE: 18 BRPM | OXYGEN SATURATION: 98 % | DIASTOLIC BLOOD PRESSURE: 68 MMHG

## 2022-01-18 VITALS
OXYGEN SATURATION: 98 % | TEMPERATURE: 98.4 F | OXYGEN SATURATION: 98 % | SYSTOLIC BLOOD PRESSURE: 120 MMHG | RESPIRATION RATE: 18 BRPM | OXYGEN SATURATION: 98 % | DIASTOLIC BLOOD PRESSURE: 70 MMHG | HEART RATE: 62 BPM | TEMPERATURE: 99.6 F | SYSTOLIC BLOOD PRESSURE: 140 MMHG | HEART RATE: 70 BPM | DIASTOLIC BLOOD PRESSURE: 68 MMHG | DIASTOLIC BLOOD PRESSURE: 70 MMHG | SYSTOLIC BLOOD PRESSURE: 122 MMHG | TEMPERATURE: 98 F | HEART RATE: 62 BPM

## 2022-01-18 VITALS
SYSTOLIC BLOOD PRESSURE: 121 MMHG | DIASTOLIC BLOOD PRESSURE: 73 MMHG | SYSTOLIC BLOOD PRESSURE: 130 MMHG | SYSTOLIC BLOOD PRESSURE: 110 MMHG | RESPIRATION RATE: 18 BRPM | HEART RATE: 68 BPM | RESPIRATION RATE: 18 BRPM | TEMPERATURE: 98.2 F | HEART RATE: 72 BPM | DIASTOLIC BLOOD PRESSURE: 60 MMHG | HEART RATE: 65 BPM | DIASTOLIC BLOOD PRESSURE: 77 MMHG | OXYGEN SATURATION: 96 % | OXYGEN SATURATION: 95 %

## 2022-01-18 VITALS
WEIGHT: 175 LBS | HEIGHT: 64 IN | HEART RATE: 56 BPM | SYSTOLIC BLOOD PRESSURE: 110 MMHG | BODY MASS INDEX: 29.88 KG/M2 | DIASTOLIC BLOOD PRESSURE: 70 MMHG | OXYGEN SATURATION: 99 %

## 2022-01-18 VITALS — DIASTOLIC BLOOD PRESSURE: 64 MMHG | SYSTOLIC BLOOD PRESSURE: 112 MMHG | HEART RATE: 64 BPM | TEMPERATURE: 98.3 F

## 2022-01-18 VITALS
OXYGEN SATURATION: 99 % | BODY MASS INDEX: 27.29 KG/M2 | DIASTOLIC BLOOD PRESSURE: 78 MMHG | HEART RATE: 68 BPM | OXYGEN SATURATION: 97 % | WEIGHT: 159 LBS | RESPIRATION RATE: 16 BRPM | SYSTOLIC BLOOD PRESSURE: 120 MMHG | TEMPERATURE: 98.1 F

## 2022-01-18 VITALS
SYSTOLIC BLOOD PRESSURE: 142 MMHG | OXYGEN SATURATION: 98 % | RESPIRATION RATE: 18 BRPM | DIASTOLIC BLOOD PRESSURE: 80 MMHG | HEART RATE: 88 BPM | TEMPERATURE: 97.9 F

## 2022-01-18 VITALS
DIASTOLIC BLOOD PRESSURE: 81 MMHG | HEART RATE: 71 BPM | TEMPERATURE: 98.4 F | OXYGEN SATURATION: 97 % | SYSTOLIC BLOOD PRESSURE: 134 MMHG

## 2022-01-18 VITALS
SYSTOLIC BLOOD PRESSURE: 119 MMHG | DIASTOLIC BLOOD PRESSURE: 69 MMHG | RESPIRATION RATE: 16 BRPM | TEMPERATURE: 98.2 F | SYSTOLIC BLOOD PRESSURE: 120 MMHG | OXYGEN SATURATION: 97 % | DIASTOLIC BLOOD PRESSURE: 68 MMHG | HEART RATE: 69 BPM | HEART RATE: 76 BPM | TEMPERATURE: 98.7 F | OXYGEN SATURATION: 97 %

## 2022-01-18 VITALS
OXYGEN SATURATION: 100 % | SYSTOLIC BLOOD PRESSURE: 118 MMHG | SYSTOLIC BLOOD PRESSURE: 132 MMHG | TEMPERATURE: 98.7 F | HEART RATE: 56 BPM | DIASTOLIC BLOOD PRESSURE: 70 MMHG | TEMPERATURE: 99.3 F | RESPIRATION RATE: 18 BRPM | SYSTOLIC BLOOD PRESSURE: 152 MMHG | TEMPERATURE: 97.3 F | HEART RATE: 61 BPM | DIASTOLIC BLOOD PRESSURE: 79 MMHG | HEART RATE: 77 BPM | OXYGEN SATURATION: 99 % | DIASTOLIC BLOOD PRESSURE: 60 MMHG | OXYGEN SATURATION: 98 %

## 2022-01-18 VITALS
RESPIRATION RATE: 16 BRPM | SYSTOLIC BLOOD PRESSURE: 126 MMHG | DIASTOLIC BLOOD PRESSURE: 60 MMHG | HEART RATE: 58 BPM | OXYGEN SATURATION: 98 % | SYSTOLIC BLOOD PRESSURE: 110 MMHG | TEMPERATURE: 98.1 F | HEART RATE: 64 BPM | WEIGHT: 159 LBS | OXYGEN SATURATION: 98 % | BODY MASS INDEX: 27.29 KG/M2 | DIASTOLIC BLOOD PRESSURE: 64 MMHG

## 2022-01-18 VITALS
TEMPERATURE: 98.8 F | HEART RATE: 62 BPM | SYSTOLIC BLOOD PRESSURE: 126 MMHG | DIASTOLIC BLOOD PRESSURE: 70 MMHG | OXYGEN SATURATION: 98 %

## 2022-01-18 VITALS — SYSTOLIC BLOOD PRESSURE: 120 MMHG | HEART RATE: 62 BPM | DIASTOLIC BLOOD PRESSURE: 70 MMHG

## 2022-01-18 VITALS
DIASTOLIC BLOOD PRESSURE: 78 MMHG | RESPIRATION RATE: 16 BRPM | HEART RATE: 69 BPM | SYSTOLIC BLOOD PRESSURE: 121 MMHG | DIASTOLIC BLOOD PRESSURE: 70 MMHG | HEART RATE: 60 BPM | OXYGEN SATURATION: 98 % | SYSTOLIC BLOOD PRESSURE: 120 MMHG | TEMPERATURE: 98.7 F

## 2022-01-18 VITALS
OXYGEN SATURATION: 98 % | RESPIRATION RATE: 16 BRPM | RESPIRATION RATE: 16 BRPM | HEART RATE: 65 BPM | DIASTOLIC BLOOD PRESSURE: 70 MMHG | OXYGEN SATURATION: 98 % | TEMPERATURE: 98.5 F | DIASTOLIC BLOOD PRESSURE: 72 MMHG | HEART RATE: 58 BPM | SYSTOLIC BLOOD PRESSURE: 122 MMHG | TEMPERATURE: 98.5 F | SYSTOLIC BLOOD PRESSURE: 110 MMHG

## 2022-01-18 VITALS
TEMPERATURE: 98.4 F | OXYGEN SATURATION: 96 % | DIASTOLIC BLOOD PRESSURE: 77 MMHG | HEART RATE: 64 BPM | SYSTOLIC BLOOD PRESSURE: 110 MMHG | OXYGEN SATURATION: 97 % | RESPIRATION RATE: 18 BRPM | DIASTOLIC BLOOD PRESSURE: 66 MMHG | SYSTOLIC BLOOD PRESSURE: 129 MMHG | RESPIRATION RATE: 16 BRPM | HEART RATE: 80 BPM

## 2022-01-18 VITALS
DIASTOLIC BLOOD PRESSURE: 64 MMHG | TEMPERATURE: 97.8 F | OXYGEN SATURATION: 97 % | SYSTOLIC BLOOD PRESSURE: 110 MMHG | HEART RATE: 61 BPM

## 2022-01-18 VITALS
TEMPERATURE: 98.4 F | DIASTOLIC BLOOD PRESSURE: 65 MMHG | OXYGEN SATURATION: 95 % | RESPIRATION RATE: 18 BRPM | HEART RATE: 64 BPM | SYSTOLIC BLOOD PRESSURE: 115 MMHG | OXYGEN SATURATION: 98 % | HEART RATE: 62 BPM | DIASTOLIC BLOOD PRESSURE: 68 MMHG | SYSTOLIC BLOOD PRESSURE: 134 MMHG

## 2022-01-18 VITALS
SYSTOLIC BLOOD PRESSURE: 130 MMHG | DIASTOLIC BLOOD PRESSURE: 62 MMHG | RESPIRATION RATE: 16 BRPM | SYSTOLIC BLOOD PRESSURE: 130 MMHG | OXYGEN SATURATION: 97 % | BODY MASS INDEX: 27.64 KG/M2 | HEART RATE: 78 BPM | HEART RATE: 64 BPM | TEMPERATURE: 98.1 F | DIASTOLIC BLOOD PRESSURE: 72 MMHG | OXYGEN SATURATION: 98 % | WEIGHT: 161 LBS

## 2022-01-18 VITALS
DIASTOLIC BLOOD PRESSURE: 72 MMHG | RESPIRATION RATE: 16 BRPM | TEMPERATURE: 98 F | RESPIRATION RATE: 18 BRPM | SYSTOLIC BLOOD PRESSURE: 128 MMHG | OXYGEN SATURATION: 98 % | OXYGEN SATURATION: 99 % | TEMPERATURE: 98 F | SYSTOLIC BLOOD PRESSURE: 110 MMHG | HEART RATE: 51 BPM | HEART RATE: 65 BPM | DIASTOLIC BLOOD PRESSURE: 90 MMHG

## 2022-01-18 VITALS
OXYGEN SATURATION: 94 % | SYSTOLIC BLOOD PRESSURE: 130 MMHG | HEART RATE: 59 BPM | HEIGHT: 64 IN | BODY MASS INDEX: 29.74 KG/M2 | SYSTOLIC BLOOD PRESSURE: 106 MMHG | RESPIRATION RATE: 16 BRPM | SYSTOLIC BLOOD PRESSURE: 120 MMHG | OXYGEN SATURATION: 94 % | OXYGEN SATURATION: 98 % | DIASTOLIC BLOOD PRESSURE: 52 MMHG | HEART RATE: 60 BPM | TEMPERATURE: 98.4 F | TEMPERATURE: 98.5 F | DIASTOLIC BLOOD PRESSURE: 78 MMHG | HEART RATE: 56 BPM | WEIGHT: 174.2 LBS | DIASTOLIC BLOOD PRESSURE: 80 MMHG

## 2022-01-18 VITALS
DIASTOLIC BLOOD PRESSURE: 62 MMHG | TEMPERATURE: 97.4 F | OXYGEN SATURATION: 98 % | HEART RATE: 62 BPM | BODY MASS INDEX: 27.14 KG/M2 | SYSTOLIC BLOOD PRESSURE: 94 MMHG | HEIGHT: 64 IN | WEIGHT: 159 LBS

## 2022-01-18 VITALS
HEART RATE: 54 BPM | TEMPERATURE: 97.4 F | OXYGEN SATURATION: 98 % | SYSTOLIC BLOOD PRESSURE: 124 MMHG | DIASTOLIC BLOOD PRESSURE: 68 MMHG | TEMPERATURE: 97.6 F | DIASTOLIC BLOOD PRESSURE: 60 MMHG | RESPIRATION RATE: 18 BRPM | OXYGEN SATURATION: 97 % | SYSTOLIC BLOOD PRESSURE: 130 MMHG | HEART RATE: 65 BPM | DIASTOLIC BLOOD PRESSURE: 74 MMHG | SYSTOLIC BLOOD PRESSURE: 122 MMHG | HEART RATE: 66 BPM | RESPIRATION RATE: 18 BRPM

## 2022-01-18 VITALS
OXYGEN SATURATION: 98 % | SYSTOLIC BLOOD PRESSURE: 110 MMHG | TEMPERATURE: 98.2 F | DIASTOLIC BLOOD PRESSURE: 60 MMHG | HEART RATE: 64 BPM | RESPIRATION RATE: 18 BRPM

## 2022-01-18 VITALS
HEART RATE: 69 BPM | DIASTOLIC BLOOD PRESSURE: 75 MMHG | OXYGEN SATURATION: 96 % | SYSTOLIC BLOOD PRESSURE: 128 MMHG | TEMPERATURE: 98.7 F

## 2022-01-18 VITALS
RESPIRATION RATE: 16 BRPM | DIASTOLIC BLOOD PRESSURE: 70 MMHG | SYSTOLIC BLOOD PRESSURE: 118 MMHG | OXYGEN SATURATION: 98 % | HEART RATE: 58 BPM | TEMPERATURE: 98.1 F

## 2022-01-18 VITALS
TEMPERATURE: 98.4 F | SYSTOLIC BLOOD PRESSURE: 112 MMHG | DIASTOLIC BLOOD PRESSURE: 70 MMHG | OXYGEN SATURATION: 97 % | HEART RATE: 61 BPM

## 2022-01-18 VITALS — SYSTOLIC BLOOD PRESSURE: 110 MMHG | HEART RATE: 78 BPM | DIASTOLIC BLOOD PRESSURE: 60 MMHG | TEMPERATURE: 98.2 F

## 2022-01-18 VITALS — HEART RATE: 65 BPM | DIASTOLIC BLOOD PRESSURE: 77 MMHG | SYSTOLIC BLOOD PRESSURE: 128 MMHG

## 2022-01-18 VITALS
DIASTOLIC BLOOD PRESSURE: 60 MMHG | SYSTOLIC BLOOD PRESSURE: 98 MMHG | WEIGHT: 163.6 LBS | OXYGEN SATURATION: 99 % | HEART RATE: 63 BPM | BODY MASS INDEX: 28.08 KG/M2

## 2022-01-18 VITALS
TEMPERATURE: 98.9 F | RESPIRATION RATE: 18 BRPM | DIASTOLIC BLOOD PRESSURE: 78 MMHG | HEART RATE: 58 BPM | SYSTOLIC BLOOD PRESSURE: 130 MMHG | OXYGEN SATURATION: 98 %

## 2022-01-18 VITALS
OXYGEN SATURATION: 97 % | HEART RATE: 76 BPM | RESPIRATION RATE: 18 BRPM | HEART RATE: 78 BPM | OXYGEN SATURATION: 98 % | DIASTOLIC BLOOD PRESSURE: 65 MMHG | SYSTOLIC BLOOD PRESSURE: 118 MMHG | DIASTOLIC BLOOD PRESSURE: 70 MMHG | SYSTOLIC BLOOD PRESSURE: 134 MMHG

## 2022-01-18 VITALS
TEMPERATURE: 97.7 F | SYSTOLIC BLOOD PRESSURE: 122 MMHG | HEART RATE: 66 BPM | OXYGEN SATURATION: 97 % | DIASTOLIC BLOOD PRESSURE: 78 MMHG | RESPIRATION RATE: 18 BRPM

## 2022-01-18 VITALS
BODY MASS INDEX: 29.82 KG/M2 | OXYGEN SATURATION: 100 % | SYSTOLIC BLOOD PRESSURE: 120 MMHG | WEIGHT: 173.7 LBS | HEART RATE: 55 BPM | DIASTOLIC BLOOD PRESSURE: 78 MMHG

## 2022-01-18 ASSESSMENT — PATIENT HEALTH QUESTIONNAIRE - PHQ9
SUM OF ALL RESPONSES TO PHQ QUESTIONS 1-9: 13
SUM OF ALL RESPONSES TO PHQ QUESTIONS 1-9: 9
SUM OF ALL RESPONSES TO PHQ QUESTIONS 1-9: 6
SUM OF ALL RESPONSES TO PHQ QUESTIONS 1-9: 2
SUM OF ALL RESPONSES TO PHQ QUESTIONS 1-9: 1

## 2022-01-20 ENCOUNTER — HOSPITAL ENCOUNTER (OUTPATIENT)
Dept: SPEECH THERAPY | Facility: REHABILITATION | Age: 62
End: 2022-01-20
Payer: COMMERCIAL

## 2022-01-20 ENCOUNTER — TELEPHONE (OUTPATIENT)
Dept: INTERNAL MEDICINE | Facility: CLINIC | Age: 62
End: 2022-01-20

## 2022-01-20 DIAGNOSIS — R48.0 ALEXIA: Primary | ICD-10-CM

## 2022-01-20 DIAGNOSIS — R48.8 AGRAPHIA: ICD-10-CM

## 2022-01-20 DIAGNOSIS — I69.320 APHASIA, POST-STROKE: ICD-10-CM

## 2022-01-20 PROCEDURE — 92507 TX SP LANG VOICE COMM INDIV: CPT | Mod: GN | Performed by: SPEECH-LANGUAGE PATHOLOGIST

## 2022-01-20 NOTE — PROGRESS NOTES
[facility]    OUTPATIENT SPEECH LANGUAGE PATHOLOGY  PLAN OF TREATMENT FOR OUTPATIENT REHABILITATION AND PROGRESS NOTE                                                          Patient's Last Name, First Name, Tara Jack Date of Birth  1960   Provider's Name  [facility] Medical Record No.  [id number]    Onset Date  4/21/21 Start of Care Date  4/29/21   Type:     __PT   ___OT   _X_SLP Medical Diagnosis  Acute left PCA stroke I63.532, Difficulty understanding written language R41.89, Difficulty reading F81.9, Difficulty writing R68.89   SLP Diagnosis  Alexia R48.0, Agraphia R48.8, Aphasia, post-stroke I69.320 Plan of Treatment  Frequency/Duration: 1x per week  Certification date from 1/28/22 to 3/28/22     Goals:  Goal Identifier 1   Goal Description Patient will label single letters/numbers with 80% accuracy SARAI.   Target Date     Date Met      Progress (detail required for progress note): 12/30/21: Goal not progressing.  Baseline 17/26 (65%) on 10/21/21 and 16/26 (61%) on today's date.  Discontinue goal with ongoing home practice recommended.     Goal Identifier 2   Goal Description Patient will complete functional naming tasks with independent use of word finding strategies in >80% of opportunities.   Target Date 03/28/22   Date Met      Progress (detail required for progress note): 12/30/21: 65% accuracy SARAI to object/picture naming with use of word finding strategies, increases to 100% given min cues/prompts.  Continue goal as worded     Goal Identifier 3   Goal Description Patient will complete functional numbers tasks (e.g., stating dollar amounts, time) with use of external aid with >80% accuracy.   Target Date 01/29/22   Date Met  12/30/21   Progress (detail required for progress note): 12/30/21: Goal met, minimally addressed; however,  "able to utilize text to speech [place] on phone for numeric recognition with 90% accuracy SARAI.  Goal met.     Goal Identifier 4   Goal Description Patient will complete sentence level writing tasks with use of external aid (e.g., speech to text) with >80% accuracy.   Target Date 03/28/22   Date Met      Progress (detail required for progress note): 12/30/21: Progressing.  Patient able to utilize speech to text function on computer with variable success, with noted difficulties with technology.  Improved with strategies to think of sentence first and speak loud/clear, and to add context.  Patient with 50% accuracy for fully accurate sentence generation.  Continue goal as worded.     Goal Identifier 5   Goal Description Updated Goal: Patient will SARAI complete x3 functional reading/writing tasks with use of assistive technology.  Old Goal: Patient will complete functional reading/writing tasks (e.g., internet search, bill decoding) with use of assistive technology with >80% accuracy.   Target Date 03/28/22   Date Met      Progress (detail required for progress note): 1/20/22: Goal progressing.  Patient continues to progress with use of assistive technology for functional reading/writing tasks including creating pros/cons list for housing options, searching housing options on the internet, and taking picture of text to phone.  Continues to benefit from repetition and visual supports for recall.       Beginning/End Dates of Progress Note Reporting Period:  12/30/21 to 1/20/22    Progress Toward Goals:   Progress this reporting period: See above.    Client Self (Subjective) Report for Progress Note Reporting Period: Patient arrived to therapy in good spirits.  She expressed that was able to attempt adding items to her home pros/cons list, as well as searching housing options online with text to speech to read/interpret; however, had \"trouble with the reader\".         I CERTIFY THE NEED FOR THESE SERVICES FURNISHED UNDER     "    THIS PLAN OF TREATMENT AND WHILE UNDER MY CARE     (Physician co-signature of this document indicates review and certification of the therapy plan).                Referring Provider: Edwina Fox, SLP

## 2022-01-20 NOTE — TELEPHONE ENCOUNTER
1-20-22  Jie Callled from [facility] wanted to give a FYI:  [facility] discharged pt from care on 1-20-22 pt received medication from [facility]  yajaira

## 2022-01-21 ENCOUNTER — OFFICE VISIT (OUTPATIENT)
Dept: NEUROLOGY | Facility: CLINIC | Age: 62
End: 2022-01-21
Payer: COMMERCIAL

## 2022-01-21 VITALS
WEIGHT: 174 LBS | HEART RATE: 68 BPM | HEIGHT: 64 IN | DIASTOLIC BLOOD PRESSURE: 62 MMHG | SYSTOLIC BLOOD PRESSURE: 114 MMHG | BODY MASS INDEX: 29.71 KG/M2

## 2022-01-21 DIAGNOSIS — Z86.73 HISTORY OF STROKE: Primary | ICD-10-CM

## 2022-01-21 PROCEDURE — 99214 OFFICE O/P EST MOD 30 MIN: CPT | Performed by: PSYCHIATRY & NEUROLOGY

## 2022-01-21 RX ORDER — AMLODIPINE BESYLATE 5 MG/1
5 TABLET ORAL DAILY
COMMUNITY
Start: 2022-01-13 | End: 2022-04-28

## 2022-01-21 RX ORDER — CLOPIDOGREL BISULFATE 75 MG/1
TABLET ORAL
Qty: 30 TABLET | Refills: 11 | Status: SHIPPED | OUTPATIENT
Start: 2022-01-21 | End: 2022-01-24

## 2022-01-21 ASSESSMENT — MIFFLIN-ST. JEOR: SCORE: 1334.26

## 2022-01-21 NOTE — LETTER
2022         RE: Tara Plaza  [address]  [facility] [address]        Dear Colleague,    Thank you for referring your patient, Tara Plaza, to the [facility] [place]. Please see a copy of my visit note below.    NEUROLOGY FOLLOW UP VISIT  NOTE       [facility] [place]  [address] [place] [address]  Tel: [phone]  Fax: [phone]  [email or web address]     Tara Plaza,  1960, MRN [id number]  PCP: Edwina Randle  Date: 2022      ASSESSMENT & PLAN     Visit Diagnosis  1. History of stroke     H/O left PCA infarction  62-year-old female with history of HTN, DM, left PCA infarction and severe intracranial atherosclerotic disease who returns for yearly follow-up. She has been on dual antiplatelet therapy but has the benefit veins after 90 days of infarct have told her to discontinue aspirin and to continue Plavix 75 mg daily in addition to statin. Her most recent LDL checked more than 2 years ago was 76 and I have repeated a lipid profile. Vascular risk factors modification: Healthy diet (fruits, vegetables, low fat dairy & reduced saturated fat), weight loss, exercise at least 30 minutes 5 days/week, BMI goal <25.  Keep systolic blood pressure goal <130.  LDL goal <70.  Hemoglobin A1c goal <7. If applicable, STOP smoking. I refilled her prescription for Plavix. Regular follow-up will be in 1 year      Thank you again for this referral, please feel free to contact me if you have any questions.    Solomon Duarte MD  [facility]  (Formerly, [facility]     HISTORY OF PRESENT ILLNESS     Patient is a 62-year-old female with history of HTN, DM, left PCA infarction, severe multifocal intracranial atherosclerotic disease, 70% proximal basilar stenosis autotransfusion LE follow-up. She was admitted to [facility] on 2020  and was found to have left PCA infarct that resulted in right visual field cut. She was quite distraught with a visual field cut and initially was kept on dual antiplatelet therapy. She is taking statin and most recent LDL checked more than 2 years ago was 79. She denies any focal weakness or numbness. She had a cataract surgery and after that felt her vision was little better but now has persistent right visual field cut.     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    Surgical History:  She  has a past surgical history that includes IR Carotid Cerebral Angiogram Bilateral (7/14/2020) and Ir Carotid Angiogram (7/14/2020).     SOCIAL HISTORY     Reviewed, and she  reports that she has never smoked. She has never used smokeless tobacco. She reports that she does not drink alcohol and does not use drugs.     FAMILY HISTORY     Reviewed, and family history includes Cerebrovascular Disease in her maternal grandmother; Heart Disease in her father and mother.     ALLERGIES     Allergies   Allergen Reactions     Seafood          REVIEW OF SYSTEMS     A 12 point review of system was performed and was negative except as outlined in the history of present illness.     HOME MEDICATIONS     Current Outpatient Rx   Medication Sig Dispense Refill     clopidogrel (PLAVIX) 75 MG tablet TAKE 1 TABLET(75 MG) BY MOUTH DAILY. 30 tablet 11     amLODIPine (NORVASC) 5 MG tablet Take 5 mg by mouth daily       atorvastatin (LIPITOR) 80 MG tablet Take 1 tablet (80 mg) by mouth every evening 90 tablet 3     blood glucose (NO BRAND  "SPECIFIED) lancets standard Accu-chek FastClix lancet drums Use to test blood sugar  2  times daily as directed. 100 each 0     blood glucose monitoring (NO BRAND SPECIFIED) meter device kit Accu-chek Guide Per insurance coverage 1 kit 0     cholecalciferol (VITAMIN D3) 25 mcg (1000 units) capsule Take 2,000 Units by mouth       citalopram (CELEXA) 20 MG tablet Take 1 tablet (20 mg) by mouth every morning TAKE 1 TABLET(20 MG) BY MOUTH EVERY MORNING 90 tablet 3     CONTOUR NEXT TEST test strip USE TO TEST BLOOD GLUCOSE PREMEAL ONCE DAILY 100 strip 2     docusate sodium (COLACE) 100 MG capsule Take 1 capsule (100 mg) by mouth 2 times daily 100 capsule 3     GOODSENSE CLEARLAX 17 GM/SCOOP powder TAKE 17 GRAMS BY MOUTH EVERY DAY AS NEEDED 510 g 10     lisinopril (ZESTRIL) 40 MG tablet Take 1 tablet (40 mg) by mouth daily 30 tablet 0     metFORMIN (GLUCOPHAGE) 500 MG tablet Take 0.5 tablets (250 mg) by mouth daily (with breakfast) 45 tablet 3     ondansetron (ZOFRAN-ODT) 4 MG ODT tab        Sharps Container MISC Use as directed to dispose of needles, lancets and other sharps Per Insurance coverage 1 each 0     Vitamin D3 (CHOLECALCIFEROL) 25 mcg (1000 units) tablet Take 2 tablets (50 mcg) by mouth daily 180 tablet 3         PHYSICAL EXAM     Vital signs  /62 (BP Location: Left arm, Patient Position: Sitting)   Pulse 68   Ht 1.626 m (5' 4\")   Wt 78.9 kg (174 lb)   BMI 29.87 kg/m      Weight:   174 lbs 0 oz    GENERAL PHYSICAL EXAM: Patient is alert and oriented x 4 in no acute distress. Neck was supple, no carotid bruits, thyromegaly, lymphadenopathy or JVD noted.   NEUROLOGICAL EXAM:  Patient is alert and oriented x3 speech somewhat soft and monotone.  She has right visual field cut.  Extraocular movements are intact face symmetrical tongue midline.  Strength in extremities 5/5 reflexes 1+ toes equivocal.  She has dysmetria on finger-nose testing. She walks with a cane     PERTINENT DIAGNOSTIC STUDIES "     Following studies were reviewed:     CT  CT BRAIN WITHOUT CONTRAST:   1. No finding for intracranial hemorrhage or mass or convincing finding for acute infarct. Small areas of patchy white matter low-attenuation change are noted, nonspecific and favored to reflect sequela of chronic microvascular ischemic change given the   patient's age. In the absence of priors for comparison, small areas of acute on chronic ischemic change cannot be excluded.  2. Small chronic infarcts are seen within both parietal lobes. Mild volume loss.     CTA [place] OF LUCAS:   1. There is abrupt occlusion of the left posterior cerebral artery at the level of the P2 junction. Left posterior cerebral artery is derived via the left posterior communicating artery. Left P1 segment is not identified.  2. There is smooth tapering of the proximal P1 segment of the right posterior cerebral artery. Although favored to reflect congenital variation, acquired occlusion cannot be excluded. P2 and more distal segments of the right posterior cerebral artery is   supplied via the right posterior communicating artery.  3. Extensive coarse atherosclerotic plaque is seen within both carotid siphons, intradural vertebral arteries and within the basilar artery. There is multifocal severe stenosis within the vertebral and basilar arteries. More mild narrowing is seen in   both carotid siphons without clear flow limiting stenosis. Areas of mild narrowing are seen within the distal EULALIA and MCA branches.     CTA OF THE NECK:  1. Mild atherosclerotic plaque left carotid bulb/bifurcation without significant stenosis either cervical carotid or vertebral system by modified NASCET criteria. No findings for dissection.     MRI  1.  Left posterior cerebral artery is supplied via the left posterior communicating artery. There is occlusion of the left posterior communicating artery at the expected P1/P2 junction without flow seen more distally.     2.  Severe multifocal  areas of stenosis are seen within the proximal intradural vertebral arteries and basilar artery as seen on prior CTA.     3.  There is aneurysmal dilatation/fusiform ectasia of the cavernous segment of the left internal carotid artery measuring up to 9 mm in diameter.     4.  Large area of restricted diffusion is seen involving the medial aspect of the left temporal and occipital lobes with a smaller area in the left putamen and compatible with areas of ischemic change.     Echocardiogram    Left ventricle ejection fraction is normal. The calculated left ventricular ejection fraction is 60%.    Normal left ventricular size. Moderate hypertrophy noted    Normal right ventricular size and systolic function.    No significant valvualr abnormalities noted    No previous study for comparison.     Cerebral angiogram  1. Diffuse intracranial atherosclerotic disease most notably affecting the proximal basilar artery and the left posterior cerebral artery distally.     2. Mild fusiform enlargement of the proximal cavernous left internal carotid artery as well as small sessile aneurysms arising from the ventral supraclinoid left internal carotid artery of doubtful clinical consequence.     3. No other significant intracranial or extra cranial abnormalities are identified.      PERTINENT LABS  Following labs were reviewed:  Office Visit on 11/26/2021   Component Date Value     Hemoglobin A1C 11/26/2021 5.6      Sodium 11/26/2021 139      Potassium 11/26/2021 4.2      Chloride 11/26/2021 104      Carbon Dioxide (CO2) 11/26/2021 26      Anion Gap 11/26/2021 9      Urea Nitrogen 11/26/2021 23*     Creatinine 11/26/2021 0.84      Calcium 11/26/2021 9.4      Glucose 11/26/2021 112      GFR Estimate 11/26/2021 75          Total time spent for face to face visit, reviewing labs/imaging studies, counseling and coordination of care was: 30 Minutes spent on the date of the encounter doing chart review, review of outside records, review  of test results, interpretation of tests, patient visit and documentation       This note was dictated using voice recognition software.  Any grammatical or context distortions are unintentional and inherent to the software.    Orders Placed This Encounter   Procedures     Lipid Profile      New Prescriptions    No medications on file     Modified Medications    Modified Medication Previous Medication    CLOPIDOGREL (PLAVIX) 75 MG TABLET clopidogrel (PLAVIX) 75 MG tablet       TAKE 1 TABLET(75 MG) BY MOUTH DAILY.    TAKE 1 TABLET(75 MG) BY MOUTH DAILY.                     Again, thank you for allowing me to participate in the care of your patient.        Sincerely,        Solomon Duarte MD

## 2022-01-21 NOTE — PROGRESS NOTES
NEUROLOGY FOLLOW UP VISIT  NOTE       [facility] [place]  [address] [place] [address]  Tel: [phone]  Fax: [phone]  [email or web address]     Tara Plaza,  1960, MRN [id number]  PCP: Edwina Randle  Date: 2022      ASSESSMENT & PLAN     Visit Diagnosis  History of stroke     H/O left PCA infarction  62-year-old female with history of HTN, DM, left PCA infarction and severe intracranial atherosclerotic disease who returns for yearly follow-up. She has been on dual antiplatelet therapy but has the benefit veins after 90 days of infarct have told her to discontinue aspirin and to continue Plavix 75 mg daily in addition to statin. Her most recent LDL checked more than 2 years ago was 76 and I have repeated a lipid profile. Vascular risk factors modification: Healthy diet (fruits, vegetables, low fat dairy & reduced saturated fat), weight loss, exercise at least 30 minutes 5 days/week, BMI goal <25.  Keep systolic blood pressure goal <130.  LDL goal <70.  Hemoglobin A1c goal <7. If applicable, STOP smoking. I refilled her prescription for Plavix. Regular follow-up will be in 1 year      Thank you again for this referral, please feel free to contact me if you have any questions.    Solomon Duarte MD  [facility]  (Former[facility], [facility]     HISTORY OF PRESENT ILLNESS     Patient is a 62-year-old female with history of HTN, DM, left PCA infarction, severe multifocal intracranial atherosclerotic disease, 70% proximal basilar stenosis autotransfusion LE follow-up. She was admitted to [facility] on [facility] and was found to have left PCA infarct that resulted in right visual field cut. She was quite distraught with a visual field cut and initially was kept on dual antiplatelet therapy. She is taking statin and most recent LDL checked more than 2 years ago was 79. She denies any  focal weakness or numbness. She had a cataract surgery and after that felt her vision was little better but now has persistent right visual field cut.     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    Surgical History:  She  has a past surgical history that includes IR Carotid Cerebral Angiogram Bilateral (7/14/2020) and Ir Carotid Angiogram (7/14/2020).     SOCIAL HISTORY     Reviewed, and she  reports that she has never smoked. She has never used smokeless tobacco. She reports that she does not drink alcohol and does not use drugs.     FAMILY HISTORY     Reviewed, and family history includes Cerebrovascular Disease in her maternal grandmother; Heart Disease in her father and mother.     ALLERGIES     Allergies   Allergen Reactions     Seafood          REVIEW OF SYSTEMS     A 12 point review of system was performed and was negative except as outlined in the history of present illness.     HOME MEDICATIONS     Current Outpatient Rx   Medication Sig Dispense Refill     clopidogrel (PLAVIX) 75 MG tablet TAKE 1 TABLET(75 MG) BY MOUTH DAILY. 30 tablet 11     amLODIPine (NORVASC) 5 MG tablet Take 5 mg by mouth daily       atorvastatin (LIPITOR) 80 MG tablet Take 1 tablet (80 mg) by mouth every evening 90 tablet 3     blood glucose (NO BRAND SPECIFIED) lancets standard Accu-chek FastClix lancet drums Use to test blood sugar  2  times daily as directed. 100 each 0     blood glucose monitoring (NO BRAND SPECIFIED) meter device kit Accu-chek Guide Per insurance coverage 1 kit 0     cholecalciferol (VITAMIN D3) 25 mcg (1000  "units) capsule Take 2,000 Units by mouth       citalopram (CELEXA) 20 MG tablet Take 1 tablet (20 mg) by mouth every morning TAKE 1 TABLET(20 MG) BY MOUTH EVERY MORNING 90 tablet 3     CONTOUR NEXT TEST test strip USE TO TEST BLOOD GLUCOSE PREMEAL ONCE DAILY 100 strip 2     docusate sodium (COLACE) 100 MG capsule Take 1 capsule (100 mg) by mouth 2 times daily 100 capsule 3     GOODSENSE CLEARLAX 17 GM/SCOOP powder TAKE 17 GRAMS BY MOUTH EVERY DAY AS NEEDED 510 g 10     lisinopril (ZESTRIL) 40 MG tablet Take 1 tablet (40 mg) by mouth daily 30 tablet 0     metFORMIN (GLUCOPHAGE) 500 MG tablet Take 0.5 tablets (250 mg) by mouth daily (with breakfast) 45 tablet 3     ondansetron (ZOFRAN-ODT) 4 MG ODT tab        Sharps Container MISC Use as directed to dispose of needles, lancets and other sharps Per Insurance coverage 1 each 0     Vitamin D3 (CHOLECALCIFEROL) 25 mcg (1000 units) tablet Take 2 tablets (50 mcg) by mouth daily 180 tablet 3         PHYSICAL EXAM     Vital signs  /62 (BP Location: Left arm, Patient Position: Sitting)   Pulse 68   Ht 1.626 m (5' 4\")   Wt 78.9 kg (174 lb)   BMI 29.87 kg/m      Weight:   174 lbs 0 oz    GENERAL PHYSICAL EXAM: Patient is alert and oriented x 4 in no acute distress. Neck was supple, no carotid bruits, thyromegaly, lymphadenopathy or JVD noted.   NEUROLOGICAL EXAM:  Patient is alert and oriented x3 speech somewhat soft and monotone.  She has right visual field cut.  Extraocular movements are intact face symmetrical tongue midline.  Strength in extremities 5/5 reflexes 1+ toes equivocal.  She has dysmetria on finger-nose testing. She walks with a cane     PERTINENT DIAGNOSTIC STUDIES     Following studies were reviewed:     CT  CT BRAIN WITHOUT CONTRAST:   1. No finding for intracranial hemorrhage or mass or convincing finding for acute infarct. Small areas of patchy white matter low-attenuation change are noted, nonspecific and favored to reflect sequela of chronic " microvascular ischemic change given the   patient's age. In the absence of priors for comparison, small areas of acute on chronic ischemic change cannot be excluded.  2. Small chronic infarcts are seen within both parietal lobes. Mild volume loss.     CTA [place] OF LUCAS:   1. There is abrupt occlusion of the left posterior cerebral artery at the level of the P2 junction. Left posterior cerebral artery is derived via the left posterior communicating artery. Left P1 segment is not identified.  2. There is smooth tapering of the proximal P1 segment of the right posterior cerebral artery. Although favored to reflect congenital variation, acquired occlusion cannot be excluded. P2 and more distal segments of the right posterior cerebral artery is   supplied via the right posterior communicating artery.  3. Extensive coarse atherosclerotic plaque is seen within both carotid siphons, intradural vertebral arteries and within the basilar artery. There is multifocal severe stenosis within the vertebral and basilar arteries. More mild narrowing is seen in   both carotid siphons without clear flow limiting stenosis. Areas of mild narrowing are seen within the distal EULALIA and MCA branches.     CTA OF THE NECK:  1. Mild atherosclerotic plaque left carotid bulb/bifurcation without significant stenosis either cervical carotid or vertebral system by modified NASCET criteria. No findings for dissection.     MRI  1.  Left posterior cerebral artery is supplied via the left posterior communicating artery. There is occlusion of the left posterior communicating artery at the expected P1/P2 junction without flow seen more distally.     2.  Severe multifocal areas of stenosis are seen within the proximal intradural vertebral arteries and basilar artery as seen on prior CTA.     3.  There is aneurysmal dilatation/fusiform ectasia of the cavernous segment of the left internal carotid artery measuring up to 9 mm in diameter.     4.  Large area  of restricted diffusion is seen involving the medial aspect of the left temporal and occipital lobes with a smaller area in the left putamen and compatible with areas of ischemic change.     Echocardiogram  Left ventricle ejection fraction is normal. The calculated left ventricular ejection fraction is 60%.  Normal left ventricular size. Moderate hypertrophy noted  Normal right ventricular size and systolic function.  No significant valvualr abnormalities noted  No previous study for comparison.     Cerebral angiogram  1. Diffuse intracranial atherosclerotic disease most notably affecting the proximal basilar artery and the left posterior cerebral artery distally.     2. Mild fusiform enlargement of the proximal cavernous left internal carotid artery as well as small sessile aneurysms arising from the ventral supraclinoid left internal carotid artery of doubtful clinical consequence.     3. No other significant intracranial or extra cranial abnormalities are identified.      PERTINENT LABS  Following labs were reviewed:  Office Visit on 11/26/2021   Component Date Value     Hemoglobin A1C 11/26/2021 5.6      Sodium 11/26/2021 139      Potassium 11/26/2021 4.2      Chloride 11/26/2021 104      Carbon Dioxide (CO2) 11/26/2021 26      Anion Gap 11/26/2021 9      Urea Nitrogen 11/26/2021 23*     Creatinine 11/26/2021 0.84      Calcium 11/26/2021 9.4      Glucose 11/26/2021 112      GFR Estimate 11/26/2021 75          Total time spent for face to face visit, reviewing labs/imaging studies, counseling and coordination of care was: 30 Minutes spent on the date of the encounter doing chart review, review of outside records, review of test results, interpretation of tests, patient visit and documentation     This note was dictated using voice recognition software.  Any grammatical or context distortions are unintentional and inherent to the software.    Orders Placed This Encounter   Procedures     Lipid Profile      New  Prescriptions    No medications on file     Modified Medications    Modified Medication Previous Medication    CLOPIDOGREL (PLAVIX) 75 MG TABLET clopidogrel (PLAVIX) 75 MG tablet       TAKE 1 TABLET(75 MG) BY MOUTH DAILY.    TAKE 1 TABLET(75 MG) BY MOUTH DAILY.

## 2022-01-21 NOTE — PATIENT INSTRUCTIONS
"  Patient Education   Acute vs. Chronic Medicines  How long you take medicine is usually based on how long you are ill. Some diseases last only a short time. But some will stay with you for the rest of your life.  Diseases that last for a short time are called acute diseases. Diseases that last for a long time are called chronic diseases.   We also refer to medicines as either acute or chronic. Medicines used to treat acute diseases are called acute medicines. Medicines used to treat chronic diseases are called chronic medicines.  Acute medicines  Acute medicines are those that will only be used for a short time. They are also called \"short-term\" medicines. People often take them when they are ill but expect to get better quickly. They may take the medicine for as little as a day or as long as a month. They are expected to be cured when they are done with their medicine.  Some examples of acute medicines are:    Antibiotics (germ-killing medicine)    Anti-diarrhea medicine    Pain medicine    Cough, cold and allergy medicines    Medicines used in an emergency, such as for a heart attack.  Chronic medicines  Chronic medicines are used for a long time. They are also known as \"maintenance\" medicines. They are used to treat high blood pressure, diabetes, osteoporosis (brittle bone disease) and other chronic diseases. People often take chronic medicines for the rest of their life. They are used to help control or manage disease rather than cure it.  Some examples of chronic medicines are:    Diabetes medicine    Heart medicine    Osteoporosis medicine    High blood pressure medicine    Cholesterol medicine.  Acute and chronic medicines may be used to treat the same problems. For example, you may take pain medicine when you have a headache. Once the headache goes away, you stop taking the medicine. This would be an acute medicine. But if you take medicine each day to prevent headaches, this would be a chronic " medicine.  For informational purposes only. Not to replace the advice of your health care provider.   Copyright   2009 [employer]. All rights reserved. [employer] 264138 - REV 12/15.

## 2022-01-22 DIAGNOSIS — Z86.73 HISTORY OF STROKE: ICD-10-CM

## 2022-01-24 RX ORDER — CLOPIDOGREL BISULFATE 75 MG/1
TABLET ORAL
Qty: 30 TABLET | Refills: 11 | Status: SHIPPED | OUTPATIENT
Start: 2022-01-24 | End: 2022-12-29

## 2022-01-24 NOTE — TELEPHONE ENCOUNTER
Refill request for Plavix. Pt last seen 1/21/22. Refills at this visit were sent to the wrong pharmacy. Will send in refills to the correct pharmacy.     Antonia Novoa RN on 1/24/2022 at 9:03 AM

## 2022-01-27 ENCOUNTER — HOSPITAL ENCOUNTER (OUTPATIENT)
Dept: SPEECH THERAPY | Facility: REHABILITATION | Age: 62
End: 2022-01-27
Payer: COMMERCIAL

## 2022-01-27 DIAGNOSIS — I69.320 APHASIA, POST-STROKE: ICD-10-CM

## 2022-01-27 DIAGNOSIS — R48.0 ALEXIA: Primary | ICD-10-CM

## 2022-01-27 DIAGNOSIS — R48.8 AGRAPHIA: ICD-10-CM

## 2022-01-27 PROCEDURE — 92507 TX SP LANG VOICE COMM INDIV: CPT | Mod: GN | Performed by: SPEECH-LANGUAGE PATHOLOGIST

## 2022-02-03 ENCOUNTER — HOSPITAL ENCOUNTER (OUTPATIENT)
Dept: SPEECH THERAPY | Facility: REHABILITATION | Age: 62
End: 2022-02-03
Payer: COMMERCIAL

## 2022-02-03 DIAGNOSIS — R48.0 ALEXIA: Primary | ICD-10-CM

## 2022-02-03 DIAGNOSIS — I69.320 APHASIA, POST-STROKE: ICD-10-CM

## 2022-02-03 DIAGNOSIS — R48.8 AGRAPHIA: ICD-10-CM

## 2022-02-03 PROCEDURE — 92507 TX SP LANG VOICE COMM INDIV: CPT | Mod: GN | Performed by: SPEECH-LANGUAGE PATHOLOGIST

## 2022-02-14 DIAGNOSIS — Z86.73 HISTORY OF STROKE: Primary | ICD-10-CM

## 2022-02-17 RX ORDER — ASPIRIN 81 MG
TABLET,CHEWABLE ORAL
Qty: 90 TABLET | Refills: 3 | Status: SHIPPED | OUTPATIENT
Start: 2022-02-17 | End: 2023-05-17

## 2022-02-17 NOTE — TELEPHONE ENCOUNTER
"Routing refill request to provider for review/approval because:  Drug not active on patient's medication list  Former patient of Edwina Randle & has not established care with another provider.  Please assign refill request to covering provider per Clinic standard process.      Disp Refills Start End WOO    aspirin 81 mg chewable tablet 90 tablet 3 3/1/2021  No   Sig: CHEW AND SWALLOW 1 TABLET(81 MG) BY MOUTH DAILY   Sent to pharmacy as: aspirin 81 mg chewable tablet   E-Prescribing Status: Receipt confirmed by pharmacy (3/1/2021  2:57 PM CST)       aspirin 81 mg chewable tablet [[id number]]    Electronically signed by: Edwina Randle MD on 03/01/21 1452 Status: Active   Ordering user: Edwina Randle MD 03/01/21 1457 Authorized by: Edwina Randle MD   Frequency:  03/01/21 - Until Discontinued Released by: Edwina Randle MD 03/01/21 1457   Diagnoses  Acute left PCA stroke (H) [I63.532]       Last office visit provider:  11/26/21     Requested Prescriptions   Pending Prescriptions Disp Refills     ASPIRIN LOW DOSE 81 MG chewable tablet [Pharmacy Med Name: ASPIRIN 81MG CHEW TAB 81 Tablet] 30 tablet 0     Sig: CHEW AND SWALLOW 1 TABLET BY MOUTH DAILY       Analgesics (Non-Narcotic Tylenol and ASA Only) Failed - 2/14/2022 12:50 PM        Failed - Medication is active on med list        Passed - Recent (12 mo) or future (30 days) visit within the authorizing provider's specialty     Patient has had an office visit with the authorizing provider or a provider within the authorizing providers department within the previous 12 mos or has a future within next 30 days. See \"Patient Info\" tab in inbasket, or \"Choose Columns\" in Meds & Orders section of the refill encounter.              Passed - Patient is age 20 years or older     If ASA is flagged for ages under 20 years old. Forward to provider for confirmation Ryes Syndrome is not a concern.                   Silvia BERG" LOUIS Lin 02/17/22 9:10 AM

## 2022-02-20 RX ORDER — ASPIRIN 81 MG
TABLET,CHEWABLE ORAL
Qty: 30 TABLET | Refills: 10 | OUTPATIENT
Start: 2022-02-20

## 2022-02-23 ENCOUNTER — OFFICE VISIT (OUTPATIENT)
Dept: INTERNAL MEDICINE | Facility: CLINIC | Age: 62
End: 2022-02-23
Payer: COMMERCIAL

## 2022-02-23 VITALS
BODY MASS INDEX: 29.87 KG/M2 | DIASTOLIC BLOOD PRESSURE: 68 MMHG | OXYGEN SATURATION: 99 % | WEIGHT: 174 LBS | SYSTOLIC BLOOD PRESSURE: 118 MMHG | HEART RATE: 68 BPM

## 2022-02-23 DIAGNOSIS — I10 BENIGN ESSENTIAL HYPERTENSION: ICD-10-CM

## 2022-02-23 DIAGNOSIS — E78.5 HYPERLIPIDEMIA LDL GOAL <100: ICD-10-CM

## 2022-02-23 DIAGNOSIS — Z86.73 CHRONIC ISCHEMIC LEFT PCA STROKE: ICD-10-CM

## 2022-02-23 DIAGNOSIS — F33.41 RECURRENT MAJOR DEPRESSIVE DISORDER, IN PARTIAL REMISSION (H): ICD-10-CM

## 2022-02-23 DIAGNOSIS — Z76.89 ENCOUNTER TO ESTABLISH CARE: ICD-10-CM

## 2022-02-23 DIAGNOSIS — E66.3 OVERWEIGHT (BMI 25.0-29.9): ICD-10-CM

## 2022-02-23 DIAGNOSIS — E11.65 TYPE 2 DIABETES MELLITUS WITH HYPERGLYCEMIA, UNSPECIFIED WHETHER LONG TERM INSULIN USE (H): ICD-10-CM

## 2022-02-23 DIAGNOSIS — K59.01 SLOW TRANSIT CONSTIPATION: ICD-10-CM

## 2022-02-23 PROCEDURE — 99214 OFFICE O/P EST MOD 30 MIN: CPT | Performed by: NURSE PRACTITIONER

## 2022-02-23 RX ORDER — DOCUSATE SODIUM 100 MG/1
100 CAPSULE, LIQUID FILLED ORAL 2 TIMES DAILY
Qty: 100 CAPSULE | Refills: 3 | Status: SHIPPED | OUTPATIENT
Start: 2022-02-23 | End: 2022-08-12

## 2022-02-23 ASSESSMENT — PATIENT HEALTH QUESTIONNAIRE - PHQ9: SUM OF ALL RESPONSES TO PHQ QUESTIONS 1-9: 8

## 2022-02-23 NOTE — PATIENT INSTRUCTIONS
Continue the daily MiraLAX for constipation.    The generic name for this is polyethylene glycol.    Start the docusate sodium or Colace 100 mg, take 1 tablet in the morning, 1 tablet at bedtime to help with your hard stools.    Try to drink more water.  Goals for water at least 6 glasses/day.    Continue your other current medications.    But see you back in 2 months for follow-up, for your physical with fasting labs, before then if anything comes up.  Patient Education     Constipation (Adult)  Constipation means that you have bowel movements that are less frequent than usual. Stools often become very hard and difficult to pass.  Constipation is very common. At some point in life, it affects almost everyone. Since everyone's bowel habits are different, what is constipation to one person may not be to another. Your healthcare provider may do tests to diagnose constipation. It depends on what he or she finds when evaluating you.    Symptoms of constipation include:    Abdominal pain    Bloating    Vomiting    Painful bowel movements    Itching, swelling, bleeding, or pain around the anus  Causes  Constipation can have many causes. These include:    Diet low in fiber    Too much dairy    Not drinking enough liquids    Lack of exercise or physical activity (especially true for older adults)    Changes in lifestyle or daily routine, including pregnancy, aging, work, and travel    Frequent use or misuse of laxatives    Ignoring the urge to have a bowel movement or delaying it until later    Medicines, such as certain prescription pain medicines, iron supplements, antacids, certain antidepressants, and calcium supplements    Diseases like irritable bowel syndrome, bowel obstructions, stroke, diabetes, thyroid disease, Parkinson disease, hemorrhoids, and colon cancer  Complications  Potential complications of constipation can include:    Hemorrhoids    Rectal bleeding from hemorrhoids or anal fissures (skin  tears)    Hernias    Dependency on laxatives    Chronic constipation    Fecal impaction, a severe form of constipation in which a large amount of hard stool is in your rectum that you can't pass    Bowel obstruction or perforation  Home care  All treatment should be done after talking with your healthcare provider. This is especially true if you have another medical problems, are taking prescription medicines, or are an older adult. Treatment most often involves lifestyle changes. You may also need medicines. Your healthcare provider will tell you which will work best for you. Follow the advice below to help avoid this problem in the future.  Lifestyle changes  These lifestyle changes can help prevent constipation:    Diet. Eat a high-fiber diet, with fresh fruit and vegetables, and reduce dairy intake, meats, and processed foods    Fluids. It's important to get enough fluids each day. Drink plenty of water when you eat more fiber. If you are on diet that limits the amount of fluid you can have, talk about this with your healthcare provider.    Regular exercise. Check with your healthcare provider first.  Medicines  Take any medicines as directed. Some laxatives are safe to use only every now and then. Others can be taken on a regular basis. While laxatives don't cause bowel dependence, they are treating the symptoms. So your constipation may return if you don't make other changes. Talk with your healthcare provider or pharmacist if you have questions.  Prescription pain medicines can cause constipation. If you are taking this kind of medicine, ask your healthcare provider if you should also take a stool softener.  Medicines you may take to treat constipation include:    Fiber supplements    Stool softeners    Laxatives    Enemas    Rectal suppositories  Follow-up care  Follow up with your healthcare provider if symptoms don't get better in the next few days. You may need to have more tests or see a  specialist.  Call 911  Call 911 if any of these occur:    Trouble breathing    Stiff, rigid abdomen that is severely painful to touch    Confusion    Fainting or loss of consciousness    Rapid heart rate    Chest pain  When to seek medical advice  Call your healthcare provider right away if any of these occur:    Fever of 100.4 F (38 C) or higher, or as directed by your healthcare provider    Failure to resume normal bowel movements    Pain in your abdomen or back gets worse    Nausea or vomiting    Swelling in your abdomen    Blood in the stool    Black, tarry stool    Involuntary weight loss    Weakness  [employer] last reviewed this educational content on 6/1/2018 2000-2021 [employer]. All rights reserved. This information is not intended as a substitute for professional medical care. Always follow your healthcare professional's instructions.

## 2022-02-24 ENCOUNTER — HOSPITAL ENCOUNTER (OUTPATIENT)
Dept: SPEECH THERAPY | Facility: REHABILITATION | Age: 62
End: 2022-02-24
Payer: COMMERCIAL

## 2022-02-24 DIAGNOSIS — I69.320 APHASIA, POST-STROKE: ICD-10-CM

## 2022-02-24 DIAGNOSIS — R48.0 ALEXIA: Primary | ICD-10-CM

## 2022-02-24 DIAGNOSIS — R48.8 AGRAPHIA: ICD-10-CM

## 2022-02-24 PROCEDURE — 92507 TX SP LANG VOICE COMM INDIV: CPT | Mod: GN | Performed by: SPEECH-LANGUAGE PATHOLOGIST

## 2022-02-24 NOTE — PROGRESS NOTES
Clinic Note    Assessment:     Assessment and Plan:  1. Encounter to establish care: Care established today.    2. Slow transit constipation: will add in Colace one capsule twice daily as her stools continue to be hard in nature. Discussed pushing her water intake, eating more fiber, and being more active. Follow up if symptoms persist. No abdominal pain.  - docusate sodium (COLACE) 100 MG capsule; Take 1 capsule (100 mg) by mouth 2 times daily  Dispense: 100 capsule; Refill: 3    3. Type 2 diabetes mellitus with hyperglycemia, unspecified whether long term insulin use (H): Last A1C was 5.6% on 11/26/21. She continues on Metformin. Continues to work on diet and exercise. Stable.     4. Benign essential hypertension: Blood pressure today was 118/68. She continues on Norvasc and Lisinopril. Stable.     5. Hyperlipidemia LDL goal <100: She continues on Atorvastatin high dose. Stable.     6. Overweight (BMI 25.0-29.9): BMI today was 29.87. Discussed continued diet and exercise.     7. Chronic ischemic left PCA stroke: hx of this 07/2021. She continues on Plavix and Atorvastatin. Managed per Neurology. Stable.    8. Recurrent major depressive disorder, in partial remission (H): She continues on Celexa. Stable.        Patient Instructions   Continue the daily MiraLAX for constipation.    The generic name for this is polyethylene glycol.    Start the docusate sodium or Colace 100 mg, take 1 tablet in the morning, 1 tablet at bedtime to help with your hard stools.    Try to drink more water.  Goals for water at least 6 glasses/day.    Continue your other current medications.    But see you back in 2 months for follow-up, for your physical with fasting labs, before then if anything comes up.  Patient Education     Constipation (Adult)  Constipation means that you have bowel movements that are less frequent than usual. Stools often become very hard and difficult to pass.  Constipation is very common. At some point in life, it  affects almost everyone. Since everyone's bowel habits are different, what is constipation to one person may not be to another. Your healthcare provider may do tests to diagnose constipation. It depends on what he or she finds when evaluating you.    Symptoms of constipation include:    Abdominal pain    Bloating    Vomiting    Painful bowel movements    Itching, swelling, bleeding, or pain around the anus  Causes  Constipation can have many causes. These include:    Diet low in fiber    Too much dairy    Not drinking enough liquids    Lack of exercise or physical activity (especially true for older adults)    Changes in lifestyle or daily routine, including pregnancy, aging, work, and travel    Frequent use or misuse of laxatives    Ignoring the urge to have a bowel movement or delaying it until later    Medicines, such as certain prescription pain medicines, iron supplements, antacids, certain antidepressants, and calcium supplements    Diseases like irritable bowel syndrome, bowel obstructions, stroke, diabetes, thyroid disease, Parkinson disease, hemorrhoids, and colon cancer  Complications  Potential complications of constipation can include:    Hemorrhoids    Rectal bleeding from hemorrhoids or anal fissures (skin tears)    Hernias    Dependency on laxatives    Chronic constipation    Fecal impaction, a severe form of constipation in which a large amount of hard stool is in your rectum that you can't pass    Bowel obstruction or perforation  Home care  All treatment should be done after talking with your healthcare provider. This is especially true if you have another medical problems, are taking prescription medicines, or are an older adult. Treatment most often involves lifestyle changes. You may also need medicines. Your healthcare provider will tell you which will work best for you. Follow the advice below to help avoid this problem in the future.  Lifestyle changes  These lifestyle changes can help  prevent constipation:    Diet. Eat a high-fiber diet, with fresh fruit and vegetables, and reduce dairy intake, meats, and processed foods    Fluids. It's important to get enough fluids each day. Drink plenty of water when you eat more fiber. If you are on diet that limits the amount of fluid you can have, talk about this with your healthcare provider.    Regular exercise. Check with your healthcare provider first.  Medicines  Take any medicines as directed. Some laxatives are safe to use only every now and then. Others can be taken on a regular basis. While laxatives don't cause bowel dependence, they are treating the symptoms. So your constipation may return if you don't make other changes. Talk with your healthcare provider or pharmacist if you have questions.  Prescription pain medicines can cause constipation. If you are taking this kind of medicine, ask your healthcare provider if you should also take a stool softener.  Medicines you may take to treat constipation include:    Fiber supplements    Stool softeners    Laxatives    Enemas    Rectal suppositories  Follow-up care  Follow up with your healthcare provider if symptoms don't get better in the next few days. You may need to have more tests or see a specialist.  Call 911  Call 911 if any of these occur:    Trouble breathing    Stiff, rigid abdomen that is severely painful to touch    Confusion    Fainting or loss of consciousness    Rapid heart rate    Chest pain  When to seek medical advice  Call your healthcare provider right away if any of these occur:    Fever of 100.4 F (38 C) or higher, or as directed by your healthcare provider    Failure to resume normal bowel movements    Pain in your abdomen or back gets worse    Nausea or vomiting    Swelling in your abdomen    Blood in the stool    Black, tarry stool    Involuntary weight loss    Weakness  [employer] last reviewed this educational content on 6/1/2018 2000-2021 [employer]. All  rights reserved. This information is not intended as a substitute for professional medical care. Always follow your healthcare professional's instructions.             Return in about 2 months (around 4/23/2022) for Routine preventive, Follow up.         Subjective:      Tara Plaza is a 61 year old female who presents today to establish care.    She is not fasted today.    Reviewed her past surgical history with her today.    Her Mom passed away in August of old age, and poor health. Her father passed 5-6 years ago. He had Parkinson's disease.    She had a previous left PCA stroke in 07/1//2020. She recently followed up with Neurology, and was taken off of her ASA, to continue on Plavix only. She has right visual field deficits post stroke. She continues on a statin and plavix.    She reports that overall she is feeling well. She reports that she continues to have issues with constipation. She is not taking her daily miralax, and never started the colace I sent in last time.    She is due for an eye exam, she is working on finding a provider who can see her with her insurance.    She is not drinking a lot of fluids. She reports having 1.5 cans of coke zero a day. Encouraged her to push her water intake, eat more fiber, and try to be more active.    The following portions of the patient's history were reviewed and updated as appropriate.    Review of Systems:    Review is otherwise negative except for what is mentioned above.     Social Hx:    History   Smoking Status     Never Smoker   Smokeless Tobacco     Never Used         Objective:     Vitals:    02/23/22 1030 02/23/22 1054   BP: 118/68    BP Location: Right arm    Patient Position: Sitting    Cuff Size: Adult Large    Pulse: (!) 46 68   SpO2: 99%    Weight: 78.9 kg (174 lb)        Exam:  General: No apparent distress. Calm. Alert and Oriented X3. Pt behavior is appropriate.  Head:Atraumatic. Normocephalic, non-tender to palpation  Neck: Supple. No  JVD. Full ROM. No adenopathy.  Eyes: PERRLA, No discharge. No strabismus. No nystagmus.  Ears: TMs pearly gray with landmarks visible.   Nose/Mouth/Throat: Patent nares, no oral lesions, pharynx clear and without exudate. Uvula mid-line. Nasal septum mid-line. Clear turbinates.   Chest/Lungs: Normal chest wall, clear to auscultation, normal respiratory effort and rate.   Heart/Pulses: Regular rate and rhythm, strong and equal radial pulses, no murmurs, gallops, or rubs. Capillary refill <2 seconds. No edema.    Musculoskeletal: Slowed gait.  Neurologic: Interactive, alert.  Skin: Warm, dry.Normal skin turgor.       Patient Active Problem List   Diagnosis     H/O Left PCA infarction     VBI (vertebrobasilar insufficiency)     Intracranial atherosclerosis     Benign essential hypertension     Type 2 diabetes mellitus (H)     Major depressive disorder with single episode, in partial remission (H)     Current Outpatient Medications   Medication Sig Dispense Refill     amLODIPine (NORVASC) 5 MG tablet Take 5 mg by mouth daily       ASPIRIN LOW DOSE 81 MG chewable tablet CHEW AND SWALLOW 1 TABLET BY MOUTH DAILY 90 tablet 3     atorvastatin (LIPITOR) 80 MG tablet Take 1 tablet (80 mg) by mouth every evening 90 tablet 3     blood glucose (NO BRAND SPECIFIED) lancets standard Accu-chek FastClix lancet drums Use to test blood sugar  2  times daily as directed. 100 each 0     blood glucose monitoring (NO BRAND SPECIFIED) meter device kit Accu-chek Guide Per insurance coverage 1 kit 0     citalopram (CELEXA) 20 MG tablet Take 1 tablet (20 mg) by mouth every morning TAKE 1 TABLET(20 MG) BY MOUTH EVERY MORNING 90 tablet 3     clopidogrel (PLAVIX) 75 MG tablet TAKE 1 TABLET(75 MG) BY MOUTH DAILY 30 tablet 11     CONTOUR NEXT TEST test strip USE TO TEST BLOOD GLUCOSE PREMEAL ONCE DAILY 100 strip 2     docusate sodium (COLACE) 100 MG capsule Take 1 capsule (100 mg) by mouth 2 times daily 100 capsule 3     GOODSENSE CLEARLAX 17  GM/SCOOP powder TAKE 17 GRAMS BY MOUTH EVERY DAY AS NEEDED 510 g 10     metFORMIN (GLUCOPHAGE) 500 MG tablet Take 0.5 tablets (250 mg) by mouth daily (with breakfast) 45 tablet 3     ondansetron (ZOFRAN-ODT) 4 MG ODT tab        Sharps Container MISC Use as directed to dispose of needles, lancets and other sharps Per Insurance coverage 1 each 0     Vitamin D3 (CHOLECALCIFEROL) 25 mcg (1000 units) tablet Take 2 tablets (50 mcg) by mouth daily 180 tablet 3     lisinopril (ZESTRIL) 40 MG tablet Take 1 tablet (40 mg) by mouth daily 30 tablet 0     Courtney Muniz, Adult-Geriatric Nurse Practitioner  [facility] - Internal Medicine Team     2/23/2022         Answers for HPI/ROS submitted by the patient on 2/23/2022  How many servings of fruits and vegetables do you eat daily?: 2-3  On average, how many sweetened beverages do you drink each day (Examples: soda, juice, sweet tea, etc.  Do NOT count diet or artificially sweetened beverages)?: 0  How many minutes a day do you exercise enough to make your heart beat faster?: 10 to 19  How many days a week do you exercise enough to make your heart beat faster?: 3 or less  How many days per week do you miss taking your medication?: 0  What is the reason for your visit today?: Establish care  When did your symptoms begin?: More than a month  What are your symptoms?: Constipation  How would you describe these symptoms?: Moderate  Are your symptoms:: Staying the same  Have you had these symptoms before?: No

## 2022-03-08 ENCOUNTER — TELEPHONE (OUTPATIENT)
Dept: SPEECH THERAPY | Facility: REHABILITATION | Age: 62
End: 2022-03-08
Payer: COMMERCIAL

## 2022-03-08 NOTE — TELEPHONE ENCOUNTER
Called patient regarding no show for speech therapy session on today's date.  Patient apologized and reported it was copied incorrectly on her calendar.  Patient expressed she has conflicting appointment for next scheduled session, assisted patient to reschedule appointment to next Thursday, 3/17/22 at 9:30.    Ana Fox M.S. CCC-SLP

## 2022-03-17 ENCOUNTER — HOSPITAL ENCOUNTER (OUTPATIENT)
Dept: SPEECH THERAPY | Facility: REHABILITATION | Age: 62
Discharge: HOME | End: 2022-03-17
Payer: COMMERCIAL

## 2022-03-17 DIAGNOSIS — R48.0 ALEXIA: Primary | ICD-10-CM

## 2022-03-17 DIAGNOSIS — R48.8 AGRAPHIA: ICD-10-CM

## 2022-03-17 DIAGNOSIS — I69.320 APHASIA, POST-STROKE: ICD-10-CM

## 2022-03-17 PROCEDURE — 92507 TX SP LANG VOICE COMM INDIV: CPT | Mod: GN | Performed by: SPEECH-LANGUAGE PATHOLOGIST

## 2022-03-17 NOTE — PROGRESS NOTES
"                                                                           [facility]    OUTPATIENT SPEECH LANGUAGE PATHOLOGY  PLAN OF TREATMENT FOR OUTPATIENT REHABILITATION AND PROGRESS NOTE       Patient's Last Name, First Name, Tara Jack Date of Birth  1960   Provider's Name  [facility] Medical Record No.  [id number]    Onset Date  4/21/21 Start of Care Date  4/29/21   Type:     __PT   ___OT   _X_SLP Medical Diagnosis  Acute left PCA stroke I63.532, Difficulty understanding written language R41.89, Difficulty reading F81.9, Difficulty writing R68.89   SLP Diagnosis  Alexia R48.0, Agraphia R48.8, Aphasia, post-stroke I69.320 Plan of Treatment  Frequency/Duration: 1x every other week for x2 additional session  Certification date from 3/28/22 to 5/28/22       Goals:  Goal Identifier 1   Goal Description (P) Patient will complete functional naming tasks with independent use of word finding strategies in >80% of opportunities.   Target Date     Date Met      Progress (detail required for progress note): (P) 3/17/21: Not addressed since last reporting period.  \"12/30/21: 65% accuracy SARAI to object/picture naming with use of word finding strategies, increases to 100% given min cues/prompts.\"  Discontinue goal with emphasis on functional read/writing.  Patient continues to demonstrate mild word finding in conversation; however, is mostly able to compensate with use of word finding strategies.     Goal Identifier 2   Goal Description (P) Patient will complete sentence level writing tasks with use of external aid (e.g., speech to text) with >80% accuracy.   Target Date (P) 05/28/22   Date Met      Progress (detail required for progress note): (P) 3/17/22: Goal progressing with variability.  20%-60% on initial attempt, increases to 80% accuracy on re-attempts with further strategy use.  Continue goal as " worded.     Goal Identifier 3   Goal Description (P) Updated Goal: Patient will SARAI complete x3 functional reading/writing tasks with use of assistive technology.  Old Goal: Patient will complete functional reading/writing tasks (e.g., internet search, bill decoding) with use of assistive technology with >80% accuracy.   Target Date (P) 05/28/22   Date Met      Progress (detail required for progress note): (P) 3/17/22: Goal progressing.  Patient able to utilize assistive technology to read articles on Facebook and create list of items to fix around the house with 90% accuracy.  Emerging success with use of assistive technology to create short story.  Continue goal as worded for increased accuracy, independence, and generalization.       Beginning/End Dates of Progress Note Reporting Period:  1/20/22 to 3/17/22    Progress Toward Goals:   Progress this reporting period: See above.    Client Self (Subjective) Report for Progress Note Reporting Period: (P) Patient arrived to therapy in good spirits.  She expressed ongoing practice with assistive technology with increasing amounts completed/success, but noted ongoing barriers.  Patient has continuously reported difficulty with vision/changing glasses.  Question if some r/t neuro vs vision, patient to discuss with PCP vs opthalmalogist if referral to neuro optalmalogist would be appropriate.            I CERTIFY THE NEED FOR THESE SERVICES FURNISHED UNDER        THIS PLAN OF TREATMENT AND WHILE UNDER MY CARE     (Physician co-signature of this document indicates review and certification of the therapy plan).                Referring Provider: Edwina Randle, laverne PCP Courtney Fox, SLP

## 2022-04-12 ENCOUNTER — HOSPITAL ENCOUNTER (OUTPATIENT)
Dept: SPEECH THERAPY | Facility: REHABILITATION | Age: 62
Discharge: HOME | End: 2022-04-12
Payer: COMMERCIAL

## 2022-04-12 DIAGNOSIS — I69.320 APHASIA, POST-STROKE: ICD-10-CM

## 2022-04-12 DIAGNOSIS — R48.0 ALEXIA: Primary | ICD-10-CM

## 2022-04-12 DIAGNOSIS — R48.8 AGRAPHIA: ICD-10-CM

## 2022-04-12 PROCEDURE — 92507 TX SP LANG VOICE COMM INDIV: CPT | Mod: GN | Performed by: SPEECH-LANGUAGE PATHOLOGIST

## 2022-04-25 ENCOUNTER — OFFICE VISIT (OUTPATIENT)
Dept: INTERNAL MEDICINE | Facility: CLINIC | Age: 62
End: 2022-04-25
Payer: COMMERCIAL

## 2022-04-25 VITALS
DIASTOLIC BLOOD PRESSURE: 60 MMHG | BODY MASS INDEX: 28.32 KG/M2 | HEIGHT: 65 IN | OXYGEN SATURATION: 98 % | WEIGHT: 170 LBS | HEART RATE: 47 BPM | SYSTOLIC BLOOD PRESSURE: 116 MMHG

## 2022-04-25 DIAGNOSIS — Z13.21 SCREENING FOR ENDOCRINE, NUTRITIONAL, METABOLIC AND IMMUNITY DISORDER: ICD-10-CM

## 2022-04-25 DIAGNOSIS — Z00.00 ENCOUNTER FOR ANNUAL PHYSICAL EXAM: ICD-10-CM

## 2022-04-25 DIAGNOSIS — E11.9 TYPE 2 DIABETES MELLITUS WITHOUT COMPLICATION, WITHOUT LONG-TERM CURRENT USE OF INSULIN (H): ICD-10-CM

## 2022-04-25 DIAGNOSIS — I49.3 PVC (PREMATURE VENTRICULAR CONTRACTION): ICD-10-CM

## 2022-04-25 DIAGNOSIS — R32 URINARY INCONTINENCE, UNSPECIFIED TYPE: ICD-10-CM

## 2022-04-25 DIAGNOSIS — R00.1 BRADYCARDIA: ICD-10-CM

## 2022-04-25 DIAGNOSIS — Z13.228 SCREENING FOR ENDOCRINE, NUTRITIONAL, METABOLIC AND IMMUNITY DISORDER: ICD-10-CM

## 2022-04-25 DIAGNOSIS — K59.01 SLOW TRANSIT CONSTIPATION: ICD-10-CM

## 2022-04-25 DIAGNOSIS — F33.41 RECURRENT MAJOR DEPRESSIVE DISORDER, IN PARTIAL REMISSION (H): ICD-10-CM

## 2022-04-25 DIAGNOSIS — Z13.29 SCREENING FOR ENDOCRINE, NUTRITIONAL, METABOLIC AND IMMUNITY DISORDER: ICD-10-CM

## 2022-04-25 DIAGNOSIS — I10 BENIGN ESSENTIAL HYPERTENSION: ICD-10-CM

## 2022-04-25 DIAGNOSIS — Z13.0 SCREENING FOR ENDOCRINE, NUTRITIONAL, METABOLIC AND IMMUNITY DISORDER: ICD-10-CM

## 2022-04-25 DIAGNOSIS — Z12.11 SCREEN FOR COLON CANCER: ICD-10-CM

## 2022-04-25 DIAGNOSIS — Z12.4 CERVICAL CANCER SCREENING: ICD-10-CM

## 2022-04-25 DIAGNOSIS — E78.5 HYPERLIPIDEMIA LDL GOAL <100: ICD-10-CM

## 2022-04-25 DIAGNOSIS — Z86.73 CHRONIC ISCHEMIC LEFT PCA STROKE: ICD-10-CM

## 2022-04-25 LAB
ALBUMIN SERPL-MCNC: 4 G/DL (ref 3.5–5)
ALBUMIN UR-MCNC: NEGATIVE MG/DL
ALP SERPL-CCNC: 62 U/L (ref 45–120)
ALT SERPL W P-5'-P-CCNC: 13 U/L (ref 0–45)
ANION GAP SERPL CALCULATED.3IONS-SCNC: 10 MMOL/L (ref 5–18)
APPEARANCE UR: CLEAR
AST SERPL W P-5'-P-CCNC: 15 U/L (ref 0–40)
ATRIAL RATE - MUSE: 50 BPM
BASOPHILS # BLD AUTO: 0 10E3/UL (ref 0–0.2)
BASOPHILS NFR BLD AUTO: 1 %
BILIRUB SERPL-MCNC: 1 MG/DL (ref 0–1)
BILIRUB UR QL STRIP: NEGATIVE
BUN SERPL-MCNC: 19 MG/DL (ref 8–22)
CALCIUM SERPL-MCNC: 9.4 MG/DL (ref 8.5–10.5)
CHLORIDE BLD-SCNC: 104 MMOL/L (ref 98–107)
CHOLEST SERPL-MCNC: 171 MG/DL
CO2 SERPL-SCNC: 27 MMOL/L (ref 22–31)
COLOR UR AUTO: YELLOW
CREAT SERPL-MCNC: 0.75 MG/DL (ref 0.6–1.1)
CREAT UR-MCNC: 189 MG/DL
DIASTOLIC BLOOD PRESSURE - MUSE: NORMAL MMHG
EOSINOPHIL # BLD AUTO: 0.1 10E3/UL (ref 0–0.7)
EOSINOPHIL NFR BLD AUTO: 2 %
ERYTHROCYTE [DISTWIDTH] IN BLOOD BY AUTOMATED COUNT: 12.7 % (ref 10–15)
FASTING STATUS PATIENT QL REPORTED: YES
GFR SERPL CREATININE-BSD FRML MDRD: 90 ML/MIN/1.73M2
GLUCOSE BLD-MCNC: 102 MG/DL (ref 70–125)
GLUCOSE UR STRIP-MCNC: NEGATIVE MG/DL
HBA1C MFR BLD: 5.9 % (ref 0–5.6)
HCT VFR BLD AUTO: 36.4 % (ref 35–47)
HDLC SERPL-MCNC: 61 MG/DL
HGB BLD-MCNC: 12.3 G/DL (ref 11.7–15.7)
HGB UR QL STRIP: NEGATIVE
IMM GRANULOCYTES # BLD: 0 10E3/UL
IMM GRANULOCYTES NFR BLD: 0 %
INTERPRETATION ECG - MUSE: NORMAL
KETONES UR STRIP-MCNC: NEGATIVE MG/DL
LDLC SERPL CALC-MCNC: 91 MG/DL
LEUKOCYTE ESTERASE UR QL STRIP: NEGATIVE
LYMPHOCYTES # BLD AUTO: 1.3 10E3/UL (ref 0.8–5.3)
LYMPHOCYTES NFR BLD AUTO: 23 %
MCH RBC QN AUTO: 29.7 PG (ref 26.5–33)
MCHC RBC AUTO-ENTMCNC: 33.8 G/DL (ref 31.5–36.5)
MCV RBC AUTO: 88 FL (ref 78–100)
MICROALBUMIN UR-MCNC: 1.91 MG/DL (ref 0–1.99)
MICROALBUMIN/CREAT UR: 10.1 MG/G CR
MONOCYTES # BLD AUTO: 0.5 10E3/UL (ref 0–1.3)
MONOCYTES NFR BLD AUTO: 8 %
NEUTROPHILS # BLD AUTO: 3.8 10E3/UL (ref 1.6–8.3)
NEUTROPHILS NFR BLD AUTO: 66 %
NITRATE UR QL: NEGATIVE
P AXIS - MUSE: 49 DEGREES
PH UR STRIP: 5.5 [PH] (ref 5–8)
PLATELET # BLD AUTO: 259 10E3/UL (ref 150–450)
POTASSIUM BLD-SCNC: 4.4 MMOL/L (ref 3.5–5)
PR INTERVAL - MUSE: 168 MS
PROT SERPL-MCNC: 7 G/DL (ref 6–8)
QRS DURATION - MUSE: 80 MS
QT - MUSE: 494 MS
QTC - MUSE: 450 MS
R AXIS - MUSE: 2 DEGREES
RBC # BLD AUTO: 4.14 10E6/UL (ref 3.8–5.2)
SODIUM SERPL-SCNC: 141 MMOL/L (ref 136–145)
SP GR UR STRIP: 1.02 (ref 1–1.03)
SYSTOLIC BLOOD PRESSURE - MUSE: NORMAL MMHG
T AXIS - MUSE: 37 DEGREES
TRIGL SERPL-MCNC: 95 MG/DL
TSH SERPL DL<=0.005 MIU/L-ACNC: 1.24 UIU/ML (ref 0.3–5)
UROBILINOGEN UR STRIP-ACNC: 1 E.U./DL
VENTRICULAR RATE- MUSE: 50 BPM
WBC # BLD AUTO: 5.7 10E3/UL (ref 4–11)

## 2022-04-25 PROCEDURE — 80061 LIPID PANEL: CPT | Performed by: NURSE PRACTITIONER

## 2022-04-25 PROCEDURE — 82043 UR ALBUMIN QUANTITATIVE: CPT | Performed by: NURSE PRACTITIONER

## 2022-04-25 PROCEDURE — G0123 SCREEN CERV/VAG THIN LAYER: HCPCS | Performed by: NURSE PRACTITIONER

## 2022-04-25 PROCEDURE — 93010 ELECTROCARDIOGRAM REPORT: CPT | Performed by: INTERNAL MEDICINE

## 2022-04-25 PROCEDURE — 93005 ELECTROCARDIOGRAM TRACING: CPT | Performed by: NURSE PRACTITIONER

## 2022-04-25 PROCEDURE — 87624 HPV HI-RISK TYP POOLED RSLT: CPT | Performed by: NURSE PRACTITIONER

## 2022-04-25 PROCEDURE — 81003 URINALYSIS AUTO W/O SCOPE: CPT | Performed by: NURSE PRACTITIONER

## 2022-04-25 PROCEDURE — 36415 COLL VENOUS BLD VENIPUNCTURE: CPT | Performed by: NURSE PRACTITIONER

## 2022-04-25 PROCEDURE — 80050 GENERAL HEALTH PANEL: CPT | Performed by: NURSE PRACTITIONER

## 2022-04-25 PROCEDURE — 99214 OFFICE O/P EST MOD 30 MIN: CPT | Mod: 25 | Performed by: NURSE PRACTITIONER

## 2022-04-25 PROCEDURE — 99396 PREV VISIT EST AGE 40-64: CPT | Performed by: NURSE PRACTITIONER

## 2022-04-25 PROCEDURE — 83036 HEMOGLOBIN GLYCOSYLATED A1C: CPT | Performed by: NURSE PRACTITIONER

## 2022-04-25 RX ORDER — METFORMIN HCL 500 MG
1 TABLET, EXTENDED RELEASE 24 HR ORAL
COMMUNITY
Start: 2021-11-19 | End: 2022-04-25 | Stop reason: DRUGHIGH

## 2022-04-25 NOTE — PATIENT INSTRUCTIONS
See an Ophthalmologist:    [facility]  Address: [address], [place] [address]  Phone: [phone]    [facility]  [address] [place] [address]  Phone: [phone]    Your labs are processing at this time, we will call you with results once they are back.    Send in the Cologuard to screen for colon cancer.    Scheduling will call to set up the heart monitor.    I will refer you to Urology if your urine sample today was normal.     Follow up in 6 months for a recheck, before then if anything comes up.

## 2022-04-25 NOTE — PROGRESS NOTES
SUBJECTIVE:   CC: Tara Plaza is an 61 year old woman who presents for preventive health visit.       Patient has been advised of split billing requirements and indicates understanding: Yes  The patient presents today for her annual physical exam. She is fasted today.    She is due for a pap smear, she has never had abnormal paps in the past. Her last one was years ago. Discussed that if this pap smear was normal, no further paps are warranted.    Mammogram is due for follow up in 2023.    She last had a colonoscopy years ago when she lived in [facility]. She had no colon polyps, will have her complete a cologuard.    She reports that she had her cataract surgery, but her vision still is off. She reports using an older pair of glasses to read with. Her surgery provider is no longer covered by her insurance, will refer her to a new provider today. Encouraged her to get her eyes checked.    Reviewed her blood sugars today from her glucometer. Her 14 day average is 106.     She lives with her brother currently, they are both diabetic. They have been following a diabetic diet closely.    She also reports worsening urinary incontinence, especially over night. She has to wear a pad now during the night, and daytime as well. She reports no increased urgency with urination, no fever, chills, nausea, vomiting, or burning when she urinates. No blood in her urine. No flank pain.     She denies other concerns today.     Discussed her abnormally low pulse rate with her today. She denies any dizziness, lightheadedness, shortness of breath, increased fatigue, chest pain, or chest pressure.           Today's PHQ-2 Score:   PHQ-2 ( 1999 Pfizer) 2/23/2022   Q1: Little interest or pleasure in doing things 0   Q2: Feeling down, depressed or hopeless 1   PHQ-2 Score 1   Q1: Little interest or pleasure in doing things Not at all   Q2: Feeling down, depressed or hopeless Several days   PHQ-2 Score 1       Abuse: Current or  Past (Physical, Sexual or Emotional) - No  Do you feel safe in your environment? Yes    Have you ever done Advance Care Planning? (For example, a Health Directive, POLST, or a discussion with a medical provider or your loved ones about your wishes): No, advance care planning information given to patient to review.  Patient declined advance care planning discussion at this time.    Social History     Tobacco Use     Smoking status: Never Smoker     Smokeless tobacco: Never Used   Substance Use Topics     Alcohol use: Never     If you drink alcohol do you typically have >3 drinks per day or >7 drinks per week? No    Alcohol Use 4/25/2022   Prescreen: >3 drinks/day or >7 drinks/week? No     Reviewed orders with patient.  Reviewed health maintenance and updated orders accordingly - Yes  Lab work is in process    Breast Cancer Screening:  Any new diagnosis of family breast, ovarian, or bowel cancer? No    FHS-7:   Breast CA Risk Assessment (FHS-7) 9/10/2021   Did any of your first-degree relatives have breast or ovarian cancer? No   Did any of your relatives have bilateral breast cancer? No   Did any man in your family have breast cancer? No   Did any woman in your family have breast and ovarian cancer? No   Did any woman in your family have breast cancer before age 50 y? No   Do you have 2 or more relatives with breast and/or ovarian cancer? No   Do you have 2 or more relatives with breast and/or bowel cancer? No       Mammogram Screening: Recommended mammography every 1-2 years with patient discussion and risk factor consideration  Pertinent mammograms are reviewed under the imaging tab.    History of abnormal Pap smear: NO - age 30-65 PAP every 5 years with negative HPV co-testing recommended  PAP / HPV Latest Ref Rng & Units 4/25/2022   HPV16 Negative Negative   HPV18 Negative Negative   HRHPV Negative Negative     Reviewed and updated as needed this visit by clinical staff   Tobacco  Allergies  Meds             "    Reviewed and updated as needed this visit by Provider                   Past Medical History:   Diagnosis Date     Depressive disorder       Past Surgical History:   Procedure Laterality Date     IR CAROTID ANGIOGRAM  7/14/2020     IR CAROTID CEREBRAL ANGIOGRAM BILATERAL  7/14/2020       Review of Systems  CONSTITUTIONAL: NEGATIVE for fever, chills, change in weight  INTEGUMENTARY/SKIN: NEGATIVE for worrisome rashes, moles or lesions  EYES: NEGATIVE for vision changes or irritation  ENT: NEGATIVE for ear, mouth and throat problems  RESP: NEGATIVE for significant cough or SOB  BREAST: NEGATIVE for masses, tenderness or discharge  CV: NEGATIVE for chest pain, palpitations or peripheral edema  GI: NEGATIVE for nausea, abdominal pain, heartburn, or change in bowel habits  : NEGATIVE for unusual urinary or vaginal symptoms. No vaginal bleeding.  MUSCULOSKELETAL: NEGATIVE for significant arthralgias or myalgia  NEURO: NEGATIVE for weakness, dizziness or paresthesias  PSYCHIATRIC: NEGATIVE for changes in mood or affect      OBJECTIVE:   /60 (BP Location: Right arm, Patient Position: Sitting, Cuff Size: Adult Regular)   Pulse (!) 47   Ht 1.638 m (5' 4.5\")   Wt 77.1 kg (170 lb)   SpO2 98%   BMI 28.73 kg/m    Physical Exam  GENERAL: healthy, alert and no distress  EYES: Eyes grossly normal to inspection, PERRL and conjunctivae and sclerae normal  HENT: ear canals and TM's normal, nose and mouth without ulcers or lesions  NECK: no adenopathy, no asymmetry, masses, or scars and thyroid normal to palpation  RESP: lungs clear to auscultation - no rales, rhonchi or wheezes  BREAST: normal without masses, tenderness or nipple discharge and no palpable axillary masses or adenopathy  CV: regular rate and rhythm, normal S1 S2, no S3 or S4, no murmur, click or rub, no peripheral edema and peripheral pulses strong  ABDOMEN: soft, nontender, no hepatosplenomegaly, no masses and bowel sounds normal   (female): normal " female external genitalia, normal urethral meatus, vaginal mucosa pink, moist, well rugated, and normal cervix/adnexa/uterus without masses or discharge  MS: right sided weakness post stroke, can walk without a cane.   SKIN: no suspicious lesions or rashes  NEURO: Normal strength and tone, mentation intact and speech normal  PSYCH: mentation appears normal, affect normal/bright    Diabetic foot exam: normal DP and PT pulses, no trophic changes or ulcerative lesions and normal monofilament exam    Diagnostic Test Results: Last mammogram  Labs reviewed in Epic    ASSESSMENT/PLAN:   Tara was seen today for physical.    Diagnoses and all orders for this visit:    Encounter for annual physical exam: Completed today. Fasted labs drawn.     Bradycardia/PVC (premature ventricular contraction): Rate on EKG was 50. EKG read by provider showed sinus bradycardia with frequent PVC's. Asymptomatic. Will check a heart monitor and TSH levels. Follow up if having symptoms as discussed. Note she is not on a beta blocker.  -     EKG 12-lead, tracing only  -     TSH with free T4 reflex; Future  -     Adult Cardiac Event Monitor; Future    Type 2 diabetes mellitus without complication, without long-term current use of insulin (H): 14 day average from her glucometer is 106. She continues on Metformin only. Controlled.   -     Albumin Random Urine Quantitative with Creat Ratio; Future  -     Hemoglobin A1c; Future  -     Comprehensive metabolic panel (BMP + Alb, Alk Phos, ALT, AST, Total. Bili, TP); Future      - Continue Metformin as ordered.    Benign essential hypertension: Blood pressure today in office was 116/60. She continues on Norvasc, Lisinopril. Stable.     Hyperlipidemia LDL goal <100: She continues on Atorvastatin. Stable.     Urinary incontinence, unspecified type: Worsening urinary incontinence over the past couple of months without any symptoms. Urinalysis was normal. Will refer to Urology.   -     UA Macro with Reflex to  "Micro and Culture - lab collect; Future  -     Adult Urology Referral; Future    Chronic ischemic left PCA stroke: hx of this, she continues on Plavix, Atorvastatin, and aspirin. Stable.   -     Adult Urology Referral; Future    Slow transit constipation: She continues on Miralax. Stable.     Recurrent major depressive disorder, in partial remission (H): She continues on Celexa. Stable.     Screening for endocrine, nutritional, metabolic and immunity disorder  -     CBC with platelets and differential; Future  -     Lipid panel reflex to direct LDL Fasting; Future  -     CBC with platelets and differential  -     Lipid panel reflex to direct LDL Fasting    Screen for colon cancer  -     COLOGUARD([employer])    Cervical cancer screening  -     Pap screen with HPV - recommended age 30 - 65 years  -     HPV High Risk Types DNA Cervical    Other orders  -     REVIEW OF HEALTH MAINTENANCE PROTOCOL ORDERS  -     ZOSTER VACCINE RECOMBINANT (Shingrix)        Patient has been advised of split billing requirements and indicates understanding: Yes    COUNSELING:  Reviewed preventive health counseling, as reflected in patient instructions  Special attention given to:        Regular exercise       Healthy diet/nutrition       Vision screening    Estimated body mass index is 28.73 kg/m  as calculated from the following:    Height as of this encounter: 1.638 m (5' 4.5\").    Weight as of this encounter: 77.1 kg (170 lb).    Weight management plan: Discussed healthy diet and exercise guidelines    She reports that she has never smoked. She has never used smokeless tobacco.      Counseling Resources:  ATP IV Guidelines  Pooled Cohorts Equation Calculator  Breast Cancer Risk Calculator  BRCA-Related Cancer Risk Assessment: FHS-7 Tool  FRAX Risk Assessment  ICSI Preventive Guidelines  Dietary Guidelines for Americans, 2010  USDA's MyPlate  ASA Prophylaxis  Lung CA Screening    Courtney Ledesma CNP  [facility] " [facility]

## 2022-04-26 ENCOUNTER — HOSPITAL ENCOUNTER (OUTPATIENT)
Dept: SPEECH THERAPY | Facility: REHABILITATION | Age: 62
Discharge: HOME | End: 2022-04-26
Payer: COMMERCIAL

## 2022-04-26 DIAGNOSIS — R48.0 ALEXIA: Primary | ICD-10-CM

## 2022-04-26 DIAGNOSIS — R48.8 AGRAPHIA: ICD-10-CM

## 2022-04-26 PROCEDURE — 92507 TX SP LANG VOICE COMM INDIV: CPT | Mod: GN | Performed by: SPEECH-LANGUAGE PATHOLOGIST

## 2022-04-27 ENCOUNTER — APPOINTMENT (OUTPATIENT)
Dept: CT IMAGING | Facility: CLINIC | Age: 62
End: 2022-04-27
Attending: EMERGENCY MEDICINE
Payer: COMMERCIAL

## 2022-04-27 ENCOUNTER — HOSPITAL ENCOUNTER (EMERGENCY)
Facility: CLINIC | Age: 62
Discharge: HOME | End: 2022-04-28
Attending: EMERGENCY MEDICINE | Admitting: EMERGENCY MEDICINE
Payer: COMMERCIAL

## 2022-04-27 DIAGNOSIS — R42 LIGHTHEADEDNESS: ICD-10-CM

## 2022-04-27 LAB
ANION GAP SERPL CALCULATED.3IONS-SCNC: 14 MMOL/L (ref 5–18)
ATRIAL RATE - MUSE: 52 BPM
BASOPHILS # BLD AUTO: 0 10E3/UL (ref 0–0.2)
BASOPHILS NFR BLD AUTO: 0 %
BUN SERPL-MCNC: 24 MG/DL (ref 8–22)
CALCIUM SERPL-MCNC: 9.3 MG/DL (ref 8.5–10.5)
CHLORIDE BLD-SCNC: 104 MMOL/L (ref 98–107)
CO2 SERPL-SCNC: 23 MMOL/L (ref 22–31)
CREAT SERPL-MCNC: 0.9 MG/DL (ref 0.6–1.1)
DIASTOLIC BLOOD PRESSURE - MUSE: 62 MMHG
EOSINOPHIL # BLD AUTO: 0.1 10E3/UL (ref 0–0.7)
EOSINOPHIL NFR BLD AUTO: 1 %
ERYTHROCYTE [DISTWIDTH] IN BLOOD BY AUTOMATED COUNT: 13 % (ref 10–15)
GFR SERPL CREATININE-BSD FRML MDRD: 72 ML/MIN/1.73M2
GLUCOSE BLD-MCNC: 101 MG/DL (ref 70–125)
HCT VFR BLD AUTO: 36.6 % (ref 35–47)
HGB BLD-MCNC: 12.1 G/DL (ref 11.7–15.7)
HUMAN PAPILLOMA VIRUS 16 DNA: NEGATIVE
HUMAN PAPILLOMA VIRUS 18 DNA: NEGATIVE
HUMAN PAPILLOMA VIRUS FINAL DIAGNOSIS: NORMAL
HUMAN PAPILLOMA VIRUS OTHER HR: NEGATIVE
IMM GRANULOCYTES # BLD: 0 10E3/UL
IMM GRANULOCYTES NFR BLD: 0 %
INTERPRETATION ECG - MUSE: NORMAL
LYMPHOCYTES # BLD AUTO: 1.7 10E3/UL (ref 0.8–5.3)
LYMPHOCYTES NFR BLD AUTO: 16 %
MCH RBC QN AUTO: 29.7 PG (ref 26.5–33)
MCHC RBC AUTO-ENTMCNC: 33.1 G/DL (ref 31.5–36.5)
MCV RBC AUTO: 90 FL (ref 78–100)
MONOCYTES # BLD AUTO: 0.8 10E3/UL (ref 0–1.3)
MONOCYTES NFR BLD AUTO: 8 %
NEUTROPHILS # BLD AUTO: 7.8 10E3/UL (ref 1.6–8.3)
NEUTROPHILS NFR BLD AUTO: 75 %
NRBC # BLD AUTO: 0 10E3/UL
NRBC BLD AUTO-RTO: 0 /100
P AXIS - MUSE: 59 DEGREES
PLATELET # BLD AUTO: 258 10E3/UL (ref 150–450)
POTASSIUM BLD-SCNC: 4.1 MMOL/L (ref 3.5–5)
PR INTERVAL - MUSE: 186 MS
QRS DURATION - MUSE: 84 MS
QT - MUSE: 504 MS
QTC - MUSE: 468 MS
R AXIS - MUSE: 36 DEGREES
RBC # BLD AUTO: 4.08 10E6/UL (ref 3.8–5.2)
SODIUM SERPL-SCNC: 141 MMOL/L (ref 136–145)
SYSTOLIC BLOOD PRESSURE - MUSE: 133 MMHG
T AXIS - MUSE: 58 DEGREES
TROPONIN I SERPL-MCNC: <0.01 NG/ML (ref 0–0.29)
VENTRICULAR RATE- MUSE: 52 BPM
WBC # BLD AUTO: 10.3 10E3/UL (ref 4–11)

## 2022-04-27 PROCEDURE — 85025 COMPLETE CBC W/AUTO DIFF WBC: CPT | Performed by: EMERGENCY MEDICINE

## 2022-04-27 PROCEDURE — 96361 HYDRATE IV INFUSION ADD-ON: CPT

## 2022-04-27 PROCEDURE — 250N000011 HC RX IP 250 OP 636: Performed by: EMERGENCY MEDICINE

## 2022-04-27 PROCEDURE — 250N000013 HC RX MED GY IP 250 OP 250 PS 637: Performed by: EMERGENCY MEDICINE

## 2022-04-27 PROCEDURE — 93005 ELECTROCARDIOGRAM TRACING: CPT | Performed by: EMERGENCY MEDICINE

## 2022-04-27 PROCEDURE — 99285 EMERGENCY DEPT VISIT HI MDM: CPT | Mod: 25

## 2022-04-27 PROCEDURE — 36415 COLL VENOUS BLD VENIPUNCTURE: CPT | Performed by: EMERGENCY MEDICINE

## 2022-04-27 PROCEDURE — 70450 CT HEAD/BRAIN W/O DYE: CPT

## 2022-04-27 PROCEDURE — 96374 THER/PROPH/DIAG INJ IV PUSH: CPT

## 2022-04-27 PROCEDURE — 258N000003 HC RX IP 258 OP 636: Performed by: EMERGENCY MEDICINE

## 2022-04-27 PROCEDURE — 84484 ASSAY OF TROPONIN QUANT: CPT | Performed by: EMERGENCY MEDICINE

## 2022-04-27 PROCEDURE — 80048 BASIC METABOLIC PNL TOTAL CA: CPT | Performed by: EMERGENCY MEDICINE

## 2022-04-27 RX ORDER — OXYCODONE AND ACETAMINOPHEN 5; 325 MG/1; MG/1
1 TABLET ORAL ONCE
Status: COMPLETED | OUTPATIENT
Start: 2022-04-27 | End: 2022-04-27

## 2022-04-27 RX ORDER — DIPHENHYDRAMINE HYDROCHLORIDE 50 MG/ML
25 INJECTION INTRAMUSCULAR; INTRAVENOUS ONCE
Status: COMPLETED | OUTPATIENT
Start: 2022-04-27 | End: 2022-04-27

## 2022-04-27 RX ADMIN — OXYCODONE AND ACETAMINOPHEN 1 TABLET: 5; 325 TABLET ORAL at 21:46

## 2022-04-27 RX ADMIN — DIPHENHYDRAMINE HYDROCHLORIDE 25 MG: 50 INJECTION, SOLUTION INTRAMUSCULAR; INTRAVENOUS at 23:16

## 2022-04-27 RX ADMIN — SODIUM CHLORIDE 500 ML: 9 INJECTION, SOLUTION INTRAVENOUS at 21:57

## 2022-04-27 ASSESSMENT — ENCOUNTER SYMPTOMS
HEADACHES: 1
FATIGUE: 1
LIGHT-HEADEDNESS: 1

## 2022-04-28 VITALS
RESPIRATION RATE: 22 BRPM | BODY MASS INDEX: 27.04 KG/M2 | OXYGEN SATURATION: 96 % | DIASTOLIC BLOOD PRESSURE: 61 MMHG | HEART RATE: 71 BPM | WEIGHT: 160 LBS | TEMPERATURE: 97.4 F | SYSTOLIC BLOOD PRESSURE: 127 MMHG

## 2022-04-28 DIAGNOSIS — I63.9 ACUTE ISCHEMIC STROKE (H): ICD-10-CM

## 2022-04-28 DIAGNOSIS — E11.9 TYPE 2 DIABETES MELLITUS WITHOUT COMPLICATION, WITHOUT LONG-TERM CURRENT USE OF INSULIN (H): ICD-10-CM

## 2022-04-28 DIAGNOSIS — I10 BENIGN ESSENTIAL HYPERTENSION: Primary | ICD-10-CM

## 2022-04-28 RX ORDER — LISINOPRIL 40 MG/1
40 TABLET ORAL DAILY
Qty: 90 TABLET | Refills: 3 | Status: SHIPPED | OUTPATIENT
Start: 2022-04-28 | End: 2023-03-29

## 2022-04-28 RX ORDER — AMLODIPINE BESYLATE 5 MG/1
5 TABLET ORAL DAILY
Qty: 90 TABLET | Refills: 3 | Status: SHIPPED | OUTPATIENT
Start: 2022-04-28 | End: 2023-03-29

## 2022-04-28 NOTE — ED NOTES
Pt reports HA located in the middle of her forehead. She reports she feels off balance. Pt was out on a walk and thinks she over did it. She became tired and fell to the ground. Denies hitting her head. Pt lives with her brother. She had a stroke 1.5 years ago and has some right side weakness from that. On assessment, red raised rash noted to lower abdomen, wrapping around to back and right arm. Pt was unaware of the rash. It does not bother her.

## 2022-04-28 NOTE — TELEPHONE ENCOUNTER
Reason for Call:  Medication or medication refill: 3 Rx Refill requests    Do you use a [facility]? NO  Name of the pharmacy and phone number for the current request:  [facility] [address], [place], operated by [facility] in [place], [phone]    Name of the medication requested:     CONTOUR NEXT TEST test strip 100 strip 2 9/12/2021  No   Sig: USE TO TEST BLOOD GLUCOSE PREMEAL ONCE DAILY   Sent to pharmacy as: Contour Next Test In Vitro Strip (blood glucose)     amLODIPine (NORVASC) 5 MG tablet   1/13/2022  --   Sig - Route: Take 5 mg by mouth daily - Oral     lisinopril (ZESTRIL) 40 MG tablet 30 tablet 0 8/2/2020 8/18/2021 No   Sig - Route: Take 1 tablet (40 mg) by mouth daily - Oral   Sent to pharmacy as: Lisinopril 40 MG Oral Tablet (ZESTRIL)     Can we leave a detailed message on this number? YES    Best Time: ANY    Call taken on 4/28/2022 at 10:29 AM by Helena Murphy

## 2022-04-28 NOTE — DISCHARGE INSTRUCTIONS
Encourage rest and fluids.  Follow-up with your doctor/clinic within the next week.  See them sooner if worse or problems.  Over-the-counter Benadryl 25 mg every 8 hours as needed for rash or itching.  Do not drive with this.    Negative all lab work other than some mild dehydration.  Head CT showed no acute findings such as stroke, bleed or injury.

## 2022-04-28 NOTE — ED TRIAGE NOTES
Patient here via EMS, patient was on a walk and took a wrong turn and walked a lot longer than she normally would, brother reports that she was gone for close to 3 hours, patient had a stroke 1.5 years ago and has R sided deficits since then, a neighbor spotted her struggling on the walk and approached her and helped her down to the ground, neighbor denies to EMS that patient hit her head, patient reports a headache and wonders if it is from her eyewear, patient had lightheadedness PTA, but does no longer have that.   Marlene Leija RN.......4/27/2022 7:03 PM     Triage Assessment     Row Name 04/27/22 1900       Triage Assessment (Adult)    Airway WDL WDL       Respiratory WDL    Respiratory WDL WDL       Skin Circulation/Temperature WDL    Skin Circulation/Temperature WDL WDL       Cardiac WDL    Cardiac WDL WDL       Peripheral/Neurovascular WDL    Peripheral Neurovascular WDL WDL       Cognitive/Neuro/Behavioral WDL    Cognitive/Neuro/Behavioral WDL X  headache

## 2022-04-28 NOTE — ED PROVIDER NOTES
EMERGENCY DEPARTMENT ENCOUNTER      NAME: Tara Plaza  AGE: 61 year old female  YOB: 1960  MRN: [id number]  EVALUATION DATE & TIME: 2022  8:37 PM    PCP: Courtney Ledesma    ED PROVIDER: Wilfredo Altamirano M.D.      Chief Complaint   Patient presents with     Headache         FINAL IMPRESSION:  1.  Weakness, resolved.  2.  Headache, resolving.  3.  Acute urticaria/allergic reaction.    ED COURSE & MEDICAL DECISION MAKIN:22 PM I met with the patient to gather history and to perform my initial exam. We discussed plans for the ED course, including diagnostic testing and treatment. PPE worn: cloth mask.  Patient not prone to headaches but has had them in the past.  Patient with mid frontal headache no worse or better than other headaches.  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  Patient on her normal walk today, took a wrong turn and ended up walking around for 2 or 3 hours.  Her stroke was a year and a half ago.  A neighbor saw her struggling on her walk and helped her to the ground and called EMS.  Patient notes mid frontal headache.  Felt lightheaded but now resolved.  Initial blood pressure 109/60 now 151/68.  10:58 PM I re-evaluated and updated patient. We discussed plans for discharge and patient and mother are agreeable.  CBC unremarkable.  Troponin negative.  Chemistries negative other than bicarbonate of 24.  EKG unremarkable.  Head CT without acute findings.  Weakness and lightheadedness resolved.  Blood pressure improved.  Headache nearly resolved.  Patient is discharged home.  Patient in agreement with the plan.  11:10 PM.  Nursing notes and urticarial rash on her arms and abdomen consistent with itching and an allergic reaction.  Patient given Benadryl and prescribed the same.  Patient in agreement with the plan.      Pertinent Labs & Imaging studies reviewed. (See chart for details)    61  year old female presents to the Emergency Department for evaluation of weakness and headache.    At the conclusion of the encounter I discussed the results of all of the tests and the disposition. The questions were answered. The patient or family acknowledged understanding and was agreeable with the care plan.              MEDICATIONS GIVEN IN THE EMERGENCY:  Medications   0.9% sodium chloride BOLUS (500 mLs Intravenous New Bag 4/27/22 2157)   oxyCODONE-acetaminophen (PERCOCET) 5-325 MG per tablet 1 tablet (1 tablet Oral Given 4/27/22 2146)       NEW PRESCRIPTIONS STARTED AT TODAY'S ER VISIT  New Prescriptions    No medications on file          =================================================================    HPI    Patient information was obtained from: Patient    Use of : N/A         Tara BEAN Arcenio Plaza is a 61 year old female with a pertinent history of left PCA infarct with VBI and residual right sided deficits, DM II, HTN, who presents to this ED via EMS for evaluation of headache.    Patient reports this evening she was out for a walk for ~3 hours, which is longer than she typically will walk. She became tired and reported a neighbor helped her to the ground. Denies head trauma. She endorses a frontal headache that has persisted. Patient was initially lightheaded but this has resolved. She denies visual changes. No personal history of frequent headaches. She did not take any analgesics PTA. Patient is currently on Plavix.    Patient does not identify any waxing or waning symptoms otherwise, exacerbating or alleviating features,associated symptoms except as mentioned. She denies any additional pain related complaints.    REVIEW OF SYSTEMS   Review of Systems   Constitutional: Positive for fatigue.   Eyes: Negative for visual disturbance.   Neurological: Positive for light-headedness (resolved) and headaches.   All other systems reviewed and are negative.      PAST MEDICAL HISTORY:  Past  Medical History:   Diagnosis Date     Depressive disorder        PAST SURGICAL HISTORY:  Past Surgical History:   Procedure Laterality Date     IR CAROTID ANGIOGRAM  7/14/2020     IR CAROTID CEREBRAL ANGIOGRAM BILATERAL  7/14/2020           CURRENT MEDICATIONS:    amLODIPine (NORVASC) 5 MG tablet  ASPIRIN LOW DOSE 81 MG chewable tablet  atorvastatin (LIPITOR) 80 MG tablet  blood glucose (NO BRAND SPECIFIED) lancets standard  blood glucose monitoring (NO BRAND SPECIFIED) meter device kit  citalopram (CELEXA) 20 MG tablet  clopidogrel (PLAVIX) 75 MG tablet  CONTOUR NEXT TEST test strip  docusate sodium (COLACE) 100 MG capsule  GOODSENSE CLEARLAX 17 GM/SCOOP powder  lisinopril (ZESTRIL) 40 MG tablet  metFORMIN (GLUCOPHAGE) 500 MG tablet  ondansetron (ZOFRAN-ODT) 4 MG ODT tab  [employer] Container MISC  Vitamin D3 (CHOLECALCIFEROL) 25 mcg (1000 units) tablet        ALLERGIES:  Allergies   Allergen Reactions     Seafood        FAMILY HISTORY:  Family History   Problem Relation Age of Onset     Heart Disease Mother      Heart Disease Father      Parkinsonism Father      Cerebrovascular Disease Maternal Grandmother        SOCIAL HISTORY:   Social History     Socioeconomic History     Marital status:      Spouse name: None     Number of children: 2     Years of education: None     Highest education level: None   Tobacco Use     Smoking status: Never Smoker     Smokeless tobacco: Never Used   Substance and Sexual Activity     Alcohol use: Never     Drug use: Never     Sexual activity: Not Currently   No drugs, alcohol, or tobacco.    VITALS:  /60   Pulse 67   Temp 97.4  F (36.3  C) (Oral)   Resp 9   Wt 72.6 kg (160 lb)   SpO2 97%   BMI 27.04 kg/m      PHYSICAL EXAM    Vital Signs:  /60   Pulse 67   Temp 97.4  F (36.3  C) (Oral)   Resp 9   Wt 72.6 kg (160 lb)   SpO2 97%   BMI 27.04 kg/m    General:  On entering the room  is in no apparent distress.    Neck:  Neck supple with full range of motion  and nontender.    Back:  Back and spine are nontender.  No costovertebral angle tenderness.    HEENT:  Oropharynx clear with moist mucous membranes.  HEENT unremarkable.    Pulmonary:  Chest clear to auscultation without rhonchi rales or wheezing.    Cardiovascular:  Cardiac regular rate and rhythm without murmurs rubs or gallops.    Abdomen:  Abdomen soft nontender.  There is no rebound or guarding.    Musculoskeletal:  she moves all 4 without any difficulty and has normal neurovascular exams.  Extremities without clubbing, cyanosis, or edema.  Legs and calves are nontender.    Neuro:  she is alert and oriented ×3 and moves all extremities symmetrically.  Strength 5/5 in all 4 extremities.  Sensation intact in all 4 extremities.  Cranial nerves II through XII intact equal bilaterally.  Pupils react to light.  Extraocular was intact.  Cerebellar function by finger-to-nose testing good and equal bilaterally.  Psych:  Normal affect.    Skin:  Unremarkable and warm and dry.       LAB:  All pertinent labs reviewed and interpreted.  Labs Ordered and Resulted from Time of ED Arrival to Time of ED Departure   BASIC METABOLIC PANEL - Abnormal       Result Value    Sodium 141      Potassium 4.1      Chloride 104      Carbon Dioxide (CO2) 23      Anion Gap 14      Urea Nitrogen 24 (*)     Creatinine 0.90      Calcium 9.3      Glucose 101      GFR Estimate 72     TROPONIN I - Normal    Troponin I <0.01     CBC WITH PLATELETS AND DIFFERENTIAL    WBC Count 10.3      RBC Count 4.08      Hemoglobin 12.1      Hematocrit 36.6      MCV 90      MCH 29.7      MCHC 33.1      RDW 13.0      Platelet Count 258      % Neutrophils 75      % Lymphocytes 16      % Monocytes 8      % Eosinophils 1      % Basophils 0      % Immature Granulocytes 0      NRBCs per 100 WBC 0      Absolute Neutrophils 7.8      Absolute Lymphocytes 1.7      Absolute Monocytes 0.8      Absolute Eosinophils 0.1      Absolute Basophils 0.0      Absolute Immature  Granulocytes 0.0      Absolute NRBCs 0.0         RADIOLOGY:  Reviewed all pertinent imaging. Please see official radiology report.  Head CT w/o contrast   Final Result   IMPRESSION:   1.  No acute intracranial process.                 EKG:    Sinus bradycardia 52, PVC, no acute findings.  Same as previous EKG of April 21, 2022.    I have independently reviewed and interpreted the EKG(s) documented above.    PROCEDURES:         I, Miladys Gibbons, am serving as a scribe to document services personally performed by Dr. Altamirano based on my observation and the provider's statements to me. I, Wilfredo Altamirano MD attest that Miladys Gibbons is acting in a scribe capacity, has observed my performance of the services and has documented them in accordance with my direction.    Wilfredo Altamirano M.D.  Emergency Medicine  [facility] [place], [place] EMERGENCY ROOM  [address] [id number] [phone]  Dept: [phone]     Wilfredo Altamirano MD  04/27/22 2301       Wilfredo Altamirano MD  04/27/22 2311

## 2022-05-02 LAB
BKR LAB AP GYN ADEQUACY: NORMAL
BKR LAB AP GYN INTERPRETATION: NORMAL
BKR LAB AP HPV REFLEX: NORMAL
BKR LAB AP LMP: NORMAL
BKR LAB AP PREVIOUS ABNORMAL: NORMAL
PATH REPORT.COMMENTS IMP SPEC: NORMAL
PATH REPORT.COMMENTS IMP SPEC: NORMAL
PATH REPORT.RELEVANT HX SPEC: NORMAL

## 2022-05-05 ENCOUNTER — HOSPITAL ENCOUNTER (OUTPATIENT)
Dept: CARDIOLOGY | Facility: HOSPITAL | Age: 62
Discharge: HOME | End: 2022-05-05
Attending: NURSE PRACTITIONER | Admitting: NURSE PRACTITIONER
Payer: COMMERCIAL

## 2022-05-05 DIAGNOSIS — I49.3 PVC (PREMATURE VENTRICULAR CONTRACTION): ICD-10-CM

## 2022-05-05 DIAGNOSIS — R00.1 BRADYCARDIA: ICD-10-CM

## 2022-05-05 PROCEDURE — 93270 REMOTE 30 DAY ECG REV/REPORT: CPT

## 2022-05-13 PROCEDURE — 93272 ECG/REVIEW INTERPRET ONLY: CPT | Performed by: INTERNAL MEDICINE

## 2022-05-17 ENCOUNTER — TELEPHONE (OUTPATIENT)
Dept: INTERNAL MEDICINE | Facility: CLINIC | Age: 62
End: 2022-05-17
Payer: COMMERCIAL

## 2022-05-17 NOTE — TELEPHONE ENCOUNTER
----- Message from Courtney Ledesma CNP sent at 5/16/2022  8:49 AM CDT -----  Please call the patient and let her know her heart monitor showed the following:    I think that the patient incorrectly reported that she fainted while wearing the monitor, let's see if this is true.    No signs of bradycardia (slow heart rate), but the heart monitor was only worn for 22 hours. If her symptoms persist, I would suggest checking a longer duration heart monitor.

## 2022-05-17 NOTE — TELEPHONE ENCOUNTER
Called pt. She did NOT have any fainting spells while wearing the monitor. She will let us know if she continues to have symptoms.

## 2022-05-31 NOTE — PROGRESS NOTES
"[facility]    Outpatient Speech Language Pathology Discharge Note  Patient: Tara Plaza  : 1960    Beginning/End Dates of Reporting Period:  21 to 22    Referring Provider: Edwina Jordan Diagnosis: Alexia R48.0, Agraphia R48.8, Aphasia, post-stroke I69.320    Client Self Report: Patient arrived to therapy in good spirits.  She expressed overall much more confidence with written/reading communication and strategies for word finding than at the beginning of therapy.     Goals:  Goal Identifier 1   Goal Description Patient will complete functional naming tasks with independent use of word finding strategies in >80% of opportunities.   Target Date     Date Met      Progress (detail required for progress note): 22: 65% accuracy SARAI to object/picture naming with use of word finding strategies, increases to 100% given min cues/prompts.\"  Discontinued goal with emphasis on functional read/writing.  Patient continues to demonstrate mild word finding in conversation; however, is mostly able to compensate with use of word finding strategies.     Goal Identifier 2   Goal Description Patient will complete sentence level writing tasks with use of external aid (e.g., speech to text) with >80% accuracy.   Target Date 22   Date Met  22   Progress (detail required for progress note): 3/17/22: Goal progressed with variability depending on STT technology and context/content.  Patient has demonstrated overall increased success and independence with AT use, increased understanding of how to self-check/modify, and strategies to improve clarity including volume and wording.     Goal Identifier 3   Goal Description Updated Goal: Patient will SARAI complete x3 functional reading/writing tasks with use of assistive technology.  Old Goal: Patient will complete functional " reading/writing tasks (e.g., internet search, bill decoding) with use of assistive technology with >80% accuracy.   Target Date 05/28/22   Date Met  04/26/22   Progress (detail required for progress note): 4/26/22: Goal met.       Plan:  Discharge from therapy.    Discharge:    Reason for Discharge: Patient has met or progressed towards all goals.  No further expectation of progress- continue with use of communication strategies and assistive technology for reading/written language tasks.  Patient comfortable with plan to transition to home program.    Equipment Issued: NA    Discharge Plan: Patient to continue home program.

## 2022-05-31 NOTE — ADDENDUM NOTE
Encounter addended by: Ana Fox, SLP on: 5/31/2022 1:24 PM   Actions taken: Clinical Note Signed, Episode resolved

## 2022-07-01 DIAGNOSIS — F33.41 RECURRENT MAJOR DEPRESSIVE DISORDER, IN PARTIAL REMISSION (H): ICD-10-CM

## 2022-07-01 NOTE — TELEPHONE ENCOUNTER
"Routing refill request to provider for review/approval because:  PHQ9 score - greater than 4.  Early refill requested.    Last Written Prescription Date:  8/18/21  Last Fill Quantity: 90,  # refills: 3   Last office visit provider:  4/25/22     Requested Prescriptions   Pending Prescriptions Disp Refills     citalopram (CELEXA) 20 MG tablet 90 tablet 3     Sig: Take 1 tablet (20 mg) by mouth every morning TAKE 1 TABLET(20 MG) BY MOUTH EVERY MORNING       SSRIs Protocol Failed - 7/1/2022  3:27 PM        Failed - PHQ-9 score less than 5 in past 6 months     Please review last PHQ-9 score.           Passed - Medication is active on med list        Passed - Patient is age 18 or older        Passed - No active pregnancy on record        Passed - No positive pregnancy test in last 12 months        Passed - Recent (6 mo) or future (30 days) visit within the authorizing provider's specialty     Patient had office visit in the last 6 months or has a visit in the next 30 days with authorizing provider or within the authorizing provider's specialty.  See \"Patient Info\" tab in inbasket, or \"Choose Columns\" in Meds & Orders section of the refill encounter.                 Claude Wellington RN 07/01/22 3:27 PM  "

## 2022-07-05 RX ORDER — CITALOPRAM HYDROBROMIDE 20 MG/1
20 TABLET ORAL EVERY MORNING
Qty: 90 TABLET | Refills: 0 | Status: SHIPPED | OUTPATIENT
Start: 2022-07-05 | End: 2022-10-10

## 2022-08-12 DIAGNOSIS — I63.9 ACUTE ISCHEMIC STROKE (H): ICD-10-CM

## 2022-08-12 DIAGNOSIS — K59.01 SLOW TRANSIT CONSTIPATION: ICD-10-CM

## 2022-08-12 DIAGNOSIS — E11.9 TYPE 2 DIABETES MELLITUS WITHOUT COMPLICATION, WITHOUT LONG-TERM CURRENT USE OF INSULIN (H): ICD-10-CM

## 2022-08-12 RX ORDER — DOCUSATE SODIUM 100 MG/1
CAPSULE, LIQUID FILLED ORAL
Qty: 60 CAPSULE | Refills: 6 | Status: SHIPPED | OUTPATIENT
Start: 2022-08-12 | End: 2023-02-27

## 2022-08-13 NOTE — TELEPHONE ENCOUNTER
"Last Written Prescription Date:  2/23/22  Last Fill Quantity: 100,  # refills: 3   Last office visit provider:  4/25/22     Requested Prescriptions   Pending Prescriptions Disp Refills     docusate sodium (COLACE) 100 MG capsule [Pharmacy Med Name: DOCUSATE 100MG  Capsule] 60 capsule 10     Sig: TAKE 1 CAPSULE BY MOUTH TWICE DAILY       Laxatives Protocol Passed - 8/12/2022 10:12 AM        Passed - Patient is age 6 or older        Passed - Recent (12 mo) or future (30 days) visit within the authorizing provider's specialty     Patient has had an office visit with the authorizing provider or a provider within the authorizing providers department within the previous 12 mos or has a future within next 30 days. See \"Patient Info\" tab in inbasket, or \"Choose Columns\" in Meds & Orders section of the refill encounter.              Passed - Medication is active on med list           blood glucose (CONTOUR NEXT TEST) test strip [Pharmacy Med Name: CONTOUR*NEXT*STRIPS 100CT IP]  10     Sig: USE TO TEST BLOOD SUGAR ONCE DAILY BEFORE MEAL       Diabetic Supplies Protocol Passed - 8/12/2022 10:12 AM        Passed - Medication is active on med list        Passed - Patient is 18 years of age or older        Passed - Recent (6 mo) or future (30 days) visit within the authorizing provider's specialty     Patient had office visit in the last 6 months or has a visit in the next 30 days with authorizing provider.  See \"Patient Info\" tab in inbasket, or \"Choose Columns\" in Meds & Orders section of the refill encounter.                 Magdalene Gutierrez RN 08/12/22 7:58 PM  "

## 2022-08-23 DIAGNOSIS — E11.9 TYPE 2 DIABETES MELLITUS WITHOUT COMPLICATION, WITHOUT LONG-TERM CURRENT USE OF INSULIN (H): ICD-10-CM

## 2022-08-23 RX ORDER — ATORVASTATIN CALCIUM 80 MG/1
80 TABLET, FILM COATED ORAL EVERY EVENING
Qty: 90 TABLET | Refills: 3 | Status: SHIPPED | OUTPATIENT
Start: 2022-08-23 | End: 2023-07-27

## 2022-08-29 ENCOUNTER — TELEPHONE (OUTPATIENT)
Dept: INTERNAL MEDICINE | Facility: CLINIC | Age: 62
End: 2022-08-29

## 2022-08-29 NOTE — TELEPHONE ENCOUNTER
Reason for Call:  Other Pharmacy changes    Detailed comments:  Patient received letter notifying patient that [facility] is no longer available    Phone Number Patient can be reached at: Cell number on file:    Telephone Information:   Mobile [phone]       Best Time: any    Can we leave a detailed message on this number? Not Applicable    Call taken on 8/29/2022 at 10:52 AM by YOVANA YEPEZ

## 2022-09-16 ENCOUNTER — VIRTUAL VISIT (OUTPATIENT)
Dept: INTERNAL MEDICINE | Facility: CLINIC | Age: 62
End: 2022-09-16
Payer: COMMERCIAL

## 2022-09-16 DIAGNOSIS — Z86.73 HISTORY OF STROKE: ICD-10-CM

## 2022-09-16 DIAGNOSIS — R26.9 GAIT DISORDER: ICD-10-CM

## 2022-09-16 DIAGNOSIS — R26.2 DIFFICULTY WALKING: Primary | ICD-10-CM

## 2022-09-16 PROCEDURE — 99213 OFFICE O/P EST LOW 20 MIN: CPT | Mod: 95 | Performed by: NURSE PRACTITIONER

## 2022-09-16 NOTE — PROGRESS NOTES
Tara is a 61 year old who is being evaluated via a billable telephone visit.      What phone number would you like to be contacted at? [phone]  How would you like to obtain your AVS? Mail a copy    Assessment & Plan     Difficulty walking  Gait disorder  History of stroke: Continued problems with walking longer distances, is at a great risk of falling. Handicap paperwork filled out. Follow up as scheduled.       Return in about 3 months (around 12/16/2022).    [facility]   Tara is a 61 year old presenting for the following health issues:  Disability forms      HPI     The patient presents today with concerns of a handicap driving pass.    She continues to use a cane at times, has a history of a stroke, and cannot walk far distances due to her leg weakness. She does not drive herself, but needs the pass.    She denies any other concerns today. She has been doing well.    Review of Systems   Constitutional, HEENT, cardiovascular, pulmonary, GI, , musculoskeletal, neuro, skin, endocrine and psych systems are negative, except as otherwise noted.      Objective           Vitals:  No vitals were obtained today due to virtual visit.    Physical Exam   healthy, alert and no distress  PSYCH: Alert and oriented times 3; coherent speech, normal   rate and volume, able to articulate logical thoughts, able   to abstract reason, no tangential thoughts, no hallucinations   or delusions  Her affect is normal  RESP: No cough, no audible wheezing, able to talk in full sentences  Remainder of exam unable to be completed due to telephone visits        Phone call duration: 12 minutes

## 2022-10-09 DIAGNOSIS — F33.41 RECURRENT MAJOR DEPRESSIVE DISORDER, IN PARTIAL REMISSION (H): ICD-10-CM

## 2022-10-10 RX ORDER — CITALOPRAM HYDROBROMIDE 20 MG/1
20 TABLET ORAL EVERY MORNING
Qty: 90 TABLET | Refills: 0 | Status: SHIPPED | OUTPATIENT
Start: 2022-10-10 | End: 2022-12-29

## 2022-10-10 NOTE — TELEPHONE ENCOUNTER
"Routing refill request to provider for review/approval because:  PHQ 9 score - not on file/out of date.  PHQ9 score - greater than 4.    Last Written Prescription Date:  7/5/22  Last Fill Quantity: 90,  # refills: 0   Last office visit provider:  9/16/22     Requested Prescriptions   Pending Prescriptions Disp Refills     citalopram (CELEXA) 20 MG tablet [Pharmacy Med Name: CITALOPRAM 20 MG TABLET 20 Tablet] 30 tablet 10     Sig: TAKE 1 TABLET BY MOUTH EVERY MORNING       SSRIs Protocol Failed - 10/10/2022 10:22 AM        Failed - PHQ-9 score less than 5 in past 6 months     Please review last PHQ-9 score.           Passed - Medication is active on med list        Passed - Patient is age 18 or older        Passed - No active pregnancy on record        Passed - No positive pregnancy test in last 12 months        Passed - Recent (6 mo) or future (30 days) visit within the authorizing provider's specialty     Patient had office visit in the last 6 months or has a visit in the next 30 days with authorizing provider or within the authorizing provider's specialty.  See \"Patient Info\" tab in inbasket, or \"Choose Columns\" in Meds & Orders section of the refill encounter.                 Claude Wellington RN 10/10/22 10:22 AM  "

## 2022-10-11 DIAGNOSIS — I63.9 ACUTE ISCHEMIC STROKE (H): ICD-10-CM

## 2022-10-11 DIAGNOSIS — E11.9 TYPE 2 DIABETES MELLITUS WITHOUT COMPLICATION, WITHOUT LONG-TERM CURRENT USE OF INSULIN (H): ICD-10-CM

## 2022-10-12 NOTE — TELEPHONE ENCOUNTER
"Last Written Prescription Date:  812/22  Last Fill Quantity: 100,  # refills: 0   Last office visit provider:  9/16/22     Requested Prescriptions   Pending Prescriptions Disp Refills     blood glucose (CONTOUR NEXT TEST) test strip [Pharmacy Med Name: CONTOUR*NEXT*STRIPS 100CT IP]  10     Sig: USE TO TEST BLOOD SUGAR ONCE DAILY BEFORE A MEAL       Diabetic Supplies Protocol Passed - 10/12/2022  9:42 AM        Passed - Medication is active on med list        Passed - Patient is 18 years of age or older        Passed - Recent (6 mo) or future (30 days) visit within the authorizing provider's specialty     Patient had office visit in the last 6 months or has a visit in the next 30 days with authorizing provider.  See \"Patient Info\" tab in inbasket, or \"Choose Columns\" in Meds & Orders section of the refill encounter.                 Claude Wellington RN 10/12/22 9:43 AM  "

## 2022-11-29 DIAGNOSIS — K59.01 SLOW TRANSIT CONSTIPATION: ICD-10-CM

## 2022-11-30 RX ORDER — POLYETHYLENE GLYCOL 3350 17 G/17G
POWDER, FOR SOLUTION ORAL
Qty: 510 G | Refills: 9 | Status: SHIPPED | OUTPATIENT
Start: 2022-11-30 | End: 2023-09-26

## 2022-11-30 NOTE — TELEPHONE ENCOUNTER
"Last Written Prescription Date:  1/17/22  Last Fill Quantity: 510,  # refills: 10   Last office visit provider:  9/16/22     Requested Prescriptions   Pending Prescriptions Disp Refills     polyethylene glycol (MIRALAX) 17 GM/Dose powder [Pharmacy Med Name: POLYETHYLENE GLYCOL 510GM Powder] 510 g 10     Sig: MIX 17 GM (1 CAPFUL) IN 8 OUNCES WATER OR JUICE AND DRINK ONCE DAILY AS NEEDED       Laxatives Protocol Passed - 11/29/2022  5:45 PM        Passed - Patient is age 6 or older        Passed - Recent (12 mo) or future (30 days) visit within the authorizing provider's specialty     Patient has had an office visit with the authorizing provider or a provider within the authorizing providers department within the previous 12 mos or has a future within next 30 days. See \"Patient Info\" tab in inbasket, or \"Choose Columns\" in Meds & Orders section of the refill encounter.              Passed - Medication is active on med list             Magdalene Gutierrez RN 11/30/22 1:26 PM  "

## 2022-12-21 ENCOUNTER — TELEPHONE (OUTPATIENT)
Dept: INTERNAL MEDICINE | Facility: CLINIC | Age: 62
End: 2022-12-21

## 2022-12-21 NOTE — TELEPHONE ENCOUNTER
2 disability forms have been faxed to  and vehicle services as requested by .    Filed in IM for our records.

## 2022-12-28 DIAGNOSIS — F33.41 RECURRENT MAJOR DEPRESSIVE DISORDER, IN PARTIAL REMISSION (H): ICD-10-CM

## 2022-12-28 DIAGNOSIS — Z86.73 HISTORY OF STROKE: ICD-10-CM

## 2022-12-28 NOTE — LETTER
12/28/2022        RE: Tara Plaza  [address]  [place] [address]              Dear Tara,        We recently provided you with medication refills.  Many medications require routine follow-up with your doctor.    Your prescription(s) have been refilled for 30 days so you may have time for the above noted follow-up. Please call to schedule soon so we can assure you have an appointment before your next refills are needed. If you have already made a follow up appointment, please disregard this letter.     You are due for an appointment with Dr. Duarte in January 2023. He is currently scheduling out until May. Please call to schedule an appointment and we can then continue refilling your medications until your scheduled appointment.      Sincerely,        [facility]     (Formerly known as [facility])  Dr. Duarte's Care Team

## 2022-12-29 RX ORDER — CITALOPRAM HYDROBROMIDE 20 MG/1
TABLET ORAL
Qty: 30 TABLET | Refills: 10 | Status: SHIPPED | OUTPATIENT
Start: 2022-12-29 | End: 2023-12-07

## 2022-12-29 NOTE — TELEPHONE ENCOUNTER
Refill request for: clopidogrel 75mg   Directions: take one tablet po QD    LOV: 01/21/22  NOV: Due for follow up January 2023. Letter mailed to pt to remind to schedule follow up in January.    30 day supply with 0 refills Medication T'd for review and signature    Norma Galaviz LPN on 12/29/2022 at 4:16 PM

## 2022-12-29 NOTE — TELEPHONE ENCOUNTER
"Routing refill request to provider for review/approval because:  phq 9    Last Written Prescription Date:  10/10/22  Last Fill Quantity: 90,  # refills: 0   Last office visit provider:  9/16/22     Requested Prescriptions   Pending Prescriptions Disp Refills     citalopram (CELEXA) 20 MG tablet [Pharmacy Med Name: CITALOPRAM 20MG TABLET 20 Tablet] 30 tablet 10     Sig: TAKE 1 TABLET BY MOUTH EVERY MORNING       SSRIs Protocol Failed - 12/28/2022  5:30 PM        Failed - PHQ-9 score less than 5 in past 6 months     Please review last PHQ-9 score.           Passed - Medication is active on med list        Passed - Patient is age 18 or older        Passed - No active pregnancy on record        Passed - No positive pregnancy test in last 12 months        Passed - Recent (6 mo) or future (30 days) visit within the authorizing provider's specialty     Patient had office visit in the last 6 months or has a visit in the next 30 days with authorizing provider or within the authorizing provider's specialty.  See \"Patient Info\" tab in inbasket, or \"Choose Columns\" in Meds & Orders section of the refill encounter.                 Magdalene Gutierrez RN 12/29/22 11:56 AM  "

## 2022-12-30 RX ORDER — CLOPIDOGREL BISULFATE 75 MG/1
TABLET ORAL
Qty: 30 TABLET | Refills: 0 | Status: SHIPPED | OUTPATIENT
Start: 2022-12-30 | End: 2023-01-27

## 2023-01-13 ENCOUNTER — TELEPHONE (OUTPATIENT)
Dept: INTERNAL MEDICINE | Facility: CLINIC | Age: 63
End: 2023-01-13

## 2023-01-13 NOTE — TELEPHONE ENCOUNTER
Kathryn from [employer] called and needs application for disability parking and reduced fee ID card forms    She faxed both over twice and still waiting for signatures     Please call her back -[phone]

## 2023-01-13 NOTE — TELEPHONE ENCOUNTER
I spoke to  and I have mailed Application For Disability Form today,  believes she can sign  Reduced fee I.D.Card's for Tara.

## 2023-01-26 DIAGNOSIS — Z86.73 HISTORY OF STROKE: ICD-10-CM

## 2023-01-27 RX ORDER — CLOPIDOGREL BISULFATE 75 MG/1
TABLET ORAL
Qty: 14 TABLET | Refills: 0 | Status: SHIPPED | OUTPATIENT
Start: 2023-01-27 | End: 2023-02-27

## 2023-01-27 NOTE — TELEPHONE ENCOUNTER
Refill request for: clopidogrel 75mg   Directions: TAKE 1 TABLET BY MOUTH DAILY     LOV: 01/21/22  NOV: Due for follow up. Letter already mailed to pt to remind to schedule.    14 day supply with 0 refills Medication T'd for review and signature    Norma Galaviz LPN on 1/27/2023 at 3:30 PM

## 2023-02-26 DIAGNOSIS — Z86.73 HISTORY OF STROKE: ICD-10-CM

## 2023-02-26 DIAGNOSIS — K59.01 SLOW TRANSIT CONSTIPATION: ICD-10-CM

## 2023-02-27 RX ORDER — CLOPIDOGREL BISULFATE 75 MG/1
TABLET ORAL
Qty: 7 TABLET | Refills: 0 | Status: SHIPPED | OUTPATIENT
Start: 2023-02-27 | End: 2023-03-28

## 2023-02-27 RX ORDER — DOCUSATE SODIUM 100 MG/1
CAPSULE, LIQUID FILLED ORAL
Qty: 60 CAPSULE | Refills: 5 | Status: SHIPPED | OUTPATIENT
Start: 2023-02-27 | End: 2023-08-24

## 2023-02-27 NOTE — TELEPHONE ENCOUNTER
"Last Written Prescription Date:  8/12/22  Last Fill Quantity: 60,  # refills: 6   Last office visit provider:  9/16/22     Requested Prescriptions   Pending Prescriptions Disp Refills     docusate sodium (COLACE) 100 MG capsule [Pharmacy Med Name: DOCUSATE 100MG  Capsule] 60 capsule 10     Sig: TAKE 1 CAPSULE BY MOUTH TWICE DAILY       Laxatives Protocol Passed - 2/26/2023  9:08 PM        Passed - Patient is age 6 or older        Passed - Recent (12 mo) or future (30 days) visit within the authorizing provider's specialty     Patient has had an office visit with the authorizing provider or a provider within the authorizing providers department within the previous 12 mos or has a future within next 30 days. See \"Patient Info\" tab in inbasket, or \"Choose Columns\" in Meds & Orders section of the refill encounter.              Passed - Medication is active on med list             Magdalene Gutierrez RN 02/27/23 4:06 PM  "

## 2023-02-27 NOTE — TELEPHONE ENCOUNTER
Refill request for: clopidogrel 75mg      Directions: TAKE 1 TABLET BY MOUTH DAILY      LOV: 01/21/22  NOV: Due for follow up. Letter already mailed to pt to remind to schedule.     7 day supply with 0 refills Medication T'd for review and signature

## 2023-03-27 DIAGNOSIS — I10 BENIGN ESSENTIAL HYPERTENSION: ICD-10-CM

## 2023-03-27 DIAGNOSIS — I63.9 ACUTE ISCHEMIC STROKE (H): ICD-10-CM

## 2023-03-27 DIAGNOSIS — E11.9 TYPE 2 DIABETES MELLITUS WITHOUT COMPLICATION, WITHOUT LONG-TERM CURRENT USE OF INSULIN (H): ICD-10-CM

## 2023-03-27 DIAGNOSIS — Z86.73 HISTORY OF STROKE: ICD-10-CM

## 2023-03-28 RX ORDER — CLOPIDOGREL BISULFATE 75 MG/1
TABLET ORAL
Qty: 7 TABLET | Refills: 0 | Status: SHIPPED | OUTPATIENT
Start: 2023-03-28 | End: 2023-04-27

## 2023-03-28 NOTE — TELEPHONE ENCOUNTER
Refill request for: clopidogrel 75mg   Directions: TAKE 1 TABLET BY MOUTH DAILY     LOV: 01/21/22  NOV: Due for follow up appt. Pt has already been notified of the need for appt. No future appt scheduled.    7 day supply with 0 refills Medication T'd for review and signature    Norma Galaviz LPN on 3/28/2023 at 9:59 AM

## 2023-03-29 RX ORDER — AMLODIPINE BESYLATE 5 MG/1
TABLET ORAL
Qty: 30 TABLET | Refills: 5 | Status: SHIPPED | OUTPATIENT
Start: 2023-03-29 | End: 2023-09-26

## 2023-03-29 RX ORDER — LISINOPRIL 40 MG/1
TABLET ORAL
Qty: 30 TABLET | Refills: 5 | Status: SHIPPED | OUTPATIENT
Start: 2023-03-29 | End: 2023-09-26

## 2023-03-29 NOTE — TELEPHONE ENCOUNTER
"amLODIPine (NORVASC) 5 MG tablet  Last Written Prescription Date:  4/28/2022  Last Fill Quantity: 90,  # refills: 3   Last office visit provider:  9/16/2022     lisinopril (ZESTRIL) 40 MG tablet  Last Written Prescription Date:  4/28/2022  Last Fill Quantity: 90,  # refills: 3   Last office visit provider:  9/16/2022     Routing refill request to provider for review/approval because:  Patient needs to be seen because:  No visit within 6 mo or future visit    blood glucose (CONTOUR NEXT TEST) test strip  Last Written Prescription Date:  10/12/2022  Last Fill Quantity: 100,  # refills: 1   Last office visit provider:  9/16/2022     Requested Prescriptions   Pending Prescriptions Disp Refills     CONTOUR NEXT TEST test strip [Pharmacy Med Name: CONTOUR*NEXT*STRIPS 50CT IP]  10     Sig: USE TO TEST BLOOD SUGAR ONCE DAILY BEFORE A MEAL       Diabetic Supplies Protocol Failed - 3/27/2023  6:08 PM        Failed - Recent (6 mo) or future (30 days) visit within the authorizing provider's specialty     Patient had office visit in the last 6 months or has a visit in the next 30 days with authorizing provider.  See \"Patient Info\" tab in inbasket, or \"Choose Columns\" in Meds & Orders section of the refill encounter.            Passed - Medication is active on med list        Passed - Patient is 18 years of age or older           lisinopril (ZESTRIL) 40 MG tablet [Pharmacy Med Name: LISINOPRIL 40MG TAB 40 Tablet] 30 tablet 10     Sig: TAKE 1 TABLET BY MOUTH DAILY       ACE Inhibitors (Including Combos) Protocol Passed - 3/27/2023  6:08 PM        Passed - Blood pressure under 140/90 in past 12 months     BP Readings from Last 3 Encounters:   04/27/22 127/61   04/25/22 116/60   02/23/22 118/68                 Passed - Recent (12 mo) or future (30 days) visit within the authorizing provider's specialty     Patient has had an office visit with the authorizing provider or a provider within the authorizing providers department within " "the previous 12 mos or has a future within next 30 days. See \"Patient Info\" tab in inbasket, or \"Choose Columns\" in Meds & Orders section of the refill encounter.              Passed - Medication is active on med list        Passed - Patient is age 18 or older        Passed - No active pregnancy on record        Passed - Normal serum creatinine on file in past 12 months     Recent Labs   Lab Test 04/27/22  2159   CR 0.90       Ok to refill medication if creatinine is low          Passed - Normal serum potassium on file in past 12 months     Recent Labs   Lab Test 04/27/22  2159   POTASSIUM 4.1             Passed - No positive pregnancy test within past 12 months           amLODIPine (NORVASC) 5 MG tablet [Pharmacy Med Name: AMLODIPINE 5MG TAB 5 Tablet] 30 tablet 10     Sig: TAKE 1 TABLET BY MOUTH DAILY       Calcium Channel Blockers Protocol  Passed - 3/27/2023  6:08 PM        Passed - Blood pressure under 140/90 in past 12 months     BP Readings from Last 3 Encounters:   04/27/22 127/61   04/25/22 116/60   02/23/22 118/68                 Passed - Recent (12 mo) or future (30 days) visit within the authorizing provider's specialty     Patient has had an office visit with the authorizing provider or a provider within the authorizing providers department within the previous 12 mos or has a future within next 30 days. See \"Patient Info\" tab in inbasket, or \"Choose Columns\" in Meds & Orders section of the refill encounter.              Passed - Medication is active on med list        Passed - Patient is age 18 or older        Passed - No active pregnancy on record        Passed - Normal serum creatinine on file in past 12 months     Recent Labs   Lab Test 04/27/22  2159   CR 0.90       Ok to refill medication if creatinine is low          Passed - No positive pregnancy test in past 12 months             Gisela Macias RN 03/29/23 1:14 AM  "

## 2023-04-26 DIAGNOSIS — Z86.73 HISTORY OF STROKE: ICD-10-CM

## 2023-04-27 RX ORDER — CLOPIDOGREL BISULFATE 75 MG/1
TABLET ORAL
Qty: 7 TABLET | Refills: 0 | Status: SHIPPED | OUTPATIENT
Start: 2023-04-27 | End: 2023-05-26

## 2023-05-17 ENCOUNTER — OFFICE VISIT (OUTPATIENT)
Dept: FAMILY MEDICINE | Facility: CLINIC | Age: 63
End: 2023-05-17
Payer: MEDICARE

## 2023-05-17 VITALS
TEMPERATURE: 98.9 F | HEART RATE: 67 BPM | OXYGEN SATURATION: 97 % | WEIGHT: 181 LBS | DIASTOLIC BLOOD PRESSURE: 70 MMHG | RESPIRATION RATE: 12 BRPM | HEIGHT: 65 IN | BODY MASS INDEX: 30.16 KG/M2 | SYSTOLIC BLOOD PRESSURE: 107 MMHG

## 2023-05-17 DIAGNOSIS — E11.9 TYPE 2 DIABETES MELLITUS WITHOUT COMPLICATION, WITHOUT LONG-TERM CURRENT USE OF INSULIN (H): Primary | ICD-10-CM

## 2023-05-17 DIAGNOSIS — Z12.11 SCREEN FOR COLON CANCER: ICD-10-CM

## 2023-05-17 DIAGNOSIS — I67.2 INTRACRANIAL ATHEROSCLEROSIS: ICD-10-CM

## 2023-05-17 DIAGNOSIS — F32.4 MAJOR DEPRESSIVE DISORDER WITH SINGLE EPISODE, IN PARTIAL REMISSION (H): ICD-10-CM

## 2023-05-17 DIAGNOSIS — Z86.73 HISTORY OF STROKE: ICD-10-CM

## 2023-05-17 DIAGNOSIS — R48.0 ALEXIA: ICD-10-CM

## 2023-05-17 DIAGNOSIS — R48.8 AGRAPHIA: ICD-10-CM

## 2023-05-17 DIAGNOSIS — I10 BENIGN ESSENTIAL HYPERTENSION: ICD-10-CM

## 2023-05-17 LAB — HBA1C MFR BLD: 6.1 % (ref 0–5.6)

## 2023-05-17 PROCEDURE — 82570 ASSAY OF URINE CREATININE: CPT | Performed by: INTERNAL MEDICINE

## 2023-05-17 PROCEDURE — 99214 OFFICE O/P EST MOD 30 MIN: CPT | Performed by: INTERNAL MEDICINE

## 2023-05-17 PROCEDURE — 36415 COLL VENOUS BLD VENIPUNCTURE: CPT | Performed by: INTERNAL MEDICINE

## 2023-05-17 PROCEDURE — 80048 BASIC METABOLIC PNL TOTAL CA: CPT | Performed by: INTERNAL MEDICINE

## 2023-05-17 PROCEDURE — 83036 HEMOGLOBIN GLYCOSYLATED A1C: CPT | Performed by: INTERNAL MEDICINE

## 2023-05-17 PROCEDURE — 82043 UR ALBUMIN QUANTITATIVE: CPT | Performed by: INTERNAL MEDICINE

## 2023-05-17 PROCEDURE — 84460 ALANINE AMINO (ALT) (SGPT): CPT | Performed by: INTERNAL MEDICINE

## 2023-05-17 PROCEDURE — 80061 LIPID PANEL: CPT | Performed by: INTERNAL MEDICINE

## 2023-05-17 ASSESSMENT — PAIN SCALES - GENERAL: PAINLEVEL: NO PAIN (0)

## 2023-05-17 ASSESSMENT — PATIENT HEALTH QUESTIONNAIRE - PHQ9
SUM OF ALL RESPONSES TO PHQ QUESTIONS 1-9: 5
10. IF YOU CHECKED OFF ANY PROBLEMS, HOW DIFFICULT HAVE THESE PROBLEMS MADE IT FOR YOU TO DO YOUR WORK, TAKE CARE OF THINGS AT HOME, OR GET ALONG WITH OTHER PEOPLE: SOMEWHAT DIFFICULT
SUM OF ALL RESPONSES TO PHQ QUESTIONS 1-9: 5

## 2023-05-17 NOTE — LETTER
May 18, 2023      Tara Plaza  [address] [address]        Dear Ms.Robinson Plaza,    We are writing to inform you of your test results.    Ms. Arcenio Plaza,     Kidney function tests are normal   A1c is very good at 6.1   No protein leak in the urine   Liver function tests are normal   Cholesterol panel is normal     Please contact the clinic if you have additional questions.  Thank you.     Resulted Orders   HEMOGLOBIN A1C   Result Value Ref Range    Hemoglobin A1C 6.1 (H) 0.0 - 5.6 %      Comment:      Normal <5.7%   Prediabetes 5.7-6.4%    Diabetes 6.5% or higher     Note: Adopted from ADA consensus guidelines.   Lipid panel reflex to direct LDL Non-fasting   Result Value Ref Range    Cholesterol 158 <200 mg/dL    Triglycerides 129 <150 mg/dL    Direct Measure HDL 65 >=50 mg/dL    LDL Cholesterol Calculated 67 <=100 mg/dL    Non HDL Cholesterol 93 <130 mg/dL    Narrative    Cholesterol  Desirable:  <200 mg/dL    Triglycerides  Normal:  Less than 150 mg/dL  Borderline High:  150-199 mg/dL  High:  200-499 mg/dL  Very High:  Greater than or equal to 500 mg/dL    Direct Measure HDL  Female:  Greater than or equal to 50 mg/dL   Male:  Greater than or equal to 40 mg/dL    LDL Cholesterol  Desirable:  <100mg/dL  Above Desirable:  100-129 mg/dL   Borderline High:  130-159 mg/dL   High:  160-189 mg/dL   Very High:  >= 190 mg/dL    Non HDL Cholesterol  Desirable:  130 mg/dL  Above Desirable:  130-159 mg/dL  Borderline High:  160-189 mg/dL  High:  190-219 mg/dL  Very High:  Greater than or equal to 220 mg/dL   Albumin Random Urine Quantitative with Creat Ratio   Result Value Ref Range    Creatinine Urine mg/dL 356.0 mg/dL      Comment:      The reference ranges have not been established in urine creatinine. The results should be integrated into the clinical context for interpretation.    Albumin Urine mg/L 25.8 mg/L      Comment:      The reference ranges have not been established in urine  albumin. The results should be integrated into the clinical context for interpretation.    Albumin Urine mg/g Cr 7.25 0.00 - 25.00 mg/g Cr      Comment:      Microalbuminuria is defined as an albumin:creatinine ratio of 17 to 299 for males and 25 to 299 for females. A ratio of albumin:creatinine of 300 or higher is indicative of overt proteinuria.  Due to biologic variability, positive results should be confirmed by a second, first-morning random or 24-hour timed urine specimen. If there is discrepancy, a third specimen is recommended. When 2 out of 3 results are in the microalbuminuria range, this is evidence for incipient nephropathy and warrants increased efforts at glucose control, blood pressure control, and institution of therapy with an angiotensin-converting-enzyme (ACE) inhibitor (if the patient can tolerate it).     BASIC METABOLIC PANEL   Result Value Ref Range    Sodium 144 136 - 145 mmol/L    Potassium 4.1 3.4 - 5.3 mmol/L    Chloride 104 98 - 107 mmol/L    Carbon Dioxide (CO2) 27 22 - 29 mmol/L    Anion Gap 13 7 - 15 mmol/L    Urea Nitrogen 22.0 8.0 - 23.0 mg/dL    Creatinine 0.75 0.51 - 0.95 mg/dL    Calcium 9.5 8.8 - 10.2 mg/dL    Glucose 108 (H) 70 - 99 mg/dL    GFR Estimate 89 >60 mL/min/1.73m2      Comment:      eGFR calculated using 2021 CKD-EPI equation.   ALT   Result Value Ref Range    ALT 23 10 - 35 U/L       If you have any questions or concerns, please call the clinic at the number listed above.       Sincerely,      Harlan Fuchs MD

## 2023-05-17 NOTE — PROGRESS NOTES
"  Assessment & Plan     Screen for colon cancer  She is long due for a colonoscopy  I will order that  - Colonoscopy Screening  Referral; Future    Benign essential hypertension  Blood pressure is very well controlled  Continue the current regimen without any change  - BASIC METABOLIC PANEL; Future  - BASIC METABOLIC PANEL    Type 2 diabetes mellitus without complication, without long-term current use of insulin (H)  She is only on metformin for her diabetes  Last A1c was very good and was below 6  - Adult Eye  Referral; Future  - HEMOGLOBIN A1C; Future  - Lipid panel reflex to direct LDL Non-fasting; Future  - Albumin Random Urine Quantitative with Creat Ratio; Future  - ALT; Future  - HEMOGLOBIN A1C  - Lipid panel reflex to direct LDL Non-fasting  - Albumin Random Urine Quantitative with Creat Ratio  - ALT    Major depressive disorder with single episode, in partial remission (H)  She is on Celexa and her PHQ-9 score is very good today at 5    H/O Left PCA infarction  She still has some right upper and lower extremity weakness  Follows up with neurology  She is on statin and Plavix  Aspirin was stopped permanently  The imaging studies showed extensive atherosclerosis in her intracranial arteries    Agraphia  She did look with PT and OT and speech therapy but currently not getting any therapies    Alexia  She does have ongoing issues with reading and writing but not speaking    Intracranial atherosclerosis  As above she is on statin and Plavix        30 minutes spent by me on the date of the encounter doing chart review, history and exam, documentation and further activities per the note       BMI:   Estimated body mass index is 30.34 kg/m  as calculated from the following:    Height as of this encounter: 1.645 m (5' 4.76\").    Weight as of this encounter: 82.1 kg (181 lb).           Harlan Fuchs MD  [facility]    Candida Pacheco is a 62 year old, presenting for the " "following health issues:  Establish Care        5/17/2023     2:53 PM   Additional Questions   Roomed by Mariana TELLEZ     History of Present Illness       Reason for visit:  Establish care    She eats 0-1 servings of fruits and vegetables daily.She consumes 0 sweetened beverage(s) daily.She exercises with enough effort to increase her heart rate 9 or less minutes per day.  She exercises with enough effort to increase her heart rate 3 or less days per week.   She is taking medications regularly.    Today's PHQ-9         PHQ-9 Total Score: 5    PHQ-9 Q9 Thoughts of better off dead/self-harm past 2 weeks :   Not at all    How difficult have these problems made it for you to do your work, take care of things at home, or get along with other people: Somewhat difficult               Review of Systems   Constitutional, HEENT, cardiovascular, pulmonary, gi and gu systems are negative, except as otherwise noted.      Objective    /70 (BP Location: Right arm, Patient Position: Sitting, Cuff Size: Adult Large)   Pulse 67   Temp 98.9  F (37.2  C) (Oral)   Resp 12   Ht 1.645 m (5' 4.76\")   Wt 82.1 kg (181 lb)   SpO2 97%   BMI 30.34 kg/m    Body mass index is 30.34 kg/m .  Physical Exam   GENERAL: healthy, alert and no distress  NECK: no adenopathy, no asymmetry, masses, or scars and thyroid normal to palpation  RESP: lungs clear to auscultation - no rales, rhonchi or wheezes  CV: regular rate and rhythm, normal S1 S2, no S3 or S4, no murmur, click or rub, no peripheral edema and peripheral pulses strong  ABDOMEN: soft, nontender, no hepatosplenomegaly, no masses and bowel sounds normal  MS: no gross musculoskeletal defects noted, no edema  NEURO: Power 4/5 in the right upper and lower extremities                    "

## 2023-05-18 LAB
ALT SERPL W P-5'-P-CCNC: 23 U/L (ref 10–35)
ANION GAP SERPL CALCULATED.3IONS-SCNC: 13 MMOL/L (ref 7–15)
BUN SERPL-MCNC: 22 MG/DL (ref 8–23)
CALCIUM SERPL-MCNC: 9.5 MG/DL (ref 8.8–10.2)
CHLORIDE SERPL-SCNC: 104 MMOL/L (ref 98–107)
CHOLEST SERPL-MCNC: 158 MG/DL
CREAT SERPL-MCNC: 0.75 MG/DL (ref 0.51–0.95)
CREAT UR-MCNC: 356 MG/DL
DEPRECATED HCO3 PLAS-SCNC: 27 MMOL/L (ref 22–29)
GFR SERPL CREATININE-BSD FRML MDRD: 89 ML/MIN/1.73M2
GLUCOSE SERPL-MCNC: 108 MG/DL (ref 70–99)
HDLC SERPL-MCNC: 65 MG/DL
LDLC SERPL CALC-MCNC: 67 MG/DL
MICROALBUMIN UR-MCNC: 25.8 MG/L
MICROALBUMIN/CREAT UR: 7.25 MG/G CR (ref 0–25)
NONHDLC SERPL-MCNC: 93 MG/DL
POTASSIUM SERPL-SCNC: 4.1 MMOL/L (ref 3.4–5.3)
SODIUM SERPL-SCNC: 144 MMOL/L (ref 136–145)
TRIGL SERPL-MCNC: 129 MG/DL

## 2023-05-25 DIAGNOSIS — Z86.73 HISTORY OF STROKE: ICD-10-CM

## 2023-05-26 NOTE — TELEPHONE ENCOUNTER
Refill request for: clopidogrel (PLAVIX) 75 MG tablet  Directions: TAKE 1 TABLET BY MOUTH DAILY *PATIENT NEEDS APPOINTMENT WITH DR. DUARTE*    Last rx for #7 was sent 4/27/23- patient has not scheduled an appt. I will let Dr. Duarte decide if he would like to approve or deny    LOV: 1/21/22  NOV: Not scheduled    Vidya Soliman CMA on 5/26/2023 at 12:21 PM

## 2023-05-28 RX ORDER — CLOPIDOGREL BISULFATE 75 MG/1
TABLET ORAL
Qty: 7 TABLET | Refills: 0 | Status: SHIPPED | OUTPATIENT
Start: 2023-05-28 | End: 2023-06-27

## 2023-06-07 ENCOUNTER — TELEPHONE (OUTPATIENT)
Dept: GASTROENTEROLOGY | Facility: CLINIC | Age: 63
End: 2023-06-07
Payer: MEDICARE

## 2023-06-07 NOTE — TELEPHONE ENCOUNTER
"Endoscopy Scheduling Screen    Have you had a positive Covid test in the last 14 days?  No    Are you active on MyChart?   No    What insurance is in the chart?  Other:  [facility]    Ordering/Referring Provider: ALMITA   (If ordering provider performs procedure, schedule with ordering provider unless otherwise instructed. )    BMI: Estimated body mass index is 30.34 kg/m  as calculated from the following:    Height as of 5/17/23: 1.645 m (5' 4.76\").    Weight as of 5/17/23: 82.1 kg (181 lb).     Sedation Ordered  moderate sedation.   If patient BMI > 50 do not schedule in ASC.    Are you taking any prescription medications for pain?   No    Are you taking methadone or Suboxone?  No    Do you have a history of malignant hyperthermia or adverse reaction to anesthesia?  No    (Females) Are you currently pregnant?   No     Have you been diagnosed or told you have pulmonary hypertension?   No    Do you have an LVAD?  No    Have you been told you have moderate to severe sleep apnea?  No    Have you been told you have COPD, asthma, or any other lung disease?  No    Do you have any heart conditions?  No     Have you ever had or are you awaiting a heart or lung transplant?   No    Have you had a stroke or transient ischemic attack (TIA aka \"mini stroke\" in the last 6 months?   No    Have you been diagnosed with or been told you have cirrhosis of the liver?   No    Are you currently on dialysis?   No    Do you need assistance transferring?   No    BMI: Estimated body mass index is 30.34 kg/m  as calculated from the following:    Height as of 5/17/23: 1.645 m (5' 4.76\").    Weight as of 5/17/23: 82.1 kg (181 lb).     Is patients BMI > 40 and scheduling location [facility]?  No    Do you take the medication Phentermine, Ozempic or Wegovy?  No    Do you take the medication Naltrexone?  No    Do you take blood thinners?  No    Preferred Pharmacy:    [id number] [address] AT [facility] " [place]  [address] [place] [address] [address] [place] [address]  Phone: [phone] Fax: [phone]    [facility], [place], [place] - [address] [address]  Phone: [phone] Fax: [phone]      Prep   Are you currently on dialysis or do you have chronic kidney disease?  No (standard prep)    Do you have a diagnosis of diabetes?  Yes (Golytely Prep)    Do you have a diagnosis of cystic fibrosis (CF)?  No    On a regular basis do you go 3 -5 days between bowel movements?  No    BMI > 40?  No    Final Scheduling Details   Colonoscopy prep sent?  Standard Golytely      THE PATIENT WILL CALL BACK T COMPLETE SCHEDULING. AT THIS TIME SHE DOES NOT HAVE SOMEONE TO ACCOMPANY HER AFTERWARD, AND DOES NOT WISH TO HAVE A NON-SEDATED COLONOSCOPY.

## 2023-06-23 ENCOUNTER — OFFICE VISIT (OUTPATIENT)
Dept: OPHTHALMOLOGY | Facility: CLINIC | Age: 63
End: 2023-06-23
Attending: INTERNAL MEDICINE
Payer: COMMERCIAL

## 2023-06-23 DIAGNOSIS — H52.4 PRESBYOPIA: ICD-10-CM

## 2023-06-23 DIAGNOSIS — E11.3291 TYPE 2 DIABETES MELLITUS WITH RIGHT EYE AFFECTED BY MILD NONPROLIFERATIVE RETINOPATHY WITHOUT MACULAR EDEMA, WITHOUT LONG-TERM CURRENT USE OF INSULIN (H): Primary | ICD-10-CM

## 2023-06-23 DIAGNOSIS — H26.491 POSTERIOR CAPSULAR OPACIFICATION OF RIGHT EYE, OBSCURING VISION: ICD-10-CM

## 2023-06-23 DIAGNOSIS — H53.461 RIGHT HOMONYMOUS HEMIANOPSIA: ICD-10-CM

## 2023-06-23 DIAGNOSIS — H15.833 POSTERIOR STAPHYLOMA, BILATERAL: ICD-10-CM

## 2023-06-23 DIAGNOSIS — Z96.1 PSEUDOPHAKIA OF BOTH EYES: ICD-10-CM

## 2023-06-23 DIAGNOSIS — Z86.73 HISTORY OF STROKE: ICD-10-CM

## 2023-06-23 PROCEDURE — 92004 COMPRE OPH EXAM NEW PT 1/>: CPT | Performed by: OPTOMETRIST

## 2023-06-23 PROCEDURE — 99207 OCT OPTIC NERVE RNFL SPECTRALIS OU (BOTH EYES): CPT | Performed by: OPTOMETRIST

## 2023-06-23 PROCEDURE — 92134 CPTRZ OPH DX IMG PST SGM RTA: CPT | Performed by: OPTOMETRIST

## 2023-06-23 ASSESSMENT — CONF VISUAL FIELD
OS_INFERIOR_TEMPORAL_RESTRICTION: 3
OD_INFERIOR_TEMPORAL_RESTRICTION: 3
OD_SUPERIOR_TEMPORAL_RESTRICTION: 3
OS_SUPERIOR_TEMPORAL_RESTRICTION: 3
OD_SUPERIOR_NASAL_RESTRICTION: 1
OD_INFERIOR_NASAL_RESTRICTION: 1

## 2023-06-23 ASSESSMENT — VISUAL ACUITY
CORRECTION_TYPE: GLASSES
OD_CC: 20/150
OS_CC: 20/40
METHOD: SNELLEN - LINEAR

## 2023-06-23 ASSESSMENT — REFRACTION_WEARINGRX
OS_ADD: +3.25
OD_AXIS: 167
OS_AXIS: 101
OD_ADD: +3.25
OD_CYLINDER: +0.25
OS_CYLINDER: +0.25
OS_SPHERE: -0.25
OD_SPHERE: -0.25

## 2023-06-23 ASSESSMENT — TONOMETRY
OD_IOP_MMHG: 11
IOP_METHOD: ICARE
OS_IOP_MMHG: 10

## 2023-06-23 ASSESSMENT — SLIT LAMP EXAM - LIDS
COMMENTS: NORMAL
COMMENTS: NORMAL

## 2023-06-23 ASSESSMENT — EXTERNAL EXAM - LEFT EYE: OS_EXAM: NORMAL

## 2023-06-23 ASSESSMENT — EXTERNAL EXAM - RIGHT EYE: OD_EXAM: NORMAL

## 2023-06-23 NOTE — NURSING NOTE
Chief Complaints and History of Present Illnesses   Patient presents with     Diabetic Eye Exam       Chief Complaint(s) and History of Present Illness(es)     Diabetic Eye Exam            Diabetes Type: Type 2          Comments    Patient here for diabetic eye exam.   Hx of cataract surgery, unsure how long ago, sometime within the last 5 years.  Feels that vision in glasses has never been clear after the cataract surgery. Distance vision is better without glasses.   Also had a stroke, but cannot remember if the stroke was before or after the cataract surgery.   Wakes up with gunk in her eyes, left worse than right. Very seldom uses drops, but states she does not like to use them.  No pain, no flashes, no floaters.     DM2  Lab Results       Component                Value               Date                       A1C                      6.1                 05/17/2023                 A1C                      5.9                 04/25/2022                 A1C                      5.6                 11/26/2021                 A1C                      5.9                 06/16/2021                 A1C                      5.6                 03/16/2021                              Killian Noel, Ophthalmic Assistant

## 2023-06-23 NOTE — PROGRESS NOTES
Assessment/Plan  (E11.6431) Type 2 diabetes mellitus with right eye affected by mild nonproliferative retinopathy without macular edema, without long-term current use of insulin (H)  (primary encounter diagnosis)  Plan: Discussed findings with patient. Encouraged continued blood glucose, pressure, and lipid control. Patient should continue following recommendations of primary care provider. Patient should plan on returning to clinic annually for a dilated eye exam but was encouraged to return to clinic sooner with new flashes/floaters or other vision changes.     (Z86.73) History of stroke  Comment: Left PCA infarct 7/2020, with residual right hemianopsia and alexia  Plan: Continue care with PCP and neurology    (H53.461) Right homonymous hemianopsia  Comment: See above. Left PCA infarct 7/2020. No mobility concerns. Patient does not drive.   Plan: Monitor.     (H26.491) Posterior capsular opacification of right eye, obscuring vision  Comment: Prior best corrected vision 20/50 right eye per past records from [facility].   Plan: Recommend consult for YAG before updating glasses.     (Z96.1) Pseudophakia of both eyes  Plan: See above.     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     Complete documentation of historical and exam elements from today's encounter can  be found in the full encounter summary report (not reduplicated in this progress  note). I personally obtained the chief complaint(s) and history of present illness. I  confirmed and edited as necessary the review of systems, past medical/surgical  history, family history, social history, and examination findings as documented by  others; and I examined the patient myself. I personally reviewed the relevant tests,  images, and reports as documented above. I formulated and edited as necessary the  assessment and plan and  discussed the findings and management plan with the  patient and family.    Trenton Deluca OD

## 2023-06-26 DIAGNOSIS — Z86.73 HISTORY OF STROKE: ICD-10-CM

## 2023-06-27 RX ORDER — CLOPIDOGREL BISULFATE 75 MG/1
TABLET ORAL
Qty: 3 TABLET | Refills: 0 | Status: SHIPPED | OUTPATIENT
Start: 2023-06-27 | End: 2024-01-25

## 2023-06-27 NOTE — TELEPHONE ENCOUNTER
Refill request for: clopidogrel (PLAVIX) 75 MG tablet  Directions: TAKE 1 TABLET BY MOUTH DAILY *PATIENT NEEDS APPOINTMENT WITH DR. DUARTE*     Last rx for #7 was sent 5/25/23- patient has not scheduled an appt. I will let Dr. Duarte decide if he would like to approve or deny. She continues to not schedule her follow ups     LOV: 1/21/22  NOV: Not scheduled  Vidya Soliman CMA on 6/27/2023 at 9:27 AM

## 2023-07-26 DIAGNOSIS — E11.9 TYPE 2 DIABETES MELLITUS WITHOUT COMPLICATION, WITHOUT LONG-TERM CURRENT USE OF INSULIN (H): ICD-10-CM

## 2023-07-27 RX ORDER — ATORVASTATIN CALCIUM 80 MG/1
TABLET, FILM COATED ORAL
Qty: 90 TABLET | Refills: 2 | Status: SHIPPED | OUTPATIENT
Start: 2023-07-27 | End: 2024-01-25

## 2023-07-27 NOTE — TELEPHONE ENCOUNTER
Dr. Fuchs current PCP  [facility]  Susan Villa RN   07/27/23 12:25 PM  [facility] Nurse Advisor

## 2023-07-28 ENCOUNTER — OFFICE VISIT (OUTPATIENT)
Dept: OPHTHALMOLOGY | Facility: CLINIC | Age: 63
End: 2023-07-28
Payer: MEDICARE

## 2023-07-28 DIAGNOSIS — Z96.1 PSEUDOPHAKIA OF BOTH EYES: ICD-10-CM

## 2023-07-28 DIAGNOSIS — Z86.73 HISTORY OF STROKE: ICD-10-CM

## 2023-07-28 DIAGNOSIS — H52.203 MYOPIA OF BOTH EYES WITH ASTIGMATISM AND PRESBYOPIA: ICD-10-CM

## 2023-07-28 DIAGNOSIS — H52.13 MYOPIA OF BOTH EYES WITH ASTIGMATISM AND PRESBYOPIA: ICD-10-CM

## 2023-07-28 DIAGNOSIS — H26.491 POSTERIOR CAPSULAR OPACIFICATION OF RIGHT EYE, OBSCURING VISION: Primary | ICD-10-CM

## 2023-07-28 DIAGNOSIS — H15.833 POSTERIOR STAPHYLOMA, BILATERAL: ICD-10-CM

## 2023-07-28 DIAGNOSIS — H52.4 MYOPIA OF BOTH EYES WITH ASTIGMATISM AND PRESBYOPIA: ICD-10-CM

## 2023-07-28 DIAGNOSIS — H53.461 RIGHT HOMONYMOUS HEMIANOPSIA: ICD-10-CM

## 2023-07-28 DIAGNOSIS — E11.3291 TYPE 2 DIABETES MELLITUS WITH RIGHT EYE AFFECTED BY MILD NONPROLIFERATIVE RETINOPATHY WITHOUT MACULAR EDEMA, WITHOUT LONG-TERM CURRENT USE OF INSULIN (H): ICD-10-CM

## 2023-07-28 PROCEDURE — 66821 AFTER CATARACT LASER SURGERY: CPT | Mod: RT | Performed by: OPHTHALMOLOGY

## 2023-07-28 PROCEDURE — 99203 OFFICE O/P NEW LOW 30 MIN: CPT | Mod: 57 | Performed by: OPHTHALMOLOGY

## 2023-07-28 ASSESSMENT — REFRACTION_WEARINGRX
OS_CYLINDER: +0.25
OS_SPHERE: -0.25
OD_CYLINDER: +0.25
OD_ADD: +3.25
OS_ADD: +3.25
OS_AXIS: 101
OD_AXIS: 167
OD_SPHERE: -0.25

## 2023-07-28 ASSESSMENT — VISUAL ACUITY
OS_CC: 20/30
METHOD: SNELLEN - LINEAR
OD_CC: 20/400
OS_CC+: -1
OD_PH_CC: 20/100
OD_PH_CC+: -1

## 2023-07-28 ASSESSMENT — TONOMETRY
OS_IOP_MMHG: 12
IOP_METHOD: TONOPEN
IOP_METHOD: TONOPEN
OD_IOP_MMHG: 11
OD_IOP_MMHG: 10

## 2023-07-28 ASSESSMENT — SLIT LAMP EXAM - LIDS
COMMENTS: 2+ DERMATOCHALASIS
COMMENTS: 2+ DERMATOCHALASIS

## 2023-07-28 ASSESSMENT — EXTERNAL EXAM - LEFT EYE: OS_EXAM: NORMAL

## 2023-07-28 ASSESSMENT — EXTERNAL EXAM - RIGHT EYE: OD_EXAM: NORMAL

## 2023-07-28 NOTE — NURSING NOTE
Chief Complaints and History of Present Illnesses   Patient presents with    Pseudophakia Follow Up     Chief Complaint(s) and History of Present Illness(es)       Pseudophakia Follow Up              Laterality: both eyes    Associated symptoms: blurred vision and glare    Onset: gradual              Comments    Here for YAG consult. Vision has been blurry in both eyes, right eye>left eye. No flashes or floaters. No eye pain. Uses AT as needed.    Lab Results       Component                Value               Date                       A1C                      6.1                 05/17/2023                 A1C                      5.9                 04/25/2022                 A1C                      5.6                 11/26/2021                 A1C                      5.9                 06/16/2021                 A1C                      5.6                 03/16/2021          .    Ilan Addison COT 12:56 PM July 28, 2023

## 2023-07-28 NOTE — PROGRESS NOTES
HPI       Pseudophakia Follow Up    In both eyes.  Associated symptoms include blurred vision and glare.  Onset was gradual.             Comments    Here for YAG consult. Vision has been blurry in both eyes, right eye>left eye. No flashes or floaters. No eye pain. Uses AT as needed.    Lab Results       Component                Value               Date                       A1C                      6.1                 05/17/2023                 A1C                      5.9                 04/25/2022                 A1C                      5.6                 11/26/2021                 A1C                      5.9                 06/16/2021                 A1C                      5.6                 03/16/2021          .    Ilan Addison COT 12:56 PM July 28, 2023             Last edited by Ilan Addison on 7/28/2023 12:56 PM.         Review of systems for the eyes was negative other than the pertinent positives/negatives listed in the HPI.      Assessment & Plan    HPI:  Tara Plaza is a 62 year old female with history of CVA, HLD, HTN, T2DM, pseudophakia who presents for YAG evaluation from Dr. Deluca. Has noted mild decreased vision right eye       POHx: pseudophakia  PMHx: CVA, HLD, HTN, T2DM  Current Medications: amLODIPine (NORVASC) 5 MG tablet, TAKE 1 TABLET BY MOUTH DAILY  atorvastatin (LIPITOR) 80 MG tablet, TAKE 1 TABLET BY MOUTH EVERY EVENING  blood glucose (NO BRAND SPECIFIED) lancets standard, Accu-chek FastClix lancet drums Use to test blood sugar  2  times daily as directed.  blood glucose monitoring (NO BRAND SPECIFIED) meter device kit, Accu-chek Guide Per insurance coverage  citalopram (CELEXA) 20 MG tablet, TAKE 1 TABLET BY MOUTH EVERY MORNING  clopidogrel (PLAVIX) 75 MG tablet, TAKE 1 TABLET BY MOUTH DAILY *PATIENT NEEDS APPOINTMENT*  CONTOUR NEXT TEST test strip, USE TO TEST BLOOD SUGAR ONCE DAILY BEFORE A MEAL  docusate sodium (COLACE) 100 MG capsule, TAKE 1 CAPSULE BY MOUTH TWICE  DAILY  lisinopril (ZESTRIL) 40 MG tablet, TAKE 1 TABLET BY MOUTH DAILY  metFORMIN (GLUCOPHAGE) 500 MG tablet, TAKE 1/2 TABLET BY MOUTH DAILY WITH BREAKFAST  ondansetron (ZOFRAN-ODT) 4 MG ODT tab,   polyethylene glycol (MIRALAX) 17 GM/Dose powder, MIX 17 GM (1 CAPFUL) IN 8 OUNCES WATER OR JUICE AND DRINK ONCE DAILY AS NEEDED  Sharps Container MISC, Use as directed to dispose of needles, lancets and other sharps Per Insurance coverage  Vitamin D3 (CHOLECALCIFEROL) 25 mcg (1000 units) tablet, Take 2 tablets (50 mcg) by mouth daily    No current facility-administered medications on file prior to visit.    PSHx: Cataract extraction/intraocular lens [facility] 2021      Current Eye Medications:      Assessment & Plan:  (H26.491) Posterior capsular opacification of right eye, obscuring vision  (primary encounter diagnosis)  (Z96.1) Pseudophakia of both eyes  [facility] 2021  Risks, benefits, and alternatives discussed  Patient elects to proceed    (Z86.73) History of stroke  (H53.461) Right homonymous hemianopsia  Check 24-2    (E11.3291) Type 2 diabetes mellitus with right eye affected by mild nonproliferative retinopathy without macular edema, without long-term current use of insulin (H)  Continue followups with Dr. 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        Cesar Hobson MD     Attending Physician Attestation:  Complete documentation of historical and exam elements from today's encounter can be found in the full encounter summary report (not reduplicated in this progress note).  I personally obtained the chief complaint(s) and history of present illness.  I confirmed and edited as necessary the review of systems, past medical/surgical history, family history, social history, and  examination findings as documented by others; and I examined the patient myself.  I personally reviewed the relevant tests, images, and reports as documented above.  I formulated and edited as necessary the assessment and plan and discussed the findings and management plan with the patient and family. - Cesar Hobson MD

## 2023-08-11 ENCOUNTER — OFFICE VISIT (OUTPATIENT)
Dept: OPHTHALMOLOGY | Facility: CLINIC | Age: 63
End: 2023-08-11
Payer: COMMERCIAL

## 2023-08-11 DIAGNOSIS — Z96.1 PSEUDOPHAKIA OF BOTH EYES: Primary | ICD-10-CM

## 2023-08-11 DIAGNOSIS — H53.461 RIGHT HOMONYMOUS HEMIANOPSIA: ICD-10-CM

## 2023-08-11 DIAGNOSIS — H52.4 PRESBYOPIA: ICD-10-CM

## 2023-08-11 PROCEDURE — 92083 EXTENDED VISUAL FIELD XM: CPT | Performed by: OPTOMETRIST

## 2023-08-11 PROCEDURE — 92012 INTRM OPH EXAM EST PATIENT: CPT | Performed by: OPTOMETRIST

## 2023-08-11 PROCEDURE — 92015 DETERMINE REFRACTIVE STATE: CPT | Performed by: OPTOMETRIST

## 2023-08-11 ASSESSMENT — REFRACTION_WEARINGRX
OS_CYLINDER: +0.25
OD_CYLINDER: +0.25
OD_ADD: +3.25
OS_ADD: +3.25
OD_AXIS: 167
OS_AXIS: 101
OD_SPHERE: -0.25
OS_SPHERE: -0.25

## 2023-08-11 ASSESSMENT — TONOMETRY
IOP_METHOD: ICARE
OD_IOP_MMHG: 10
OS_IOP_MMHG: 12

## 2023-08-11 ASSESSMENT — VISUAL ACUITY
METHOD: SNELLEN - LINEAR
OD_CC+: -1
OS_CC: 20/25
CORRECTION_TYPE: GLASSES
OS_CC+: -2
OD_CC: 20/125

## 2023-08-11 ASSESSMENT — REFRACTION_MANIFEST
OS_SPHERE: -0.50
OD_CYLINDER: +1.00
OS_CYLINDER: +0.25
OS_AXIS: 120
OS_ADD: +3.25
OD_ADD: +3.25
OD_AXIS: 175
OD_SPHERE: -0.50

## 2023-08-11 ASSESSMENT — SLIT LAMP EXAM - LIDS
COMMENTS: 2+ DERMATOCHALASIS
COMMENTS: 2+ DERMATOCHALASIS

## 2023-08-11 ASSESSMENT — EXTERNAL EXAM - RIGHT EYE: OD_EXAM: NORMAL

## 2023-08-11 ASSESSMENT — EXTERNAL EXAM - LEFT EYE: OS_EXAM: NORMAL

## 2023-08-11 NOTE — NURSING NOTE
Chief Complaints and History of Present Illnesses   Patient presents with    Follow Up     Chief Complaint(s) and History of Present Illness(es)       Follow Up              Laterality: both eyes    Course: stable    Associated symptoms: Negative for eye pain, flashes and floaters    Treatments tried: no treatments              Comments    Here for follow up. Vision is doing better since YAG procedure. No flashes or floaters. No eye pain.    Ilan Addison COT 7:57 AM August 11, 2023

## 2023-08-11 NOTE — PROGRESS NOTES
Assessment/Plan  (Z96.1) Pseudophakia of both eyes  (primary encounter diagnosis)  Comment: post YAG for PCO  Plan: Monitor. Return to clinic if vision changes.     (H53.461) Right homonymous hemianopsia  Comment: Post left PCA stroke- baseline visual field today for this health system  Plan: HVF 24-2 OU        Monitor regularly.     (H52.4) Presbyopia  Plan: REFRACTION [[id number]]        Discussed findings with patient. New spectacle prescription dispensed to patient. Patient is welcome to return to clinic with prolonged adaptation difficulties.     Complete documentation of historical and exam elements from today's encounter can  be found in the full encounter summary report (not reduplicated in this progress  note). I personally obtained the chief complaint(s) and history of present illness. I  confirmed and edited as necessary the review of systems, past medical/surgical  history, family history, social history, and examination findings as documented by  others; and I examined the patient myself. I personally reviewed the relevant tests,  images, and reports as documented above. I formulated and edited as necessary the  assessment and plan and discussed the findings and management plan with the  patient and family.    Trenton Deluca OD   
1 pair

## 2023-08-24 DIAGNOSIS — K59.01 SLOW TRANSIT CONSTIPATION: ICD-10-CM

## 2023-08-24 RX ORDER — DOCUSATE SODIUM 100 MG/1
CAPSULE, LIQUID FILLED ORAL
Qty: 180 CAPSULE | Refills: 2 | Status: SHIPPED | OUTPATIENT
Start: 2023-08-24 | End: 2023-08-25

## 2023-08-25 DIAGNOSIS — K59.01 SLOW TRANSIT CONSTIPATION: ICD-10-CM

## 2023-08-25 RX ORDER — DOCUSATE SODIUM 100 MG/1
CAPSULE, LIQUID FILLED ORAL
Qty: 60 CAPSULE | Refills: 10 | Status: SHIPPED | OUTPATIENT
Start: 2023-08-25 | End: 2024-06-26

## 2023-08-25 NOTE — TELEPHONE ENCOUNTER
"Last Written Prescription Date:  8/24/2023  Last Fill Quantity: 180,  # refills: 2     Please see pharmacy requested changes.    Requested Prescriptions   Pending Prescriptions Disp Refills    docusate sodium (COLACE) 100 MG capsule [Pharmacy Med Name: DOCUSATE SOD 100MG  Capsule] 60 capsule 10     Sig: TAKE ONE (1) CAPSULE BY MOUTH TWICE DAILY       Laxatives Protocol Passed - 8/25/2023 11:23 AM        Passed - Patient is age 6 or older        Passed - Recent (12 mo) or future (30 days) visit within the authorizing provider's specialty     Patient has had an office visit with the authorizing provider or a provider within the authorizing providers department within the previous 12 mos or has a future within next 30 days. See \"Patient Info\" tab in inbasket, or \"Choose Columns\" in Meds & Orders section of the refill encounter.              Passed - Medication is active on med list             Lainey Oro RN 08/25/23 11:23 AM  "

## 2023-08-25 NOTE — TELEPHONE ENCOUNTER
"Last Written Prescription Date:  2/27/23  Last Fill Quantity: 60,  # refills: 5   Last office visit provider:   5/17/23 with Jef    Requested Prescriptions   Pending Prescriptions Disp Refills    docusate sodium (COLACE) 100 MG capsule [Pharmacy Med Name: DOCUSATE SOD 100MG  Capsule] 60 capsule 10     Sig: TAKE ONE (1) CAPSULE BY MOUTH TWICE DAILY       Laxatives Protocol Passed - 8/24/2023  7:51 PM        Passed - Patient is age 6 or older        Passed - Recent (12 mo) or future (30 days) visit within the authorizing provider's specialty     Patient has had an office visit with the authorizing provider or a provider within the authorizing providers department within the previous 12 mos or has a future within next 30 days. See \"Patient Info\" tab in inbasket, or \"Choose Columns\" in Meds & Orders section of the refill encounter.              Passed - Medication is active on med list             PRANAV CINTRON RN 08/24/23 7:52 PM  "

## 2023-09-26 DIAGNOSIS — I10 BENIGN ESSENTIAL HYPERTENSION: ICD-10-CM

## 2023-09-26 DIAGNOSIS — E11.9 TYPE 2 DIABETES MELLITUS WITHOUT COMPLICATION, WITHOUT LONG-TERM CURRENT USE OF INSULIN (H): ICD-10-CM

## 2023-09-26 DIAGNOSIS — K59.01 SLOW TRANSIT CONSTIPATION: ICD-10-CM

## 2023-09-26 DIAGNOSIS — I63.9 ACUTE ISCHEMIC STROKE (H): ICD-10-CM

## 2023-09-26 RX ORDER — LISINOPRIL 40 MG/1
TABLET ORAL
Qty: 90 TABLET | Refills: 3 | Status: SHIPPED | OUTPATIENT
Start: 2023-09-26 | End: 2024-01-25

## 2023-09-26 RX ORDER — AMLODIPINE BESYLATE 5 MG/1
TABLET ORAL
Qty: 90 TABLET | Refills: 3 | Status: SHIPPED | OUTPATIENT
Start: 2023-09-26 | End: 2024-01-25

## 2023-09-26 RX ORDER — POLYETHYLENE GLYCOL 3350 17 G/17G
POWDER, FOR SOLUTION ORAL
Qty: 510 G | Refills: 10 | Status: SHIPPED | OUTPATIENT
Start: 2023-09-26 | End: 2024-08-20

## 2023-10-04 ENCOUNTER — LAB (OUTPATIENT)
Dept: FAMILY MEDICINE | Facility: CLINIC | Age: 63
End: 2023-10-04
Payer: MEDICARE

## 2023-10-04 ENCOUNTER — TELEPHONE (OUTPATIENT)
Dept: FAMILY MEDICINE | Facility: CLINIC | Age: 63
End: 2023-10-04
Payer: MEDICARE

## 2023-10-04 DIAGNOSIS — Z12.11 COLON CANCER SCREENING: Primary | ICD-10-CM

## 2023-10-04 DIAGNOSIS — Z12.11 COLON CANCER SCREENING: ICD-10-CM

## 2023-10-04 NOTE — TELEPHONE ENCOUNTER
Called patient and relayed provider message below.     Patient states that she does NOT have a family history of colon cancer, and does NOT have any family or personal history of polyps.       Routing to provider      VIRAJ Vega, RN  [facility]    
Patient is unable to do a colonoscopy as she has tried find somebody to accompany her and stay with her following the procedure.  Can provider order the Cologuard instead?  
Please triage this call  We can certainly order a Cologuard however she has to meet certain criteria for that and they include the following  She should not be having any family history of colon cancer and this should not be any personal or family history of polyps  If she meets this criteria we can order Cologuard  Please call and speak with her and let me know  
Referral placed for Cologuard  
No

## 2023-10-16 LAB — NONINV COLON CA DNA+OCC BLD SCRN STL QL: NORMAL

## 2023-10-22 LAB — NONINV COLON CA DNA+OCC BLD SCRN STL QL: NORMAL

## 2023-12-07 DIAGNOSIS — F33.41 RECURRENT MAJOR DEPRESSIVE DISORDER, IN PARTIAL REMISSION (H): ICD-10-CM

## 2023-12-08 RX ORDER — CITALOPRAM HYDROBROMIDE 20 MG/1
20 TABLET ORAL EVERY MORNING
Qty: 30 TABLET | Refills: 4 | Status: SHIPPED | OUTPATIENT
Start: 2023-12-08 | End: 2024-01-25

## 2024-01-23 DIAGNOSIS — E11.9 TYPE 2 DIABETES MELLITUS WITHOUT COMPLICATION, WITHOUT LONG-TERM CURRENT USE OF INSULIN (H): ICD-10-CM

## 2024-01-25 ENCOUNTER — OFFICE VISIT (OUTPATIENT)
Dept: FAMILY MEDICINE | Facility: CLINIC | Age: 64
End: 2024-01-25
Payer: MEDICARE

## 2024-01-25 VITALS
HEART RATE: 66 BPM | TEMPERATURE: 97.9 F | SYSTOLIC BLOOD PRESSURE: 118 MMHG | WEIGHT: 185.4 LBS | OXYGEN SATURATION: 95 % | BODY MASS INDEX: 30.89 KG/M2 | RESPIRATION RATE: 14 BRPM | HEIGHT: 65 IN | DIASTOLIC BLOOD PRESSURE: 74 MMHG

## 2024-01-25 DIAGNOSIS — E11.3291 TYPE 2 DIABETES MELLITUS WITH RIGHT EYE AFFECTED BY MILD NONPROLIFERATIVE RETINOPATHY WITHOUT MACULAR EDEMA, WITHOUT LONG-TERM CURRENT USE OF INSULIN (H): ICD-10-CM

## 2024-01-25 DIAGNOSIS — Z23 NEED FOR SHINGLES VACCINE: ICD-10-CM

## 2024-01-25 DIAGNOSIS — E55.9 VITAMIN D DEFICIENCY: ICD-10-CM

## 2024-01-25 DIAGNOSIS — Z12.11 SCREEN FOR COLON CANCER: Primary | ICD-10-CM

## 2024-01-25 DIAGNOSIS — I10 BENIGN ESSENTIAL HYPERTENSION: ICD-10-CM

## 2024-01-25 DIAGNOSIS — F32.4 MAJOR DEPRESSIVE DISORDER WITH SINGLE EPISODE, IN PARTIAL REMISSION (H): ICD-10-CM

## 2024-01-25 DIAGNOSIS — F33.41 RECURRENT MAJOR DEPRESSIVE DISORDER, IN PARTIAL REMISSION (H): ICD-10-CM

## 2024-01-25 DIAGNOSIS — Z23 NEED FOR VACCINATION: ICD-10-CM

## 2024-01-25 DIAGNOSIS — I63.9 ACUTE ISCHEMIC STROKE (H): ICD-10-CM

## 2024-01-25 DIAGNOSIS — Z12.31 VISIT FOR SCREENING MAMMOGRAM: ICD-10-CM

## 2024-01-25 DIAGNOSIS — F32.1 CURRENT MODERATE EPISODE OF MAJOR DEPRESSIVE DISORDER WITHOUT PRIOR EPISODE (H): ICD-10-CM

## 2024-01-25 DIAGNOSIS — E11.9 TYPE 2 DIABETES MELLITUS WITHOUT COMPLICATION, WITHOUT LONG-TERM CURRENT USE OF INSULIN (H): ICD-10-CM

## 2024-01-25 DIAGNOSIS — Z86.73 HISTORY OF STROKE: ICD-10-CM

## 2024-01-25 LAB — HBA1C MFR BLD: 6.3 % (ref 0–5.6)

## 2024-01-25 PROCEDURE — 83036 HEMOGLOBIN GLYCOSYLATED A1C: CPT | Performed by: INTERNAL MEDICINE

## 2024-01-25 PROCEDURE — 36415 COLL VENOUS BLD VENIPUNCTURE: CPT | Performed by: INTERNAL MEDICINE

## 2024-01-25 PROCEDURE — 99214 OFFICE O/P EST MOD 30 MIN: CPT | Performed by: INTERNAL MEDICINE

## 2024-01-25 RX ORDER — CITALOPRAM HYDROBROMIDE 20 MG/1
20 TABLET ORAL EVERY MORNING
Qty: 90 TABLET | Refills: 3 | Status: SHIPPED | OUTPATIENT
Start: 2024-01-25

## 2024-01-25 RX ORDER — LISINOPRIL 40 MG/1
40 TABLET ORAL DAILY
Qty: 90 TABLET | Refills: 3 | Status: SHIPPED | OUTPATIENT
Start: 2024-01-25

## 2024-01-25 RX ORDER — ATORVASTATIN CALCIUM 80 MG/1
80 TABLET, FILM COATED ORAL EVERY EVENING
Qty: 90 TABLET | Refills: 3 | Status: SHIPPED | OUTPATIENT
Start: 2024-01-25

## 2024-01-25 RX ORDER — CLOPIDOGREL BISULFATE 75 MG/1
TABLET ORAL
Qty: 90 TABLET | Refills: 3 | Status: SHIPPED | OUTPATIENT
Start: 2024-01-25

## 2024-01-25 RX ORDER — VITAMIN B COMPLEX
50 TABLET ORAL DAILY
Qty: 180 TABLET | Refills: 3 | Status: SHIPPED | OUTPATIENT
Start: 2024-01-25

## 2024-01-25 RX ORDER — AMLODIPINE BESYLATE 5 MG/1
5 TABLET ORAL DAILY
Qty: 90 TABLET | Refills: 3 | Status: SHIPPED | OUTPATIENT
Start: 2024-01-25

## 2024-01-25 RX ORDER — RESPIRATORY SYNCYTIAL VIRUS VACCINE 120MCG/0.5
0.5 KIT INTRAMUSCULAR ONCE
Qty: 1 EACH | Refills: 0 | Status: CANCELLED | OUTPATIENT
Start: 2024-01-25 | End: 2024-01-25

## 2024-01-25 ASSESSMENT — PATIENT HEALTH QUESTIONNAIRE - PHQ9
SUM OF ALL RESPONSES TO PHQ QUESTIONS 1-9: 4
10. IF YOU CHECKED OFF ANY PROBLEMS, HOW DIFFICULT HAVE THESE PROBLEMS MADE IT FOR YOU TO DO YOUR WORK, TAKE CARE OF THINGS AT HOME, OR GET ALONG WITH OTHER PEOPLE: NOT DIFFICULT AT ALL
SUM OF ALL RESPONSES TO PHQ QUESTIONS 1-9: 4

## 2024-01-25 NOTE — PROGRESS NOTES
Assessment & Plan     Need for shingles vaccine    - zoster vaccine recombinant adjuvanted (SHINGRIX) injection; Inject 0.5 mLs into the muscle once for 1 dose Pharmacist administered    Screen for colon cancer  I did order for her a Cologuard test but she told me she never figure out how to do it so this was never done  - Fecal colorectal cancer screen (FIT); Future    Visit for screening mammogram  She also needs to get mammograms as she never had a mammogram since 2021  I have ordered it today and asked her to schedule it  - MA SCREENING DIGITAL BILAT - Future  (s+30); Future    Benign essential hypertension  Blood pressure under good control on the current regimen  - amLODIPine (NORVASC) 5 MG tablet; Take 1 tablet (5 mg) by mouth daily  - lisinopril (ZESTRIL) 40 MG tablet; Take 1 tablet (40 mg) by mouth daily    Type 2 diabetes mellitus without complication, without long-term current use of insulin (H)  She does check her blood sugars at home and I have seen the readings which are very good the readings lately went up above 130  We will check A1c today  She in fact takes only half a tablet of metformin 5 mg every day    - HEMOGLOBIN A1C; Future  - atorvastatin (LIPITOR) 80 MG tablet; Take 1 tablet (80 mg) by mouth every evening  - blood glucose (NO BRAND SPECIFIED) lancets standard; Accu-chek FastClix lancet drums Use to test blood sugar  2  times daily as directed.  - blood glucose monitoring (NO BRAND SPECIFIED) meter device kit; Accu-chek Guide Per insurance coverage    Acute ischemic stroke (H)  She had a stroke in 2020 July which has affected her right side of the body and also caused a right homonymous hemianopia  She is no longer following up with neurology but her lipid panel is good  - blood glucose (NO BRAND SPECIFIED) lancets standard; Accu-chek FastClix lancet drums Use to test blood sugar  2  times daily as directed.  - blood glucose monitoring (NO BRAND SPECIFIED) meter device kit; Accu-chek  "Guide Per insurance coverage  - lisinopril (ZESTRIL) 40 MG tablet; Take 1 tablet (40 mg) by mouth daily    H/O Left PCA infarction  Her blood pressure is good and her lipid panel is good she is no longer following up with neurology and she is only on Plavix but it appears to me that this was not refilled recently as she lost follow-up with the neurology  I explained to her that she cannot stop this and has to take it for life  I refilled the prescription today  - clopidogrel (PLAVIX) 75 MG tablet; Take one tablet daily    Vitamin D deficiency    - Vitamin D3 (CHOLECALCIFEROL) 25 mcg (1000 units) tablet; Take 2 tablets (50 mcg) by mouth daily    Need for vaccination  We discussed about shingles vaccination and she is keen to get that    Current moderate episode of major depressive disorder without prior episode (H)  PHQ-9 score is good at 4 today continue Celexa    Major depressive disorder with single episode, in partial remission (H24)  PHQ-9 score is good today continue Celexa    Type 2 diabetes mellitus with right eye affected by mild nonproliferative retinopathy without macular edema, without long-term current use of insulin (H)  She follows up closely with ophthalmologist and her blood sugars have been good  She also has right homonymous hemianopia    I did complete paperwork for her today for [employer]    She is not clear what medications she takes  I will also do a MTM consultation as it appears to me like she might not be taking all of the medications prescribed      45 minutes spent by me on the date of the encounter doing chart review, history and exam, documentation and further activities per the note      BMI  Estimated body mass index is 31.09 kg/m  as calculated from the following:    Height as of this encounter: 1.645 m (5' 4.75\").    Weight as of this encounter: 84.1 kg (185 lb 6.4 oz).           Candida Pacheco is a 63 year old, presenting for the following health issues:  Forms      " "1/25/2024    12:36 PM   Additional Questions   Roomed by Estrada   Accompanied by self         1/25/2024    12:36 PM   Patient Reported Additional Medications   Patient reports taking the following new medications No     History of Present Illness       Reason for visit:  [employer] form    She eats 0-1 servings of fruits and vegetables daily.She consumes 0 sweetened beverage(s) daily.She exercises with enough effort to increase her heart rate 10 to 19 minutes per day.  She exercises with enough effort to increase her heart rate 3 or less days per week.   She is taking medications regularly.                 Review of Systems  Constitutional, HEENT, cardiovascular, pulmonary, gi and gu systems are negative, except as otherwise noted.      Objective    /74 (BP Location: Left arm, Patient Position: Sitting, Cuff Size: Adult Regular)   Pulse 66   Temp 97.9  F (36.6  C) (Oral)   Resp 14   Ht 1.645 m (5' 4.75\")   Wt 84.1 kg (185 lb 6.4 oz)   SpO2 95%   BMI 31.09 kg/m    Body mass index is 31.09 kg/m .  Physical Exam   GENERAL: alert and no distress  EYES: Eyes grossly normal to inspection, PERRL and conjunctivae and sclerae normal  RESP: lungs clear to auscultation - no rales, rhonchi or wheezes  CV: regular rate and rhythm, normal S1 S2, no S3 or S4, no murmur, click or rub, no peripheral edema  ABDOMEN: soft, nontender, no hepatosplenomegaly, no masses and bowel sounds normal  MS: no gross musculoskeletal defects noted, no edema  NEURO: Right homonymous  hemianopsia  Power 4/5 in right upper and lower extremity  Some spasticity in right lower extremity            Signed Electronically by: Harlan Fuchs MD    "

## 2024-01-29 DIAGNOSIS — I63.9 ACUTE ISCHEMIC STROKE (H): ICD-10-CM

## 2024-01-29 DIAGNOSIS — E11.9 TYPE 2 DIABETES MELLITUS WITHOUT COMPLICATION, WITHOUT LONG-TERM CURRENT USE OF INSULIN (H): ICD-10-CM

## 2024-01-30 RX ORDER — BLOOD-GLUCOSE METER
EACH MISCELLANEOUS
Qty: 1 KIT | Refills: 10 | OUTPATIENT
Start: 2024-01-30

## 2024-01-30 RX ORDER — LANCETS
EACH MISCELLANEOUS
Qty: 102 EACH | Refills: 1 | Status: SHIPPED | OUTPATIENT
Start: 2024-01-30 | End: 2024-08-12

## 2024-02-04 DIAGNOSIS — I63.9 ACUTE ISCHEMIC STROKE (H): ICD-10-CM

## 2024-02-04 DIAGNOSIS — E11.9 TYPE 2 DIABETES MELLITUS WITHOUT COMPLICATION, WITHOUT LONG-TERM CURRENT USE OF INSULIN (H): ICD-10-CM

## 2024-02-05 RX ORDER — BLOOD-GLUCOSE METER
EACH MISCELLANEOUS
Qty: 1 KIT | Refills: 0 | Status: SHIPPED | OUTPATIENT
Start: 2024-02-05 | End: 2024-08-20

## 2024-02-13 NOTE — PROGRESS NOTES
Medication Therapy Management (MTM) Encounter    ASSESSMENT:                            Medication Adherence/Access: No issues identified    Type 2 Diabetes: Patient is meeting A1c goal of < 7%. Would be best to optimize metformin for cardiovascular risk reduction but pt prefers to keep the same. Likely safe to continue current regimen.     Microalbumin: up to date. Last microalbumin was at goal < 30 mg/g. Pt is on ACEI/ARB.  Aspirin: is not indicated in this patient due to other current anticoagulant/antiplatelet therapy  Immunizations: Due for Prevnar 20 , Shingrix series, and annual influenza vaccine. Pt declines  Annual Eye Exam: up to date.     Prior CVA: Patient is on appropriate dose of clopidogrel and high intensity statin which are indicated due to prior CVA    Hypertension: Stable. Patient is meeting blood pressure goal of < 140/90mmHg.     Depression: stable. At goal of PHQ-9 < 5.    Supplements: uncontrolled. Pt needs education on how to use Miralax appropriately.    PLAN:                            1. Use the cap on the Miralax bottle to measure your dose. Start with one-half capful daily and can increase to a full cap daily if needed    2. You can arrange a UPS  of the cologuard kit by calling the number I provided you with. Let me know if this works out okay!    Follow-up: with PCP as advised    SUBJECTIVE/OBJECTIVE:                          Tara Plaza is a 63 year old female called for an initial visit. She was referred to me from Harlan Fuchs MD.      Reason for visit: med review.    Allergies/ADRs: None  Past Medical History: Reviewed in chart  Tobacco: She reports that she has never smoked. She has never used smokeless tobacco.  Alcohol: none    Medication Adherence/Access: based on [employer] fill history, patient appears to be sporadically non-adherent but she says her meds are packaged for her and they are on autofill.  Diabetes   Type 2 Diabetes  Metformin  mg daily    Denies diarrhea or GI upset    Pt tolerating regimen well  Prefers to stay on same dose of metformin since it is working well for her  Current diabetes symptoms: none  Diet/Exercise: not discussed today    Blood sugar monitoring: uses glucometer, fasting readings typically 110-120 mg/dL     Eye exam is up to date  Foot exam: due    Prior CVA:   Atorvastatin 80 mg daily  Clopidogrel 75 mg daily    Pt had ischemic stroke in July 2020  Caused homonymous hemianopia  She is not following with neuro  Not interested in follow-up   Denies abnormal bleeding/bruising or myalgias    Hypertension:   Amlodipine 5 mg daily  Lisinopril 40 mg daily    Patient denies dizziness, dry cough, or edema  Patient does not self-monitor blood pressure.      Depression:   Citalopram 20 mg daily  Working well for depression      2/23/2022    10:33 AM 5/17/2023     2:59 PM 1/25/2024    12:33 PM   PHQ   PHQ-9 Total Score 8 5 4   Q9: Thoughts of better off dead/self-harm past 2 weeks Not at all Not at all Not at all     Supplements:  Miralax 17g once daily if needed  Docusate 100 mg twice daily   Vitamin D 2000 units daily    Pt complaining of constipation  She doesn't understand how to take Miralax since she can't read the instructions  Discussed today    BP Readings from Last 3 Encounters:   01/25/24 118/74   05/17/23 107/70   04/27/22 127/61     Pulse Readings from Last 3 Encounters:   01/25/24 66   05/17/23 67   04/27/22 71     Current Outpatient Medications   Medication Sig    amLODIPine (NORVASC) 5 MG tablet Take 1 tablet (5 mg) by mouth daily    atorvastatin (LIPITOR) 80 MG tablet Take 1 tablet (80 mg) by mouth every evening    blood glucose (NO BRAND SPECIFIED) lancets standard Accu-chek FastClix lancet drums Use to test blood sugar  2  times daily as directed.    blood glucose monitoring (ACCU-CHEK FASTCLIX) lancets USE TO TEST BLOOD SUGAR TWICE DAILY    Blood Glucose Monitoring Suppl (ACCU-CHEK GUIDE) w/Device KIT USE AS DIRECTED TO  MONITOR BLOOD SUGAR    citalopram (CELEXA) 20 MG tablet Take 1 tablet (20 mg) by mouth every morning    clopidogrel (PLAVIX) 75 MG tablet Take one tablet daily    CONTOUR NEXT TEST test strip USE TO TEST BLOOD SUGAR ONCE DAILY BEFORE A MEAL    docusate sodium (COLACE) 100 MG capsule TAKE ONE (1) CAPSULE BY MOUTH TWICE DAILY    lisinopril (ZESTRIL) 40 MG tablet Take 1 tablet (40 mg) by mouth daily    metFORMIN (GLUCOPHAGE) 500 MG tablet TAKE 1/2 TABLET BY MOUTH DAILY WITH BREAKFAST    polyethylene glycol (MIRALAX) 17 GM/Dose powder MIX 17 GM (1 CAPFUL) IN 8 OUNCES WATER OR JUICE AND DRINK ONCE DAILY AS NEEDED    Vitamin D3 (CHOLECALCIFEROL) 25 mcg (1000 units) tablet Take 2 tablets (50 mcg) by mouth daily     No current facility-administered medications for this visit.     Lab Results   Component Value Date    A1C 6.3 (H) 01/25/2024     (H) 05/17/2023     05/17/2023    POTASSIUM 4.1 05/17/2023    GERMAINE 9.5 05/17/2023    CHLORIDE 104 05/17/2023    CO2 27 05/17/2023    BUN 22.0 05/17/2023    CR 0.75 05/17/2023    GFRESTIMATED 89 05/17/2023    CHOL 158 05/17/2023    LDL 67 05/17/2023    HDL 65 05/17/2023    TRIG 129 05/17/2023    UMALCR 7.25 05/17/2023    TSH 1.24 04/25/2022     ----------------  I spent 21 minutes with this patient today. All changes were made via collaborative practice agreement with Harlan Fuchs MD. A copy of the visit note was provided to the patient's provider(s).    A summary of these recommendations was declined by the patient.    Veronique Bobby, PharmD, [facility]  Medication Therapy Management Provider  [phone]    Telemedicine Visit Details  Type of service:  Telephone visit  Start Time: 8:03 AM  End Time: 8:24 AM     Medication Therapy Recommendations  Takes dietary supplements    Current Medication: polyethylene glycol (MIRALAX) 17 GM/Dose powder   Rationale: Does not understand instructions - Adherence - Adherence   Recommendation: Provide Education - MiraLax 17 g Pack   Status:  Accepted - no CPA Needed

## 2024-02-14 ENCOUNTER — VIRTUAL VISIT (OUTPATIENT)
Dept: PHARMACY | Facility: CLINIC | Age: 64
End: 2024-02-14
Payer: COMMERCIAL

## 2024-02-14 DIAGNOSIS — Z78.9 TAKES DIETARY SUPPLEMENTS: ICD-10-CM

## 2024-02-14 DIAGNOSIS — Z86.73 HISTORY OF STROKE: ICD-10-CM

## 2024-02-14 DIAGNOSIS — E11.3291 TYPE 2 DIABETES MELLITUS WITH RIGHT EYE AFFECTED BY MILD NONPROLIFERATIVE RETINOPATHY WITHOUT MACULAR EDEMA, WITHOUT LONG-TERM CURRENT USE OF INSULIN (H): Primary | ICD-10-CM

## 2024-02-14 DIAGNOSIS — F32.4 MAJOR DEPRESSIVE DISORDER WITH SINGLE EPISODE, IN PARTIAL REMISSION (H): ICD-10-CM

## 2024-02-14 DIAGNOSIS — I10 BENIGN ESSENTIAL HYPERTENSION: ICD-10-CM

## 2024-02-14 PROCEDURE — 99207 PR NO CHARGE LOS: CPT | Mod: 93 | Performed by: PHARMACIST

## 2024-02-14 NOTE — Clinical Note
FYI - no changes made today. Did full med review with patient - she gets her meds prepackaged for her and they are on autofill so she thinks that is working well. I talked with her about how to use Miralax correctly and also helped her figure out how to send in her Cologuard kit so she will start working on that too. Thanks for referral! Veronique

## 2024-02-15 NOTE — PATIENT INSTRUCTIONS
Bg Pacheco, it was great talking with you today!     Recommendations from today's MTM visit:                                                      1. Use the cap on the Miralax bottle to measure your dose. Start with one-half capful daily and can increase to a full cap daily if needed    2. You can arrange a UPS  of the cologuard kit by calling the number I provided you with. Let me know if this works out okay!    I value your experience and would be very grateful for your time with providing feedback on our clinic survey. You may receive a survey via email or text message in the next few days.     To schedule another MTM appointment, please call the clinic directly or you may call the MTM scheduling line at [phone] or toll-free at [phone].     My Clinical Pharmacist's contact information:                                                      Please feel free to contact me with any questions or concerns you have.      Veronique Bobby, PharmD, BCACP  [facility]  Phone: [phone]

## 2024-03-10 ENCOUNTER — TELEPHONE (OUTPATIENT)
Dept: FAMILY MEDICINE | Facility: CLINIC | Age: 64
End: 2024-03-10
Payer: MEDICARE

## 2024-03-10 NOTE — TELEPHONE ENCOUNTER
FYI - Status Update    Who is Calling: [facility]    Update: Pharmacy does not dispense the Shingrix vaccine    Does caller want a call/response back: No

## 2024-03-22 DIAGNOSIS — I63.9 ACUTE ISCHEMIC STROKE (H): ICD-10-CM

## 2024-03-22 DIAGNOSIS — E11.9 TYPE 2 DIABETES MELLITUS WITHOUT COMPLICATION, WITHOUT LONG-TERM CURRENT USE OF INSULIN (H): ICD-10-CM

## 2024-03-25 RX ORDER — BLOOD SUGAR DIAGNOSTIC
STRIP MISCELLANEOUS
Qty: 100 STRIP | Refills: 3 | Status: SHIPPED | OUTPATIENT
Start: 2024-03-25

## 2024-04-12 PROCEDURE — 82274 ASSAY TEST FOR BLOOD FECAL: CPT | Performed by: INTERNAL MEDICINE

## 2024-04-17 LAB — HEMOCCULT STL QL IA: NEGATIVE

## 2024-04-22 DIAGNOSIS — E11.9 TYPE 2 DIABETES MELLITUS WITHOUT COMPLICATION, WITHOUT LONG-TERM CURRENT USE OF INSULIN (H): ICD-10-CM

## 2024-04-25 ENCOUNTER — OFFICE VISIT (OUTPATIENT)
Dept: FAMILY MEDICINE | Facility: CLINIC | Age: 64
End: 2024-04-25
Payer: MEDICARE

## 2024-04-25 VITALS
HEIGHT: 64 IN | TEMPERATURE: 98.6 F | SYSTOLIC BLOOD PRESSURE: 114 MMHG | BODY MASS INDEX: 31.51 KG/M2 | RESPIRATION RATE: 14 BRPM | WEIGHT: 184.6 LBS | HEART RATE: 68 BPM | OXYGEN SATURATION: 97 % | DIASTOLIC BLOOD PRESSURE: 68 MMHG

## 2024-04-25 DIAGNOSIS — Z12.31 VISIT FOR SCREENING MAMMOGRAM: ICD-10-CM

## 2024-04-25 DIAGNOSIS — I10 BENIGN ESSENTIAL HYPERTENSION: ICD-10-CM

## 2024-04-25 DIAGNOSIS — I63.9 ACUTE ISCHEMIC STROKE (H): ICD-10-CM

## 2024-04-25 DIAGNOSIS — E11.3291 TYPE 2 DIABETES MELLITUS WITH RIGHT EYE AFFECTED BY MILD NONPROLIFERATIVE RETINOPATHY WITHOUT MACULAR EDEMA, WITHOUT LONG-TERM CURRENT USE OF INSULIN (H): Primary | ICD-10-CM

## 2024-04-25 DIAGNOSIS — E11.9 TYPE 2 DIABETES MELLITUS WITHOUT COMPLICATION, WITHOUT LONG-TERM CURRENT USE OF INSULIN (H): ICD-10-CM

## 2024-04-25 DIAGNOSIS — M25.551 HIP PAIN, RIGHT: ICD-10-CM

## 2024-04-25 LAB — HBA1C MFR BLD: 5.8 % (ref 0–5.6)

## 2024-04-25 PROCEDURE — 36415 COLL VENOUS BLD VENIPUNCTURE: CPT | Performed by: INTERNAL MEDICINE

## 2024-04-25 PROCEDURE — 80048 BASIC METABOLIC PNL TOTAL CA: CPT | Performed by: INTERNAL MEDICINE

## 2024-04-25 PROCEDURE — 80061 LIPID PANEL: CPT | Performed by: INTERNAL MEDICINE

## 2024-04-25 PROCEDURE — G0009 ADMIN PNEUMOCOCCAL VACCINE: HCPCS | Performed by: INTERNAL MEDICINE

## 2024-04-25 PROCEDURE — 90677 PCV20 VACCINE IM: CPT | Performed by: INTERNAL MEDICINE

## 2024-04-25 PROCEDURE — 82570 ASSAY OF URINE CREATININE: CPT | Performed by: INTERNAL MEDICINE

## 2024-04-25 PROCEDURE — 83036 HEMOGLOBIN GLYCOSYLATED A1C: CPT | Performed by: INTERNAL MEDICINE

## 2024-04-25 PROCEDURE — 99214 OFFICE O/P EST MOD 30 MIN: CPT | Mod: 25 | Performed by: INTERNAL MEDICINE

## 2024-04-25 PROCEDURE — 84460 ALANINE AMINO (ALT) (SGPT): CPT | Performed by: INTERNAL MEDICINE

## 2024-04-25 PROCEDURE — 82043 UR ALBUMIN QUANTITATIVE: CPT | Performed by: INTERNAL MEDICINE

## 2024-04-25 RX ORDER — RESPIRATORY SYNCYTIAL VIRUS VACCINE 120MCG/0.5
0.5 KIT INTRAMUSCULAR ONCE
Qty: 1 EACH | Refills: 0 | Status: CANCELLED | OUTPATIENT
Start: 2024-04-25 | End: 2024-04-25

## 2024-04-25 ASSESSMENT — ANXIETY QUESTIONNAIRES
5. BEING SO RESTLESS THAT IT IS HARD TO SIT STILL: NOT AT ALL
GAD7 TOTAL SCORE: 7
IF YOU CHECKED OFF ANY PROBLEMS ON THIS QUESTIONNAIRE, HOW DIFFICULT HAVE THESE PROBLEMS MADE IT FOR YOU TO DO YOUR WORK, TAKE CARE OF THINGS AT HOME, OR GET ALONG WITH OTHER PEOPLE: SOMEWHAT DIFFICULT
1. FEELING NERVOUS, ANXIOUS, OR ON EDGE: SEVERAL DAYS
7. FEELING AFRAID AS IF SOMETHING AWFUL MIGHT HAPPEN: MORE THAN HALF THE DAYS
2. NOT BEING ABLE TO STOP OR CONTROL WORRYING: SEVERAL DAYS
GAD7 TOTAL SCORE: 7
3. WORRYING TOO MUCH ABOUT DIFFERENT THINGS: SEVERAL DAYS
6. BECOMING EASILY ANNOYED OR IRRITABLE: SEVERAL DAYS

## 2024-04-25 ASSESSMENT — PATIENT HEALTH QUESTIONNAIRE - PHQ9
5. POOR APPETITE OR OVEREATING: SEVERAL DAYS
SUM OF ALL RESPONSES TO PHQ QUESTIONS 1-9: 6

## 2024-04-25 ASSESSMENT — PAIN SCALES - GENERAL: PAINLEVEL: NO PAIN (0)

## 2024-04-25 NOTE — PROGRESS NOTES
Patient Quality Outreach    Patient is due for the following:   Breast Cancer Screening - Mammogram    Next Steps:   Patient was scheduled for Mammogram Screening on 06/28/2024 at 1 pm    Type of outreach:    Patient has been scheduled      Questions for provider review:    None           Yajaira Sun MA

## 2024-04-25 NOTE — NURSING NOTE
Prior to immunization administration, verified patients identity using patient s name and date of birth. Please see Immunization Activity for additional information.     Screening Questionnaire for Adult Immunization    Are you sick today?   No   Do you have allergies to medications, food, a vaccine component or latex?   No   Have you ever had a serious reaction after receiving a vaccination?   No   Do you have a long-term health problem with heart, lung, kidney, or metabolic disease (e.g., diabetes), asthma, a blood disorder, no spleen, complement component deficiency, a cochlear implant, or a spinal fluid leak?  Are you on long-term aspirin therapy?   No   Do you have cancer, leukemia, HIV/AIDS, or any other immune system problem?   No   Do you have a parent, brother, or sister with an immune system problem?   No   In the past 3 months, have you taken medications that affect  your immune system, such as prednisone, other steroids, or anticancer drugs; drugs for the treatment of rheumatoid arthritis, Crohn s disease, or psoriasis; or have you had radiation treatments?   No   Have you had a seizure, or a brain or other nervous system problem?   No   During the past year, have you received a transfusion of blood or blood    products, or been given immune (gamma) globulin or antiviral drug?   No   For women: Are you pregnant or is there a chance you could become       pregnant during the next month?   No   Have you received any vaccinations in the past 4 weeks?   No     Immunization questionnaire answers were all negative.      Patient instructed to remain in clinic for 15 minutes afterwards, and to report any adverse reactions.     Screening performed by Yajaira Sun MA on 4/25/2024 at 1:31 PM.

## 2024-04-25 NOTE — PATIENT INSTRUCTIONS
At [facility], we strive to deliver an exceptional experience to you, every time we see you. If you receive a survey, please complete it as we do value your feedback.  If you have MyChart, you can expect to receive results automatically within 24 hours of their completion.  Your provider will send a note interpreting your results as well.   If you do not have MyChart, you should receive your results in about a week by mail.    Your care team:                            Family Medicine Internal Medicine   MD Mario Stevenson, MD Amber Orona, MD Adina Forbes, PA-C    Westley Miner, MD Pediatrics   Yahir Rivera, PA-C  Agata Orellana, MD Micki Ulloa APRPATRICIA Jensen CNP, CNP, MD Sherry Navarrete, MD Meagan Harden, CNP  Same-Day (No follow up visit)   Josiah Chiu, PAULO Cho, PA-C    Violet Mcqueen PA-C     Clinic hours: Monday - Thursday 7 am-6 pm; Fridays 7 am-5 pm.   Urgent care: Monday - Friday 10 am- 8 pm; Saturday and Sunday 9 am-5 pm.    Clinic: [phone]       [facility]: Monday - Thursday 8 am - 7 pm; Friday 8 am - 6 pm  [facility]: [phone]

## 2024-04-25 NOTE — LETTER
April 29, 2024      Tara Plaza  [id number] [address] [place] [address]        Dear Ms.Robinson Plaza,    We are writing to inform you of your test results.    Kidney function tests are normal   No protein leak in the urine   A1c is good at 5.8   Liver function tests are normal   Cholesterol panel is excellent and all the parameters are at goal     Resulted Orders   HEMOGLOBIN A1C   Result Value Ref Range    Hemoglobin A1C 5.8 (H) 0.0 - 5.6 %      Comment:      Normal <5.7%   Prediabetes 5.7-6.4%    Diabetes 6.5% or higher     Note: Adopted from ADA consensus guidelines.   BASIC METABOLIC PANEL   Result Value Ref Range    Sodium 142 135 - 145 mmol/L      Comment:      Reference intervals for this test were updated on 09/26/2023 to more accurately reflect our healthy population. There may be differences in the flagging of prior results with similar values performed with this method. Interpretation of those prior results can be made in the context of the updated reference intervals.     Potassium 4.3 3.4 - 5.3 mmol/L    Chloride 104 98 - 107 mmol/L    Carbon Dioxide (CO2) 27 22 - 29 mmol/L    Anion Gap 11 7 - 15 mmol/L    Urea Nitrogen 23.3 (H) 8.0 - 23.0 mg/dL    Creatinine 0.79 0.51 - 0.95 mg/dL    GFR Estimate 83 >60 mL/min/1.73m2    Calcium 9.8 8.8 - 10.2 mg/dL    Glucose 114 (H) 70 - 99 mg/dL   Lipid panel reflex to direct LDL Non-fasting   Result Value Ref Range    Cholesterol 162 <200 mg/dL    Triglycerides 140 <150 mg/dL    Direct Measure HDL 59 >=50 mg/dL    LDL Cholesterol Calculated 75 <=100 mg/dL    Non HDL Cholesterol 103 <130 mg/dL    Patient Fasting > 8hrs? No     Narrative    Cholesterol  Desirable:  <200 mg/dL    Triglycerides  Normal:  Less than 150 mg/dL  Borderline High:  150-199 mg/dL  High:  200-499 mg/dL  Very High:  Greater than or equal to 500 mg/dL    Direct Measure HDL  Female:  Greater than or equal to 50 mg/dL   Male:  Greater than or equal to 40 mg/dL    LDL Cholesterol  Desirable:   <100mg/dL  Above Desirable:  100-129 mg/dL   Borderline High:  130-159 mg/dL   High:  160-189 mg/dL   Very High:  >= 190 mg/dL    Non HDL Cholesterol  Desirable:  130 mg/dL  Above Desirable:  130-159 mg/dL  Borderline High:  160-189 mg/dL  High:  190-219 mg/dL  Very High:  Greater than or equal to 220 mg/dL   Albumin Random Urine Quantitative with Creat Ratio   Result Value Ref Range    Creatinine Urine mg/dL 295.0 mg/dL      Comment:      The reference ranges have not been established in urine creatinine. The results should be integrated into the clinical context for interpretation.    Albumin Urine mg/L 17.0 mg/L      Comment:      The reference ranges have not been established in urine albumin. The results should be integrated into the clinical context for interpretation.    Albumin Urine mg/g Cr 5.76 0.00 - 25.00 mg/g Cr      Comment:      Microalbuminuria is defined as an albumin:creatinine ratio of 17 to 299 for males and 25 to 299 for females. A ratio of albumin:creatinine of 300 or higher is indicative of overt proteinuria.  Due to biologic variability, positive results should be confirmed by a second, first-morning random or 24-hour timed urine specimen. If there is discrepancy, a third specimen is recommended. When 2 out of 3 results are in the microalbuminuria range, this is evidence for incipient nephropathy and warrants increased efforts at glucose control, blood pressure control, and institution of therapy with an angiotensin-converting-enzyme (ACE) inhibitor (if the patient can tolerate it).     ALT   Result Value Ref Range    ALT 25 0 - 50 U/L       If you have any questions or concerns, please call the clinic at the number listed above.       Sincerely,      Harlan Fuchs MD

## 2024-04-25 NOTE — PROGRESS NOTES
Assessment & Plan     Type 2 diabetes mellitus with right eye affected by mild nonproliferative retinopathy without macular edema, without long-term current use of insulin (H)  Her A1c continues to be good  It is 5.8 today  Continue with metformin to 250 once daily  - HEMOGLOBIN A1C; Future  - Lipid panel reflex to direct LDL Non-fasting; Future  - Albumin Random Urine Quantitative with Creat Ratio; Future  - ALT; Future  - HEMOGLOBIN A1C  - Lipid panel reflex to direct LDL Non-fasting  - Albumin Random Urine Quantitative with Creat Ratio  - ALT    Benign essential hypertension  Blood pressure is under good control  Continue with lisinopril 40 mg  - BASIC METABOLIC PANEL; Future  - BASIC METABOLIC PANEL    Type 2 diabetes mellitus without complication, without long-term current use of insulin (H)    - blood glucose (NO BRAND SPECIFIED) lancets standard; Accu-chek FastClix lancet drums Use to test blood sugar  2  times daily as directed.    Acute ischemic stroke (H)  She had a stroke in 2020 July which has affected her right side of the body and also caused a right homonymous hemianopia   The stroke has affected the left PCA territory  There are also multiple supra and infratentorial chronic appearing lacunar type infarcts  Left PCA was completely occluded  Her blood pressure is good and her lipid panel has also been good in the past  Her risk factors have been optimized  She was being followed by neurology but she does not want to see them anymore  Continue Plavix for life  - blood glucose (NO BRAND SPECIFIED) lancets standard; Accu-chek FastClix lancet drums Use to test blood sugar  2  times daily as directed.    Hip pain, right  Pain in the right hip  Tells me the pain started since her stroke  Examination clearly demonstrates that she has got some osteoarthritis of the right hip  She has pain on internal and external rotation of the hip  - Orthopedic  Referral; Future    Visit for screening  "mammogram    - *MA Screening Digital Bilateral; Future      30 minutes spent by me on the date of the encounter doing chart review, history and exam, documentation and further activities per the note      BMI  Estimated body mass index is 31.69 kg/m  as calculated from the following:    Height as of this encounter: 1.626 m (5' 4\").    Weight as of this encounter: 83.7 kg (184 lb 9.6 oz).   Weight management plan: Discussed healthy diet and exercise guidelines          Candida Pacheco is a 63 year old, presenting for the following health issues:  RECHECK        4/25/2024    12:33 PM   Additional Questions   Roomed by griffin     History of Present Illness       Diabetes:   She presents for follow up of diabetes.  She is checking home blood glucose one time daily.   She checks blood glucose before meals.  Blood glucose is never over 200 and never under 70.  When her blood glucose is low, the patient is asymptomatic for confusion, blurred vision, lethargy and reports not feeling dizzy, shaky, or weak.   She has no concerns regarding her diabetes at this time.   She is not experiencing numbness or burning in feet, excessive thirst, blurry vision, weight changes or redness, sores or blisters on feet.           She eats 2-3 servings of fruits and vegetables daily.She consumes 0 sweetened beverage(s) daily.She exercises with enough effort to increase her heart rate 9 or less minutes per day.  She exercises with enough effort to increase her heart rate 3 or less days per week.   She is taking medications regularly.                 Review of Systems  Constitutional, HEENT, cardiovascular, pulmonary, gi and gu systems are negative, except as otherwise noted.      Objective    /68   Pulse 68   Temp 98.6  F (37  C) (Oral)   Resp 14   Ht 1.626 m (5' 4\")   Wt 83.7 kg (184 lb 9.6 oz)   SpO2 97%   BMI 31.69 kg/m    Body mass index is 31.69 kg/m .  Physical Exam   GENERAL: alert and no distress  EYES: Eyes grossly " normal to inspection, PERRL and conjunctivae and sclerae normal  HENT: ear canals and TM's normal, nose and mouth without ulcers or lesions  NECK: no adenopathy, no asymmetry, masses, or scars  RESP: lungs clear to auscultation - no rales, rhonchi or wheezes  CV: regular rate and rhythm, normal S1 S2, no S3 or S4, no murmur, click or rub, no peripheral edema  ABDOMEN: soft, nontender, no hepatosplenomegaly, no masses and bowel sounds normal  MS: Pain on internal/external  rotation of right hip  SKIN: no suspicious lesions or rashes  NEURO: She has some dysarthria and also right homonymous hemianopia  PSYCH: mentation appears normal, affect normal/bright            Signed Electronically by: Harlan Fuchs MD

## 2024-04-26 DIAGNOSIS — E11.9 TYPE 2 DIABETES MELLITUS WITHOUT COMPLICATION, WITHOUT LONG-TERM CURRENT USE OF INSULIN (H): ICD-10-CM

## 2024-04-26 LAB
ALT SERPL W P-5'-P-CCNC: 25 U/L (ref 0–50)
ANION GAP SERPL CALCULATED.3IONS-SCNC: 11 MMOL/L (ref 7–15)
BUN SERPL-MCNC: 23.3 MG/DL (ref 8–23)
CALCIUM SERPL-MCNC: 9.8 MG/DL (ref 8.8–10.2)
CHLORIDE SERPL-SCNC: 104 MMOL/L (ref 98–107)
CHOLEST SERPL-MCNC: 162 MG/DL
CREAT SERPL-MCNC: 0.79 MG/DL (ref 0.51–0.95)
CREAT UR-MCNC: 295 MG/DL
DEPRECATED HCO3 PLAS-SCNC: 27 MMOL/L (ref 22–29)
EGFRCR SERPLBLD CKD-EPI 2021: 83 ML/MIN/1.73M2
FASTING STATUS PATIENT QL REPORTED: NO
GLUCOSE SERPL-MCNC: 114 MG/DL (ref 70–99)
HDLC SERPL-MCNC: 59 MG/DL
LDLC SERPL CALC-MCNC: 75 MG/DL
MICROALBUMIN UR-MCNC: 17 MG/L
MICROALBUMIN/CREAT UR: 5.76 MG/G CR (ref 0–25)
NONHDLC SERPL-MCNC: 103 MG/DL
POTASSIUM SERPL-SCNC: 4.3 MMOL/L (ref 3.4–5.3)
SODIUM SERPL-SCNC: 142 MMOL/L (ref 135–145)
TRIGL SERPL-MCNC: 140 MG/DL

## 2024-04-29 RX ORDER — LANCETS
EACH MISCELLANEOUS
Refills: 10 | OUTPATIENT
Start: 2024-04-29

## 2024-05-13 DIAGNOSIS — M25.551 RIGHT HIP PAIN: Primary | ICD-10-CM

## 2024-05-13 NOTE — PROGRESS NOTES
[facility]  SPORTS MEDICINE CLINIC VISIT     May 17, 2024        ASSESSMENT & PLAN    63-year-old with prior history of stroke with chronic right-sided extremity symptoms here with right posterior hip/low back pain that is likely lumbar in etiology.  Suspect mostly mechanical low back pain though she does have some localized pain over the sacroiliac joint    Reviewed imaging and assessment with patient in detail  We discussed treatment with Tylenol.  She was given a prescription for tizanidine to use at night that will hopefully make her more comfortable.  Referred to physical therapy  Would like to see her back in 8 weeks.  Sooner if she is having new or worsening symptoms    Vega Murrieta MD  [facility] SPORTS MEDICINE CLINIC [place]    -----  Chief Complaint   Patient presents with    Consult For     Right hip pain       SUBJECTIVE  Tara Plaza is a/an 63 year old female who is seen in consultation at the request of  Harlan Fuchs M.D. for evaluation of right hip pain. Patient had a stroke in 2020 or 2021.     The patient is seen by themselves.  The patient is Right handed    Onset: 3-4 years(s) ago. Patient describes injury as pain since her stroke.   Location of Pain: right hip - posterior  Worsened by: walking  Better with: nothing  Treatments tried: icy hot  Associated symptoms:  lack of balance    Orthopedic/Surgical history: NO  Social History/Occupation: Not working      REVIEW OF SYSTEMS:  Do you have fever, chills, weight loss? No  Do you have any vision problems? No  Do you have any chest pain or edema? No  Do you have any shortness of breath or wheezing?  No  Do you have stomach problems? No  Do you have any numbness or focal weakness? No  Do you have diabetes? Yes, metformin  Do you have problems with bleeding or clotting? No  Do you have an rashes or other skin lesions? No    OBJECTIVE:  There were no vitals taken for this visit.     General: alert, pleasant, no  distress. sitting comfortably in chair  Back/Spine: TTP over the midline of the lower lumbar area as well as the paraspinous musculature on the right.  TTP in the sacroiliac area on the right as well.  Straight leg raise negative bilaterally.  No hamstring tightness noted.  Negative pain in back with ISHAAN testing bilaterally  Neuro: SILT on bilateral lower extremities, can stand on toes and heel walk without difficulty,       RADIOLOGY:    2 view xrays of right hip performed and reviewed independently demonstrating no acute fracture or dislocation.  Enthesophyte of the greater trochanter.. See EMR for formal radiology report.

## 2024-05-17 ENCOUNTER — ANCILLARY PROCEDURE (OUTPATIENT)
Dept: GENERAL RADIOLOGY | Facility: CLINIC | Age: 64
End: 2024-05-17
Attending: FAMILY MEDICINE
Payer: MEDICARE

## 2024-05-17 ENCOUNTER — OFFICE VISIT (OUTPATIENT)
Dept: ORTHOPEDICS | Facility: CLINIC | Age: 64
End: 2024-05-17
Attending: INTERNAL MEDICINE
Payer: MEDICARE

## 2024-05-17 DIAGNOSIS — M25.551 HIP PAIN, RIGHT: ICD-10-CM

## 2024-05-17 DIAGNOSIS — M25.551 RIGHT HIP PAIN: ICD-10-CM

## 2024-05-17 PROCEDURE — 99204 OFFICE O/P NEW MOD 45 MIN: CPT | Performed by: FAMILY MEDICINE

## 2024-05-17 PROCEDURE — 73502 X-RAY EXAM HIP UNI 2-3 VIEWS: CPT | Mod: RT | Performed by: RADIOLOGY

## 2024-05-17 RX ORDER — TIZANIDINE 2 MG/1
2-4 TABLET ORAL 3 TIMES DAILY PRN
Qty: 30 TABLET | Refills: 0 | Status: SHIPPED | OUTPATIENT
Start: 2024-05-17 | End: 2024-06-12

## 2024-05-17 NOTE — LETTER
5/17/2024         RE: Tara Plaza  [id number] [facility] [address]        Dear Colleague,    Thank you for referring your patient, Tara Plaza, to the [facility] SPORTS MEDICINE CLINIC [place]. Please see a copy of my visit note below.      [facility]  SPORTS MEDICINE CLINIC VISIT     May 17, 2024        ASSESSMENT & PLAN    63-year-old with prior history of stroke with chronic right-sided extremity symptoms here with right posterior hip/low back pain that is likely lumbar in etiology.  Suspect mostly mechanical low back pain though she does have some localized pain over the sacroiliac joint    Reviewed imaging and assessment with patient in detail  We discussed treatment with Tylenol.  She was given a prescription for tizanidine to use at night that will hopefully make her more comfortable.  Referred to physical therapy  Would like to see her back in 8 weeks.  Sooner if she is having new or worsening symptoms    Vega Murrieta MD  [facility]    -----  Chief Complaint   Patient presents with     Consult For     Right hip pain       SUBJECTIVE  Tara Plaza is a/an 63 year old female who is seen in consultation at the request of  Harlan Fuchs M.D. for evaluation of right hip pain. Patient had a stroke in 2020 or 2021.     The patient is seen by themselves.  The patient is Right handed    Onset: 3-4 years(s) ago. Patient describes injury as pain since her stroke.   Location of Pain: right hip - posterior  Worsened by: walking  Better with: nothing  Treatments tried: icy hot  Associated symptoms:  lack of balance    Orthopedic/Surgical history: NO  Social History/Occupation: Not working      REVIEW OF SYSTEMS:  Do you have fever, chills, weight loss? No  Do you have any vision problems? No  Do you have any chest pain or edema? No  Do you have any shortness of breath or wheezing?  No  Do you have stomach problems? No  Do  you have any numbness or focal weakness? No  Do you have diabetes? Yes, metformin  Do you have problems with bleeding or clotting? No  Do you have an rashes or other skin lesions? No    OBJECTIVE:  There were no vitals taken for this visit.     General: alert, pleasant, no distress. sitting comfortably in chair  Back/Spine: TTP over the midline of the lower lumbar area as well as the paraspinous musculature on the right.  TTP in the sacroiliac area on the right as well.  Straight leg raise negative bilaterally.  No hamstring tightness noted.  Negative pain in back with ISHAAN testing bilaterally  Neuro: SILT on bilateral lower extremities, can stand on toes and heel walk without difficulty,       RADIOLOGY:    2 view xrays of right hip performed and reviewed independently demonstrating no acute fracture or dislocation.  Enthesophyte of the greater trochanter.. See EMR for formal radiology report.              Again, thank you for allowing me to participate in the care of your patient.        Sincerely,        Vega Murrieta MD

## 2024-05-17 NOTE — PATIENT INSTRUCTIONS
-Follow-up with physical therapy for your low back pain and sacroiliac pain  -Try taking Tylenol 1000 mg at bedtime along with tizanidine  -Follow-up in clinic in 2 months

## 2024-05-21 DIAGNOSIS — K59.01 SLOW TRANSIT CONSTIPATION: ICD-10-CM

## 2024-05-22 RX ORDER — DOCUSATE SODIUM 100 MG/1
CAPSULE, LIQUID FILLED ORAL
Qty: 60 CAPSULE | Refills: 10 | OUTPATIENT
Start: 2024-05-22

## 2024-05-27 DIAGNOSIS — K59.01 SLOW TRANSIT CONSTIPATION: ICD-10-CM

## 2024-05-27 RX ORDER — DOCUSATE SODIUM 100 MG/1
CAPSULE, LIQUID FILLED ORAL
Qty: 60 CAPSULE | Refills: 10 | OUTPATIENT
Start: 2024-05-27

## 2024-06-06 ENCOUNTER — TELEPHONE (OUTPATIENT)
Dept: ORTHOPEDICS | Facility: CLINIC | Age: 64
End: 2024-06-06
Payer: MEDICARE

## 2024-06-06 DIAGNOSIS — M25.551 HIP PAIN, RIGHT: ICD-10-CM

## 2024-06-06 NOTE — TELEPHONE ENCOUNTER
Medication Question or concern regarding medication   Prescription Clarification  Name of Medication: rivanidine??  Prescribing Provider: Dr. Murrieta   [place]arm[place] pharmacy, patient didn't remember name   What on the order needs clarification? Patient received a letter stating her pharmacy cannot refill her medication.    Patient suffered a stroke so has difficulty reading.  The spelling of the medication may not be correct.    Patient does not drive so she needs a pharmacy who will deliver her medication.    Please reach out to patient to discuss other possibilities.    Could we send this information to you in [facility] or would you prefer to receive a phone call?:   Patient would prefer a phone call   Okay to leave a detailed message?: Yes at Home number on file [phone] (home)

## 2024-06-10 NOTE — TELEPHONE ENCOUNTER
Pharmacy refused to fill tizanidine due to insurance only covering the patient taking 3 tablets per day. So the pharmacy recommended changing amount of days to 10 days instead of 5. If that doesn't work, patient would like pharmacy to be sent to [facility] [address] in [place].      Shu Arizmendi MA, ATC

## 2024-06-11 ENCOUNTER — THERAPY VISIT (OUTPATIENT)
Dept: PHYSICAL THERAPY | Facility: CLINIC | Age: 64
End: 2024-06-11
Payer: MEDICARE

## 2024-06-11 DIAGNOSIS — M25.551 HIP PAIN, RIGHT: Primary | ICD-10-CM

## 2024-06-11 PROCEDURE — 97530 THERAPEUTIC ACTIVITIES: CPT | Mod: GP | Performed by: PHYSICAL THERAPIST

## 2024-06-11 PROCEDURE — 97161 PT EVAL LOW COMPLEX 20 MIN: CPT | Mod: GP | Performed by: PHYSICAL THERAPIST

## 2024-06-11 PROCEDURE — 97110 THERAPEUTIC EXERCISES: CPT | Mod: GP | Performed by: PHYSICAL THERAPIST

## 2024-06-11 ASSESSMENT — ACTIVITIES OF DAILY LIVING (ADL)
HOS_ADL_ITEM_SCORE_TOTAL: 29
GOING_DOWN_1_FLIGHT_OF_STAIRS: MODERATE DIFFICULTY
TWISTING/PIVOTING ON INVOLVED LEG: MODERATE DIFFICULTY
PLEASE_INDICATE_YOR_PRIMARY_REASON_FOR_REFERRAL_TO_THERAPY:: HIP
TWISTING/PIVOTING_ON_INVOLVED_LEG: MODERATE DIFFICULTY
LIGHT_TO_MODERATE_WORK: SLIGHT DIFFICULTY
ABILITY_TO_PERFORM_ACTIVITY_WITH_YOUR_NORMAL_TECHNIQUE: EXTREME DIFFICULTY
WALKING_FOR_APPROXIMATELY_10_MINUTES: MODERATE DIFFICULTY
GETTING_INTO_AND_OUT_OF_AN_AVERAGE_CAR: MODERATE DIFFICULTY
SPORTS_TOTAL_ITEM_SCORE: 0
GETTING INTO AND OUT OF AN AVERAGE CAR: MODERATE DIFFICULTY
ADL_COUNT: 17
WALKING_INITIALLY: SLIGHT DIFFICULTY
RUNNING_ONE_MILE: UNABLE TO DO
ADL_SCORE(%): 0
GOING DOWN 1 FLIGHT OF STAIRS: MODERATE DIFFICULTY
STEPPING_UP_AND_DOWN_CURBS: SLIGHT DIFFICULTY
HEAVY_WORK: EXTREME DIFFICULTY
SITTING_FOR_15_MINUTES: NO DIFFICULTY AT ALL
RECREATIONAL_ACTIVITIES: UNABLE TO DO
WALKING_DOWN_STEEP_HILLS: EXTREME DIFFICULTY
RECREATIONAL ACTIVITIES: UNABLE TO DO
PUTTING ON SOCKS AND SHOES: NO DIFFICULTY AT ALL
HOW_WOULD_YOU_RATE_YOUR_CURRENT_LEVEL_OF_FUNCTION_DURING_YOUR_USUAL_ACTIVITIES_OF_DAILY_LIVING_FROM_0_TO_100_WITH_100_BEING_YOUR_LEVEL_OF_FUNCTION_PRIOR_TO_YOUR_HIP_PROBLEM_AND_0_BEING_THE_INABILITY_TO_PERFORM_ANY_OF_YOUR_USUAL_DAILY_ACTIVITIES?: 50
ADL_TOTAL_ITEM_SCORE: 0
SPORTS_SCORE(%): 0
DEEP SQUATTING: UNABLE TO DO
SITTING FOR 15 MINUTES: NO DIFFICULTY AT ALL
HOS_ADL_SCORE(%): 45.31
STEPPING UP AND DOWN CURBS: SLIGHT DIFFICULTY
WALKING_APPROXIMATELY_10_MINUTES: MODERATE DIFFICULTY
HOW_WOULD_YOU_RATE_YOUR_CURRENT_LEVEL_OF_FUNCTION?: ABNORMAL
HOS_ADL_HIGHEST_POTENTIAL_SCORE: 64
HEAVY_WORK: EXTREME DIFFICULTY
SPORTS_HIGHEST_POTENTIAL_SCORE: 36
ABILITY_TO_PARTICIPATE_IN_YOUR_DESIRED_SPORT_AS_LONG_AS_YOU_WOULD_LIKE: UNABLE TO DO
WALKING_INITIALLY: SLIGHT DIFFICULTY
STANDING_FOR_15_MINUTES: SLIGHT DIFFICULTY
LANDING: EXTREME DIFFICULTY
LOW_IMPACT_ACTIVITIES_LIKE_FAST_WALKING: EXTREME DIFFICULTY
SPORTS_COUNT: 9
HOW_WOULD_YOU_RATE_YOUR_CURRENT_LEVEL_OF_FUNCTION_DURING_YOUR_USUAL_ACTIVITIES_OF_DAILY_LIVING_FROM_0_TO_100_WITH_100_BEING_YOUR_LEVEL_OF_FUNCTION_PRIOR_TO_YOUR_HIP_PROBLEM_AND_0_BEING_THE_INABILITY_TO_PERFORM_ANY_OF_YOUR_USUAL_DAILY_ACTIVITIES?: 50
WALKING_15_MINUTES_OR_GREATER: MODERATE DIFFICULTY
GOING_UP_1_FLIGHT_OF_STAIRS: MODERATE DIFFICULTY
WALKING_15_MINUTES_OR_GREATER: MODERATE DIFFICULTY
HOW_WOULD_YOU_RATE_YOUR_CURRENT_LEVEL_OF_FUNCTION_DURING_YOUR_SPORTS_RELATED_ACTIVITIES_FROM_0_TO_100_WITH_100_BEING_YOUR_LEVEL_OF_FUNCTION_PRIOR_TO_YOUR_HIP_PROBLEM_AND_0_BEING_THE_INABILITY_TO_PERFORM_ANY_OF_YOUR_USUAL_DAILY_ACTIVITIES?: 50
GOING UP 1 FLIGHT OF STAIRS: MODERATE DIFFICULTY
STANDING FOR 15 MINUTES: SLIGHT DIFFICULTY
ROLLING_OVER_IN_BED: MODERATE DIFFICULTY
WALKING_UP_STEEP_HILLS: EXTREME DIFFICULTY
DEEP_SQUATTING: UNABLE TO DO
LIGHT_TO_MODERATE_WORK: SLIGHT DIFFICULTY
PUTTING_ON_SOCKS_AND_SHOES: NO DIFFICULTY AT ALL
WALKING_UP_STEEP_HILLS: EXTREME DIFFICULTY
WALKING_DOWN_STEEP_HILLS: EXTREME DIFFICULTY
JUMPING: UNABLE TO DO
STARTING_AND_STOPPING_QUICKLY: MODERATE DIFFICULTY
ADL_HIGHEST_POTENTIAL_SCORE: 68
ROLLING OVER IN BED: MODERATE DIFFICULTY

## 2024-06-11 NOTE — PROGRESS NOTES
"PHYSICAL THERAPY EVALUATION  Type of Visit: Evaluation    See electronic medical record for Abuse and Falls Screening details.    Subjective Pt presents with c/o Right hip and back pain since she experienced a stroke 2020 or 2021.Pain from LB,hip into lR eg. She states the stroke/brain injury has limited her reading and ability to complete other activities. She has a cane but doesn't use it, had PT a few years ago for LBP. Pt is not able to work and spends most of her day sitting. Pain keeps her awake at night.      Presenting condition or subjective complaint: hip pain  Date of onset: 06/11/24    Relevant medical history: Depression; High blood pressure; Stroke   Dates & types of surgery:      Prior diagnostic imaging/testing results: X-ray     Prior therapy history for the same diagnosis, illness or injury:        Prior Level of Function  Transfers:   Ambulation:   ADL:   IADL:     Living Environment  Social support: Alone   Type of home: Apartment/condo   Stairs to enter the home: Yes       Ramp: No   Stairs inside the home: No       Help at home: Other  Equipment owned: Straight Cane     Employment: No    Hobbies/Interests: watching tv and would like to go on walks    Patient goals for therapy: walk with soreness and sleep without pain    Pain assessment: Pain present     Objective   HIP EVALUATION  PAIN: Pain Level at Rest: 4/10  Pain Level with Use: 8/10  Pain Location: hip  Pain Quality: Aching  Pain Frequency: constant  Pain is Worst: same  Pain is Exacerbated By: lying down, moving too much  Pain is Relieved By: rest  Pain Progression: Worsened  INTEGUMENTARY (edema, incisions):   POSTURE:  rounded shoulders  GAIT:   Weightbearing Status:   Assistive Device(s): has a cane, but does not use it very often  Gait Deviations: pain, small steps, unsteady  BALANCE/PROPRIOCEPTION:  unable to stand on R with EO, Left 5\" , unable to walk on heels or toes, unsteady with transfers and slow gait    WEIGHTBEARING " ALIGNMENT:   NON-WEIGHTBEARING ALIGNMENT:    ROM:   (Degrees) Left AROM Left PROM  Right AROM Right PROM   Hip Flexion   wnl    Hip Extension       Hip Abduction   wnl    Hip Adduction       Hip Internal Rotation   wnl    Hip External Rotation   wnl    Knee Flexion   wnl    Knee Extension   wnl    Lumbar Side glide     Lumbar Flexion Wnl, some R LBP   Lumbar Extension Wnl, -   Pain:   End feel:     PELVIC/SI SCREEN:   STRENGTH:   Pain: - none + mild ++ moderate +++ severe  Strength Scale: 0-5/5 Left Right   Hip Flexion 4+ 4-   Hip Extension     Hip Abduction 4 4-   Hip Adduction 4 4-   Hip Internal Rotation     Hip External Rotation     Knee Flexion 4+ 4-   Knee Extension 4 4-     LE FLEXIBILITY:   SPECIAL TESTS:    Left Right   ISHAAN Negative  Negative    FADIR/Labrum/ABDULKADIR Negative  Negative    Femoral Nerve     Yoselin's     Piriformis     Quadrant Testing     SLR Negative  Negative    Slump Negative  Negative    Stork with Extension     Paolo            FUNCTIONAL TESTS: Double Leg Squat: Anterior knee translation, Knee valgus, Hip internal rotation, and Improper use of glutes/hips  Single Leg Squat: Anterior knee translation, Knee valgus, Hip internal rotation, and Improper use of glutes/hips  PALPATION:  TTP R SIJ area  JOINT MOBILITY:     Assessment & Plan   CLINICAL IMPRESSIONS  Medical Diagnosis: hip/back pain    Treatment Diagnosis: hip, back and right leg pain and weakness   Impression/Assessment: Patient is a 63 year old female with R LB, hip and R leg complaints.  The following significant findings have been identified: Pain, Decreased ROM/flexibility, Decreased strength, Impaired balance, and Decreased proprioception. These impairments interfere with their ability to perform recreational activities, household chores, household mobility, and community mobility as compared to previous level of function.     Clinical Decision Making (Complexity):  Clinical Presentation: Stable/Uncomplicated  Clinical  Presentation Rationale: based on medical and personal factors listed in PT evaluation  Clinical Decision Making (Complexity): Low complexity    PLAN OF CARE  Treatment Interventions:  Interventions: Manual Therapy, Neuromuscular Re-education, Therapeutic Activity, Therapeutic Exercise    Long Term Goals            Frequency of Treatment: 2x/month x 3 months  Duration of Treatment: 3 months    Recommended Referrals to Other Professionals: Physical Therapy  Education Assessment:        Risks and benefits of evaluation/treatment have been explained.   Patient/Family/caregiver agrees with Plan of Care.     Evaluation Time:             Signing Clinician: Shwetha Culp PT      [facility]                                                                                   OUTPATIENT PHYSICAL THERAPY      PLAN OF TREATMENT FOR OUTPATIENT REHABILITATION   Patient's Last Name, First Name, M.JERILYNMiah Rg BolaTara    YOB: 1960   Provider's Name   [facility]   Medical Record No.  [id number]     Onset Date: 06/11/24  Start of Care Date: 06/11/24     Medical Diagnosis:  hip/back pain      PT Treatment Diagnosis:  hip, back and right leg pain and weakness Plan of Treatment  Frequency/Duration: 2x/month x 3 months/ 3 months    Certification date from 6/11/2024 to 09/03/24         See note for plan of treatment details and functional goals     Shwetha Culp PT                         I CERTIFY THE NEED FOR THESE SERVICES FURNISHED UNDER        THIS PLAN OF TREATMENT AND WHILE UNDER MY CARE     (Physician attestation of this document indicates review and certification of the therapy plan).              Referring Provider:  Vega Murrieta    Initial Assessment  See Epic Evaluation- Start of Care Date: 06/11/24

## 2024-06-12 RX ORDER — TIZANIDINE 2 MG/1
2-4 TABLET ORAL 3 TIMES DAILY PRN
Qty: 30 TABLET | Refills: 0 | Status: SHIPPED | OUTPATIENT
Start: 2024-06-12 | End: 2024-07-17

## 2024-06-17 ENCOUNTER — THERAPY VISIT (OUTPATIENT)
Dept: PHYSICAL THERAPY | Facility: CLINIC | Age: 64
End: 2024-06-17
Payer: MEDICARE

## 2024-06-17 DIAGNOSIS — M25.551 HIP PAIN, RIGHT: Primary | ICD-10-CM

## 2024-06-17 PROCEDURE — 97530 THERAPEUTIC ACTIVITIES: CPT | Mod: GP | Performed by: PHYSICAL THERAPIST

## 2024-06-17 PROCEDURE — 97110 THERAPEUTIC EXERCISES: CPT | Mod: GP | Performed by: PHYSICAL THERAPIST

## 2024-06-24 ENCOUNTER — THERAPY VISIT (OUTPATIENT)
Dept: PHYSICAL THERAPY | Facility: CLINIC | Age: 64
End: 2024-06-24
Payer: MEDICARE

## 2024-06-24 DIAGNOSIS — M25.551 HIP PAIN, RIGHT: Primary | ICD-10-CM

## 2024-06-24 PROCEDURE — 97110 THERAPEUTIC EXERCISES: CPT | Mod: GP | Performed by: PHYSICAL THERAPIST

## 2024-06-24 PROCEDURE — 97530 THERAPEUTIC ACTIVITIES: CPT | Mod: GP | Performed by: PHYSICAL THERAPIST

## 2024-06-26 DIAGNOSIS — K59.01 SLOW TRANSIT CONSTIPATION: ICD-10-CM

## 2024-06-26 RX ORDER — DOCUSATE SODIUM 100 MG/1
100 CAPSULE, LIQUID FILLED ORAL 2 TIMES DAILY
Qty: 180 CAPSULE | Refills: 2 | Status: SHIPPED | OUTPATIENT
Start: 2024-06-26

## 2024-07-16 ENCOUNTER — THERAPY VISIT (OUTPATIENT)
Dept: PHYSICAL THERAPY | Facility: CLINIC | Age: 64
End: 2024-07-16
Payer: MEDICARE

## 2024-07-16 ENCOUNTER — TELEPHONE (OUTPATIENT)
Dept: FAMILY MEDICINE | Facility: CLINIC | Age: 64
End: 2024-07-16

## 2024-07-16 DIAGNOSIS — M25.551 HIP PAIN, RIGHT: ICD-10-CM

## 2024-07-16 DIAGNOSIS — M25.551 HIP PAIN, RIGHT: Primary | ICD-10-CM

## 2024-07-16 PROCEDURE — 97530 THERAPEUTIC ACTIVITIES: CPT | Mod: GP | Performed by: PHYSICAL THERAPIST

## 2024-07-16 PROCEDURE — 97110 THERAPEUTIC EXERCISES: CPT | Mod: GP | Performed by: PHYSICAL THERAPIST

## 2024-07-16 NOTE — TELEPHONE ENCOUNTER
Carola  with [employer] (no CTC on file) calling with patient on the line wanting more information regarding tizanidine.     Pt gives verbal permission to speak with Carola regarding any medical information.       RN explain to pt and Carola that tizanidine was last prescribed by Dr. Murrieta with Sports Medicine. RN advise they call his office for further questions. Pt explains that she called wanting to request a refill, but was told she was calling the wrong place.     Pt requesting PCP figure out the issue. Again, RN encouraged patient to speak with Sports Medicine as it was originally prescribed by them. Carola did let pt know that she will help her with calling Sports Medicine. Pt verbalized understanding and she will call with Carola to speak with Dr. Murrieta's office.     No further questions/concerns.         Lillie Copeland RN    [facility]

## 2024-07-16 NOTE — TELEPHONE ENCOUNTER
Patient is asking for a call from Dr. Murrieta regarding her muscle relaxer medication prescription, please call patient at [phone], any time, okay to leave detailed msg.

## 2024-07-17 RX ORDER — TIZANIDINE 2 MG/1
2-4 TABLET ORAL 3 TIMES DAILY PRN
Qty: 30 TABLET | Refills: 0 | Status: SHIPPED | OUTPATIENT
Start: 2024-07-17 | End: 2024-09-23

## 2024-07-17 NOTE — TELEPHONE ENCOUNTER
Called patient back and was unable to leave voicemail.       tiZANidine (ZANAFLEX) 2 MG tablet prescribed on 6/12/24.  LOV: 5/17/24.      Shu Arizmendi MA, ATC

## 2024-07-23 ENCOUNTER — THERAPY VISIT (OUTPATIENT)
Dept: PHYSICAL THERAPY | Facility: CLINIC | Age: 64
End: 2024-07-23
Payer: MEDICARE

## 2024-07-23 DIAGNOSIS — M25.551 HIP PAIN, RIGHT: Primary | ICD-10-CM

## 2024-07-23 PROCEDURE — 97530 THERAPEUTIC ACTIVITIES: CPT | Mod: GP | Performed by: PHYSICAL THERAPIST

## 2024-07-23 PROCEDURE — 97110 THERAPEUTIC EXERCISES: CPT | Mod: GP | Performed by: PHYSICAL THERAPIST

## 2024-08-08 ENCOUNTER — THERAPY VISIT (OUTPATIENT)
Dept: PHYSICAL THERAPY | Facility: CLINIC | Age: 64
End: 2024-08-08
Payer: MEDICARE

## 2024-08-08 DIAGNOSIS — M25.551 HIP PAIN, RIGHT: Primary | ICD-10-CM

## 2024-08-08 PROCEDURE — 97110 THERAPEUTIC EXERCISES: CPT | Mod: GP | Performed by: PHYSICAL THERAPIST

## 2024-08-09 ENCOUNTER — OFFICE VISIT (OUTPATIENT)
Dept: OPHTHALMOLOGY | Facility: CLINIC | Age: 64
End: 2024-08-09
Payer: MEDICARE

## 2024-08-09 DIAGNOSIS — H52.4 PRESBYOPIA: ICD-10-CM

## 2024-08-09 DIAGNOSIS — Z96.1 PSEUDOPHAKIA OF BOTH EYES: ICD-10-CM

## 2024-08-09 DIAGNOSIS — H53.461 RIGHT HOMONYMOUS HEMIANOPSIA: ICD-10-CM

## 2024-08-09 DIAGNOSIS — H02.834 DERMATOCHALASIS OF BOTH UPPER EYELIDS: ICD-10-CM

## 2024-08-09 DIAGNOSIS — E11.9 TYPE 2 DIABETES MELLITUS WITHOUT RETINOPATHY (H): Primary | ICD-10-CM

## 2024-08-09 DIAGNOSIS — H15.833 POSTERIOR STAPHYLOMA, BILATERAL: ICD-10-CM

## 2024-08-09 DIAGNOSIS — H02.831 DERMATOCHALASIS OF BOTH UPPER EYELIDS: ICD-10-CM

## 2024-08-09 PROCEDURE — 92015 DETERMINE REFRACTIVE STATE: CPT | Mod: GY | Performed by: OPTOMETRIST

## 2024-08-09 PROCEDURE — 92014 COMPRE OPH EXAM EST PT 1/>: CPT | Performed by: OPTOMETRIST

## 2024-08-09 ASSESSMENT — VISUAL ACUITY
OS_PH_CC: 20/40
OS_PH_CC+: +1
OS_CC+: -2
METHOD: SNELLEN - LINEAR
OS_CC: 20/40
CORRECTION_TYPE: GLASSES
OD_CC: 20/400
OD_PH_CC: 20/250

## 2024-08-09 ASSESSMENT — REFRACTION_WEARINGRX
OD_SPHERE: -0.50
OD_ADD: +3.25
OD_CYLINDER: +1.00
OS_CYLINDER: +0.25
OS_SPHERE: -0.50
OD_AXIS: 011
SPECS_TYPE: PAL
OS_ADD: +3.25
OS_AXIS: 114

## 2024-08-09 ASSESSMENT — SLIT LAMP EXAM - LIDS
COMMENTS: 2+ DERMATOCHALASIS
COMMENTS: 2+ DERMATOCHALASIS

## 2024-08-09 ASSESSMENT — REFRACTION_MANIFEST
OS_SPHERE: -0.25
OD_CYLINDER: +0.75
OS_CYLINDER: +0.75
OD_AXIS: 013
OD_SPHERE: -0.50
OS_AXIS: 065
OS_ADD: +3.25
OD_ADD: +3.25

## 2024-08-09 ASSESSMENT — TONOMETRY
OS_IOP_MMHG: 16
IOP_METHOD: ICARE
OD_IOP_MMHG: 12

## 2024-08-09 ASSESSMENT — EXTERNAL EXAM - RIGHT EYE: OD_EXAM: NORMAL

## 2024-08-09 ASSESSMENT — CONF VISUAL FIELD
OS_INFERIOR_NASAL_RESTRICTION: 1
OD_INFERIOR_TEMPORAL_RESTRICTION: 1
OS_SUPERIOR_NASAL_RESTRICTION: 1
METHOD: COUNTING FINGERS
OD_INFERIOR_NASAL_RESTRICTION: 3
OD_SUPERIOR_TEMPORAL_RESTRICTION: 1

## 2024-08-09 ASSESSMENT — EXTERNAL EXAM - LEFT EYE: OS_EXAM: NORMAL

## 2024-08-09 ASSESSMENT — CUP TO DISC RATIO
OD_RATIO: 0.3
OS_RATIO: 0.3

## 2024-08-09 NOTE — NURSING NOTE
Chief Complaints and History of Present Illnesses   Patient presents with    Diabetic Eye Exam     Chief Complaint(s) and History of Present Illness(es)       Diabetic Eye Exam               Comments    Pt returns for her annual diabetic eye exam. She denies significant vision changes each eye. She states that her eyes are comfortable most of the time. She does have FBS for which she uses artificial tears. She does check her blood sugar every morning. She does not recall what it was, but it was in the normal range. H/O complete homonymous right hemianopsia both eyes.  Lab Results       Component                Value               Date                       A1C                      5.8                 04/25/2024                 A1C                      6.3                 01/25/2024                 A1C                      6.1                 05/17/2023                 A1C                      5.9                 04/25/2022                 A1C                      5.6                 11/26/2021

## 2024-08-09 NOTE — PROGRESS NOTES
Assessment/Plan  (E11.9) Type 2 diabetes mellitus without retinopathy (H)  (primary encounter diagnosis)  Plan: Discussed findings with patient. Encouraged continued blood glucose, pressure, and lipid control. Patient should continue following recommendations of primary care provider. Patient should plan on returning to clinic annually for a dilated eye exam but was encouraged to return to clinic sooner with new flashes/floaters or other vision changes.     (H53.461) Right homonymous hemianopsia  Comment: Following PCA stroke. Longstanding.   Plan: Monitor. Return to clinic with any vision change.s     (Z96.1) Pseudophakia of both eyes  Comment: History of YAG   Plan: Monitor.     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   Plan: Monitor. Return to clinic if vision changes.     (H02.831,  H02.834) Dermatochalasis of both upper eyelids  Comment: Likely beginning to impact vision.   Plan: Hot compresses with lid massage recommended for tearing. Consider lid evaluation if symptoms do not improve.     (H52.4) Presbyopia  Plan: Discussed findings with patient. New spectacle prescription dispensed to patient. Patient is welcome to return to clinic with prolonged adaptation difficulties.     Complete documentation of historical and exam elements from today's encounter can  be found in the full encounter summary report (not reduplicated in this progress  note). I personally obtained the chief complaint(s) and history of present illness. I  confirmed and edited as necessary the review of systems, past medical/surgical  history, family history, social history, and examination findings as documented by  others; and I examined the patient myself. I personally reviewed the relevant tests,  images, and reports as documented above. I formulated and edited as necessary the  assessment and plan and discussed the findings and management plan with the  patient and family.    Trenton Deluca OD

## 2024-08-12 DIAGNOSIS — I63.9 ACUTE ISCHEMIC STROKE (H): ICD-10-CM

## 2024-08-12 DIAGNOSIS — E11.9 TYPE 2 DIABETES MELLITUS WITHOUT COMPLICATION, WITHOUT LONG-TERM CURRENT USE OF INSULIN (H): ICD-10-CM

## 2024-08-12 RX ORDER — LANCETS
EACH MISCELLANEOUS
Qty: 200 EACH | Refills: 1 | Status: SHIPPED | OUTPATIENT
Start: 2024-08-12

## 2024-08-12 NOTE — TELEPHONE ENCOUNTER
Medication Question or Refill        What medication are you calling about (include dose and sig)?: lANCETS    Preferred Pharmacy:    [facility] [place] - [address]  [address] [facility] [address]  Phone: [phone] Fax: [phone]      Controlled Substance Agreement on file:   CSA -- Patient Level:    CSA: None found at the patient level.       Who prescribed the medication?: VAKA    Do you need a refill? Yes    When did you use the medication last? N/A    Patient offered an appointment? No    Do you have any questions or concerns?  No      Okay to leave a detailed message?: Yes at Cell number on file:    Telephone Information:   Mobile [phone]

## 2024-08-19 DIAGNOSIS — I63.9 ACUTE ISCHEMIC STROKE (H): ICD-10-CM

## 2024-08-19 DIAGNOSIS — E11.9 TYPE 2 DIABETES MELLITUS WITHOUT COMPLICATION, WITHOUT LONG-TERM CURRENT USE OF INSULIN (H): ICD-10-CM

## 2024-08-20 ENCOUNTER — THERAPY VISIT (OUTPATIENT)
Dept: PHYSICAL THERAPY | Facility: CLINIC | Age: 64
End: 2024-08-20
Payer: MEDICARE

## 2024-08-20 DIAGNOSIS — K59.01 SLOW TRANSIT CONSTIPATION: ICD-10-CM

## 2024-08-20 DIAGNOSIS — M25.551 HIP PAIN, RIGHT: Primary | ICD-10-CM

## 2024-08-20 PROCEDURE — 97110 THERAPEUTIC EXERCISES: CPT | Mod: GP | Performed by: PHYSICAL THERAPIST

## 2024-08-20 RX ORDER — BLOOD-GLUCOSE METER
EACH MISCELLANEOUS
Qty: 1 KIT | Refills: 0 | Status: SHIPPED | OUTPATIENT
Start: 2024-08-20

## 2024-08-20 RX ORDER — POLYETHYLENE GLYCOL 3350 17 G/17G
POWDER, FOR SOLUTION ORAL
Qty: 510 G | Refills: 2 | Status: SHIPPED | OUTPATIENT
Start: 2024-08-20

## 2024-08-20 NOTE — PROGRESS NOTES
PLAN  Pt will return to MD for further evaluation    Beginning/End Dates of Progress Note Reporting Period:  6/20/2024 to 08/20/2024    Referring Provider:  Vega Murrieta

## 2024-08-22 NOTE — PROGRESS NOTES
08/20/24 0500   Appointment Info   Signing clinician's name / credentials Shwetha Culp PT   Total/Authorized Visits E&T - 6   Visits Used 7   Medical Diagnosis hip pain   PT Tx Diagnosis hip, back and right leg pain and weakness   Precautions/Limitations CVA '20 or '21   Progress Note/Certification   Start of Care Date 06/11/24   Onset of illness/injury or Date of Surgery 06/11/24   Therapy Frequency 2x/month x 3 months   Predicted Duration 3 months   Certification date from 06/11/24   Certification date to 09/03/24   Progress Note Completed Date 08/20/24   PT Goal 1   Goal Description Pt will walk x 30 min with PL 2/10   Rationale to maximize safety and independence with performance of ADLs and functional tasks;to maximize safety and independence within the home;to maximize safety and independence within the community;to maximize safety and independence with transportation;to maximize safety and independence with self cares   Goal Progress pt hasn't walked outside due to the rain, she is able to walk in her building for short distances using her cane.  PL 5/10   Target Date 09/03/24   Subjective Report   Subjective Report min change in right sided pain. muscle relaxer at night plus Tyenol and min change. more pain hip and lateral knee, arm pain - hurts and feel swollen. Pt feels everything hurts more the past few weeks.   Objective Measures   Objective Measures Objective Measure 1;Objective Measure 2;Objective Measure 3   Objective Measure 1   Objective Measure flexibility   Details HS, quads reduced flexibility   Objective Measure 2   Objective Measure endurance   Details UBE - 6 min, tired at end of the session, but not more sore   Objective Measure 3   Objective Measure Lumbar AROM   Details flexion - mid shin, + thoracic mm on R, extension 50% , pain in the right leg and thoracic mm.   Treatment Interventions (PT)   Interventions Therapeutic Procedure/Exercise;Therapeutic Activity   Therapeutic  "Procedure/Exercise   Therapeutic Procedures: strength, endurance, ROM, flexibility minutes (51322) 25   Therapeutic Procedures Ther Proc 2;Ther Proc 3;Ther Proc 4;Ther Proc 5   Ther Proc 1 bridge #1   Ther Proc 1 - Details 10x5\"   Ther Proc 2 roll in   Ther Proc 2 - Details seated, towel roll , x 10   Ther Proc 3 satnding hip abduction   Ther Proc 3 - Details 10x each side at barre   Ther Proc 4 squats holding onto barre   Ther Proc 4 - Details 10x   Ther Proc 5 hip roll/knees side to side   Ther Proc 5 - Details 1-2 min, relieved hip pain   PTRx Ther Proc 1 Toe Raises   PTRx Ther Proc 1 - Details 20x, holding on to barre   PTRx Ther Proc 2 lateral side step   PTRx Ther Proc 2 - Details 2 min, sore hip mm, able to do more today, and follow the line   PTRx Ther Proc 3 Standing Extension   PTRx Ther Proc 3 - Details lumbar ext, 10x, 5x/day   Skilled Intervention printed out PTRX, pt states  will help her get on to her computer so she can watch videos.   Patient Response/Progress pt able to complete without increase in pain   Therapeutic Activity   Therapeutic Activities: dynamic activities to improve functional performance minutes (71477) 7   Therapeutic Activities Ther Act 2;Ther Act 3;Ther Act 4   Ther Act 1 rev HEP, pt education re: walking daily   Ther Act 1 - Details tips to walk in hallway, lateral side step or walk outside with her kids   Ther Act 2 rev use of pedal machine on table for UE   Ther Act 2 - Details pt will try at home   Ther Act 3 UBE   Ther Act 3 - Details 6 min, forward/back   Ther Act 4 leg press, seat at 3   Ther Act 4 - Details 3 plates, 20 reps, felt good on knee   Skilled Intervention pt education, above and discussed her meds, reviewed label of muscle relaxer as she can't read   Patient Response/Progress pt demonstrates understanding   Education   Learner/Method Patient;Listening;Demonstration;Pictures/Video   Education Comments printed ptrx   Plan   Home program walking and HEP "   Updates to plan of care added rows with theraband for posture   Plan for next session pt will return to MD   Comments   Comments right sided weakness from stroke a few years ago   Total Session Time   Timed Code Treatment Minutes 32   Total Treatment Time (sum of timed and untimed services) 32     PLAN  Continue therapy per current plan of care.    Beginning/End Dates of Progress Note Reporting Period:  08/20/24 to 08/20/2024    Referring Provider:  Vega Murrieta

## 2024-09-23 DIAGNOSIS — M25.551 HIP PAIN, RIGHT: ICD-10-CM

## 2024-09-23 NOTE — TELEPHONE ENCOUNTER
Prescription refill requested for:   tiZANidine (ZANAFLEX) 2 MG tablet       Last Written Prescription Date:  7/17/24  Last Fill Quantity: 30,   # refills: 0  Last Office Visit: 5/17/24  Future Office visit:           Shu Arizmendi ATC    no

## 2024-09-24 RX ORDER — TIZANIDINE 2 MG/1
2-4 TABLET ORAL 3 TIMES DAILY PRN
Qty: 30 TABLET | Refills: 0 | Status: SHIPPED | OUTPATIENT
Start: 2024-09-24

## 2024-10-19 DIAGNOSIS — E11.9 TYPE 2 DIABETES MELLITUS WITHOUT COMPLICATION, WITHOUT LONG-TERM CURRENT USE OF INSULIN (H): ICD-10-CM

## 2024-10-21 RX ORDER — LANCETS
EACH MISCELLANEOUS
Qty: 102 EACH | Refills: 1 | Status: SHIPPED | OUTPATIENT
Start: 2024-10-21

## 2024-11-08 DIAGNOSIS — M25.551 HIP PAIN, RIGHT: ICD-10-CM

## 2024-11-08 NOTE — CONFIDENTIAL NOTE
Medication Refill Request    Has the patient contacted the pharmacy for the refill? Yes   Name of medication being requested: tiZANidine (ZANAFLEX) 2 MG tablet   Provider who prescribed the medication: [facility]  Pharmacy: [facility]   Date medication is needed: 11/15/24      Could we send this information to you in [place] or would you prefer to receive a phone call?:   Patient would prefer a phone call   Okay to leave a detailed message?: Yes at Other phone number: [phone]

## 2024-11-08 NOTE — TELEPHONE ENCOUNTER
Prescription refill requested for:   tiZANidine (ZANAFLEX) 2 MG tablet       Last Written Prescription Date:  9/24/24  Last Fill Quantity: 30,   # refills: 0  Last Office Visit: 5/17/24  Future Office visit:           Shu Arizmendi ATC

## 2024-11-18 DIAGNOSIS — K59.01 SLOW TRANSIT CONSTIPATION: ICD-10-CM

## 2024-11-18 RX ORDER — TIZANIDINE 2 MG/1
2-4 TABLET ORAL 3 TIMES DAILY PRN
Qty: 30 TABLET | Refills: 0 | Status: SHIPPED | OUTPATIENT
Start: 2024-11-18

## 2024-11-19 RX ORDER — POLYETHYLENE GLYCOL 3350 17 G/17G
POWDER, FOR SOLUTION ORAL
Qty: 510 G | Refills: 10 | Status: SHIPPED | OUTPATIENT
Start: 2024-11-19

## 2024-12-18 DIAGNOSIS — F33.41 RECURRENT MAJOR DEPRESSIVE DISORDER, IN PARTIAL REMISSION (H): ICD-10-CM

## 2024-12-18 DIAGNOSIS — I63.9 ACUTE ISCHEMIC STROKE (H): ICD-10-CM

## 2024-12-18 DIAGNOSIS — I10 BENIGN ESSENTIAL HYPERTENSION: ICD-10-CM

## 2024-12-18 DIAGNOSIS — E11.9 TYPE 2 DIABETES MELLITUS WITHOUT COMPLICATION, WITHOUT LONG-TERM CURRENT USE OF INSULIN (H): ICD-10-CM

## 2024-12-18 DIAGNOSIS — Z86.73 HISTORY OF STROKE: ICD-10-CM

## 2024-12-19 RX ORDER — LISINOPRIL 40 MG/1
40 TABLET ORAL DAILY
Qty: 90 TABLET | Refills: 0 | Status: SHIPPED | OUTPATIENT
Start: 2024-12-19

## 2024-12-19 RX ORDER — LANCETS
EACH MISCELLANEOUS
Qty: 200 EACH | Refills: 0 | Status: SHIPPED | OUTPATIENT
Start: 2024-12-19

## 2024-12-19 RX ORDER — CLOPIDOGREL BISULFATE 75 MG/1
TABLET ORAL
Qty: 90 TABLET | Refills: 0 | Status: SHIPPED | OUTPATIENT
Start: 2024-12-19

## 2024-12-19 RX ORDER — AMLODIPINE BESYLATE 5 MG/1
5 TABLET ORAL DAILY
Qty: 90 TABLET | Refills: 0 | Status: SHIPPED | OUTPATIENT
Start: 2024-12-19

## 2024-12-19 RX ORDER — CITALOPRAM HYDROBROMIDE 20 MG/1
20 TABLET ORAL EVERY MORNING
Qty: 90 TABLET | Refills: 0 | Status: SHIPPED | OUTPATIENT
Start: 2024-12-19

## 2024-12-19 RX ORDER — ATORVASTATIN CALCIUM 80 MG/1
80 TABLET, FILM COATED ORAL EVERY EVENING
Qty: 90 TABLET | Refills: 0 | Status: SHIPPED | OUTPATIENT
Start: 2024-12-19

## 2025-01-23 ENCOUNTER — MYC REFILL (OUTPATIENT)
Dept: ORTHOPEDICS | Facility: CLINIC | Age: 65
End: 2025-01-23
Payer: MEDICARE

## 2025-01-23 DIAGNOSIS — M25.551 HIP PAIN, RIGHT: ICD-10-CM

## 2025-01-23 RX ORDER — TIZANIDINE 2 MG/1
2-4 TABLET ORAL 3 TIMES DAILY PRN
Qty: 30 TABLET | Refills: 0 | Status: SHIPPED | OUTPATIENT
Start: 2025-01-23

## 2025-02-18 DIAGNOSIS — E11.9 TYPE 2 DIABETES MELLITUS WITHOUT COMPLICATION, WITHOUT LONG-TERM CURRENT USE OF INSULIN (H): ICD-10-CM

## 2025-02-19 RX ORDER — LANCETS
EACH MISCELLANEOUS
Qty: 200 EACH | Refills: 1 | Status: SHIPPED | OUTPATIENT
Start: 2025-02-19

## 2025-03-07 ENCOUNTER — TELEPHONE (OUTPATIENT)
Dept: ORTHOPEDICS | Facility: CLINIC | Age: 65
End: 2025-03-07

## 2025-03-07 DIAGNOSIS — M25.551 HIP PAIN, RIGHT: ICD-10-CM

## 2025-03-07 NOTE — LETTER
March 13, 2025    Tara Plaza  [id number] [address] [address]    Dear Tara,    At [facility] we care about your health and are committed to providing quality patient care.     Here is a list of Health Maintenance topics that are due now or due soon:  Health Maintenance Due   Topic Date Due    DEXA  Never done    ZOSTER IMMUNIZATION (1 of 2) Never done    RSV VACCINE (1 - Risk 60-74 years 1-dose series) Never done    DIABETIC FOOT EXAM  04/25/2023    MAMMO SCREENING  09/10/2023    A1C  07/25/2024    INFLUENZA VACCINE (1) 09/01/2024    COVID-19 Vaccine (4 - 2024-25 season) 09/01/2024    PHQ-9  10/25/2024    MEDICARE ANNUAL WELLNESS VISIT  12/02/2024    FALL RISK ASSESSMENT  Never done    COLORECTAL CANCER SCREENING  04/12/2025    ANNUAL REVIEW OF HM ORDERS  04/25/2025    PAP FOLLOW-UP  04/25/2025    HPV FOLLOW-UP  04/25/2025        We are recommending that you:  Schedule a WELLNESS (Preventative/Physical) APPOINTMENT with your primary care provider. If you go elsewhere for your wellness appointments then please disregard this reminder    ,   Schedule a MAMMOGRAM which is due.    1 in 8 women will develop invasive breast cancer during her lifetime and it is the most common non-skin cancer in [place] women.  EARLY detection, new treatments, and a better understanding of the disease have increased survival rates - the 5 year survival rate in the 1960s was 63% and today it is close to 90%.    If you are under/uninsured, we recommend you contact the [facility]. They offer mammograms at no charge or on a sliding fee charge. You can schedule with them at [phone]. Please have them send us the results.      Please disregard this reminder if you have had this exam elsewhere within the last year.  It would be helpful for us to have a copy of your mammogram report in your file so that we can best coordinate your care - please contact us with when your test was done so we can update  your record.    ,   Schedule a COLONOSCOPY to assess for colon cancer (due every 10 years or 5 years in higher risk situations.)       Colon cancer is now the second leading cause of cancer-related deaths in the [place] for both men and women and there are over 130,000 new cases and 50,000 deaths per year from colon cancer.  Colonoscopies can prevent 90-95% of these deaths.  Problem lesions can be removed before they ever become cancer.  This test is not only looking for cancer, but also getting rid of precancerious lesions.    If you are under/uninsured, we recommend you contact the [employer] program. [employer] is a free colorectal cancer screening program that provides colonoscopies for eligible under/uninsured [place] men and women. If you are interested in receiving a free colonoscopy, please call [employer] at [phone] (mention code [employer]) to see if you re eligible.     If you do not wish to do a colonoscopy or cannot afford to do one, at this time, there is another option. It is called a FIT test or Fecal Immunochemical Occult Blood Test (take home stool sample kit).  It does not replace the colonoscopy for colorectal cancer screening, but it can detect hidden bleeding in the lower colon.  It does need to be repeated every year and if a positive result is obtained, you would be referred for a colonoscopy.    If you have completed either one of these tests at another facility, please call/respond to this message with the details of when and where the tests were done and if they were normal or not. Or have the records sent to our clinic so that we can best coordinate your care.  ,   Schedule a PAP SMEAR EXAM which is due.  Please disregard this reminder if you have had this exam elsewhere within the last year.  It would be helpful for us to have a copy of your recent pap smear report in our file so that we can best coordinate your care.    If you are under/uninsured, we recommend you  contact the [facility]. They offer pap smears at no charge or on a sliding fee charge. You can schedule with them at [phone]. Please have them send us the results.    ,   Complete the attached Questionnaire:  You are due to complete the attached PHQ questionnaire. Please complete as soon as you are able.    , and   Schedule a Nurse-Only appointment to update your immunizations: Your records indicate that you are not up to date with your immunizations, please schedule a nurse-only appointment to get these updated or update them at your next office visit. If this is incorrect, please disregard.    To schedule an appointment or discuss this further, you may contact us by phone at the [facility] at [phone] or online through the patient [email or web address] @ [email or web address]    Thank you for trusting [facility] and we appreciate the opportunity to serve you.  We look forward to supporting your healthcare needs in the future.    Your partners in health,      Quality Committee at [facility]          Electronically signed

## 2025-03-07 NOTE — TELEPHONE ENCOUNTER
Prescription refill requested for:   tiZANidine (ZANAFLEX) 2 MG tablet       Last Written Prescription Date:  1/23/25  Last Fill Quantity: 30,   # refills: 0  Last Office Visit: 5/17/24  Future Office visit:           Shu Arizmendi ATC

## 2025-03-07 NOTE — TELEPHONE ENCOUNTER
Medication Refill Request    Has the patient contacted the pharmacy for the refill? Yes   Name of medication being requested: Tizanidine 2 mg  Provider who prescribed the medication: Dr Murrieta  Pharmacy: [facility]  Date medication is needed: asap

## 2025-03-13 NOTE — TELEPHONE ENCOUNTER
Patient Quality Outreach    Patient is due for the following:   Diabetes -  A1C and Foot Exam  Colon Cancer Screening  Cervical Cancer Screening - PAP Needed  Depression  -  PHQ-9 needed  Physical Annual Wellness Visit      Topic Date Due    Zoster (Shingles) Vaccine (1 of 2) Never done    Flu Vaccine (1) 09/01/2024    COVID-19 Vaccine (4 - 2024-25 season) 09/01/2024       Action(s) Taken:   Schedule a Annual Wellness Visit    Type of outreach:    Sent letter.    Questions for provider review:    None           Ana Maria Brar

## 2025-03-18 DIAGNOSIS — Z86.73 HISTORY OF STROKE: ICD-10-CM

## 2025-03-18 DIAGNOSIS — F33.41 RECURRENT MAJOR DEPRESSIVE DISORDER, IN PARTIAL REMISSION: ICD-10-CM

## 2025-03-18 DIAGNOSIS — I63.9 ACUTE ISCHEMIC STROKE (H): ICD-10-CM

## 2025-03-18 DIAGNOSIS — E11.9 TYPE 2 DIABETES MELLITUS WITHOUT COMPLICATION, WITHOUT LONG-TERM CURRENT USE OF INSULIN (H): ICD-10-CM

## 2025-03-18 DIAGNOSIS — I10 BENIGN ESSENTIAL HYPERTENSION: ICD-10-CM

## 2025-03-19 RX ORDER — CITALOPRAM HYDROBROMIDE 20 MG/1
20 TABLET ORAL EVERY MORNING
Qty: 30 TABLET | Refills: 1 | Status: SHIPPED | OUTPATIENT
Start: 2025-03-19

## 2025-03-19 RX ORDER — ATORVASTATIN CALCIUM 80 MG/1
80 TABLET, FILM COATED ORAL EVERY EVENING
Qty: 90 TABLET | Refills: 1 | Status: SHIPPED | OUTPATIENT
Start: 2025-03-19

## 2025-03-19 RX ORDER — AMLODIPINE BESYLATE 5 MG/1
5 TABLET ORAL DAILY
Qty: 90 TABLET | Refills: 0 | Status: SHIPPED | OUTPATIENT
Start: 2025-03-19

## 2025-03-19 RX ORDER — LISINOPRIL 40 MG/1
40 TABLET ORAL DAILY
Qty: 90 TABLET | Refills: 0 | Status: SHIPPED | OUTPATIENT
Start: 2025-03-19

## 2025-03-19 RX ORDER — CLOPIDOGREL BISULFATE 75 MG/1
TABLET ORAL
Qty: 30 TABLET | Refills: 1 | Status: SHIPPED | OUTPATIENT
Start: 2025-03-19

## 2025-03-19 NOTE — TELEPHONE ENCOUNTER
Please call patient and schedule for a office visit with me as she is due for annual visit for medication review

## 2025-05-16 DIAGNOSIS — F33.41 RECURRENT MAJOR DEPRESSIVE DISORDER, IN PARTIAL REMISSION: ICD-10-CM

## 2025-05-16 DIAGNOSIS — Z86.73 HISTORY OF STROKE: ICD-10-CM

## 2025-05-19 RX ORDER — CITALOPRAM HYDROBROMIDE 20 MG/1
20 TABLET ORAL EVERY MORNING
Qty: 30 TABLET | Refills: 2 | Status: SHIPPED | OUTPATIENT
Start: 2025-05-19

## 2025-05-19 RX ORDER — CLOPIDOGREL BISULFATE 75 MG/1
75 TABLET ORAL
Qty: 30 TABLET | Refills: 2 | Status: SHIPPED | OUTPATIENT
Start: 2025-05-19

## 2025-05-21 RX ORDER — TIZANIDINE 2 MG/1
2-4 TABLET ORAL 3 TIMES DAILY PRN
Qty: 30 TABLET | Refills: 0 | Status: SHIPPED | OUTPATIENT
Start: 2025-05-21

## 2025-06-17 DIAGNOSIS — E11.9 TYPE 2 DIABETES MELLITUS WITHOUT COMPLICATION, WITHOUT LONG-TERM CURRENT USE OF INSULIN (H): ICD-10-CM

## 2025-06-17 DIAGNOSIS — I10 BENIGN ESSENTIAL HYPERTENSION: ICD-10-CM

## 2025-06-17 DIAGNOSIS — I63.9 ACUTE ISCHEMIC STROKE (H): ICD-10-CM

## 2025-06-18 RX ORDER — AMLODIPINE BESYLATE 5 MG/1
5 TABLET ORAL DAILY
Qty: 90 TABLET | Refills: 0 | Status: SHIPPED | OUTPATIENT
Start: 2025-06-18

## 2025-06-18 RX ORDER — LISINOPRIL 40 MG/1
40 TABLET ORAL DAILY
Qty: 90 TABLET | Refills: 0 | Status: SHIPPED | OUTPATIENT
Start: 2025-06-18

## 2025-06-24 DIAGNOSIS — I63.9 ACUTE ISCHEMIC STROKE (H): ICD-10-CM

## 2025-06-24 DIAGNOSIS — E11.9 TYPE 2 DIABETES MELLITUS WITHOUT COMPLICATION, WITHOUT LONG-TERM CURRENT USE OF INSULIN (H): ICD-10-CM

## 2025-06-24 RX ORDER — LISINOPRIL 40 MG/1
40 TABLET ORAL DAILY
Qty: 90 TABLET | Refills: 11 | OUTPATIENT
Start: 2025-06-24

## 2025-08-15 DIAGNOSIS — Z86.73 HISTORY OF STROKE: ICD-10-CM

## 2025-08-15 DIAGNOSIS — F33.41 RECURRENT MAJOR DEPRESSIVE DISORDER, IN PARTIAL REMISSION: ICD-10-CM

## 2025-08-18 RX ORDER — CLOPIDOGREL BISULFATE 75 MG/1
75 TABLET ORAL DAILY
Qty: 30 TABLET | Refills: 4 | Status: SHIPPED | OUTPATIENT
Start: 2025-08-18

## 2025-08-18 RX ORDER — CITALOPRAM HYDROBROMIDE 20 MG/1
20 TABLET ORAL EVERY MORNING
Qty: 30 TABLET | Refills: 3 | Status: SHIPPED | OUTPATIENT
Start: 2025-08-18